# Patient Record
Sex: MALE | Race: WHITE | NOT HISPANIC OR LATINO | Employment: OTHER | URBAN - METROPOLITAN AREA
[De-identification: names, ages, dates, MRNs, and addresses within clinical notes are randomized per-mention and may not be internally consistent; named-entity substitution may affect disease eponyms.]

---

## 2021-04-13 DIAGNOSIS — Z23 ENCOUNTER FOR IMMUNIZATION: ICD-10-CM

## 2022-10-24 ENCOUNTER — APPOINTMENT (EMERGENCY)
Dept: RADIOLOGY | Facility: HOSPITAL | Age: 77
DRG: 163 | End: 2022-10-24
Attending: EMERGENCY MEDICINE
Payer: COMMERCIAL

## 2022-10-24 ENCOUNTER — HOSPITAL ENCOUNTER (INPATIENT)
Facility: HOSPITAL | Age: 77
LOS: 13 days | Discharge: HOME HEALTH | DRG: 163 | End: 2022-11-06
Attending: EMERGENCY MEDICINE | Admitting: INTERNAL MEDICINE
Payer: COMMERCIAL

## 2022-10-24 ENCOUNTER — APPOINTMENT (INPATIENT)
Dept: CARDIOLOGY | Facility: HOSPITAL | Age: 77
DRG: 163 | End: 2022-10-24
Attending: INTERNAL MEDICINE
Payer: COMMERCIAL

## 2022-10-24 DIAGNOSIS — I26.99 BILATERAL PULMONARY EMBOLISM (CMS/HCC): ICD-10-CM

## 2022-10-24 DIAGNOSIS — R79.89 ELEVATED LFTS: ICD-10-CM

## 2022-10-24 DIAGNOSIS — R55 SYNCOPE, UNSPECIFIED SYNCOPE TYPE: ICD-10-CM
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
## 2022-10-24 DIAGNOSIS — I26.02 ACUTE SADDLE PULMONARY EMBOLISM WITH ACUTE COR PULMONALE (CMS/HCC): ICD-10-CM

## 2022-10-24 DIAGNOSIS — I5A MYOCARDIAL INJURY: ICD-10-CM

## 2022-10-24 DIAGNOSIS — S01.81XA CHIN LACERATION, INITIAL ENCOUNTER: ICD-10-CM

## 2022-10-24 PROBLEM — E78.5 HYPERLIPIDEMIA: Status: ACTIVE | Noted: 2022-10-24

## 2022-10-24 PROBLEM — I24.89 DEMAND ISCHEMIA (CMS/HCC): Status: ACTIVE | Noted: 2022-10-24

## 2022-10-24 PROBLEM — I10 HYPERTENSION: Status: ACTIVE | Noted: 2022-10-24

## 2022-10-24 PROBLEM — S02.92XA FACIAL FRACTURE (CMS/HCC): Status: ACTIVE | Noted: 2022-10-24

## 2022-10-24 PROBLEM — E27.49 ADRENAL HEMORRHAGE (CMS/HCC): Status: ACTIVE | Noted: 2022-10-24

## 2022-10-24 LAB
AFP SERPL-MCNC: 1.4 NG/ML
ALBUMIN SERPL-MCNC: 4.1 G/DL (ref 3.4–5)
ALP SERPL-CCNC: 53 IU/L (ref 35–126)
ALT SERPL-CCNC: 256 IU/L (ref 16–63)
ANION GAP SERPL CALC-SCNC: 12 MEQ/L (ref 3–15)
AORTIC ROOT ANNULUS: 3.4 CM
APTT PPP: 30 SEC (ref 23–35)
APTT PPP: 88 SEC (ref 23–35)
AST SERPL-CCNC: 231 IU/L (ref 15–41)
BACTERIA URNS QL MICRO: ABNORMAL /HPF
BASOPHILS # BLD: 0.07 K/UL (ref 0.01–0.1)
BASOPHILS NFR BLD: 0.4 %
BILIRUB SERPL-MCNC: 1.5 MG/DL (ref 0.3–1.2)
BILIRUB UR QL STRIP.AUTO: NEGATIVE MG/DL
BSA FOR ECHO PROCEDURE: 2.1 M2
BUN SERPL-MCNC: 24 MG/DL (ref 8–20)
CALCIUM SERPL-MCNC: 9 MG/DL (ref 8.9–10.3)
CHLORIDE SERPL-SCNC: 99 MEQ/L (ref 98–109)
CLARITY UR REFRACT.AUTO: CLEAR
CO2 SERPL-SCNC: 25 MEQ/L (ref 22–32)
COLOR UR AUTO: YELLOW
CREAT SERPL-MCNC: 1.3 MG/DL (ref 0.8–1.3)
DIFFERENTIAL METHOD BLD: ABNORMAL
E WAVE DECELERATION TIME: 264 MS
E/A RATIO: 0.7
E/E' RATIO: 7.7
E/LAT E' RATIO: 6.4
EOSINOPHIL # BLD: 0.16 K/UL (ref 0.04–0.54)
EOSINOPHIL NFR BLD: 0.9 %
ERYTHROCYTE [DISTWIDTH] IN BLOOD BY AUTOMATED COUNT: 13.1 % (ref 11.6–14.4)
ERYTHROCYTE [DISTWIDTH] IN BLOOD BY AUTOMATED COUNT: 13.2 % (ref 11.6–14.4)
EST RIGHT VENT SYSTOLIC PRESSURE BY TRICUSPID REGURGITATION JET: 58 MMHG
FLUAV RNA SPEC QL NAA+PROBE: NEGATIVE
FLUBV RNA SPEC QL NAA+PROBE: NEGATIVE
FRACTIONAL SHORTENING: 32 %
GFR SERPL CREATININE-BSD FRML MDRD: 53.8 ML/MIN/1.73M*2
GLUCOSE SERPL-MCNC: 236 MG/DL (ref 70–99)
GLUCOSE UR STRIP.AUTO-MCNC: ABNORMAL MG/DL
HCT VFR BLDCO AUTO: 44.3 % (ref 40.1–51)
HCT VFR BLDCO AUTO: 44.7 % (ref 40.1–51)
HGB BLD-MCNC: 15 G/DL (ref 13.7–17.5)
HGB BLD-MCNC: 15.3 G/DL (ref 13.7–17.5)
HGB UR QL STRIP.AUTO: 1
HYALINE CASTS #/AREA URNS LPF: ABNORMAL /LPF
IMM GRANULOCYTES # BLD AUTO: 0.17 K/UL (ref 0–0.08)
IMM GRANULOCYTES NFR BLD AUTO: 1 %
INR PPP: 1.2
INTERVENTRICULAR SEPTUM: 0.88 CM
KETONES UR STRIP.AUTO-MCNC: 1 MG/DL
LA/AORTA RATIO: 1
LAD 2D: 3.4 CM
LEFT INTERNAL DIMENSION IN SYSTOLE: 2.17 CM (ref 3.24–4.91)
LEFT VENTRICULAR INTERNAL DIMENSION IN DIASTOLE: 3.19 CM (ref 5.54–7.69)
LEFT VENTRICULAR POSTERIOR WALL IN END DIASTOLE: 0.91 CM (ref 0.7–1.31)
LEUKOCYTE ESTERASE UR QL STRIP.AUTO: NEGATIVE
LYMPHOCYTES # BLD: 0.84 K/UL (ref 1.2–3.5)
LYMPHOCYTES NFR BLD: 4.8 %
MCH RBC QN AUTO: 29.4 PG (ref 28–33.2)
MCH RBC QN AUTO: 30.1 PG (ref 28–33.2)
MCHC RBC AUTO-ENTMCNC: 33.9 G/DL (ref 32.2–36.5)
MCHC RBC AUTO-ENTMCNC: 34.2 G/DL (ref 32.2–36.5)
MCV RBC AUTO: 86.7 FL (ref 83–98)
MCV RBC AUTO: 87.8 FL (ref 83–98)
MONOCYTES # BLD: 0.74 K/UL (ref 0.3–1)
MONOCYTES NFR BLD: 4.2 %
MUCOUS THREADS URNS QL MICRO: ABNORMAL /LPF
MV E'TISSUE VEL-LAT: 0.08 M/S
MV E'TISSUE VEL-MED: 0.07 M/S
MV PEAK A VEL: 0.72 M/S
MV PEAK E VEL: 0.51 M/S
NEUTROPHILS # BLD: 15.6 K/UL (ref 1.7–7)
NEUTS SEG NFR BLD: 88.7 %
NITRITE UR QL STRIP.AUTO: NEGATIVE
NRBC BLD-RTO: 0 %
PDW BLD AUTO: 9 FL (ref 9.4–12.4)
PDW BLD AUTO: 9 FL (ref 9.4–12.4)
PH UR STRIP.AUTO: 6.5 [PH]
PLATELET # BLD AUTO: 166 K/UL (ref 150–350)
PLATELET # BLD AUTO: 168 K/UL (ref 150–350)
POSTERIOR WALL: 0.91 CM
POTASSIUM SERPL-SCNC: 4.3 MEQ/L (ref 3.6–5.1)
PROT SERPL-MCNC: 7.1 G/DL (ref 6–8.2)
PROT UR QL STRIP.AUTO: 1
PROTHROMBIN TIME: 14.8 SEC (ref 12.2–14.5)
RBC # BLD AUTO: 5.09 M/UL (ref 4.5–5.8)
RBC # BLD AUTO: 5.11 M/UL (ref 4.5–5.8)
RBC #/AREA URNS HPF: ABNORMAL /HPF
RSV RNA SPEC QL NAA+PROBE: NEGATIVE
SARS-COV-2 RNA RESP QL NAA+PROBE: NEGATIVE
SODIUM SERPL-SCNC: 136 MEQ/L (ref 136–144)
SP GR UR REFRACT.AUTO: >1.035
SQUAMOUS URNS QL MICRO: ABNORMAL /HPF
TAPSE: 0.78 CM
TR MAX PG: 48 MMHG
TRICUSPID VALVE PEAK REGURGITATION VELOCITY: 3.47 M/S
TROPONIN I SERPL HS-MCNC: 1150.4 PG/ML
TROPONIN I SERPL HS-MCNC: 1154.2 PG/ML
UROBILINOGEN UR STRIP-ACNC: 0.2 EU/DL
WBC # BLD AUTO: 13.64 K/UL (ref 3.8–10.5)
WBC # BLD AUTO: 17.58 K/UL (ref 3.8–10.5)
WBC #/AREA URNS HPF: ABNORMAL /HPF
Z-SCORE OF LEFT VENTRICULAR DIMENSION IN END DIASTOLE: -7.16
Z-SCORE OF LEFT VENTRICULAR DIMENSION IN END SYSTOLE: -4.81
Z-SCORE OF LEFT VENTRICULAR POSTERIOR WALL IN END DIASTOLE: -0.26

## 2022-10-24 PROCEDURE — G1004 CDSM NDSC: HCPCS

## 2022-10-24 PROCEDURE — 80074 ACUTE HEPATITIS PANEL: CPT | Performed by: INTERNAL MEDICINE

## 2022-10-24 PROCEDURE — 93005 ELECTROCARDIOGRAM TRACING: CPT | Performed by: PHYSICIAN ASSISTANT

## 2022-10-24 PROCEDURE — 85610 PROTHROMBIN TIME: CPT | Performed by: PHYSICIAN ASSISTANT

## 2022-10-24 PROCEDURE — 25800000 HC PHARMACY IV SOLUTIONS: Performed by: INTERNAL MEDICINE

## 2022-10-24 PROCEDURE — 20600000 HC ROOM AND CARE INTERMEDIATE/TELEMETRY

## 2022-10-24 PROCEDURE — 81001 URINALYSIS AUTO W/SCOPE: CPT | Performed by: PHYSICIAN ASSISTANT

## 2022-10-24 PROCEDURE — 71045 X-RAY EXAM CHEST 1 VIEW: CPT

## 2022-10-24 PROCEDURE — 85730 THROMBOPLASTIN TIME PARTIAL: CPT | Performed by: INTERNAL MEDICINE

## 2022-10-24 PROCEDURE — 86803 HEPATITIS C AB TEST: CPT | Performed by: INTERNAL MEDICINE

## 2022-10-24 PROCEDURE — 82105 ALPHA-FETOPROTEIN SERUM: CPT | Performed by: INTERNAL MEDICINE

## 2022-10-24 PROCEDURE — 63600000 HC DRUGS/DETAIL CODE: Performed by: PHYSICIAN ASSISTANT

## 2022-10-24 PROCEDURE — 12011 RPR F/E/E/N/L/M 2.5 CM/<: CPT

## 2022-10-24 PROCEDURE — 82378 CARCINOEMBRYONIC ANTIGEN: CPT | Performed by: NURSE PRACTITIONER

## 2022-10-24 PROCEDURE — 36415 COLL VENOUS BLD VENIPUNCTURE: CPT | Performed by: PHYSICIAN ASSISTANT

## 2022-10-24 PROCEDURE — 85027 COMPLETE CBC AUTOMATED: CPT | Performed by: INTERNAL MEDICINE

## 2022-10-24 PROCEDURE — 63700000 HC SELF-ADMINISTRABLE DRUG: Performed by: INTERNAL MEDICINE

## 2022-10-24 PROCEDURE — 87637 SARSCOV2&INF A&B&RSV AMP PRB: CPT | Performed by: PHYSICIAN ASSISTANT

## 2022-10-24 PROCEDURE — 80053 COMPREHEN METABOLIC PANEL: CPT | Performed by: PHYSICIAN ASSISTANT

## 2022-10-24 PROCEDURE — 63600000 HC DRUGS/DETAIL CODE: Performed by: INTERNAL MEDICINE

## 2022-10-24 PROCEDURE — 85025 COMPLETE CBC W/AUTO DIFF WBC: CPT | Performed by: PHYSICIAN ASSISTANT

## 2022-10-24 PROCEDURE — 86301 IMMUNOASSAY TUMOR CA 19-9: CPT | Performed by: NURSE PRACTITIONER

## 2022-10-24 PROCEDURE — 0HQ1XZZ REPAIR FACE SKIN, EXTERNAL APPROACH: ICD-10-PCS | Performed by: EMERGENCY MEDICINE

## 2022-10-24 PROCEDURE — 84484 ASSAY OF TROPONIN QUANT: CPT | Mod: 91 | Performed by: PHYSICIAN ASSISTANT

## 2022-10-24 PROCEDURE — 93306 TTE W/DOPPLER COMPLETE: CPT

## 2022-10-24 PROCEDURE — 96374 THER/PROPH/DIAG INJ IV PUSH: CPT | Mod: 59

## 2022-10-24 PROCEDURE — 84484 ASSAY OF TROPONIN QUANT: CPT | Performed by: PHYSICIAN ASSISTANT

## 2022-10-24 PROCEDURE — 63600105 HC IODINE BASED CONTRAST: Performed by: PHYSICIAN ASSISTANT

## 2022-10-24 PROCEDURE — 85730 THROMBOPLASTIN TIME PARTIAL: CPT | Performed by: PHYSICIAN ASSISTANT

## 2022-10-24 PROCEDURE — 12051 INTMD RPR FACE/MM 2.5 CM/<: CPT | Performed by: PHYSICIAN ASSISTANT

## 2022-10-24 PROCEDURE — 99285 EMERGENCY DEPT VISIT HI MDM: CPT | Mod: 25

## 2022-10-24 PROCEDURE — 99223 1ST HOSP IP/OBS HIGH 75: CPT | Performed by: INTERNAL MEDICINE

## 2022-10-24 RX ORDER — ATORVASTATIN CALCIUM 20 MG/1
20 TABLET, FILM COATED ORAL EVERY EVENING
COMMUNITY

## 2022-10-24 RX ORDER — LATANOPROST 50 UG/ML
1 SOLUTION/ DROPS OPHTHALMIC NIGHTLY
Status: DISCONTINUED | OUTPATIENT
Start: 2022-10-24 | End: 2022-11-06 | Stop reason: HOSPADM

## 2022-10-24 RX ORDER — TIMOLOL MALEATE 5 MG/ML
1 SOLUTION/ DROPS OPHTHALMIC EVERY MORNING
COMMUNITY
Start: 2022-10-02

## 2022-10-24 RX ORDER — AMLODIPINE BESYLATE 10 MG/1
5 TABLET ORAL EVERY EVENING
COMMUNITY
Start: 2022-08-17 | End: 2022-11-06 | Stop reason: HOSPADM

## 2022-10-24 RX ORDER — AMOXICILLIN AND CLAVULANATE POTASSIUM 875; 125 MG/1; MG/1
1 TABLET, FILM COATED ORAL 2 TIMES DAILY WITH MEALS
Status: DISCONTINUED | OUTPATIENT
Start: 2022-10-24 | End: 2022-10-24

## 2022-10-24 RX ORDER — TRANDOLAPRIL 4 MG/1
4 TABLET ORAL EVERY EVENING
COMMUNITY
Start: 2022-08-16 | End: 2022-11-06 | Stop reason: HOSPADM

## 2022-10-24 RX ORDER — PANTOPRAZOLE SODIUM 40 MG/1
40 TABLET, DELAYED RELEASE ORAL DAILY
Status: DISCONTINUED | OUTPATIENT
Start: 2022-10-24 | End: 2022-11-06 | Stop reason: HOSPADM

## 2022-10-24 RX ORDER — NITROGLYCERIN 0.4 MG/1
0.4 TABLET SUBLINGUAL EVERY 5 MIN PRN
Status: DISCONTINUED | OUTPATIENT
Start: 2022-10-24 | End: 2022-11-06 | Stop reason: HOSPADM

## 2022-10-24 RX ORDER — LATANOPROST 50 UG/ML
1 SOLUTION/ DROPS OPHTHALMIC NIGHTLY
COMMUNITY

## 2022-10-24 RX ORDER — HEPARIN SODIUM 10000 [USP'U]/100ML
100-4000 INJECTION, SOLUTION INTRAVENOUS
Status: DISCONTINUED | OUTPATIENT
Start: 2022-10-24 | End: 2022-10-29

## 2022-10-24 RX ORDER — IBUPROFEN 200 MG
16-32 TABLET ORAL AS NEEDED
Status: DISCONTINUED | OUTPATIENT
Start: 2022-10-24 | End: 2022-11-06 | Stop reason: HOSPADM

## 2022-10-24 RX ORDER — ALBUTEROL SULFATE 0.83 MG/ML
5 SOLUTION RESPIRATORY (INHALATION) EVERY 6 HOURS PRN
Status: DISCONTINUED | OUTPATIENT
Start: 2022-10-24 | End: 2022-11-06 | Stop reason: HOSPADM

## 2022-10-24 RX ORDER — ESOMEPRAZOLE MAGNESIUM 40 MG/1
40 CAPSULE, DELAYED RELEASE ORAL EVERY EVENING
COMMUNITY
Start: 2022-10-13

## 2022-10-24 RX ORDER — ACETAMINOPHEN 325 MG/1
650 TABLET ORAL EVERY 4 HOURS PRN
Status: DISCONTINUED | OUTPATIENT
Start: 2022-10-24 | End: 2022-11-06 | Stop reason: HOSPADM

## 2022-10-24 RX ORDER — DEXTROSE 40 %
15-30 GEL (GRAM) ORAL AS NEEDED
Status: DISCONTINUED | OUTPATIENT
Start: 2022-10-24 | End: 2022-11-06 | Stop reason: HOSPADM

## 2022-10-24 RX ORDER — HYDROCHLOROTHIAZIDE 12.5 MG/1
12.5 TABLET ORAL EVERY MORNING
COMMUNITY
Start: 2022-09-05 | End: 2022-11-06 | Stop reason: HOSPADM

## 2022-10-24 RX ORDER — METOPROLOL SUCCINATE 25 MG/1
25 TABLET, EXTENDED RELEASE ORAL EVERY EVENING
COMMUNITY
Start: 2022-09-06

## 2022-10-24 RX ORDER — SODIUM CHLORIDE 9 MG/ML
INJECTION, SOLUTION INTRAVENOUS CONTINUOUS
Status: ACTIVE | OUTPATIENT
Start: 2022-10-24 | End: 2022-10-25

## 2022-10-24 RX ORDER — TIMOLOL MALEATE 5 MG/ML
1 SOLUTION/ DROPS OPHTHALMIC EVERY MORNING
Status: DISCONTINUED | OUTPATIENT
Start: 2022-10-25 | End: 2022-11-06 | Stop reason: HOSPADM

## 2022-10-24 RX ORDER — DEXTROSE 50 % IN WATER (D50W) INTRAVENOUS SYRINGE
25 AS NEEDED
Status: DISCONTINUED | OUTPATIENT
Start: 2022-10-24 | End: 2022-11-06 | Stop reason: HOSPADM

## 2022-10-24 RX ADMIN — HEPARIN SODIUM 1550 UNITS/HR: 10000 INJECTION, SOLUTION INTRAVENOUS at 22:57

## 2022-10-24 RX ADMIN — IOHEXOL 100 ML: 350 INJECTION, SOLUTION INTRAVENOUS at 09:31

## 2022-10-24 RX ADMIN — LATANOPROST 1 DROP: 50 SOLUTION OPHTHALMIC at 22:59

## 2022-10-24 RX ADMIN — PANTOPRAZOLE SODIUM 40 MG: 40 TABLET, DELAYED RELEASE ORAL at 15:20

## 2022-10-24 RX ADMIN — HEPARIN SODIUM 1550 UNITS/HR: 10000 INJECTION, SOLUTION INTRAVENOUS at 11:46

## 2022-10-24 RX ADMIN — SODIUM CHLORIDE: 9 INJECTION, SOLUTION INTRAVENOUS at 15:18

## 2022-10-24 ASSESSMENT — ENCOUNTER SYMPTOMS
DYSURIA: 0
CHILLS: 0
AGITATION: 0
SEIZURES: 0
PALPITATIONS: 0
ARTHRALGIAS: 0
BACK PAIN: 0
SORE THROAT: 0
SHORTNESS OF BREATH: 1
ABDOMINAL PAIN: 0
FEVER: 0
COLOR CHANGE: 0
CONFUSION: 0
COUGH: 0
HEMATURIA: 0
VOMITING: 1
EYE PAIN: 0

## 2022-10-24 NOTE — Clinical Note
Patient placed on procedure table in supine position with arms at side. Positioning devices: all pressure points padded, arm board under arms and safety strap applied.

## 2022-10-24 NOTE — ED ATTESTATION NOTE
I have personally seen and examined the patient.  I reviewed and agree with physician assistant / nurse practitioners assessment and plan of care, with the following exceptions: None  My examination, assessment, and plan of care of  Tim Humphries is as follows:       Vital Signs Review: Vital signs have been reviewed. The oxygen saturation is  SpO2: (!) 90 % which is  Hypoxic.    The patient is a 77-year-old male with past medical history of GERD, pericarditis, hypertension, heart murmur, sleep apnea, glaucoma, who presented to the emergency department for evaluation of fall with unresponsive episode.  The patient was found to have hypoxia on arrival by EMS.  Grunting respirations.  The patient's mental status improved and is currently awake and alert but does not recall the cause of him falling.  The patient did have recent COVID infection 2 months ago.  The patient recovered uneventfully and received COVID booster vaccination 1 week ago.  Patient has reported increased dyspnea with exertion over the past 4 days.  Patient denies fever, chills, cough, nausea, vomiting, diarrhea, chest pain, abdominal pain, lower extremity pain, edema, or rash.  Approximately 1 month ago the patient's wife reported left calf discomfort that has since resolved.  No recent immobilization or surgeries.    Physical exam: Vital signs reviewed.  General: Patient appears comfortable lying in bed.  HEENT: No scalp laceration.  Right chin laceration, EOMI, MMM.  PERRLA.  No facial tenderness.  Neck: Supple, nontender.  No JVD.  C-collar present.  Chest: Lungs clear to auscultation bilaterally, no rales.  No increased work of breathing.  No accessory muscle use.  Heart: Regular.  No murmur.  Abdomen: Soft, nontender, nondistended, no guarding, no rebound.  Extremities: No cyanosis, clubbing, edema, or calf tenderness.  No external signs of traumatic injury.  Skin: Warm and dry.  Neuro: GCS 15.  5/5 strength bilateral upper and lower  extremities, sensation normal, cranial nerves II to XII intact.  Affect: Normal.    Plan: Check labs, EKG, CT scan chest PE protocol rule out PE.  CT scan brain rule out traumatic injuries.    Labs Reviewed   CBC AND DIFF - Abnormal       Result Value    WBC 17.58 (*)     RBC 5.09      Hemoglobin 15.3      Hematocrit 44.7      MCV 87.8      MCH 30.1      MCHC 34.2      RDW 13.2      Platelets 168      MPV 9.0 (*)     Differential Type Auto      nRBC 0.0      Immature Granulocytes 1.0      Neutrophils 88.7      Lymphocytes 4.8      Monocytes 4.2      Eosinophils 0.9      Basophils 0.4      Immature Granulocytes, Absolute 0.17 (*)     Neutrophils, Absolute 15.60 (*)     Lymphocytes, Absolute 0.84 (*)     Monocytes, Absolute 0.74      Eosinophils, Absolute 0.16      Basophils, Absolute 0.07     PROTIME-INR - Abnormal    PT 14.8 (*)     INR 1.2     COMPREHENSIVE METABOLIC PANEL - Abnormal    Sodium 136      Potassium 4.3      Chloride 99      CO2 25      BUN 24 (*)     Creatinine 1.3      Glucose 236 (*)     Calcium 9.0      AST (SGOT) 231 (*)     ALT (SGPT) 256 (*)     Alkaline Phosphatase 53      Total Protein 7.1      Albumin 4.1      Bilirubin, Total 1.5 (*)     eGFR 53.8 (*)     Anion Gap 12     HIGH SENSITIVE TROPONIN I (BASELINE - REFLEX 2HR) - Abnormal    High Sens Troponin I 1,154.2 (*)    UA REFLEX CULTURE (MACROSCOPIC) - Abnormal    Color, Urine Yellow      Clarity, Urine Clear      Specific Gravity, Urine >1.035 (*)     pH, Urine 6.5      Leukocyte Esterase Negative      Nitrite, Urine Negative      Protein, Urine +1 (*)     Glucose, Urine Trace (*)     Ketones, Urine +1 (*)     Urobilinogen, Urine 0.2      Bilirubin, Urine Negative      Blood, Urine +1 (*)    HIGH SENSITIVE TROPONIN I (NO REFLEX) - Abnormal    High Sens Troponin I 1,150.4 (*)    UA MICROSCOPIC (UCU) - Abnormal    RBC, Urine 10 TO 19 (*)     WBC, Urine 0 TO 3      Squamous Epithelial None Seen      Hyaline Cast 3 TO 9 (*)     Bacteria, Urine  None Seen      MUCUSUA Rare (*)    SARS-COV-2 (COVID-19)/ FLU A/B, AND RSV, PCR - Normal    SARS-CoV-2 (COVID-19) Negative      Influenza A Negative      Influenza B Negative      Respiratory Syncytial Virus Negative      Narrative:     Testing performed using real-time PCR for detection of COVID-19. EUA approved validation studies performed on site.    PTT - Normal    PTT 30     SARS-COV-2 (COVID 19), PCR    Narrative:     The following orders were created for panel order SARS-CoV-2 (COVID-19), PCR Nasopharynx.  Procedure                               Abnormality         Status                     ---------                               -----------         ------                     SARS-COV-2 (COVID-19)/ F...[[id number]]  Normal              Final result                 Please view results for these tests on the individual orders.   URINALYSIS REFLEX CULTURE (ED AND OUTPATIENT ONLY)    Narrative:     The following orders were created for panel order UA with reflex culture.  Procedure                               Abnormality         Status                     ---------                               -----------         ------                     UA Reflex to Culture (Ma...[[id number]]  Abnormal            Final result               UA Microscopic[[id number]]               Abnormal            Final result                 Please view results for these tests on the individual orders.   RAINBOW DRAW PANEL    Narrative:     The following orders were created for panel order [facility] Draw Panel.  Procedure                               Abnormality         Status                     ---------                               -----------         ------                     RAINBOW RED[[id number]]                                      In process                 RAINBOW PINK[[id number]]                                     In process                 RAINBOW GOLD[[id number]]                                     In process                    Please view results for these tests on the individual orders.   PTT   CBC   CBC   HEPATITIS PANEL, ACUTE   HEPATITIS C ANTIBODY   AFP TUMOR MARKER   RAINBOW RED   RAINBOW PINK   RAINBOW GOLD     X-RAY CHEST 1 VIEW   Final Result   IMPRESSION:   No evidence of acute pulmonary disease.            CT ANGIOGRAPHY CHEST PULMONARY EMBOLISM WITH IV CONTRAST   Final Result   IMPRESSION:   1.  Extensive acute pulmonary arterial emboli involving the bilateral main and   multiple bilateral lobar, segmental and subsegmental branches, as detailed   above. Flattening of the interventricular septum with dilatation of the right   ventricle suggestive of right-sided heart strain. No evidence of acute pulmonary   infarct.   2.  Large, indeterminate infiltrative hypodense lesion within the right hepatic   lobe measuring up to 7 cm warranting further assessment with contrast-enhanced   MRI. Differential diagnosis includes metastatic disease, a primary malignancy as   well as abscess. Small adjacent similar appearing indeterminate right hepatic   lobe lesion.   3.  Indeterminate right adrenal mass with findings consistent with right adrenal   hemorrhage.   4.  A 10 mm groundglass nodule within the right middle lobe. A follow-up CT scan   of the chest in 6-12 months is recommended as per Fleischner guidelines.   5.  Additional incidental findings including ectasia of ascending thoracic   aorta, enlarged prostate gland, coronary arterial atherosclerotic vascular   disease and colonic diverticulosis without evidence of diverticulitis.         Findings and recommendations discussed with SUZANNA Muñoz by Dr. Thompson by   telephone at 9:54 AM and 10:21 AM on 10/24/2022.      Fleischner Society 2017 Guidelines for Management of Incidentally Detected   Pulmonary Nodules in Adults. (Subsolid Nodules)      Single ground glass:   <6 mm - No routine follow-up   >/= 6 mm - CT at 6-12 months to confirm persistence, then CT every 2 years until   5  years   (In certain suspicious nodules < 6 mm, consider follow-up at 2 and 4 years.  If   solid component(s) or growth develops, consider resection (Recommendations 3A   and 4A)            CT ABDOMEN PELVIS WITH IV CONTRAST   Final Result   IMPRESSION:   1.  Extensive acute pulmonary arterial emboli involving the bilateral main and   multiple bilateral lobar, segmental and subsegmental branches, as detailed   above. Flattening of the interventricular septum with dilatation of the right   ventricle suggestive of right-sided heart strain. No evidence of acute pulmonary   infarct.   2.  Large, indeterminate infiltrative hypodense lesion within the right hepatic   lobe measuring up to 7 cm warranting further assessment with contrast-enhanced   MRI. Differential diagnosis includes metastatic disease, a primary malignancy as   well as abscess. Small adjacent similar appearing indeterminate right hepatic   lobe lesion.   3.  Indeterminate right adrenal mass with findings consistent with right adrenal   hemorrhage.   4.  A 10 mm groundglass nodule within the right middle lobe. A follow-up CT scan   of the chest in 6-12 months is recommended as per Fleischner guidelines.   5.  Additional incidental findings including ectasia of ascending thoracic   aorta, enlarged prostate gland, coronary arterial atherosclerotic vascular   disease and colonic diverticulosis without evidence of diverticulitis.         Findings and recommendations discussed with SUZANNA Muñoz by Dr. Thompson by   telephone at 9:54 AM and 10:21 AM on 10/24/2022.      Fleischner Society 2017 Guidelines for Management of Incidentally Detected   Pulmonary Nodules in Adults. (Subsolid Nodules)      Single ground glass:   <6 mm - No routine follow-up   >/= 6 mm - CT at 6-12 months to confirm persistence, then CT every 2 years until   5 years   (In certain suspicious nodules < 6 mm, consider follow-up at 2 and 4 years.  If   solid component(s) or growth develops,  consider resection (Recommendations 3A   and 4A)            CT HEAD WITHOUT IV CONTRAST   Final Result   IMPRESSION:      1.  No posttraumatic intracranial abnormality.   2. Chronic changes noted under the comment.      CT CERVICAL SPINE WITHOUT IV CONTRAST   Final Result   IMPRESSION:      1.  No acute fractures. As clinically indicated, MRI of the cervical spine is   recommended for further evaluation.   2.  Other incidental findings noted under the comment.            CT FACIAL BONES WITHOUT IV CONTRAST   Final Result   IMPRESSION:   Multiple right-sided facial bone fractures as described under the comment.      ECG 12 lead         Transthoracic Echo (TTE) Complete    (Results Pending)   IR CONSULT    (Results Pending)     1456: Patient's test results were reviewed.  Vital signs reviewed.  Patient has evidence of extensive bilateral PE.  Patient did have abnormal findings on CT scan evaluation with indeterminate right adrenal mass with findings consistent of adrenal hemorrhage.  Case was discussed with on-call trauma surgeon who recommended continuation of plan for anticoagulation as patient's bilateral PE are potentially life-threatening.    Critical Care  Performed by: Henri Glover MD  Authorized by: Henri Glover MD     Critical care provider statement:     Critical care time (minutes):  120    Critical care time was exclusive of:  Separately billable procedures and treating other patients    Critical care was necessary to treat or prevent imminent or life-threatening deterioration of the following conditions:  Respiratory failure    Critical care was time spent personally by me on the following activities:  Ordering and performing treatments and interventions, ordering and review of laboratory studies, ordering and review of radiographic studies, pulse oximetry, re-evaluation of patient's condition, evaluation of patient's response to treatment, examination of patient and obtaining history from  patient or surrogate    I assumed direction of critical care for this patient from another provider in my specialty: no            Impression: Acute fall.  Acute loss of consciousness.  Acute respiratory failure with hypoxia.  Acute extensive bilateral pulmonary emboli.  Hepatic mass.  Multiple facial fractures.       I was physically present for the key/critical portions of the following procedures: None     Henri Glover MD  10/24/22 3175

## 2022-10-24 NOTE — H&P
Hospital Medicine Service -  History & Physical        CHIEF COMPLAINT   Fall, syncope     HISTORY OF PRESENT ILLNESS      Tim Humphries is a 77 y.o. male with a past medical history of hypertension, GERD, FLAVIA, hyperlipidemia who presents with syncope, fall.  Wife is at bedside who assisted with history.  For 1 is only patient had COVID about 2-3 months ago which he fully recovered without event.  After he recovered he went to get his COVID-vaccine about 2 weeks ago.  For the last 4 to 5 days patient has noted increased dyspnea with exertion and shortness of breath.  Today while coming down stair he passed out and fell down half a flight of stair.  Wife was close by it and found him down on the floor unresponsive.  Wife report patient was unconscious for about 10 minutes.  When EMS arrived patient was found to have grunting respiration with O2 sat at 86% on room air.  Was then brought to [facility] emergency room.  Patient also report he has some left leg swelling approximately 3 to 4 weeks ago which resolved by itself.    In ER he was found to have extensive PE with right heart strain.  He was found to be hypoxic and put on 4 L oxygen.  His blood pressure remained stable currently.  Mentation intact.  Patient CAT scan also revealed multiple right displaced facial fracture as well as adrenal hemorrhage.  ER PA discussed case with trauma surgery.  Due to patient extensive PE benefit of anticoagulation currently outweigh risk for bleed.  Trauma surgery recommend to start Eliquis and monitor hemoglobin closely.    PAST MEDICAL AND SURGICAL HISTORY      Past Medical History:   Diagnosis Date    Dyslipidemia     GERD (gastroesophageal reflux disease)     Glaucoma     Heart murmur     HTN (hypertension)     Implantable loop recorder present     Pericarditis     Sleep apnea        Past Surgical History:   Procedure Laterality Date    CARDIAC CATHETERIZATION      CATARACT EXTRACTION         PCP: Zachary Gutierrez  MD SATHYA    MEDICATIONS      Prior to Admission medications    Medication Sig Start Date End Date Taking? Authorizing Provider   amLODIPine (NORVASC) 10 mg tablet Take 5 mg by mouth every evening. 8/17/22  Yes Saniya Gomez MD   atorvastatin (LIPITOR) 20 mg tablet Take 20 mg by mouth every evening.   Yes Saniya Gomez MD   esomeprazole (NexIUM) 40 mg capsule Take 40 mg by mouth every evening. 10/13/22  Yes Saniya Gomez MD   hydrochlorothiazide (HYDRODIURIL) 12.5 mg tablet Take 12.5 mg by mouth every morning. 9/5/22  Yes Saniya Gomez MD   latanoprost (XALATAN) 0.005 % ophthalmic solution Administer 1 drop into both eyes nightly.   Yes Saniya Gomez MD   metoprolol succinate XL (TOPROL-XL) 25 mg 24 hr tablet Take 25 mg by mouth every evening. 9/6/22  Yes Saniya Gomez MD   timolol (TIMOPTIC) 0.5 % ophthalmic solution Administer 1 drop into the left eye every morning. 10/2/22  Yes ProviderSaniya MD   trandolapriL (MAVIK) 4 mg tablet Take 4 mg by mouth every evening. 8/16/22  Yes ProviderSaniya MD       ALLERGIES      Patient has no known allergies.    FAMILY HISTORY      History reviewed. No pertinent family history.    SOCIAL HISTORY      Social History     Socioeconomic History    Marital status:      Spouse name: None    Number of children: None    Years of education: None    Highest education level: None   Tobacco Use    Smoking status: Never    Smokeless tobacco: Never   Substance and Sexual Activity    Alcohol use: Not Currently     Comment: occasional wine    Drug use: Never     Social Determinants of Health     Food Insecurity: No Food Insecurity    Worried About Running Out of Food in the Last Year: Never true    Ran Out of Food in the Last Year: Never true       REVIEW OF SYSTEMS      All other systems reviewed and negative except as noted in HPI    PHYSICAL EXAMINATION      Temp:  [36.8 °C (98.2 °F)]  36.8 °C (98.2 °F)  Heart Rate:  [82-94] 88  Resp:  [14-24] 20  BP: (109-137)/(69-86) 118/77  Body mass index is 24.91 kg/m².    Physical Exam  Physical Exam   Constitutional:  appears well-developed and well-nourished. No distress.   HENT:   Head: Normocephalic. Swelling, ecchymosis right periorbital area, right chin suture.    Right Ear: External ear normal.   Left Ear: External ear normal.   Mouth/Throat: Oropharynx is clear and moist.   Eyes: Conjunctivae and EOM are normal. Right eye exhibits no discharge. Left eye exhibits no discharge.   Neck: Normal range of motion. Neck supple. No tracheal deviation present.   Cardiovascular: Normal rate, regular rhythm and normal heart sounds.  Exam reveals no gallop and no friction rub.    No murmur heard.  Pulmonary/Chest: mild conversational dyspnea. No stridor. No respiratory distress.  has no wheezes.  has no rales.   Abdominal: Soft. Bowel sounds are normal. exhibits no distension. There is no tenderness. There is no rebound and no guarding.   Musculoskeletal:  exhibits no edema or deformity.   Neurological:  is alert, awake, oriented x3   Skin: Skin is warm and dry, not diaphoretic.     LABS / IMAGING / EKG        Labs  Cbc  CMP Results       10/24/22     0830        K 4.3    Cl 99    CO2 25    Glucose 236    BUN 24    Creatinine 1.3    Calcium 9.0    Anion Gap 12            Albumin 4.1    EGFR 53.8         Comment for K at 0830 on 10/24/22: Results obtained on plasma. Plasma Potassium values may be up to 0.4 mEQ/L less than serum values. The differences may be greater for patients with high platelet or white cell counts.          SARS-CoV-2 (COVID-19) (no units)   Date/Time Value   10/24/2022 0839 Negative       ASSESSMENT AND PLAN           Elevated LFTs  Assessment & Plan  AST ALT elevated.  Patient and wife report he did not have any prior liver problem.  CT abdomen pelvis revealed a large hypodense liver lesion 7 cm  -Monitor LFT  -GI  consult  -check AFP, hepatitis panel  -Will need MRI evaluation once pt is more stable medically     Facial fracture (CMS/HCC)  Assessment & Plan  Multiple displaced facial fracture on CT scan  -trauma surgery and facial surgery c/s. Antibiotic per facial surgery.     Adrenal hemorrhage (CMS/HCC)  Assessment & Plan  CT abdomen pelvis with potential adrenal hemorrhage.  ER NP had discussed this with trauma surgery.  Okay to anticoagulate due to benefit greater than risk.  -Monitor CBC every 6, if hemoglobin dropping then will need to get stat CTA of the abdomen pelvis to assess for active bleed    Fall  Assessment & Plan  Fell this AM and passed out.   CT with facial bone fracture with displacement, CT a/p with potential adrenal hemorrhage?   -PT/OT when patient is more medically stable  -SW c/s    Hyperlipidemia  Assessment & Plan  Due to elevated LFT, will hold Lipitor for now until further GI eval    Demand ischemia (CMS/HCC)  Assessment & Plan  Patient has significantly elevated high-sensitivity troponin.  This is likely due to patient's PE leading to right heart strain.  Doubt ACS  -Check TTE  -Cardiology consult    Hypertension  Assessment & Plan  At home patient is on Norvasc, hydrochlorothiazide, metoprolol  -Due to extensive PE and right heart strain, will hold all blood pressure medication for now.    * Bilateral pulmonary embolism (CMS/HCC)  Assessment & Plan  CT with extensive bilateral PE with signs for right heart strain as well as elevated troponin.  Currently on 4 L oxygen, blood pressure stable.  Likely provoked with recent COVID with also concerning finding in the liver?   -Admit to PCU  -Continue heparin drip and monitor hemoglobin closely.  -Pulmonology consult-discussed with pulm who will assess patient today  -Continuous pulse ox  -Albuterol as needed  -If hemodynamically become unstable then will need to be upgraded to ICU       VTE Assessment: Padua    VTE Prophylaxis: Current  anticoagulants:  heparin (porcine) bolus from bag 3,400-6,850 Units, intravenous, q6h PRN  heparin infusion in D5W 100 units/mL, intravenous, Titrated      Code Status: Full Code      Discussed advanced care planning. Full code- discussed with pt  Estimated Discharge Date: 10/31/2022  Disposition Planning: need >2 MN     Cheryl Bradford DO  10/24/2022

## 2022-10-24 NOTE — ED PROVIDER NOTES
Emergency Medicine Note  HPI   HISTORY OF PRESENT ILLNESS     The patient is a 77-year-old male with past medical history of hypertension, hyperlipidemia, coronary artery disease, sleep apnea, and GERD who presents today for evaluation of a fall with loss of consciousness.  The patient does not recall the details of the fall.  The wife was at home during the fall and was at his side quickly after hearing him.  She reports he fell down approximately half of the steps in their home.  He was unconscious for approximately 10 minutes until EMS arrived.  EMS reported patient was having grunting respirations upon their arrival and was hypoxic at 86% on room air.  His oxygen improved quickly on nonrebreather.  Patient states he has been short of breath for the past 5 days.  He reports receiving a COVID booster vaccination last week prior to onset of his symptoms.  He also reports having right leg pain which has now resolved.  Denies any history of respiratory problems or hypoxia in the past.  Patient denies headache.  He vomited once in the ambulance.  Patient does not take any blood thinners. The patient had COVID in August 2022.       History provided by:  Patient   used: No    Trauma  Mechanism of injury: fall     Current symptoms:       Associated symptoms:             Reports vomiting.             Denies abdominal pain, back pain, chest pain and seizures.         Patient History   PAST HISTORY     Reviewed from Nursing Triage:  Tobacco  Allergies  Meds  Problems  Med Hx  Surg Hx  Fam Hx  Soc   Hx      Past Medical History:   Diagnosis Date    Dyslipidemia     GERD (gastroesophageal reflux disease)     Glaucoma     Heart murmur     HTN (hypertension)     Implantable loop recorder present     Pericarditis     Sleep apnea        Past Surgical History:   Procedure Laterality Date    CARDIAC CATHETERIZATION      CATARACT EXTRACTION         History reviewed. No pertinent family  history.    Social History     Tobacco Use    Smoking status: Never    Smokeless tobacco: Never   Substance Use Topics    Alcohol use: Not Currently     Comment: occasional wine    Drug use: Never         Review of Systems   REVIEW OF SYSTEMS     Review of Systems   Constitutional: Negative for chills and fever.   HENT: Negative for ear pain and sore throat.    Eyes: Negative for pain and visual disturbance.   Respiratory: Positive for shortness of breath. Negative for cough.    Cardiovascular: Negative for chest pain and palpitations.   Gastrointestinal: Positive for vomiting. Negative for abdominal pain.   Endocrine: Negative for polyuria.   Genitourinary: Negative for dysuria and hematuria.   Musculoskeletal: Negative for arthralgias and back pain.   Skin: Negative for color change and rash.   Neurological: Positive for syncope. Negative for seizures.   Psychiatric/Behavioral: Negative for agitation and confusion.         VITALS     ED Vitals    Date/Time Temp Pulse Resp BP SpO2 [facility]   10/24/22 1502 -- 88 28 138/95 94 % SC   10/24/22 1501 -- -- -- 138/80 -- KM   10/24/22 1432 -- 86 30 138/80 95 % SC   10/24/22 1402 -- 84 23 127/80 93 % SC   10/24/22 1344 -- 84 22 122/79 93 % SC   10/24/22 1214 -- 88 20 118/77 93 % ALB   10/24/22 1145 -- 82 23 116/75 92 % ALB   10/24/22 1032 -- 84 24 109/69 92 % SC   10/24/22 0932 -- 94 21 137/79 94 % SC   10/24/22 0900 36.8 °C (98.2 °F) 93 16 127/80 95 % SC   10/24/22 0825 -- -- -- -- 95 % SC   10/24/22 0824 -- 92 14 128/86 90 % SC        Pulse Ox %: 90 % (on 2 L NC) (10/24/22 0829)  Pulse Ox Interpretation: Low (10/24/22 0829)  Heart Rate: 96 (10/24/22 0829)  Rhythm Strip Interpretation: Normal Sinus Rhythm (10/24/22 0829)     Physical Exam   PHYSICAL EXAM     Physical Exam  Vitals and nursing note reviewed.   Constitutional:       General: He is not in acute distress.     Appearance: Normal appearance. He is well-developed and well-nourished. He is not diaphoretic.   HENT:       Head: Normocephalic. No raccoon eyes or Dyson's sign.        Mouth/Throat:      Mouth: Oropharynx is clear and moist and mucous membranes are normal.   Eyes:      Extraocular Movements: Extraocular movements intact.      Conjunctiva/sclera: Conjunctivae normal.      Pupils: Pupils are equal, round, and reactive to light.   Neck:      Trachea: Trachea normal.      Comments: C-collar in place  Cardiovascular:      Rate and Rhythm: Normal rate and regular rhythm.      Pulses: Intact distal pulses.      Heart sounds: Normal heart sounds. No murmur heard.  Pulmonary:      Effort: Pulmonary effort is normal. No respiratory distress.      Breath sounds: Normal breath sounds. No wheezing, rhonchi or rales.   Chest:      Chest wall: No tenderness.   Abdominal:      General: Bowel sounds are normal. There is no distension.      Palpations: Abdomen is soft. There is no hepatosplenomegaly or mass.      Tenderness: There is no abdominal tenderness. There is no guarding or rebound.   Musculoskeletal:      Right shoulder: Normal.      Left shoulder: Normal.      Cervical back: Normal.      Thoracic back: Normal.      Lumbar back: Normal.      Right hip: Normal.      Left hip: Normal.   Skin:     General: Skin is warm and dry.      Coloration: Skin is not pale.      Findings: No rash.   Neurological:      General: No focal deficit present.      Mental Status: He is alert and oriented to person, place, and time.      Cranial Nerves: No cranial nerve deficit.      Sensory: No sensory deficit.      Motor: Motor strength is normal. No weakness.   Psychiatric:         Mood and Affect: Mood and affect normal.         Speech: Speech normal.         Behavior: Behavior normal. Behavior is cooperative.         Thought Content: Thought content normal.         Judgment: Judgment normal.           PROCEDURES     Laceration Repair    Date/Time: 10/24/2022 11:33 AM  Performed by: Ankita Muñoz PA C  Authorized by: Henri Glover MD      Consent:     Consent obtained:  Verbal    Consent given by:  Patient    Risks discussed:  Infection, pain, poor cosmetic result, retained foreign body, poor wound healing, need for additional repair, nerve damage, tendon damage and vascular damage    Alternatives discussed:  No treatment  Universal protocol:     Patient identity confirmed:  Verbally with patient and arm band  Anesthesia:     Anesthesia method:  Local infiltration    Local anesthetic:  Lidocaine 1% WITH epi  Laceration details:     Location:  Face    Face location:  Chin    Length (cm):  2    Depth (mm):  6  Pre-procedure details:     Preparation:  Patient was prepped and draped in usual sterile fashion  Exploration:     Wound exploration: wound explored through full range of motion      Wound extent: no fascia violation noted, no foreign bodies/material noted, no muscle damage noted, no nerve damage noted, no tendon damage noted, no underlying fracture noted and no vascular damage noted      Contaminated: no    Treatment:     Area cleansed with:  Betadine    Amount of cleaning:  Standard    Irrigation solution:  Sterile saline    Irrigation volume:  500    Irrigation method:  Pressure wash and syringe    Layers/structures repaired:  Deep subcutaneous  Deep subcutaneous:     Suture size:  5-0    Suture material:  Vicryl    Suture technique:  Simple interrupted    Number of sutures:  3  Skin repair:     Repair method:  Sutures    Suture size:  6-0    Suture material:  Nylon    Suture technique:  Simple interrupted    Number of sutures:  8  Approximation:     Approximation:  Close  Repair type:     Repair type:  Intermediate  Post-procedure details:     Dressing:  Antibiotic ointment    Procedure completion:  Tolerated well, no immediate complications         DATA     Results     Procedure Component Value Units Date/Time    APTT [[id number]] Collected: 10/24/22 1806    Specimen: Blood, Venous Updated: 10/24/22 1810    [place] Draw Panel [[id number]]  Collected: 10/24/22 0830    Specimen: Blood, Venous Updated: 10/24/22 1801    Narrative:      The following orders were created for panel order [place] Draw Panel.  Procedure                               Abnormality         Status                     ---------                               -----------         ------                     RAINBOW RED[[id number]]                                      In process                 RAINBOW PINK[[id number]]                                     Final result               RAINBOW GOLD[[id number]]                                     Final result                 Please view results for these tests on the individual orders.    RAINBOW GOLD [[id number]] Collected: 10/24/22 0830    Specimen: Blood, Venous Updated: 10/24/22 1801    RAINBOW PINK [[id number]] Collected: 10/24/22 0830    Specimen: Blood, Venous Updated: 10/24/22 1700    UA with reflex culture [[id number]]  (Abnormal) Collected: 10/24/22 1233    Specimen: Urine, Clean Catch Updated: 10/24/22 1257    Narrative:      The following orders were created for panel order UA with reflex culture.  Procedure                               Abnormality         Status                     ---------                               -----------         ------                     UA Reflex to Culture (Ma...[[id number]]  Abnormal            Final result               UA Microscopic[[id number]]               Abnormal            Final result                 Please view results for these tests on the individual orders.    UA Microscopic [[id number]]  (Abnormal) Collected: 10/24/22 1233    Specimen: Urine, Clean Catch Updated: 10/24/22 1257     RBC, Urine 10 TO 19 /HPF      WBC, Urine 0 TO 3 /HPF      Squamous Epithelial None Seen /hpf      Hyaline Cast 3 TO 9 /lpf      Bacteria, Urine None Seen /HPF      MUCUSUA Rare /LPF     UA Reflex to Culture (Macroscopic) [[id number]]  (Abnormal) Collected: 10/24/22 1233    Specimen: Urine, Clean Catch Updated:  10/24/22 1253     Color, Urine Yellow     Clarity, Urine Clear     Specific Gravity, Urine >1.035     pH, Urine 6.5     Leukocyte Esterase Negative     Comment: Results can be falsely negative due to high specific gravity, some antibiotics, glucose >3 g/dl, or WBC other than neutrophils.        Nitrite, Urine Negative     Protein, Urine +1     Glucose, Urine Trace mg/dL      Ketones, Urine +1 mg/dL      Comment: Free sulfhydryl drugs such as Mesna, Capoten, and Acetylcysteine (Mucomyst) may cause false positive ketonuria.        Urobilinogen, Urine 0.2 EU/dL      Bilirubin, Urine Negative mg/dL      Blood, Urine +1     Comment: The sensitivity of the occult blood test is equivalent to approximately 4 intact RBC/HPF.       SARS-CoV-2 (COVID-19), PCR Nasopharynx [[id number]]  (Normal) Collected: 10/24/22 0839    Specimen: Nasopharyngeal Swab from Nasopharynx Updated: 10/24/22 0925    Narrative:      The following orders were created for panel order SARS-CoV-2 (COVID-19), PCR Nasopharynx.  Procedure                               Abnormality         Status                     ---------                               -----------         ------                     SARS-COV-2 (COVID-19)/ F...[[id number]]  Normal              Final result                 Please view results for these tests on the individual orders.    SARS-COV-2 (COVID-19)/ FLU A/B, AND RSV, PCR Nasopharynx [[id number]]  (Normal) Collected: 10/24/22 0839    Specimen: Nasopharyngeal Swab from Nasopharynx Updated: 10/24/22 0925     SARS-CoV-2 (COVID-19) Negative     Influenza A Negative     Influenza B Negative     Respiratory Syncytial Virus Negative    Narrative:      Testing performed using real-time PCR for detection of COVID-19. EUA approved validation studies performed on site.     HS Troponin I (with 2 hour reflex) [[id number]]  (Abnormal) Collected: 10/24/22 0830    Specimen: Blood, Venous Updated: 10/24/22 0910     High Sens Troponin I 1,154.2 pg/mL      Comprehensive metabolic panel [[id number]]  (Abnormal) Collected: 10/24/22 0830    Specimen: Blood, Venous Updated: 10/24/22 0854     Sodium 136 mEQ/L      Potassium 4.3 mEQ/L      Comment: Results obtained on plasma. Plasma Potassium values may be up to 0.4 mEQ/L less than serum values. The differences may be greater for patients with high platelet or white cell counts.        Chloride 99 mEQ/L      CO2 25 mEQ/L      BUN 24 mg/dL      Creatinine 1.3 mg/dL      Glucose 236 mg/dL      Calcium 9.0 mg/dL      AST (SGOT) 231 IU/L      ALT (SGPT) 256 IU/L      Alkaline Phosphatase 53 IU/L      Total Protein 7.1 g/dL      Comment: Test performed on plasma which typically contains approximately 0.4 g/dL more protein than serum.        Albumin 4.1 g/dL      Bilirubin, Total 1.5 mg/dL      eGFR 53.8 mL/min/1.73m*2      Anion Gap 12 mEQ/L     Protime-INR [[id number]]  (Abnormal) Collected: 10/24/22 0830    Specimen: Blood, Venous Updated: 10/24/22 0847     PT 14.8 sec      INR 1.2     Comment: Moderate Intensity Anticoagulation = 2.0 to 3.0, High Intensity = 2.5 to 3.5       APTT [[id number]]  (Normal) Collected: 10/24/22 0830    Specimen: Blood, Venous Updated: 10/24/22 0847     PTT 30 sec     CBC and differential [[id number]]  (Abnormal) Collected: 10/24/22 0830    Specimen: Blood, Venous Updated: 10/24/22 0844     WBC 17.58 K/uL      RBC 5.09 M/uL      Hemoglobin 15.3 g/dL      Hematocrit 44.7 %      MCV 87.8 fL      MCH 30.1 pg      MCHC 34.2 g/dL      RDW 13.2 %      Platelets 168 K/uL      MPV 9.0 fL      Differential Type Auto     nRBC 0.0 %      Immature Granulocytes 1.0 %      Neutrophils 88.7 %      Lymphocytes 4.8 %      Monocytes 4.2 %      Eosinophils 0.9 %      Basophils 0.4 %      Immature Granulocytes, Absolute 0.17 K/uL      Neutrophils, Absolute 15.60 K/uL      Lymphocytes, Absolute 0.84 K/uL      Monocytes, Absolute 0.74 K/uL      Eosinophils, Absolute 0.16 K/uL      Basophils, Absolute 0.07 K/uL     RAINBOW  RED [[id number]] Collected: 10/24/22 0830    Specimen: Blood, Venous Updated: 10/24/22 0836          Imaging Results          X-RAY CHEST 1 VIEW (Final result)  Result time 10/24/22 09:53:30    Final result                 Impression:    IMPRESSION:  No evidence of acute pulmonary disease.                 Narrative:    CLINICAL HISTORY:   Hypoxia    COMMENT:    Comparison: None..    2 AP views of the chest were obtained.    The lungs are clear without evidence of focal consolidation.  There is no  evidence of pleural effusion or pneumothorax. Cardiac silhouette is within  normal limits for size..                               CT ANGIOGRAPHY CHEST PULMONARY EMBOLISM WITH IV CONTRAST (Final result)  Result time 10/24/22 10:22:35    Final result                 Impression:    IMPRESSION:  1.  Extensive acute pulmonary arterial emboli involving the bilateral main and  multiple bilateral lobar, segmental and subsegmental branches, as detailed  above. Flattening of the interventricular septum with dilatation of the right  ventricle suggestive of right-sided heart strain. No evidence of acute pulmonary  infarct.  2.  Large, indeterminate infiltrative hypodense lesion within the right hepatic  lobe measuring up to 7 cm warranting further assessment with contrast-enhanced  MRI. Differential diagnosis includes metastatic disease, a primary malignancy as  well as abscess. Small adjacent similar appearing indeterminate right hepatic  lobe lesion.  3.  Indeterminate right adrenal mass with findings consistent with right adrenal  hemorrhage.  4.  A 10 mm groundglass nodule within the right middle lobe. A follow-up CT scan  of the chest in 6-12 months is recommended as per Fleischner guidelines.  5.  Additional incidental findings including ectasia of ascending thoracic  aorta, enlarged prostate gland, coronary arterial atherosclerotic vascular  disease and colonic diverticulosis without evidence of diverticulitis.      Findings  and recommendations discussed with SUZANNA Muñoz by Dr. Thompson by  telephone at 9:54 AM and 10:21 AM on 10/24/2022.    Fleischner Society 2017 Guidelines for Management of Incidentally Detected  Pulmonary Nodules in Adults. (Subsolid Nodules)    Single ground glass:  <6 mm - No routine follow-up  >/= 6 mm - CT at 6-12 months to confirm persistence, then CT every 2 years until  5 years  (In certain suspicious nodules < 6 mm, consider follow-up at 2 and 4 years.  If  solid component(s) or growth develops, consider resection (Recommendations 3A  and 4A)                 Narrative:    CLINICAL HISTORY: Trauma  Pulmonary embolism (PE) suspected, high prob    COMPARISON: None    COMMENT:    TECHNIQUE: CT of the chest, abdomen and pelvis was performed after the  uneventful administration of intravenous contrast with the patient in the supine  position. Images reconstructed/reformatted in the axial, coronal and  sagittal  planes.    CT DOSE:  One or more dose reduction techniques (e.g. automated exposure  control, adjustment of the mA and/or kV according to patient size, use of  iterative reconstruction technique) utilized for this examination.    100mL of iohexoL (OMNIPAQUE 350) 350 mg iodine/mL solution 100 mL          CHEST  CHEST WALL AND LOWER NECK: within normal limits  MEDIASTINUM AND MONICA: within normal limits  HEART: Normal in size. However, there is flattening of the interventricular  septum with dilatation of the right ventricle consistent with right-sided heart  strain. No pericardial effusion.  VESSELS: There are extensive acute pulmonary arterial emboli involving the right  main, right upper lobe are and right interlobar pulmonary arterial branches as  well as multiple right upper, middle and lower lobe segmental and subsegmental  branches. There are additional acute pulmonary arterial emboli involving the  distal left main and left lower lobar pulmonary artery branches and multiple  left upper and lower lobe  segmental and subsegmental arterial branches. There is  ectasia of the ascending thoracic aorta measuring 4.5 cm. There are extensive  coronary arterial atherosclerotic vascular calcifications.  LUNG , LARGE AIRWAYS AND PLEURA: The trachea and central bronchi are patent.  There is no focal airspace consolidation, pleural effusion or pneumothorax.  There is no evidence of an acute pulmonary infarct. There is a groundglass  nodule measuring 10 mm in the perihilar right middle lobe (series 4 image 186).    BONES: There is no acute osseous abnormality or concerning osseous lesion. There  are mild degenerative changes within the thoracolumbar spine.    ABDOMEN AND PELVIS  LIVER: The liver is normal in size and contour. There are several scattered  cysts seen within the liver, the largest measuring 4 cm within the left hepatic  lobe. There is a somewhat ill-defined hypoattenuating lesion within the right  hepatic lobe measuring up to 7 cm. There is an adjacent 2.3 cm hypoattenuating  lesion which does not have the appearance of a simple cyst within the right  hepatic lobe.  BILE DUCTS: normal caliber  GALLBLADDER: Normal  PANCREAS: within normal limits  SPLEEN: within normal limits  ADRENALS: The left adrenal gland is normal. There is an indeterminate hypodense  lesion distending the lateral limb of the right adrenal gland measuring 3.4 x  2.7 cm (66 Hounsfield units). There is adjacent ill-defined complex fluid and  stranding likely reflecting adrenal hemorrhage.  KIDNEYS/URETERS/BLADDER: There is a small simple cyst at the upper pole of the  right kidney. The kidneys, ureters and urinary bladder are otherwise  unremarkable.    REPRODUCTIVE ORGANS: The prostate gland is enlarged.    BOWEL: There is no bowel wall thickening. Normal bowel caliber. There are  colonic diverticula without associated inflammatory changes. A normal appendix  is seen in the right lower quadrant.  no ascites or free air, no fluid  collection.  ABDOMINAL LYMPH NODES: within normal limits.  ABDOMINAL WALL: within normal limits                               CT ABDOMEN PELVIS WITH IV CONTRAST (Final result)  Result time 10/24/22 10:22:35    Final result                 Impression:    IMPRESSION:  1.  Extensive acute pulmonary arterial emboli involving the bilateral main and  multiple bilateral lobar, segmental and subsegmental branches, as detailed  above. Flattening of the interventricular septum with dilatation of the right  ventricle suggestive of right-sided heart strain. No evidence of acute pulmonary  infarct.  2.  Large, indeterminate infiltrative hypodense lesion within the right hepatic  lobe measuring up to 7 cm warranting further assessment with contrast-enhanced  MRI. Differential diagnosis includes metastatic disease, a primary malignancy as  well as abscess. Small adjacent similar appearing indeterminate right hepatic  lobe lesion.  3.  Indeterminate right adrenal mass with findings consistent with right adrenal  hemorrhage.  4.  A 10 mm groundglass nodule within the right middle lobe. A follow-up CT scan  of the chest in 6-12 months is recommended as per Fleischner guidelines.  5.  Additional incidental findings including ectasia of ascending thoracic  aorta, enlarged prostate gland, coronary arterial atherosclerotic vascular  disease and colonic diverticulosis without evidence of diverticulitis.      Findings and recommendations discussed with SUZANNA Muñoz by Dr. Thompson by  telephone at 9:54 AM and 10:21 AM on 10/24/2022.    Fleischner Society 2017 Guidelines for Management of Incidentally Detected  Pulmonary Nodules in Adults. (Subsolid Nodules)    Single ground glass:  <6 mm - No routine follow-up  >/= 6 mm - CT at 6-12 months to confirm persistence, then CT every 2 years until  5 years  (In certain suspicious nodules < 6 mm, consider follow-up at 2 and 4 years.  If  solid component(s) or growth develops, consider resection  (Recommendations 3A  and 4A)                 Narrative:    CLINICAL HISTORY: Trauma  Pulmonary embolism (PE) suspected, high prob    COMPARISON: None    COMMENT:    TECHNIQUE: CT of the chest, abdomen and pelvis was performed after the  uneventful administration of intravenous contrast with the patient in the supine  position. Images reconstructed/reformatted in the axial, coronal and  sagittal  planes.    CT DOSE:  One or more dose reduction techniques (e.g. automated exposure  control, adjustment of the mA and/or kV according to patient size, use of  iterative reconstruction technique) utilized for this examination.    100mL of iohexoL (OMNIPAQUE 350) 350 mg iodine/mL solution 100 mL          CHEST  CHEST WALL AND LOWER NECK: within normal limits  MEDIASTINUM AND MONICA: within normal limits  HEART: Normal in size. However, there is flattening of the interventricular  septum with dilatation of the right ventricle consistent with right-sided heart  strain. No pericardial effusion.  VESSELS: There are extensive acute pulmonary arterial emboli involving the right  main, right upper lobe are and right interlobar pulmonary arterial branches as  well as multiple right upper, middle and lower lobe segmental and subsegmental  branches. There are additional acute pulmonary arterial emboli involving the  distal left main and left lower lobar pulmonary artery branches and multiple  left upper and lower lobe segmental and subsegmental arterial branches. There is  ectasia of the ascending thoracic aorta measuring 4.5 cm. There are extensive  coronary arterial atherosclerotic vascular calcifications.  LUNG , LARGE AIRWAYS AND PLEURA: The trachea and central bronchi are patent.  There is no focal airspace consolidation, pleural effusion or pneumothorax.  There is no evidence of an acute pulmonary infarct. There is a groundglass  nodule measuring 10 mm in the perihilar right middle lobe (series 4 image 186).    BONES: There is no  acute osseous abnormality or concerning osseous lesion. There  are mild degenerative changes within the thoracolumbar spine.    ABDOMEN AND PELVIS  LIVER: The liver is normal in size and contour. There are several scattered  cysts seen within the liver, the largest measuring 4 cm within the left hepatic  lobe. There is a somewhat ill-defined hypoattenuating lesion within the right  hepatic lobe measuring up to 7 cm. There is an adjacent 2.3 cm hypoattenuating  lesion which does not have the appearance of a simple cyst within the right  hepatic lobe.  BILE DUCTS: normal caliber  GALLBLADDER: Normal  PANCREAS: within normal limits  SPLEEN: within normal limits  ADRENALS: The left adrenal gland is normal. There is an indeterminate hypodense  lesion distending the lateral limb of the right adrenal gland measuring 3.4 x  2.7 cm (66 Hounsfield units). There is adjacent ill-defined complex fluid and  stranding likely reflecting adrenal hemorrhage.  KIDNEYS/URETERS/BLADDER: There is a small simple cyst at the upper pole of the  right kidney. The kidneys, ureters and urinary bladder are otherwise  unremarkable.    REPRODUCTIVE ORGANS: The prostate gland is enlarged.    BOWEL: There is no bowel wall thickening. Normal bowel caliber. There are  colonic diverticula without associated inflammatory changes. A normal appendix  is seen in the right lower quadrant.  no ascites or free air, no fluid collection.  ABDOMINAL LYMPH NODES: within normal limits.  ABDOMINAL WALL: within normal limits                               CT HEAD WITHOUT IV CONTRAST (Final result)  Result time 10/24/22 09:33:47    Final result                 Impression:    IMPRESSION:    1.  No posttraumatic intracranial abnormality.  2. Chronic changes noted under the comment.             Narrative:    CLINICAL HISTORY: Trauma.    COMMENT: Contiguous axial CT images of the brain is performed without  intravenous contrast with sagittal and coronal reformatted  images.    COMPARISON:  None    CT DOSE: One or more dose reduction technique (e.g.automated exposure control,  adjustment of the kV and/or mA according to the patient's size, use of iterative  reconstruction technique) utilized for this examination.    There is diffuse generalized cerebral volume loss. Decrease in density is  visualized in the periventricular white matter tracts most likely representing  chronic small vessel ischemic disease. No intraparenchymal mass, acute infarcts  or hemorrhages are seen. The ventricles and the cisterns are normal. No  extra-axial masses or focal fluid collections are seen. There is no midline  shift. Intracranial vascular calcifications are seen.  Incidental note is made  of azygous VIK.    An air-fluid level is seen in the right maxillary sinus.  Please see dedicated  CT facial bones for further evaluation.  No calvarial fracture is noted.                               CT CERVICAL SPINE WITHOUT IV CONTRAST (Final result)  Result time 10/24/22 09:37:29    Final result                 Impression:    IMPRESSION:    1.  No acute fractures. As clinically indicated, MRI of the cervical spine is  recommended for further evaluation.  2.  Other incidental findings noted under the comment.                 Narrative:    CLINICAL HISTORY: Trauma.    COMMENT: Contiguous axial CT images of the cervical spine are performed with  sagittal and coronal reformatted images.    COMPARISON:  None    CT DOSE: One or more dose reduction technique (e.g.automated exposure control,  adjustment of the kV and/or mA according to the patient's size, use of iterative  reconstruction technique) utilized for this examination.    No acute fractures or posttraumatic spondylolisthesis are identified. No soft  tissue edema is seen.  Mild degenerative spondylolisthesis is seen at C3-C4  level where C4 vertebral body is anterior to C3 vertebral body.  Also at this  level, there is focal small to moderate left  paracentral disc osteophyte  protrusion with left-sided uncovertebral and facet joint hypertrophy causing  mild mass effect on the left aspect of the thecal sac and moderate left without  right neural foraminal stenosis.    The sagittal and coronal reformatted images demonstrate normal facet joint  alignment.    Evaluation of the central canal is limited in the lower cervical spine.  Visualized lung apices are clear.                               CT FACIAL BONES WITHOUT IV CONTRAST (Final result)  Result time 10/24/22 09:30:47    Final result                 Impression:    IMPRESSION:  Multiple right-sided facial bone fractures as described under the comment.             Narrative:    CLINICAL HISTORY: Trauma.    COMMENT: Contiguous axial CT images of the facial bone are performed with  sagittal and coronal reformatted images.    COMPARISON:  None    CT DOSE: One or more dose reduction technique (e.g.automated exposure control,  adjustment of the kV and/or mA according to the patient's size, use of iterative  reconstruction technique) utilized for this examination.    Multiple right-sided facial bone fractures are noted: Inferior orbit, anterior  and lateral maxillary sinus, lateral orbit, zygomatic arch.  Posterior lateral  maxillary sinus fracture is angulated and displaced by 5 mm.  Anterior maxillary  sinus fracture is posteriorly displaced by 2 mm.  Two fracture sites are noted  in the right zygomatic arch which is also angulated.  Right lateral orbital wall  fracture is angulated and displaced by approximately 3 mm.  Inferior orbital  wall fracture is displaced by 3 mm involving the infraorbital foramen. No other  fracture is noted.  Mild fluid is noted in the extraconal space of the right  orbit inferiorly.  No definite orbital hematoma is seen.  Staphyloma of the  bilateral globes are noted.  An air-fluid level is seen in the right maxillary  sinus additionally, fat is visualized in the inferior aspect of  the right  maxillary sinus likely herniation of buccal fat.  Scattered mucosal disease is  seen in the bilateral ethmoid, left frontal and left sphenoid sinuses.  Mild  right-sided facial soft tissue edema/hematoma is seen.                                ECG 12 lead   ED Interpretation   Rhythm: [NSR]  Rate: 96  P waves: [normal interval]  QRS: [normal QRS]  Axis: [normal]  ST Segments: [no obvious ST elevation or ischemia]  Inferior q waves. No prior ekg available.           Scoring tools                                  ED Course & MDM   MDM / ED COURSE / CLINICAL IMPRESSION / DISPO     MDM    ED Course as of 10/24/22 1820   Mon Oct 24, 2022   0843 High Sens Troponin I(!!): 1,154.2 [SL]   0956 CT ANGIOGRAPHY CHEST PULMONARY EMBOLISM WITH IV CONTRAST  Call from radiology- multiple large PE with right heart strain. [SL]   0959 CT FACIAL BONES WITHOUT IV CONTRAST  Multiple right-sided facial bone fractures are noted: Inferior orbit, anterior  and lateral maxillary sinus, lateral orbit, zygomatic arch.  Posterior lateral  maxillary sinus fracture is angulated and displaced by 5 mm.  Anterior maxillary  sinus fracture is posteriorly displaced by 2 mm.  Two fracture sites are noted  in the right zygomatic arch which is also angulated.  Right lateral orbital wall  fracture is angulated and displaced by approximately 3 mm.  Inferior orbital  wall fracture is displaced by 3 mm involving the infraorbital foramen. No other  fracture is noted.  Mild fluid is noted in the extraconal space of the right  orbit inferiorly.  No definite orbital hematoma is seen.  Staphyloma of the  bilateral globes are noted.  An air-fluid level is seen in the right maxillary  sinus additionally, fat is visualized in the inferior aspect of the right  maxillary sinus likely herniation of buccal fat.  Scattered mucosal disease is  seen in the bilateral ethmoid, left frontal and left sphenoid sinuses.  Mild  right-sided facial soft tissue  edema/hematoma is seen.     --  IMPRESSION:  Multiple right-sided facial bone fractures as described under the comment. [SL]   9939 Paged trauma to discuss traumatic injuries. Suspect syncopal episode secondary to hypoxia from bilateral PE. ? Arrhythmia. Will need to be admitted and anticoagulated. Waiting on final CT abd report before able to anticoagulate.  [SL]   1035 Dw Dr. [place]. Recommends [facility] admission given multiple medical findings and need for further work up. Recommends proceed with anticoagulation given high risk of large PE with heart strain. Adrenal hemorrhage noted- relatively small. Recommends follow hgb closely, watch for unstable vitals. Would CTA abdomen if concern for acute bleeding. Recommends facial trauma consult for injuries.  [SL]   1210 Case was discussed with hospitalist.  Reviewed patient's presentation, ED course, and relevant data.  Hospitalist accepts patient on their service and will see / admit pt.  Dr. Espinoza.  [SL]   1220 Consult placed to plastics to discuss facial fracture. Was told by office they would call me back after lunch.  [SL]      ED Course User Index  [SL] Ankita Muñoz PA C     Clinical Impression      Fall, initial encounter   Syncope, unspecified syncope type   Acute saddle pulmonary embolism with acute cor pulmonale (CMS/HCC)   Chin laceration, initial encounter     _________________     ED Disposition   Admit / Observation                  Ankita Muñoz PA C  10/24/22 3689

## 2022-10-24 NOTE — PROGRESS NOTES
Image reviewed and cases discussed with primary team.  Pt requires full AC and this small finding should not inhibit that,   Benefits clearly out weigh risks here.

## 2022-10-24 NOTE — CONSULTS
Pulmonology Consult Note    Subjective     Tim Humphries is a 77 y.o. male who was admitted for Fall, initial encounter [W19.XXXA]. Patient was referred by Hospitalist for patient co-management. Patient is seen in the ED. He had a fall at home after losing consciousness. Per wife, it took about 10 minutes to regain consciousness. EMS provided oxygen therpay and brought patient in. He reports being in usual state of health. He had COVID 2-3 months ago. He had a COVID booster about 3 weeks ago. He notes leg swelling around time of booster. He notes dyspnea for the last 5 days. He had right face trauma from fall. His ED workup also revealed multiple bilateral PE's with evidence of right heart distention. There is also notable adrenal hemorrhage. He currently reports facial pain, but denies dyspnea at rest, chest discomfort, Dizziness.    Pertinent radiology results reviewed. Pertinent lab results reviewed..    Past Medical History:   Diagnosis Date    Dyslipidemia     GERD (gastroesophageal reflux disease)     Glaucoma     Heart murmur     HTN (hypertension)     Implantable loop recorder present     Pericarditis     Sleep apnea      Past Surgical History:   Procedure Laterality Date    CARDIAC CATHETERIZATION      CATARACT EXTRACTION       Social History     Socioeconomic History    Marital status:      Spouse name: None    Number of children: None    Years of education: None    Highest education level: None   Tobacco Use    Smoking status: Never    Smokeless tobacco: Never   Substance and Sexual Activity    Alcohol use: Not Currently     Comment: occasional wine    Drug use: Never     Social Determinants of Health     Food Insecurity: No Food Insecurity    Worried About Running Out of Food in the Last Year: Never true    Ran Out of Food in the Last Year: Never true     History reviewed. No pertinent family history.    Allergies: Patient has no known allergies.    Current Inpatient  Medications   Medication Dose Route Frequency Provider Last Rate Last Admin    acetaminophen (TYLENOL) tablet 650 mg  650 mg oral q4h PRN Cheryl Bradford DO        albuterol nebulizer solution 5 mg  5 mg nebulization q6h PRN Cheryl Bradford DO        glucose chewable tablet 16-32 g of dextrose  16-32 g of dextrose oral PRN Cheryl Bradford DO        Or    dextrose 40 % oral gel 15-30 g of dextrose  15-30 g of dextrose oral PRN Cheryl Bradford DO        Or    glucagon (GLUCAGEN) injection 1 mg  1 mg intramuscular PRN Cheryl Bradford DO        Or    dextrose 50 % in water (D50) injection 12.5 g  25 mL intravenous PRN Cheryl Bradford DO        heparin (porcine) bolus from bag 3,400-6,850 Units  40-80 Units/kg intravenous q6h PRN Cheryl Bradford DO        heparin infusion in D5W 100 units/mL  100-4,000 Units/hr intravenous Titrated Cheryl Bradford DO 15.5 mL/hr at 10/24/22 1146 1,550 Units/hr at 10/24/22 1146    latanoprost (XALATAN) 0.005 % ophthalmic solution 1 drop  1 drop Both Eyes Nightly hCeryl Bradford DO        nitroglycerin (NITROSTAT) SL tablet 0.4 mg  0.4 mg sublingual q5 min PRN Cheryl Bradford DO        pantoprazole (PROTONIX) tablet,delayed release (DR/EC) 40 mg  40 mg oral Daily Cheryl Bradford DO        sodium chloride 0.9 % infusion   intravenous Continuous Cheryl Bradford DO        timolol (TIMOPTIC) 0.5 % ophthalmic solution 1 drop  1 drop Left Eye q AM Cheryl Bradford DO            Medication List      ASK your doctor about these medications    amLODIPine 10 mg tablet  Commonly known as: NORVASC  Take 5 mg by mouth every evening.  Dose: 5 mg     atorvastatin 20 mg tablet  Commonly known as: LIPITOR  Take 20 mg by mouth every evening.  Dose: 20 mg     esomeprazole 40 mg capsule  Commonly known as: NexIUM  Take 40 mg by mouth every evening.  Dose: 40 mg     hydrochlorothiazide 12.5 mg tablet  Commonly known as: HYDRODIURIL  Take 12.5 mg by mouth every morning.  Dose: 12.5 mg     latanoprost 0.005 % ophthalmic solution  Commonly known as:  "XALATAN  Administer 1 drop into both eyes nightly.  Dose: 1 drop     metoprolol succinate XL 25 mg 24 hr tablet  Commonly known as: TOPROL-XL  Take 25 mg by mouth every evening.  Dose: 25 mg     timolol 0.5 % ophthalmic solution  Commonly known as: TIMOPTIC  Administer 1 drop into the left eye every morning.  Dose: 1 drop     trandolapriL 4 mg tablet  Commonly known as: MAVIK  Take 4 mg by mouth every evening.  Dose: 4 mg            Review of Systems:  All other systems reviewed and negative except as noted in the HPI.    Objective     Vital Signs for the last 24 hours:  Temp:  [36.8 °C (98.2 °F)] 36.8 °C (98.2 °F)  Heart Rate:  [82-94] 88  Resp:  [14-24] 20  BP: (109-137)/(69-86) 118/77  SpO2:  [90 %-95 %] 93 %    Oxygen:  Oxygen Therapy: Supplemental oxygen  O2 Delivery Method: Nasal cannula     O2 Flow Rate (L/min): 4 L/min     Labs:  I have reviewed the patient's labs.  Current labs are within normal limits.    Imaging:  I have reviewed the Imaging from the last 24 hrs. See HPI    Pulmonary Function Tests:  No new pulmonary function tests.    Sleep Studies:  No new sleep studies.    ECG:  sinus rhythm with S1Q3T3 pattern.    Physical Exam:  Visit Vitals  /77 (BP Location: Right upper arm, Patient Position: Lying)   Pulse 88   Temp 36.8 °C (98.2 °F) (Temporal)   Resp 20   Ht 1.854 m (6' 1\")   Wt 85.6 kg (188 lb 12.8 oz)   SpO2 93%   BMI 24.91 kg/m²     General appearance: alert, appears stated age and cooperative  Head: normocephalic, without obvious abnormality, scalp contusion, depressed right ZMC  Eyes: conjunctivae/corneas clear. PERRL, EOM's intact. Fundi benign.  Neck: no adenopathy, no carotid bruit, no JVD, supple, symmetrical, trachea midline and thyroid not enlarged, symmetric, no tenderness/mass/nodules  Back: symmetric, no curvature. ROM normal. No CVA tenderness.  Lungs: breath sounds all lung fields: clear  Chest wall: no tenderness  Heart: regular rate and rhythm, S1, S2 normal, no murmur, " click, rub or gallop  Abdomen: soft, non-tender; bowel sounds normal; no masses, no organomegaly  Extremities: extremities normal, warm and well-perfused; no cyanosis, clubbing, or edema  Pulses: 2+ and symmetric  Skin: Skin color, texture, turgor normal. No rashes or lesions  Neurologic: Alert and oriented X 3, normal strength and tone. Normal symmetric reflexes. Normal coordination and gait      Assessment   77 y.o. male being consulted for patient co-management       Plan     1) Submassive PE  -bilateral PE on CTA  -continue Hep gtt  -given enlarged RV, extensive clot burden, and adrenal hemorrhage, patient is at risk for decompensation both from clots and from treatment. I believe the patient would benefit from IR intervention, clot retrieval rather than catheter-directed tPA given adrenal hemorrhage. Discussed case with IR and Hospitalist. Discussed with patient and family.  -check echo  -check LE DVT    2) Adrenal hemorrhage  -trend Hgb.  -if dropping and/or abdominal exam changes, then reimage    3) Facial fractures  -discussed with Plastics. No plan for surgical repair given acute PE.    4) Hypertension  -holding home meds    5) Acute hypoxic respiratory failure  -continue supplemental oxygen    Dispo: PCU    I spent 45 minutes on this date of service performing the following activities: obtaining history, performing examination, entering orders, documenting, preparing for visit, obtaining / reviewing records, providing counseling and education, independently reviewing study/studies, communicating results and coordinating care.    Juliano Orr MD  10/24/2022

## 2022-10-24 NOTE — ASSESSMENT & PLAN NOTE
CT chest: Extensive b/l PE with R heart strain. 7cm hypodense lesion R hepatic lobe, small R hepatic lobe lesion. Indeterminate R adrenal mass and hemorrhage.  10 mm RML nodule  -Provoked by underlying hepatic malignancy?   -With acute hypoxic resp failure  -Associated R heart strain  -Started on Heparin drip and underwent TPA to each pulm artery  -RRT 10/26 with hypotension and retroperitoneal hemorrhage  -With DVT.  US:  Bilaterally L>R.  R nearly occlusive thrombus within the posterior tibial vein. Left: Mid femoral vein partially occlusive thrombus.  The distal popliteal vein contains occlusive thrombus.  Peroneal vein and posterior tibial veins contain partially to nearly occlusive thrombus.  -Echo 10/24:  TDS.  EF 75%   Grade I LV diastolic dysfunction. Severely dilated RV. Severely reduced RV systolic function c/w RV strain.  Mod dilated RA. Trace TR.   -Echo 10/26:  C/w 10/24, RV size has decreased and function improved   -S/p IVC filter 10/25  -AC held for now

## 2022-10-24 NOTE — ASSESSMENT & PLAN NOTE
-Abnormal transaminases  -CT:  7cm hypodense lesion R hepatic lobe, small R hepatic lobe lesion.   -CA 19-9, AFP, hepatitis panel neg.   CEA 3.9 which is theoretically elevated in a non smoker  -MRI ordered  -Will need bx

## 2022-10-24 NOTE — CONSULTS
REASON FOR CONSULT: R heart strain, PE, elevated HS troponin     CONSULT FROM: Cheryl Bradford DO    PRIMARY CARDIOLOGIST: Dr. Ravinder Carlson    ------------------------------------------------------------------------------------------------------------------------------------------  HISTORY OF PRESENTING ILLNESS  ------------------------------------------------------------------------------------------------------------------------------------------  Tim Humphries is a 77 y.o. male who is admitted s/p unwitnessed fall. Found to have multiple bilateral pulmonary emboli with evidence of right heart distention. Also notable adrenal hemorrhage, Right facial fracture and ill-defined liver lesion.  Cardiology consulted for evaluation given Right heart strain seen on CT.      # cardiac risk factors: HTN, HLD, FLAVIA [CPAP]  # CAD - Minimal CAD on cath 3/2019  # Asc thoracic aortic aneurysm measuring 4.5 on echo 9/22  # Pericarditis in 3/19  # Syncope - in a hot shower, details unknown and a short pause so LINQ placed in 2017 without significant arrhythmia or pause with it in place.   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  Echo 9/2022 : EF 55%. Ddx1, AscAo dilated (Aortic root 2.9 cm, AscAo 4.5cm) PASP 35 mmHg. No significant valvular stenosis or insufficiencies noted.   Cardiac cath 3/2019 : Luminal irregularities. EF 55% by visual estimate.     Mr. Humphries is an active 77 year old male. He typically goes for a 2.5 mile jog every other day. At baseline he has no concerning cardiac complaints.    About x4 days ago while he was out jogging he couldn't go as far as he typically does given dyspnea on exertion. He continued with dyspnea on exertion since. This morning he remembers waking up and taking his medications and the next thing he remembers is waking up to EMS. Per wife this morning while she was in the  "kitchen making coffee she heard him grunt followed shortly afterwards by hearing him falling down the stairs. She believes he fell half way down the staircase. He was initially unconscious for about x10 minutes. She called 911 and was told to place him on his back and start chest compressions. Initially when EMS arrived he was hypoxic at 86% on RA which improved with a non rebreather. He was reportedly unconscious for about x10 minutes. Currently on exam his biggest complaint is that he feels \"tired\". He denies chest pain or SOB currently on exam.     ------------------------------------------------------------------------------------------------------------------------------------------  PAST MEDICAL HISTORY  ------------------------------------------------------------------------------------------------------------------------------------------  Past Medical History:   Diagnosis Date    Dyslipidemia     GERD (gastroesophageal reflux disease)     Glaucoma     Heart murmur     HTN (hypertension)     Implantable loop recorder present     Pericarditis     Sleep apnea      Past Surgical History:   Procedure Laterality Date    CARDIAC CATHETERIZATION      CATARACT EXTRACTION         ------------------------------------------------------------------------------------------------------------------------------------------  MEDICATIONS  ------------------------------------------------------------------------------------------------------------------------------------------  Home medications    esomeprazole magnesium 40 mg capsule,delayed release(DR/EC) (esomeprazole magnesium) 1 capsule by mouth once a day   latanoprost 0.005% drops (latanoprost) once a day   hydrochlorothiazide 12.5 mg tablet (hydrochlorothiazide) 1 tablet by mouth once a day   trandolapril 4 mg tablet (trandolapril) 1 tablet by mouth once a day   atorvastatin 20 mg tablet (atorvastatin) 1 tablet by mouth once a day   Toprol XL 25 mg tablet extended " release 24 hr (metoprolol succinate) 1 tablet by mouth once a day   timolol maleate 0.5% drops (timolol maleate) once a day   amlodipine 5 mg tablet (amlodipine) 1 tablet by mouth once a day   Inpatient Medications      acetaminophen, 650 mg, oral, q4h PRN    albuterol, 5 mg, nebulization, q6h PRN    glucose, 16-32 g of dextrose, oral, PRN **OR** dextrose, 15-30 g of dextrose, oral, PRN **OR** glucagon, 1 mg, intramuscular, PRN **OR** dextrose 50 % in water (D50), 25 mL, intravenous, PRN    heparin (porcine), 40-80 Units/kg, intravenous, q6h PRN    heparin infusion - MAR calculator by PTT, 100-4,000 Units/hr, intravenous, Titrated    latanoprost, 1 drop, Both Eyes, Nightly    nitroglycerin, 0.4 mg, sublingual, q5 min PRN    pantoprazole, 40 mg, oral, Daily    sodium chloride 0.9 %, , intravenous, Continuous    timolol, 1 drop, Left Eye, q AM    amLODIPine    atorvastatin    esomeprazole    hydrochlorothiazide    latanoprost    metoprolol succinate XL    timolol    trandolapriL    ------------------------------------------------------------------------------------------------------------------------------------------  ALLERGIES  ------------------------------------------------------------------------------------------------------------------------------------------  Patient has no known allergies.    ------------------------------------------------------------------------------------------------------------------------------------------  SOCIAL HISTORY  ------------------------------------------------------------------------------------------------------------------------------------------  Never smoker  Etoh - 1 beer daily     ------------------------------------------------------------------------------------------------------------------------------------------  FAMILY  HISTORY  ------------------------------------------------------------------------------------------------------------------------------------------  Father:  FH MI < 60 yrs old  Full Brother:  FH Congestive Heart Failure    Father, fatal MI, age 59    ------------------------------------------------------------------------------------------------------------------------------------------  REVIEW OF SYSTEMS  ------------------------------------------------------------------------------------------------------------------------------------------  Constitutional: - fever, - chills, - weakness, - weight loss, + fatigue, + head injury, + facial injury   HEENT: - blurred vision, - sore throat, - hoarseness  Respiratory: - dyspnea, - cough, - hemoptysis  Cardiovascular: - chest pain, + dyspnea on exertion, - orthopnea, - PND, - edema, - palpitations, - syncope  Gastrointestinal: - nausea, - vomiting, - diarrhea, - hematemesis, - melena  Genitourinary: - dysuria, - frequency  Integument: - rash, - itching  Hematologic/lymphatic:  - bruising, - petechiae  Musculoskeletal: - arthalgias, - myalgias  Neurological: - vertigo, - tremors, - headache, - speech deficit, - focal weakness  Behavioral/Psych: - anxiety, - depression  Endocrine: - cold intolerance, - heat intolerance, - weight change    ------------------------------------------------------------------------------------------------------------------------------------------  LABS / IMAGING / EKG / TELEMETRY  ------------------------------------------------------------------------------------------------------------------------------------------  LABS:  Results from last 7 days   Lab Units 10/24/22  1047 10/24/22  0830   SODIUM mEQ/L  --  136   POTASSIUM mEQ/L  --  4.3   CHLORIDE mEQ/L  --  99   CO2 mEQ/L  --  25   BUN mg/dL  --  24*   CREATININE mg/dL  --  1.3   AST IU/L  --  231*   ALT IU/L  --  256*   HIGH SENSITIVE TROPONIN I pg/mL 1,150.4* 1,154.2*     Results from  last 7 days   Lab Units 10/24/22  0830   WBC K/uL 17.58*   HEMOGLOBIN g/dL 15.3   HEMATOCRIT % 44.7   PLATELETS K/uL 168   INR  1.2     No results found for: HGBA1C, TSH  No results found for: CHOL, LDLCALC, HDL, TRIG  No results found for: BNP    IMAGING:  CTA chest PE study / abdomen pelvis 10/24/22 : Extensive acute pulmonary arterial emboli involving the bilateral main and multiple bilateral lobar, segmental and subsegmental branches. Flattening of the interventricular septum with dilatation of the right ventricle suggestive of right-sided heart strain. No evidence of acute pulmonary infarct. Large, indeterminate infiltrative hypodense lesion within the right hepatic lobe measuring up to 7 cm warranting further assessment with contrast-enhanced MRI. Differential diagnosis includes metastatic disease, a primary malignancy as well as abscess. Small adjacent similar appearing indeterminate right hepatic lobe lesion. Indeterminate right adrenal mass with findings consistent with right adrenal hemorrhage. A 10 mm groundglass nodule within the right middle lobe. A follow-up CT scan of the chest in 6-12 months is recommended as per Fleischner guidelines. Additional incidental findings including ectasia of ascending thoracic aorta, enlarged prostate gland, coronary arterial atherosclerotic vascular disease and colonic diverticulosis without evidence of diverticulitis.     CXR 10/24/22 : No evidence of acute pulmonary disease.     CT C spine 10/24/22 : No acute fractures. As clinically indicated, MRI of the cervical spine is recommended for further evaluation.     CT Head 10/24/22 : No posttraumatic intracranial abnormality. Chronic changes noted under the comment.    CT facial bones without IV contrast 10/24/22 : Multiple right-sided facial bone fractures as described under the comment.    ECG:   10/24/22 08:27 NSR, non specific ST / T wave abnormalities (S1Q3T3)    TELEMETRY:   Sinus, Brief PAT      ATTENDING ADDENDUM  :     I personally saw and examined the patient, independently and in conjunction with the physician assistant.  I performed a face-to-face evaluation and personally performed key portions including history, physical examination, and medical decision making.  I reviewed and agree with the above and am adding the below.    Tim Humphries is a 77 y.o. man with a history of nonobstructive CAD who presents with syncope associated with pulmonary embolism.  There was trauma associated with the fall, including adrenal hemorrhage.  CT identifies multiple bilateral pulmonary emboli.  He has pronounced right heart distention and moderate pulmonary hypertension with a Vasquez sign all consistent with acute pulmonary embolism.  He has been started on a heparin drip, though it was proposed by pulmonary that he might benefit from thrombectomy or even catheter directed thrombolysis to minimize the potential complications of therapeutic anticoagulation in the setting of adrenal hemorrhage.  In addition to the adrenal hemorrhage, there is a facial fracture.  He has marked facial contusions.    The patient denies chest pain at this time.  He has mild shortness of breath.  He denies palpitations, orthopnea, claudication, or edema.    VITAL SIGNS:  Temp:  [36.5 °C (97.7 °F)-37.2 °C (98.9 °F)] 36.6 °C (97.9 °F)  Heart Rate:  [] 100  Resp:  [18-20] 18  BP: (101-139)/(55-77) 139/77    Intake/Output Summary (Last 24 hours) at 06/05/19 1615  Last data filed at 06/05/19 0900   Gross per 24 hour   Intake              240 ml   Output                0 ml   Net              240 ml     PHYSICAL EXAM:  General appearance: alert and cooperative.   Head: without obvious abnormality. Chin stiches in place, dried blood, scattered areas of ecchymosis. No active bleeding.   Eyes: PERRLA, extraocular movements intact  Neck: No JVD, carotid bruits, thyromegaly  Lungs: clear to auscultation bilaterally, no crackles or wheezing  Heart: regular  rate and rhythm, S1-S2 normal, no murmurs, clicks, rubs or gallops  Abdomen: soft, non-tender, bowel sounds normal  Extremities: no edema, peripheral pulses present  Skin: Skin color, texture, turgor normal. No rashes or lesions  Neurologic: Alert and oriented X 3, no focal deficits    ASSESSMENT AND PLAN:    1.  Traumatic syncope: Associated with pulmonary embolism.  Continue telemetry.  Determination on further work-up pending resolution of acute issues.    2.  Acute pulmonary embolism: Right heart findings consistent with diagnosis.  Defer to pulmonary as to whether it is most appropriate to continue with therapeutic anticoagulation or proceed with either catheter directed thrombolysis or catheter thrombectomy.    3.  Paroxysmal atrial tachycardia: A single sustained episode, though no symptoms reported.  Continue telemetry.  Likely secondary to acute pulmonary embolism.    4.  Abnormal troponin: Findings all consistent with acute pulmonary embolism.  There is a history of nonobstructive CAD and it may be appropriate, once acute issues resolved, to proceed with noninvasive ischemic testing.  Decision on this will be made once acute issues fully addressed.      Surya Ford MD, [facility]       SUZANNA Bray  10/24/2022    Primary Care Doctor: Zachary Gutierrez MD

## 2022-10-24 NOTE — CONSULTS
GASTROENTEROLOGY CONSULTATION   [facility]     PATIENT NAME:  Tim Humphries        YOB: 1945   AGE:  77 y.o.         MRN:  [id number]              HISTORY   CC: liver lesion, elevated LFTs    77 M with PMHx of dyslipidemia, GERD, glaucoma, heart murmur, HTN, s/p loop recorder, pericarditis, sleep apnea being seen for elevated LFTs, liver lesion.  Patient seen with wife Olena at bedside who assists with history. Patient reportedly is s/p fall this AM with loss of consciousness found to have extensive PE with right heart strain, right facial fracture, adrenal hemorrhage, and ill-defined liver lesion.  Patient reports symptoms of progressive shortness of breath x1 week and weight loss of about 5 lbs in last 1 week. He has no history of liver disease. Mother reportedly passed from cancer of unknown origin.     PAST MEDICAL HISTORY     Past Medical History:   Diagnosis Date    Dyslipidemia     GERD (gastroesophageal reflux disease)     Glaucoma     Heart murmur     HTN (hypertension)     Implantable loop recorder present     Pericarditis     Sleep apnea        PAST SURGICAL HISTORY     Past Surgical History:   Procedure Laterality Date    CARDIAC CATHETERIZATION      CATARACT EXTRACTION          FAMILY HISTORY   History reviewed. No pertinent family history.  No significant Neurological History    SOCIAL HISTORY     Social History     Tobacco Use    Smoking status: Never    Smokeless tobacco: Never   Substance Use Topics    Alcohol use: Not Currently     Comment: occasional wine       MEDICATIONS   Home Medication:  Not in a hospital admission.    MEDICATIONS:  Infusions:     heparin infusion - MAR calculator by PTT  100-4,000 Units/hr 1,550 Units/hr (10/24/22 1146)          Scheduled:     ALLERGIES   No Known Allergies    REVIEW OF SYSTEMS   13 point review of systems completed. Negative except for above mentioned history.    PHYSICAL EXAMINATION   Temperature: 98.2 Temp (24hrs),  "Av.8 °C (98.2 °F), Min:36.8 °C (98.2 °F), Max:36.8 °C (98.2 °F)      BP: 118/77  Pulse: 88 Respirations: 20  Height: 1.854 m (6' 1\") Weight: 85.6 kg (188 lb 12.8 oz) Body mass index is 24.91 kg/m².     General appearance: no distress  Head: normocephalic; chin stitches, dried blood, scattered ecchymosis  Eyes:  Anicteric  ENT:  Oral mucosa dry  Lungs: clear to auscultation anteriorly   Heart: regular rate   Abdomen: soft, non-tender; bowel sounds normal; no masses, no organomegaly  Extremities: no edema  Skin:  No rashes or lesions  Neurologic: awake and alert and oriented  Pscyh:  cooperative     Diagnostic Data:     Labs reviewed-  Lab Results   Component Value Date    WBC 17.58 (H) 10/24/2022    HGB 15.3 10/24/2022    HCT 44.7 10/24/2022     10/24/2022     (H) 10/24/2022     (H) 10/24/2022     10/24/2022    K 4.3 10/24/2022    CL 99 10/24/2022    CREATININE 1.3 10/24/2022    BUN 24 (H) 10/24/2022    CO2 25 10/24/2022    INR 1.2 10/24/2022     Imaging reviewed  CT HEAD WITHOUT IV CONTRAST    Result Date: 10/24/2022  IMPRESSION: 1.  No posttraumatic intracranial abnormality. 2. Chronic changes noted under the comment.    CT FACIAL BONES WITHOUT IV CONTRAST    Result Date: 10/24/2022  IMPRESSION: Multiple right-sided facial bone fractures as described under the comment.    CT CERVICAL SPINE WITHOUT IV CONTRAST    Result Date: 10/24/2022  IMPRESSION: 1.  No acute fractures. As clinically indicated, MRI of the cervical spine is recommended for further evaluation. 2.  Other incidental findings noted under the comment.     CT ABDOMEN PELVIS WITH IV CONTRAST    Result Date: 10/24/2022  IMPRESSION: 1.  Extensive acute pulmonary arterial emboli involving the bilateral main and multiple bilateral lobar, segmental and subsegmental branches, as detailed above. Flattening of the interventricular septum with dilatation of the right ventricle suggestive of right-sided heart strain. No evidence of " acute pulmonary infarct. 2.  Large, indeterminate infiltrative hypodense lesion within the right hepatic lobe measuring up to 7 cm warranting further assessment with contrast-enhanced MRI. Differential diagnosis includes metastatic disease, a primary malignancy as well as abscess. Small adjacent similar appearing indeterminate right hepatic lobe lesion. 3.  Indeterminate right adrenal mass with findings consistent with right adrenal hemorrhage. 4.  A 10 mm groundglass nodule within the right middle lobe. A follow-up CT scan of the chest in 6-12 months is recommended as per Fleischner guidelines. 5.  Additional incidental findings including ectasia of ascending thoracic aorta, enlarged prostate gland, coronary arterial atherosclerotic vascular disease and colonic diverticulosis without evidence of diverticulitis. Findings and recommendations discussed with SUZANNA Muoñz by Dr. Thompson by telephone at 9:54 AM and 10:21 AM on 10/24/2022. Fleischner Society 2017 Guidelines for Management of Incidentally Detected Pulmonary Nodules in Adults. (Subsolid Nodules) Single ground glass: <6 mm - No routine follow-up >/= 6 mm - CT at 6-12 months to confirm persistence, then CT every 2 years until 5 years (In certain suspicious nodules < 6 mm, consider follow-up at 2 and 4 years.  If solid component(s) or growth develops, consider resection (Recommendations 3A and 4A)     CT ANGIOGRAPHY CHEST PULMONARY EMBOLISM WITH IV CONTRAST    Result Date: 10/24/2022  IMPRESSION: 1.  Extensive acute pulmonary arterial emboli involving the bilateral main and multiple bilateral lobar, segmental and subsegmental branches, as detailed above. Flattening of the interventricular septum with dilatation of the right ventricle suggestive of right-sided heart strain. No evidence of acute pulmonary infarct. 2.  Large, indeterminate infiltrative hypodense lesion within the right hepatic lobe measuring up to 7 cm warranting further assessment with  contrast-enhanced MRI. Differential diagnosis includes metastatic disease, a primary malignancy as well as abscess. Small adjacent similar appearing indeterminate right hepatic lobe lesion. 3.  Indeterminate right adrenal mass with findings consistent with right adrenal hemorrhage. 4.  A 10 mm groundglass nodule within the right middle lobe. A follow-up CT scan of the chest in 6-12 months is recommended as per Fleischner guidelines. 5.  Additional incidental findings including ectasia of ascending thoracic aorta, enlarged prostate gland, coronary arterial atherosclerotic vascular disease and colonic diverticulosis without evidence of diverticulitis. Findings and recommendations discussed with SUZANNA Muñoz by Dr. Thompson by telephone at 9:54 AM and 10:21 AM on 10/24/2022. Fleischner Society 2017 Guidelines for Management of Incidentally Detected Pulmonary Nodules in Adults. (Subsolid Nodules) Single ground glass: <6 mm - No routine follow-up >/= 6 mm - CT at 6-12 months to confirm persistence, then CT every 2 years until 5 years (In certain suspicious nodules < 6 mm, consider follow-up at 2 and 4 years.  If solid component(s) or growth develops, consider resection (Recommendations 3A and 4A)     X-RAY CHEST 1 VIEW    Result Date: 10/24/2022  IMPRESSION: No evidence of acute pulmonary disease.         ASSESSMENT/PLAN     77 M with PMHx of dyslipidemia, GERD, glaucoma, heart murmur, HTN, s/p loop recorder, pericarditis, sleep apnea being seen for elevated LFTs, liver lesion.    1) Liver lesion -Large, indeterminate infiltrative hypodense lesion within the right hepatic  lobe measuring up to 7 cm warranting further assessment with contrast-enhanced  MRI. Differential diagnosis includes metastatic disease, a primary malignancy as  well as abscess.  2) LFTs - unclear etiology, possibly r/t shock liver given acute PE with R heart strain although, concerning liver lesion noted as above  3) Acute bilateral PE with right heart  strain - anticoagulation per HMS/pulm  4) S/p fall - adrenal hemorrhage, facial fractures  6) HTN, GERD      Plan:  -Trend labwork (LFTs)  -Acute hepatitis panel, AFP pending  -Check MRI/MRCP with and without contrast when medically stable to tolerate  -Consider further workup pending above  -D/w attending    AUTHOR:  DEVAN Wagoner  10/24/2022

## 2022-10-24 NOTE — ASSESSMENT & PLAN NOTE
-CT facial bones:Multiple right-sided facial bone fractures as described under the comment.  -Seen by Trauma and plastics  -Seen by plastics who discussed potential facial deformity.  Not a candidate for reconstructive surgery, given his need for AC  -Will d/w plastics re: need for abx

## 2022-10-24 NOTE — ASSESSMENT & PLAN NOTE
CT abdomen pelvis with potential adrenal hemorrhage.  ER NP had discussed this with trauma surgery.  Okay to anticoagulate due to benefit greater than risk.  -Monitor CBC every 6, if hemoglobin dropping then will need to get stat CTA of the abdomen pelvis to assess for active bleed

## 2022-10-25 ENCOUNTER — APPOINTMENT (INPATIENT)
Dept: RADIOLOGY | Facility: HOSPITAL | Age: 77
DRG: 163 | End: 2022-10-25
Attending: HOSPITALIST
Payer: COMMERCIAL

## 2022-10-25 ENCOUNTER — APPOINTMENT (INPATIENT)
Dept: RADIOLOGY | Facility: HOSPITAL | Age: 77
DRG: 163 | End: 2022-10-25
Attending: INTERNAL MEDICINE
Payer: COMMERCIAL

## 2022-10-25 PROBLEM — I5A MYOCARDIAL INJURY: Status: ACTIVE | Noted: 2022-10-24

## 2022-10-25 LAB
ABO + RH BLD: NORMAL
ALBUMIN SERPL-MCNC: 3.2 G/DL (ref 3.4–5)
ALBUMIN SERPL-MCNC: 3.6 G/DL (ref 3.4–5)
ALP SERPL-CCNC: 43 IU/L (ref 35–126)
ALP SERPL-CCNC: 48 IU/L (ref 35–126)
ALT SERPL-CCNC: 210 IU/L (ref 16–63)
ALT SERPL-CCNC: 229 IU/L (ref 16–63)
ANION GAP SERPL CALC-SCNC: 10 MEQ/L (ref 3–15)
ANION GAP SERPL CALC-SCNC: 11 MEQ/L (ref 3–15)
APTT PPP: 26 SEC (ref 23–35)
APTT PPP: 52 SEC (ref 23–35)
APTT PPP: 57 SEC (ref 23–35)
APTT PPP: 68 SEC (ref 23–35)
APTT PPP: 73 SEC (ref 23–35)
AST SERPL-CCNC: 118 IU/L (ref 15–41)
AST SERPL-CCNC: 118 IU/L (ref 15–41)
ATRIAL RATE: 96
BASOPHILS # BLD: 0.03 K/UL (ref 0.01–0.1)
BASOPHILS NFR BLD: 0.2 %
BILIRUB SERPL-MCNC: 1.2 MG/DL (ref 0.3–1.2)
BILIRUB SERPL-MCNC: 1.5 MG/DL (ref 0.3–1.2)
BLD GP AB SCN SERPL QL: NEGATIVE
BUN SERPL-MCNC: 27 MG/DL (ref 8–20)
BUN SERPL-MCNC: 37 MG/DL (ref 8–20)
CALCIUM SERPL-MCNC: 8.4 MG/DL (ref 8.9–10.3)
CALCIUM SERPL-MCNC: 8.7 MG/DL (ref 8.9–10.3)
CANCER AG19-9 SERPL-ACNC: 20.7 U/ML (ref 0–35)
CEA SERPL-MCNC: 3.9 NG/ML
CHLORIDE SERPL-SCNC: 100 MEQ/L (ref 98–109)
CHLORIDE SERPL-SCNC: 104 MEQ/L (ref 98–109)
CO2 SERPL-SCNC: 21 MEQ/L (ref 22–32)
CO2 SERPL-SCNC: 26 MEQ/L (ref 22–32)
CREAT SERPL-MCNC: 0.9 MG/DL (ref 0.8–1.3)
CREAT SERPL-MCNC: 1.6 MG/DL (ref 0.8–1.3)
D AG BLD QL: POSITIVE
DIFFERENTIAL METHOD BLD: ABNORMAL
EOSINOPHIL # BLD: 0 K/UL (ref 0.04–0.54)
EOSINOPHIL NFR BLD: 0 %
ERYTHROCYTE [DISTWIDTH] IN BLOOD BY AUTOMATED COUNT: 13.1 % (ref 11.6–14.4)
ERYTHROCYTE [DISTWIDTH] IN BLOOD BY AUTOMATED COUNT: 13.1 % (ref 11.6–14.4)
ERYTHROCYTE [DISTWIDTH] IN BLOOD BY AUTOMATED COUNT: 13.2 % (ref 11.6–14.4)
GFR SERPL CREATININE-BSD FRML MDRD: 42.3 ML/MIN/1.73M*2
GFR SERPL CREATININE-BSD FRML MDRD: >60 ML/MIN/1.73M*2
GLUCOSE BLD-MCNC: 221 MG/DL (ref 70–99)
GLUCOSE SERPL-MCNC: 177 MG/DL (ref 70–99)
GLUCOSE SERPL-MCNC: 226 MG/DL (ref 70–99)
HAV IGM SER QL: NONREACTIVE
HBV CORE IGM SER QL: NONREACTIVE
HBV SURFACE AG SER QL: NONREACTIVE
HCT VFR BLDCO AUTO: 30.8 % (ref 40.1–51)
HCT VFR BLDCO AUTO: 40.8 % (ref 40.1–51)
HCT VFR BLDCO AUTO: 41.3 % (ref 40.1–51)
HCT VFR BLDCO AUTO: 41.6 % (ref 40.1–51)
HCV AB SER QL: NONREACTIVE
HCV AB SER QL: NONREACTIVE
HGB BLD-MCNC: 10.6 G/DL (ref 13.7–17.5)
HGB BLD-MCNC: 13.9 G/DL (ref 13.7–17.5)
HGB BLD-MCNC: 13.9 G/DL (ref 13.7–17.5)
HGB BLD-MCNC: 14.4 G/DL (ref 13.7–17.5)
IMM GRANULOCYTES # BLD AUTO: 0.23 K/UL (ref 0–0.08)
IMM GRANULOCYTES NFR BLD AUTO: 1.2 %
INR PPP: 1.4
LABORATORY COMMENT REPORT: NORMAL
LABORATORY COMMENT REPORT: NORMAL
LACTATE SERPL-SCNC: 3 MMOL/L (ref 0.4–2)
LACTATE SERPL-SCNC: 4.4 MMOL/L (ref 0.4–2)
LYMPHOCYTES # BLD: 0.64 K/UL (ref 1.2–3.5)
LYMPHOCYTES NFR BLD: 3.4 %
MAGNESIUM SERPL-MCNC: 1.9 MG/DL (ref 1.8–2.5)
MAGNESIUM SERPL-MCNC: 2 MG/DL (ref 1.8–2.5)
MCH RBC QN AUTO: 29 PG (ref 28–33.2)
MCH RBC QN AUTO: 30.1 PG (ref 28–33.2)
MCH RBC QN AUTO: 30.3 PG (ref 28–33.2)
MCHC RBC AUTO-ENTMCNC: 33.4 G/DL (ref 32.2–36.5)
MCHC RBC AUTO-ENTMCNC: 34.4 G/DL (ref 32.2–36.5)
MCHC RBC AUTO-ENTMCNC: 35.3 G/DL (ref 32.2–36.5)
MCV RBC AUTO: 85.9 FL (ref 83–98)
MCV RBC AUTO: 86.7 FL (ref 83–98)
MCV RBC AUTO: 87.5 FL (ref 83–98)
MONOCYTES # BLD: 1.7 K/UL (ref 0.3–1)
MONOCYTES NFR BLD: 9.1 %
NEUTROPHILS # BLD: 15.99 K/UL (ref 1.7–7)
NEUTS SEG NFR BLD: 86.1 %
NRBC BLD-RTO: 0 %
P AXIS: 44
PDW BLD AUTO: 9.5 FL (ref 9.4–12.4)
PDW BLD AUTO: 9.5 FL (ref 9.4–12.4)
PDW BLD AUTO: 9.9 FL (ref 9.4–12.4)
PHOSPHATE SERPL-MCNC: 4.9 MG/DL (ref 2.4–4.7)
PLATELET # BLD AUTO: 163 K/UL (ref 150–350)
PLATELET # BLD AUTO: 172 K/UL (ref 150–350)
PLATELET # BLD AUTO: 174 K/UL (ref 150–350)
POCT TEST: ABNORMAL
POTASSIUM SERPL-SCNC: 4.3 MEQ/L (ref 3.6–5.1)
POTASSIUM SERPL-SCNC: 4.6 MEQ/L (ref 3.6–5.1)
PR INTERVAL: 176
PROT SERPL-MCNC: 5.4 G/DL (ref 6–8.2)
PROT SERPL-MCNC: 6 G/DL (ref 6–8.2)
PROTHROMBIN TIME: 16.7 SEC (ref 12.2–14.5)
QRS DURATION: 94
QT INTERVAL: 358
QTC CALCULATION(BAZETT): 452
R AXIS: 53
RBC # BLD AUTO: 3.52 M/UL (ref 4.5–5.8)
RBC # BLD AUTO: 4.75 M/UL (ref 4.5–5.8)
RBC # BLD AUTO: 4.8 M/UL (ref 4.5–5.8)
SODIUM SERPL-SCNC: 136 MEQ/L (ref 136–144)
SODIUM SERPL-SCNC: 136 MEQ/L (ref 136–144)
SPECIMEN EXP DATE BLD: NORMAL
T WAVE AXIS: -25
TROPONIN I SERPL HS-MCNC: 1349.2 PG/ML
TROPONIN I SERPL HS-MCNC: 1349.8 PG/ML
VENTRICULAR RATE: 96
WBC # BLD AUTO: 14.99 K/UL (ref 3.8–10.5)
WBC # BLD AUTO: 15.44 K/UL (ref 3.8–10.5)
WBC # BLD AUTO: 18.59 K/UL (ref 3.8–10.5)

## 2022-10-25 PROCEDURE — 25800000 HC PHARMACY IV SOLUTIONS: Performed by: INTERNAL MEDICINE

## 2022-10-25 PROCEDURE — 85730 THROMBOPLASTIN TIME PARTIAL: CPT | Performed by: PHYSICIAN ASSISTANT

## 2022-10-25 PROCEDURE — 80053 COMPREHEN METABOLIC PANEL: CPT | Performed by: HOSPITALIST

## 2022-10-25 PROCEDURE — 25800000 HC PHARMACY IV SOLUTIONS: Performed by: HOSPITALIST

## 2022-10-25 PROCEDURE — 85027 COMPLETE CBC AUTOMATED: CPT | Performed by: INTERNAL MEDICINE

## 2022-10-25 PROCEDURE — 87641 MR-STAPH DNA AMP PROBE: CPT | Performed by: HOSPITALIST

## 2022-10-25 PROCEDURE — 63600000 HC DRUGS/DETAIL CODE: Performed by: INTERNAL MEDICINE

## 2022-10-25 PROCEDURE — 85025 COMPLETE CBC W/AUTO DIFF WBC: CPT | Performed by: HOSPITALIST

## 2022-10-25 PROCEDURE — 83036 HEMOGLOBIN GLYCOSYLATED A1C: CPT | Performed by: HOSPITALIST

## 2022-10-25 PROCEDURE — C1769 GUIDE WIRE: HCPCS

## 2022-10-25 PROCEDURE — 86920 COMPATIBILITY TEST SPIN: CPT

## 2022-10-25 PROCEDURE — 36430 TRANSFUSION BLD/BLD COMPNT: CPT

## 2022-10-25 PROCEDURE — 20000000 HC ROOM AND CARE ICU

## 2022-10-25 PROCEDURE — C1894 INTRO/SHEATH, NON-LASER: HCPCS

## 2022-10-25 PROCEDURE — 85610 PROTHROMBIN TIME: CPT | Performed by: PHYSICIAN ASSISTANT

## 2022-10-25 PROCEDURE — 25000000 HC PHARMACY GENERAL: Performed by: RADIOLOGY

## 2022-10-25 PROCEDURE — 93005 ELECTROCARDIOGRAM TRACING: CPT | Performed by: HOSPITALIST

## 2022-10-25 PROCEDURE — 84484 ASSAY OF TROPONIN QUANT: CPT | Performed by: HOSPITALIST

## 2022-10-25 PROCEDURE — 36415 COLL VENOUS BLD VENIPUNCTURE: CPT | Performed by: INTERNAL MEDICINE

## 2022-10-25 PROCEDURE — 63700000 HC SELF-ADMINISTRABLE DRUG: Performed by: INTERNAL MEDICINE

## 2022-10-25 PROCEDURE — 85730 THROMBOPLASTIN TIME PARTIAL: CPT | Performed by: HOSPITALIST

## 2022-10-25 PROCEDURE — 25800000 HC PHARMACY IV SOLUTIONS: Performed by: RADIOLOGY

## 2022-10-25 PROCEDURE — 71045 X-RAY EXAM CHEST 1 VIEW: CPT

## 2022-10-25 PROCEDURE — 85014 HEMATOCRIT: CPT | Performed by: HOSPITALIST

## 2022-10-25 PROCEDURE — 63700000 HC SELF-ADMINISTRABLE DRUG: Performed by: HOSPITALIST

## 2022-10-25 PROCEDURE — 3E04317 INTRODUCTION OF OTHER THROMBOLYTIC INTO CENTRAL VEIN, PERCUTANEOUS APPROACH: ICD-10-PCS | Performed by: RADIOLOGY

## 2022-10-25 PROCEDURE — 83735 ASSAY OF MAGNESIUM: CPT | Performed by: HOSPITALIST

## 2022-10-25 PROCEDURE — 76937 US GUIDE VASCULAR ACCESS: CPT

## 2022-10-25 PROCEDURE — 83605 ASSAY OF LACTIC ACID: CPT | Performed by: HOSPITALIST

## 2022-10-25 PROCEDURE — 63600000 HC DRUGS/DETAIL CODE: Performed by: PHYSICIAN ASSISTANT

## 2022-10-25 PROCEDURE — G1004 CDSM NDSC: HCPCS

## 2022-10-25 PROCEDURE — 74176 CT ABD & PELVIS W/O CONTRAST: CPT | Mod: MG

## 2022-10-25 PROCEDURE — 85730 THROMBOPLASTIN TIME PARTIAL: CPT | Performed by: INTERNAL MEDICINE

## 2022-10-25 PROCEDURE — C1887 CATHETER, GUIDING: HCPCS

## 2022-10-25 PROCEDURE — 36100345 IR THROMBOLYSIS

## 2022-10-25 PROCEDURE — 93970 EXTREMITY STUDY: CPT

## 2022-10-25 PROCEDURE — 63600000 HC DRUGS/DETAIL CODE: Mod: JG | Performed by: RADIOLOGY

## 2022-10-25 PROCEDURE — 99233 SBSQ HOSP IP/OBS HIGH 50: CPT | Performed by: HOSPITALIST

## 2022-10-25 PROCEDURE — 84100 ASSAY OF PHOSPHORUS: CPT | Performed by: HOSPITALIST

## 2022-10-25 PROCEDURE — 99291 CRITICAL CARE FIRST HOUR: CPT | Performed by: HOSPITALIST

## 2022-10-25 PROCEDURE — 02CQ3ZZ EXTIRPATION OF MATTER FROM RIGHT PULMONARY ARTERY, PERCUTANEOUS APPROACH: ICD-10-PCS | Performed by: RADIOLOGY

## 2022-10-25 PROCEDURE — 02CR3ZZ EXTIRPATION OF MATTER FROM LEFT PULMONARY ARTERY, PERCUTANEOUS APPROACH: ICD-10-PCS | Performed by: RADIOLOGY

## 2022-10-25 PROCEDURE — 80053 COMPREHEN METABOLIC PANEL: CPT | Performed by: INTERNAL MEDICINE

## 2022-10-25 PROCEDURE — 87040 BLOOD CULTURE FOR BACTERIA: CPT | Performed by: HOSPITALIST

## 2022-10-25 PROCEDURE — 86900 BLOOD TYPING SEROLOGIC ABO: CPT

## 2022-10-25 RX ORDER — LIDOCAINE HYDROCHLORIDE 10 MG/ML
INJECTION, SOLUTION INFILTRATION; PERINEURAL
Status: COMPLETED | OUTPATIENT
Start: 2022-10-25 | End: 2022-10-25

## 2022-10-25 RX ORDER — OXYCODONE HYDROCHLORIDE 5 MG/1
5 TABLET ORAL EVERY 6 HOURS PRN
Status: DISCONTINUED | OUTPATIENT
Start: 2022-10-25 | End: 2022-11-06 | Stop reason: HOSPADM

## 2022-10-25 RX ORDER — INSULIN ASPART 100 [IU]/ML
2-10 INJECTION, SOLUTION INTRAVENOUS; SUBCUTANEOUS
Status: DISCONTINUED | OUTPATIENT
Start: 2022-10-25 | End: 2022-10-28

## 2022-10-25 RX ORDER — SODIUM CHLORIDE 9 MG/ML
5 INJECTION, SOLUTION INTRAVENOUS AS NEEDED
Status: ACTIVE | OUTPATIENT
Start: 2022-10-25 | End: 2022-10-26

## 2022-10-25 RX ORDER — HYDROMORPHONE HYDROCHLORIDE 1 MG/ML
.25-.5 INJECTION, SOLUTION INTRAMUSCULAR; INTRAVENOUS; SUBCUTANEOUS
Status: DISCONTINUED | OUTPATIENT
Start: 2022-10-25 | End: 2022-11-06 | Stop reason: HOSPADM

## 2022-10-25 RX ADMIN — HEPARIN SODIUM 1700 UNITS/HR: 10000 INJECTION, SOLUTION INTRAVENOUS at 14:51

## 2022-10-25 RX ADMIN — SODIUM CHLORIDE 1000 ML: 9 INJECTION, SOLUTION INTRAVENOUS at 18:55

## 2022-10-25 RX ADMIN — TIMOLOL MALEATE 1 DROP: 5 SOLUTION/ DROPS OPHTHALMIC at 08:54

## 2022-10-25 RX ADMIN — SODIUM CHLORIDE: 9 INJECTION, SOLUTION INTRAVENOUS at 14:52

## 2022-10-25 RX ADMIN — ALTEPLASE 10 MG: 2.2 INJECTION, POWDER, LYOPHILIZED, FOR SOLUTION INTRAVENOUS at 17:23

## 2022-10-25 RX ADMIN — LATANOPROST 1 DROP: 50 SOLUTION OPHTHALMIC at 22:53

## 2022-10-25 RX ADMIN — OXYCODONE HYDROCHLORIDE 5 MG: 5 TABLET ORAL at 14:03

## 2022-10-25 RX ADMIN — PANTOPRAZOLE SODIUM 40 MG: 40 TABLET, DELAYED RELEASE ORAL at 08:54

## 2022-10-25 RX ADMIN — ALTEPLASE 10 MG: 2.2 INJECTION, POWDER, LYOPHILIZED, FOR SOLUTION INTRAVENOUS at 17:34

## 2022-10-25 RX ADMIN — LIDOCAINE HYDROCHLORIDE 10 ML: 10 INJECTION, SOLUTION INFILTRATION; PERINEURAL at 17:11

## 2022-10-25 RX ADMIN — SODIUM CHLORIDE: 9 INJECTION, SOLUTION INTRAVENOUS at 04:49

## 2022-10-25 RX ADMIN — HYDROMORPHONE HYDROCHLORIDE 0.5 MG: 1 INJECTION, SOLUTION INTRAMUSCULAR; INTRAVENOUS; SUBCUTANEOUS at 23:25

## 2022-10-25 NOTE — SIGNIFICANT EVENT
.     Hospital Medicine  RRT or Code Blue Note       SUMMARY OF EVENT   This is a 77 y.o. year-old male who was admitted on 10/24/2022 with  Fall, initial encounter [W19.XXXA]  Chin laceration, initial encounter [S01.81XA]  Acute saddle pulmonary embolism with acute cor pulmonale (CMS/HCC) [I26.02]  Syncope, unspecified syncope type [R55] and is currently being treated for Bilateral pulmonary embolism (CMS/HCC).  I was called to the room by overhead page for RRT for complaint of AMS and hypotension.  Patient admitted w/ submassive PE w/ R heart strain. Patient received catheter directed thrombolysis this afternoon. Once he returned to PCU, patient was confused, cold/clammy, diaphoretic and became hypotensive. RRT was called. Patient was given NS bolus. BP improved to 90/50s. Will sent patient for stat CT head/chest/abdomen and pelvis to r/o bleed. Stat labs were ordered. Patient was transferred to ICU             CODE STATUS      Their Code Status at the start of the event was Full Code      CODE LEADER   Arturo Parker MD       PERTINENT PHYSICAL EXAMINATION      Pre-Event Vital Signs: Temp:  [36.5 °C (97.7 °F)-36.9 °C (98.5 °F)] 36.5 °C (97.7 °F)  Heart Rate:  [] 100  Resp:  [16-20] 20  BP: (102-158)/(59-87) 102/60    Physical Exam    GEN: well-developed and well-nourished; not in acute distress  HEENT: normocephalic; atraumatic  NECK: no JVD; no bruits  CARDIO: regular rate and rhythm; no murmurs or rubs  RESP: clear to auscultation bilaterally; no rales, rhonchi, or wheezes  ABD: soft, non-distended, non-tender, normal bowel sounds  EXT: no cyanosis, clubbing, or edema  SKIN: diarphoretic  MUSCULOSKELETAL: no injury or deformity  NEURO: alert and oriented x 1; nonfocal     SIGNIFICANT LABS / IMAGING      Labs  Labs are pending.    Imaging  I have independently reviewed the pertinent imaging from the last 24 hrs.    ECG/Telemetry  I have independently reviewed the ECG. Significant findings include NSR,  AFSHAN.          POST-EVENT      Diagnosis: PE, hypotension    Patient condition at end of event: stable    Disposition:transfer to ICU    Critical Care Time: 35 minutes     KEY COMMUNICATION           Discussed with Emergency Contact: Extended Emergency Contact Information  Primary Emergency Contact: YandelOlena  Mobile Phone: [phone]  Relation: Spouse: Yes/No: yes    Follow-Up/Handoff: signed out to Dr. Simms

## 2022-10-25 NOTE — PLAN OF CARE
Plan of Care Review  Plan of Care Reviewed With: patient  Progress: improving  Outcome Summary: Pt arrived to PCU yesterday. Q6 HH, Q4 Neuro Checks, 2 new IVs placed (#20 RFA, #18 LFA). Assessing BPs and LOC r/t syncopal episode that brought pt in to hospital. Pt has loop recorder. Hep gtt at 17; at goal; redraw at 1200. NSS @80. NPO overnight; IR today for adrenal hemorrhage removal (?). AOx4; drowsy!. 4L NC, diminished. Strong; neuro checks normal. Chin lac sutures clean, open to air. Facial edema (R side); bruises from fall. Tumor Marker labs seem to have come back unremarkable. NSR on monitor. Urinal at bedside; urine dark yellow and odorous. Continue to monitor status of patient; hopefully news on IR procedure soon. Pt is hungry and thirsty. Only minimal pain.
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
## 2022-10-25 NOTE — PROGRESS NOTES
[facility] Medicine Service -  Daily Progress Note       SUBJECTIVE   Interval History: no acute events overnight. Patient reports breathing is good. Feeling ok. No chest pain. No n/v. No abd pain.      OBJECTIVE      Vital signs in last 24 hours:  Temp:  [36.6 °C (97.8 °F)-36.8 °C (98.2 °F)] 36.6 °C (97.8 °F)  Heart Rate:  [66-91] 66  Resp:  [16-30] 16  BP: (116-158)/(75-95) 152/83    Intake/Output Summary (Last 24 hours) at 10/25/2022 1137  Last data filed at 10/25/2022 0600  Gross per 24 hour   Intake 1465.72 ml   Output 650 ml   Net 815.72 ml       PHYSICAL EXAMINATION      Physical Exam    GEN: well-developed and well-nourished; not in acute distress  HEENT: normocephalic; atraumatic  NECK: no JVD; no bruits  CARDIO: regular rate and rhythm; no murmurs or rubs  RESP: clear to auscultation bilaterally; no rales, rhonchi, or wheezes  ABD: soft, non-distended, non-tender, normal bowel sounds  EXT: no cyanosis, clubbing, or edema  SKIN: clean, dry, warm, and intact  MUSCULOSKELETAL: no injury or deformity  NEURO: alert and oriented x 3; nonfocal  BEHAVIOR/EMOTIONAL: appropriate; cooperative     LINES, CATHETERS, DRAINS, AIRWAYS, AND WOUNDS   Lines, Drains, and Airways:  Wounds (agree with documentation and present on admission):  Peripheral IV (Adult) 10/24/22 Anterior;Left Wrist (Active)   Number of days: 1       Peripheral IV (Adult) 10/24/22 Posterior;Right Forearm (Active)   Number of days: 1       Peripheral IV (Adult) 10/24/22 Left;Posterior Forearm (Active)   Number of days: 1         Comments:      LABS / IMAGING / TELE      Labs  Lab Results   Component Value Date    WBC 14.99 (H) 10/25/2022    HGB 13.9 10/25/2022    HCT 41.6 10/25/2022    MCV 86.7 10/25/2022     10/25/2022     Lab Results   Component Value Date    GLUCOSE 177 (H) 10/25/2022    CALCIUM 8.7 (L) 10/25/2022     10/25/2022    K 4.3 10/25/2022    CO2 26 10/25/2022     10/25/2022    BUN 27 (H) 10/25/2022    CREATININE 0.9  10/25/2022     Lab Results   Component Value Date    INR 1.2 10/24/2022       Micro  Microbiology Results     Procedure Component Value Units Date/Time    SARS-CoV-2 (COVID-19), PCR Nasopharynx [[id number]]  (Normal) Collected: 10/24/22 0839    Specimen: Nasopharyngeal Swab from Nasopharynx Updated: 10/24/22 0925    Narrative:      The following orders were created for panel order SARS-CoV-2 (COVID-19), PCR Nasopharynx.  Procedure                               Abnormality         Status                     ---------                               -----------         ------                     SARS-COV-2 (COVID-19)/ F...[[id number]]  Normal              Final result                 Please view results for these tests on the individual orders.    SARS-COV-2 (COVID-19)/ FLU A/B, AND RSV, PCR Nasopharynx [[id number]]  (Normal) Collected: 10/24/22 0839    Specimen: Nasopharyngeal Swab from Nasopharynx Updated: 10/24/22 0925     SARS-CoV-2 (COVID-19) Negative     Influenza A Negative     Influenza B Negative     Respiratory Syncytial Virus Negative    Narrative:      Testing performed using real-time PCR for detection of COVID-19. EUA approved validation studies performed on site.           Imaging  CT HEAD WITHOUT IV CONTRAST    Result Date: 10/24/2022  IMPRESSION: 1.  No posttraumatic intracranial abnormality. 2. Chronic changes noted under the comment.    CT FACIAL BONES WITHOUT IV CONTRAST    Result Date: 10/24/2022  IMPRESSION: Multiple right-sided facial bone fractures as described under the comment.    CT CERVICAL SPINE WITHOUT IV CONTRAST    Result Date: 10/24/2022  IMPRESSION: 1.  No acute fractures. As clinically indicated, MRI of the cervical spine is recommended for further evaluation. 2.  Other incidental findings noted under the comment.     CT ABDOMEN PELVIS WITH IV CONTRAST    Result Date: 10/24/2022  IMPRESSION: 1.  Extensive acute pulmonary arterial emboli involving the bilateral main and multiple bilateral  lobar, segmental and subsegmental branches, as detailed above. Flattening of the interventricular septum with dilatation of the right ventricle suggestive of right-sided heart strain. No evidence of acute pulmonary infarct. 2.  Large, indeterminate infiltrative hypodense lesion within the right hepatic lobe measuring up to 7 cm warranting further assessment with contrast-enhanced MRI. Differential diagnosis includes metastatic disease, a primary malignancy as well as abscess. Small adjacent similar appearing indeterminate right hepatic lobe lesion. 3.  Indeterminate right adrenal mass with findings consistent with right adrenal hemorrhage. 4.  A 10 mm groundglass nodule within the right middle lobe. A follow-up CT scan of the chest in 6-12 months is recommended as per Fleischner guidelines. 5.  Additional incidental findings including ectasia of ascending thoracic aorta, enlarged prostate gland, coronary arterial atherosclerotic vascular disease and colonic diverticulosis without evidence of diverticulitis. Findings and recommendations discussed with SUZANNA Muñoz by Dr. Thompson by telephone at 9:54 AM and 10:21 AM on 10/24/2022. Fleischner Society 2017 Guidelines for Management of Incidentally Detected Pulmonary Nodules in Adults. (Subsolid Nodules) Single ground glass: <6 mm - No routine follow-up >/= 6 mm - CT at 6-12 months to confirm persistence, then CT every 2 years until 5 years (In certain suspicious nodules < 6 mm, consider follow-up at 2 and 4 years.  If solid component(s) or growth develops, consider resection (Recommendations 3A and 4A)     CT ANGIOGRAPHY CHEST PULMONARY EMBOLISM WITH IV CONTRAST    Result Date: 10/24/2022  IMPRESSION: 1.  Extensive acute pulmonary arterial emboli involving the bilateral main and multiple bilateral lobar, segmental and subsegmental branches, as detailed above. Flattening of the interventricular septum with dilatation of the right ventricle suggestive of right-sided heart  strain. No evidence of acute pulmonary infarct. 2.  Large, indeterminate infiltrative hypodense lesion within the right hepatic lobe measuring up to 7 cm warranting further assessment with contrast-enhanced MRI. Differential diagnosis includes metastatic disease, a primary malignancy as well as abscess. Small adjacent similar appearing indeterminate right hepatic lobe lesion. 3.  Indeterminate right adrenal mass with findings consistent with right adrenal hemorrhage. 4.  A 10 mm groundglass nodule within the right middle lobe. A follow-up CT scan of the chest in 6-12 months is recommended as per Fleischner guidelines. 5.  Additional incidental findings including ectasia of ascending thoracic aorta, enlarged prostate gland, coronary arterial atherosclerotic vascular disease and colonic diverticulosis without evidence of diverticulitis. Findings and recommendations discussed with SUZANNA Muñoz by Dr. Thompson by telephone at 9:54 AM and 10:21 AM on 10/24/2022. Fleischner Society 2017 Guidelines for Management of Incidentally Detected Pulmonary Nodules in Adults. (Subsolid Nodules) Single ground glass: <6 mm - No routine follow-up >/= 6 mm - CT at 6-12 months to confirm persistence, then CT every 2 years until 5 years (In certain suspicious nodules < 6 mm, consider follow-up at 2 and 4 years.  If solid component(s) or growth develops, consider resection (Recommendations 3A and 4A)     X-RAY CHEST 1 VIEW    Result Date: 10/24/2022  IMPRESSION: No evidence of acute pulmonary disease.       ECG/Telemetry  I have independently reviewed the telemetry. No events for the last 24 hours.    ASSESSMENT AND PLAN      * Bilateral pulmonary embolism (CMS/HCC)  Assessment & Plan  CT with extensive bilateral PE with signs for right heart strain as well as elevated troponin.  Currently on 4 L oxygen, blood pressure stable.  Likely provoked with recent COVID with also concerning finding in the liver?   -Admit to PCU  -Continue heparin drip  and monitor hemoglobin closely.  -Pulmonology consulted- pulm recommending clot retrieval in the setting of adrenal hemorrhage  -Continuous pulse ox  -Albuterol as needed  -still ongoing discussion regarding IR intervention  -pt is hemodynamically stable    Elevated LFTs  Assessment & Plan  AST ALT elevated.  Patient and wife report he did not have any prior liver problem.  CT abdomen pelvis revealed a large hypodense liver lesion 7 cm  -Monitor LFT  -GI consult  -check AFP, hepatitis panel  -Will need MRI evaluation once pt is more stable medically     Facial fracture (CMS/HCC)  Assessment & Plan  Multiple displaced facial fracture on CT scan  -trauma surgery and facial surgery c/s. Antibiotic per facial surgery.     Adrenal hemorrhage (CMS/HCC)  Assessment & Plan  CT abdomen pelvis with potential adrenal hemorrhage.  ER NP had discussed this with trauma surgery.  Okay to anticoagulate due to benefit greater than risk.  -Monitor CBC every 6, if hemoglobin dropping then will need to get stat CTA of the abdomen pelvis to assess for active bleed    Fall  Assessment & Plan  Fell this AM and passed out.   CT with facial bone fracture with displacement, CT a/p with potential adrenal hemorrhage?   -PT/OT when patient is more medically stable  -SW c/s    Hyperlipidemia  Assessment & Plan  Due to elevated LFT, will hold Lipitor for now until further GI eval    Myocardial injury  Assessment & Plan  Patient has significantly elevated high-sensitivity troponin.    This is likely due to patient's PE leading to right heart strain.  Doubt ACS  cardiology following    Hypertension  Assessment & Plan  At home patient is on Norvasc, hydrochlorothiazide, metoprolol  -Due to extensive PE and right heart strain, will hold all blood pressure medication for now.        Plan of care discussed with patient, nurse, DR. Orr     VTE Assessment: Padua    VTE Prophylaxis:  Current anticoagulants:  heparin (porcine) bolus from bag 3,400-6,850  Units, intravenous, q6h PRN  heparin infusion in D5W 100 units/mL, intravenous, Titrated      Code Status: Full Code      Estimated Discharge Date: 10/31/2022     Disposition Planning: pending clinical course     Arturo Parker MD  10/25/2022

## 2022-10-25 NOTE — PROGRESS NOTES
D/w Dr. Orr (pulmonary) and Dr. Parekr ([facility] [place]) - pt with submassive PE and remains symptomatic with activity intolerance despite full dose anticoagulation. Given more peripheral location of thrombus, it would be difficult to resolve with mechanical thrombectomy. The thrombus would ordinarily respond well to EKOS catheter-directed thrombolysis - however, given his adrenal hemorrhage and liver injury, he would be at significantly increased risk of abdominal hemorrhage with TPA infusion.     Single session infusion of TPA into each pulmonary artery was offered as an alternative option for treatment. While this still carries a significant risk of bleeding, overall bleeding risk is likely lower than continued infusion therapy given the short half life of TPA. The risks were discussed with pulmonary and in detail to the patient - the patient is agreeable to proceed, understanding the risks.

## 2022-10-25 NOTE — PROGRESS NOTES
Interventional Cardiology Progress Note    Facial soreness and thoracic soreness, but shortness of breath stable if not improving and no chest pain.  No concerning arrhythmia on monitor.    REVIEW OF SYSTEMS : No other significant changes to review of systems over the past 24 hours.    Current Facility-Administered Medications   Medication Dose Route Frequency Provider Last Rate Last Admin    acetaminophen (TYLENOL) tablet 650 mg  650 mg oral q4h PRN Cheryl Bradford, DO        albuterol nebulizer solution 5 mg  5 mg nebulization q6h PRN Cheryl Bradford, DO        glucose chewable tablet 16-32 g of dextrose  16-32 g of dextrose oral PRN Cheryl Bradford, DO        Or    dextrose 40 % oral gel 15-30 g of dextrose  15-30 g of dextrose oral PRN Cheryl Bradford, DO        Or    glucagon (GLUCAGEN) injection 1 mg  1 mg intramuscular PRN Cheryl Bradford,         Or    dextrose 50 % in water (D50) injection 12.5 g  25 mL intravenous PRN Cheryl Bradford, DO        heparin (porcine) bolus from bag 3,400-6,850 Units  40-80 Units/kg intravenous q6h PRN Cheryl Bradford,         heparin infusion in D5W 100 units/mL  100-4,000 Units/hr intravenous Titrated Cheryl Bradford DO 17 mL/hr at 10/25/22 0636 1,700 Units/hr at 10/25/22 0636    latanoprost (XALATAN) 0.005 % ophthalmic solution 1 drop  1 drop Both Eyes Nightly Cheryl Bradford DO   1 drop at 10/24/22 2259    nitroglycerin (NITROSTAT) SL tablet 0.4 mg  0.4 mg sublingual q5 min PRN Cheryl Bradford DO        pantoprazole (PROTONIX) tablet,delayed release (DR/EC) 40 mg  40 mg oral Daily Cheryl Bradford DO   40 mg at 10/25/22 0854    sodium chloride 0.9 % infusion   intravenous Continuous Cheryl Bradford DO 80 mL/hr at 10/25/22 0600 Rate Verify at 10/25/22 0600    timolol (TIMOPTIC) 0.5 % ophthalmic solution 1 drop  1 drop Left Eye q AM Cheryl Bradford, DO   1 drop at 10/25/22 0854          Objective   Labs  Hemoglobin 13.9.  Peak troponin around 1500.    Telemetry  I have personally reviewed the telemetry tracings.  Sinus  rhythm.    Physical Exam  Temp:  [36.6 °C (97.8 °F)-36.8 °C (98.2 °F)] 36.6 °C (97.8 °F)  Heart Rate:  [66-91] 66  Resp:  [16-30] 16  BP: (116-158)/(75-95) 152/83    Intake/Output Summary (Last 24 hours) at 10/25/2022 1125  Last data filed at 10/25/2022 0600  Gross per 24 hour   Intake 1465.72 ml   Output 650 ml   Net 815.72 ml       General: Drowsy, NAD.  Lungs: Rhonchorous throughout.  Heart: RRR. No R/M/G.  Abdomen: Soft. NT/ND. BS positive.  Extremities: Warm, no C/C/E.  Neuro: Moves all 4 extremities. Sensation intact.  Skin: Scattered ecchymoses.    ASSESSMENT / PLAN:    1.  Traumatic syncope: Associated with pulmonary embolism.  Remains on telemetry.  No significant arrhythmia thus far.  Echocardiogram with expected right ventricular findings following pulmonary embolism, but no concerning left ventricular abnormalities.    2.  Acute pulmonary embolism: Right ventricular hypokinesis and moderate pulmonary hypertension as is typically seen.  Ongoing discussion about whether to proceed with catheter directed thrombolysis or catheter thrombectomy order continue treatment with therapeutic anticoagulation.    3.  Abnormal troponin: Findings are consistent with acute pulmonary embolism at this time.  Given history of nonobstructive CAD, it would likely be appropriate, once acute issues resolved, to consider outpatient noninvasive testing for CAD.

## 2022-10-25 NOTE — PLAN OF CARE
Problem: Adult Inpatient Plan of Care  Goal: Readiness for Transition of Care  Intervention: Mutually Develop Transition Plan  Flowsheets (Taken 10/25/2022 1053)  Anticipated Discharge Disposition:   home with home health   home without assistance or services  Equipment Needed After Discharge: none  Assistive Device/Animal Currently Used at Home: none  Anticipated Changes Related to Illness: none  Transportation Concerns: car, none  Readmission Within the Last 30 Days: no previous admission in last 30 days  Patient/Family Anticipated Services at Transition: home health care  Patient/Family Anticipates Transition to: home with family  Transportation Anticipated: family or friend will provide  Concerns to be Addressed:   care coordination/care conferences   discharge planning     Spoke with patient at bedside.  Went over RNCC role and discharge planning.  Confirmed PCP and pharmacy.  Pt lives with wife in a 2SH, FF to bedroom and bath.  States is independent and uses no DME.  Pt admitted for b/l PE with R heart strain.  Continuing with heparin drip.  Will continue to follow for discharge needs.

## 2022-10-25 NOTE — PROGRESS NOTES
"   Pulmonary Progress Note       SUBJECTIVE   Interval History:  -stable overnight, without significant change  -still dizzy with movement  -denies SOBAR, palpitations, cough, wheeze  -notes right rib/chest pain  -denies facial pain, visual changes     OBJECTIVE        Vital Signs:   Visit Vitals  /78   Pulse 96   Temp 36.5 °C (97.7 °F) (Oral)   Resp 18   Ht 1.854 m (6' 1\")   Wt 84.5 kg (186 lb 3.2 oz)   SpO2 96%   BMI 24.57 kg/m²       I/O's:    Intake/Output Summary (Last 24 hours) at 10/25/2022 1501  Last data filed at 10/25/2022 1200  Gross per 24 hour   Intake 1465.72 ml   Output 1070 ml   Net 395.72 ml       Medications:    Reviewed. Pertinent medications as below.     latanoprost  1 drop Both Eyes Nightly    pantoprazole  40 mg oral Daily    timolol  1 drop Left Eye q AM           PHYSICAL EXAMINATION        Vital Signs:   Visit Vitals  /78   Pulse 96   Temp 36.5 °C (97.7 °F) (Oral)   Resp 18   Ht 1.854 m (6' 1\")   Wt 84.5 kg (186 lb 3.2 oz)   SpO2 96%   BMI 24.57 kg/m²       I/O's:    Intake/Output Summary (Last 24 hours) at 10/25/2022 1501  Last data filed at 10/25/2022 1200  Gross per 24 hour   Intake 1465.72 ml   Output 1070 ml   Net 395.72 ml       General: The patient is in no acute distress.  Resting comfortably in bed.  HEENT: Mucous membranes are moist.  Sclera are anicteric.  Neck: Supple.  No cervical lymphadenopathy  Cardiovascular: S1 and S2 are present.  There are no murmurs. +heave  Lungs: Clear to auscultation bilaterally without wheezes or rhonchi  Abdomen: Soft, nontender and nondistended  Extremities: Cool and dry without edema  Neuro: Awake and alert.  Spontaneously moving all extremities       Diagnostic Data      Labs:    I have personally reviewed all pertinent patient laboratory results. Labs of note discussed below:    ABG Results    No lab values to display.       Results from last 7 days   Lab Units 10/25/22  1159 10/25/22  0536 10/25/22  0038 10/24/22  1806 " 10/24/22  0830   CREATININE mg/dL  --  0.9  --   --  1.3   BUN mg/dL  --  27*  --   --  24*   WBC K/uL  --  14.99* 15.44* 13.64* 17.58*   HEMOGLOBIN g/dL 13.9 13.9 14.4 15.0 15.3   PLATELETS K/uL  --  172 174 166 168       Micro:   Microbiology Results     Procedure Component Value Units Date/Time    SARS-CoV-2 (COVID-19), PCR Nasopharynx [[id number]]  (Normal) Collected: 10/24/22 0839    Specimen: Nasopharyngeal Swab from Nasopharynx Updated: 10/24/22 0925    Narrative:      The following orders were created for panel order SARS-CoV-2 (COVID-19), PCR Nasopharynx.  Procedure                               Abnormality         Status                     ---------                               -----------         ------                     SARS-COV-2 (COVID-19)/ F...[[id number]]  Normal              Final result                 Please view results for these tests on the individual orders.    SARS-COV-2 (COVID-19)/ FLU A/B, AND RSV, PCR Nasopharynx [[id number]]  (Normal) Collected: 10/24/22 0839    Specimen: Nasopharyngeal Swab from Nasopharynx Updated: 10/24/22 0925     SARS-CoV-2 (COVID-19) Negative     Influenza A Negative     Influenza B Negative     Respiratory Syncytial Virus Negative    Narrative:      Testing performed using real-time PCR for detection of COVID-19. EUA approved validation studies performed on site.           Imaging:    Personally reviewed:  Echo 10/24/2022: Interpretation Summary     Technically difficult study. Only little parasternal views are available.   Estimated EF 75%. Wall motion appears grossly normal. Normal LV wall thickness. Grade I LV diastolic dysfunction.   Severely dilated RV. Severely reduced RV systolic function. Findings are consistent with right ventricular strain in a patient with an acute saddle pulmonary embolism.   Normal-sized LA.   Moderately dilated RA.   No significant mitral valve regurgitation.   Tricuspid aortic valve. Sclerotic aortic valve leaflets. No aortic  valve stenosis. No aortic regurgitation.   Trace tricuspid valve regurgitation.   Estimated RVSP = 58 mmHg.   IVC is <2.1cm. IVC collapses >50% during inspiration.   No evidence of pericardial effusion.      ASSESSMENT AND PLAN      Assessment   77 y.o. male being consulted for patient co-management       Plan      1) Submassive PE with acute RV failure  -bilateral PE on CTA  -continue Hep gtt for now, eventual transition to DOAC  -given enlarged RV, extensive clot burden, and adrenal hemorrhage, patient is at risk for decompensation both from clots and from treatment. He is also at risk for long-term complications (RCHF, CTEPH). I believe the patient would benefit from IR intervention, clot retrieval and/or catheter-directed tPA given adrenal hemorrhage. Discussed case with IR and Hospitalist. Discussed with patient and family.  -check LE DVT     2) Adrenal hemorrhage  -Hgb stable.  -if dropping and/or abdominal exam changes, then reimage     3) Facial fractures  -discussed with Plastics. No plan for surgical repair given acute PE.     4) Hypertension  -holding home meds     5) Acute hypoxic respiratory failure  -continue supplemental oxygen     Dispo: PCU     I spent 35 minutes on this date of service performing the following activities: obtaining history, performing examination, entering orders, documenting, preparing for visit, obtaining / reviewing records, providing counseling and education, independently reviewing study/studies, communicating results and coordinating care.         Juliano Orr MD  Pulmonary & Critical Care Medicine   10/25/2022  3:01 PM

## 2022-10-25 NOTE — PROGRESS NOTES
GASTROENTEROLOGY DAILY PROGRESS NOTE  [facility]     PATIENT NAME:  Tim Humphries          YOB: 1945  AGE:  77 y.o.   MRN: [id number]      SUBJECTIVE   CC: Follow-up for mild LFT elevations, right lobe liver lesion.    REVIEW OF SYSTEMS   Systems reviewed and otherwise negative except as documented above.    VITAL SIGNS   Temp:  [36.6 °C (97.8 °F)-36.8 °C (98.2 °F)] 36.6 °C (97.8 °F)  Heart Rate:  [66-91] 66  Resp:  [16-30] 16  BP: (109-158)/(69-95) 152/83      Intake/Output Summary (Last 24 hours) at 10/25/2022 1015  Last data filed at 10/25/2022 0600  Gross per 24 hour   Intake 1465.72 ml   Output 650 ml   Net 815.72 ml     PHYSICAL EXAM   Physical Exam  General appearance: Resting comfortably  Scheduled Meds:   latanoprost  1 drop Both Eyes Nightly    pantoprazole  40 mg oral Daily    timolol  1 drop Left Eye q AM     Continuous Infusions:   heparin infusion - MAR calculator by PTT  100-4,000 Units/hr 1,700 Units/hr (10/25/22 0636)    sodium chloride 0.9 %   80 mL/hr at 10/25/22 0600     PRN Meds:.  acetaminophen    albuterol    glucose **OR** dextrose **OR** glucagon **OR** dextrose 50 % in water (D50)    heparin (porcine)    nitroglycerin    IMAGING AND LABS REVIEWED   Labs reviewed  Results from last 7 days   Lab Units 10/25/22  0536 10/25/22  0038 10/24/22  1806   WBC K/uL 14.99* 15.44* 13.64*   HEMOGLOBIN g/dL 13.9 14.4 15.0   HEMATOCRIT % 41.6 40.8 44.3   PLATELETS K/uL 172 174 166   ]    ]  Results from last 7 days   Lab Units 10/25/22  0536 10/24/22  0830   SODIUM mEQ/L 136 136   POTASSIUM mEQ/L 4.3 4.3   CHLORIDE mEQ/L 100 99   CO2 mEQ/L 26 25   BUN mg/dL 27* 24*   CREATININE mg/dL 0.9 1.3   CALCIUM mg/dL 8.7* 9.0   ALBUMIN g/dL 3.6 4.1   BILIRUBIN TOTAL mg/dL 1.5* 1.5*   ALK PHOS IU/L 48 53   ALT IU/L 210* 256*   AST IU/L 118* 231*   GLUCOSE mg/dL 177* 236*   ]    ]  Results from last 7 days   Lab Units 10/25/22  0540 10/25/22  0038 10/24/22  1806 10/24/22  0830   INR   --    --   --  1.2   PTT sec 68* 57* 88* 30   ]    Imaging reviewed  CT HEAD WITHOUT IV CONTRAST    Result Date: 10/24/2022  IMPRESSION: 1.  No posttraumatic intracranial abnormality. 2. Chronic changes noted under the comment.    CT FACIAL BONES WITHOUT IV CONTRAST    Result Date: 10/24/2022  IMPRESSION: Multiple right-sided facial bone fractures as described under the comment.    CT CERVICAL SPINE WITHOUT IV CONTRAST    Result Date: 10/24/2022  IMPRESSION: 1.  No acute fractures. As clinically indicated, MRI of the cervical spine is recommended for further evaluation. 2.  Other incidental findings noted under the comment.     CT ABDOMEN PELVIS WITH IV CONTRAST    Result Date: 10/24/2022  IMPRESSION: 1.  Extensive acute pulmonary arterial emboli involving the bilateral main and multiple bilateral lobar, segmental and subsegmental branches, as detailed above. Flattening of the interventricular septum with dilatation of the right ventricle suggestive of right-sided heart strain. No evidence of acute pulmonary infarct. 2.  Large, indeterminate infiltrative hypodense lesion within the right hepatic lobe measuring up to 7 cm warranting further assessment with contrast-enhanced MRI. Differential diagnosis includes metastatic disease, a primary malignancy as well as abscess. Small adjacent similar appearing indeterminate right hepatic lobe lesion. 3.  Indeterminate right adrenal mass with findings consistent with right adrenal hemorrhage. 4.  A 10 mm groundglass nodule within the right middle lobe. A follow-up CT scan of the chest in 6-12 months is recommended as per Fleischner guidelines. 5.  Additional incidental findings including ectasia of ascending thoracic aorta, enlarged prostate gland, coronary arterial atherosclerotic vascular disease and colonic diverticulosis without evidence of diverticulitis. Findings and recommendations discussed with SUZANNA Muñoz by Dr. Thompson by telephone at 9:54 AM and 10:21 AM on 10/24/2022.  Fleischner Society 2017 Guidelines for Management of Incidentally Detected Pulmonary Nodules in Adults. (Subsolid Nodules) Single ground glass: <6 mm - No routine follow-up >/= 6 mm - CT at 6-12 months to confirm persistence, then CT every 2 years until 5 years (In certain suspicious nodules < 6 mm, consider follow-up at 2 and 4 years.  If solid component(s) or growth develops, consider resection (Recommendations 3A and 4A)     CT ANGIOGRAPHY CHEST PULMONARY EMBOLISM WITH IV CONTRAST    Result Date: 10/24/2022  IMPRESSION: 1.  Extensive acute pulmonary arterial emboli involving the bilateral main and multiple bilateral lobar, segmental and subsegmental branches, as detailed above. Flattening of the interventricular septum with dilatation of the right ventricle suggestive of right-sided heart strain. No evidence of acute pulmonary infarct. 2.  Large, indeterminate infiltrative hypodense lesion within the right hepatic lobe measuring up to 7 cm warranting further assessment with contrast-enhanced MRI. Differential diagnosis includes metastatic disease, a primary malignancy as well as abscess. Small adjacent similar appearing indeterminate right hepatic lobe lesion. 3.  Indeterminate right adrenal mass with findings consistent with right adrenal hemorrhage. 4.  A 10 mm groundglass nodule within the right middle lobe. A follow-up CT scan of the chest in 6-12 months is recommended as per Fleischner guidelines. 5.  Additional incidental findings including ectasia of ascending thoracic aorta, enlarged prostate gland, coronary arterial atherosclerotic vascular disease and colonic diverticulosis without evidence of diverticulitis. Findings and recommendations discussed with SUZANNA Muñoz by Dr. Thompson by telephone at 9:54 AM and 10:21 AM on 10/24/2022. Fleischner Society 2017 Guidelines for Management of Incidentally Detected Pulmonary Nodules in Adults. (Subsolid Nodules) Single ground glass: <6 mm - No routine follow-up >/= 6  mm - CT at 6-12 months to confirm persistence, then CT every 2 years until 5 years (In certain suspicious nodules < 6 mm, consider follow-up at 2 and 4 years.  If solid component(s) or growth develops, consider resection (Recommendations 3A and 4A)     X-RAY CHEST 1 VIEW    Result Date: 10/24/2022  IMPRESSION: No evidence of acute pulmonary disease.       ASSESSMENT/PLAN   77 M with PMHx of dyslipidemia, GERD, glaucoma, heart murmur, HTN, s/p loop recorder, pericarditis, sleep apnea being seen for elevated LFTs, liver lesion.     1) Liver lesion -Large, indeterminate infiltrative hypodense lesion within the right hepatic  lobe measuring up to 7 cm warranting further assessment with contrast-enhanced  MRI. Differential diagnosis includes metastatic disease, a primary malignancy as  well as abscess.  2) LFTs - unclear etiology, possibly r/t shock liver given acute PE with R heart strain although, concerning liver lesion noted as above  3) Acute bilateral PE with right heart strain - anticoagulation per HMS/pulm  4) S/p fall - adrenal hemorrhage, facial fractures  6) HTN, GERD    10/25/2022 update:    Patient appears to be more comfortable.  LFTs seem to be improving.  Continued anticoagulation for thromboembolic disease.  I suppose an implanted loop recorder is a contraindication to performing an MRI of the liver.  We will need to discuss further evaluation of the liver lesion with radiology.     AUTHOR:  Jose Temple,   10/25/2022

## 2022-10-25 NOTE — PATIENT CARE CONFERENCE
Care Progression Rounds Note  Date: 10/25/2022  Time: 9:50 AM     Patient Name: Tim Humphries     Medical Record Number: [id number]   YOB: 1945  Sex: Male      Room/Bed: 3222    Admitting Diagnosis: Fall, initial encounter [W19.XXXA]  Chin laceration, initial encounter [S01.81XA]  Acute saddle pulmonary embolism with acute cor pulmonale (CMS/HCC) [I26.02]  Syncope, unspecified syncope type [R55]   Admit Date/Time: 10/24/2022  8:21 AM    Primary Diagnosis: Bilateral pulmonary embolism (CMS/HCC)  Principal Problem: Bilateral pulmonary embolism (CMS/HCC)    GMLOS: pending  Anticipated Discharge Date: 10/31/2022    AM-PAC:  Mobility Score:      Discharge Planning:  Anticipated Discharge Disposition: home with home health, skilled nursing facility    Barriers to Discharge:  Medical issues not resolved    Comments:       Participants:  advanced practice provider, , nursing

## 2022-10-25 NOTE — POST-PROCEDURE NOTE
Interventional Radiology Brief Postprocedure Note    Tim Humphries     Attending: Sarkis Gerardo MD     Assistant:      Diagnosis: Submassive PE with continued activity intolerance    Description of procedure: Bilateral pulmonary arterial catheter directed thrombolysis    Contrast: 20 cc, Omni 300     Anesthesia:  Local (Lidocaine)    Volume of Lidocaine Utilized (ml): 5     Medications:  TPA 10mg - each pulmonary artery     Complications: None      Estimated Blood Loss: Estimated Blood Loss: 0-10 ml    Anticoagulation:      Specimens:      Findings: Successful catheter-directed thrombolysis of bilateral pulmonary arteries - 10 mg TPA each. R CFV access - hemostasis achieved with manual compression    10/25/2022 5:41 PM

## 2022-10-26 ENCOUNTER — APPOINTMENT (INPATIENT)
Dept: RADIOLOGY | Facility: HOSPITAL | Age: 77
DRG: 163 | End: 2022-10-26
Payer: COMMERCIAL

## 2022-10-26 ENCOUNTER — APPOINTMENT (INPATIENT)
Dept: CARDIOLOGY | Facility: HOSPITAL | Age: 77
DRG: 163 | End: 2022-10-26
Attending: INTERNAL MEDICINE
Payer: COMMERCIAL

## 2022-10-26 LAB
ALBUMIN SERPL-MCNC: 3.1 G/DL (ref 3.4–5)
ALBUMIN SERPL-MCNC: 3.1 G/DL (ref 3.4–5)
ALBUMIN SERPL-MCNC: 3.2 G/DL (ref 3.4–5)
ALP SERPL-CCNC: 39 IU/L (ref 35–126)
ALP SERPL-CCNC: 43 IU/L (ref 35–126)
ALP SERPL-CCNC: 46 IU/L (ref 35–126)
ALT SERPL-CCNC: 224 IU/L (ref 16–63)
ALT SERPL-CCNC: 262 IU/L (ref 16–63)
ALT SERPL-CCNC: 292 IU/L (ref 16–63)
ANION GAP SERPL CALC-SCNC: 10 MEQ/L (ref 3–15)
ANION GAP SERPL CALC-SCNC: 10 MEQ/L (ref 3–15)
ANION GAP SERPL CALC-SCNC: 9 MEQ/L (ref 3–15)
AORTIC ROOT ANNULUS: 3.5 CM
APTT PPP: 26 SEC (ref 23–35)
ASCENDING AORTA: 4.2 CM
AST SERPL-CCNC: 132 IU/L (ref 15–41)
AST SERPL-CCNC: 153 IU/L (ref 15–41)
AST SERPL-CCNC: 70 IU/L (ref 15–41)
BASOPHILS # BLD: 0.02 K/UL (ref 0.01–0.1)
BASOPHILS # BLD: 0.02 K/UL (ref 0.01–0.1)
BASOPHILS # BLD: 0.03 K/UL (ref 0.01–0.1)
BASOPHILS NFR BLD: 0.1 %
BASOPHILS NFR BLD: 0.1 %
BASOPHILS NFR BLD: 0.2 %
BILIRUB SERPL-MCNC: 0.8 MG/DL (ref 0.3–1.2)
BILIRUB SERPL-MCNC: 1.4 MG/DL (ref 0.3–1.2)
BILIRUB SERPL-MCNC: 1.6 MG/DL (ref 0.3–1.2)
BSA FOR ECHO PROCEDURE: 2.08 M2
BUN SERPL-MCNC: 44 MG/DL (ref 8–20)
BUN SERPL-MCNC: 48 MG/DL (ref 8–20)
BUN SERPL-MCNC: 54 MG/DL (ref 8–20)
CALCIUM SERPL-MCNC: 7.8 MG/DL (ref 8.9–10.3)
CALCIUM SERPL-MCNC: 8.1 MG/DL (ref 8.9–10.3)
CALCIUM SERPL-MCNC: 8.1 MG/DL (ref 8.9–10.3)
CHLORIDE SERPL-SCNC: 106 MEQ/L (ref 98–109)
CHLORIDE SERPL-SCNC: 106 MEQ/L (ref 98–109)
CHLORIDE SERPL-SCNC: 107 MEQ/L (ref 98–109)
CO2 SERPL-SCNC: 20 MEQ/L (ref 22–32)
CO2 SERPL-SCNC: 21 MEQ/L (ref 22–32)
CO2 SERPL-SCNC: 22 MEQ/L (ref 22–32)
CREAT SERPL-MCNC: 1.6 MG/DL (ref 0.8–1.3)
CREAT SERPL-MCNC: 2.2 MG/DL (ref 0.8–1.3)
CREAT SERPL-MCNC: 2.3 MG/DL (ref 0.8–1.3)
DIFFERENTIAL METHOD BLD: ABNORMAL
EOSINOPHIL # BLD: 0 K/UL (ref 0.04–0.54)
EOSINOPHIL NFR BLD: 0 %
ERYTHROCYTE [DISTWIDTH] IN BLOOD BY AUTOMATED COUNT: 13.3 % (ref 11.6–14.4)
ERYTHROCYTE [DISTWIDTH] IN BLOOD BY AUTOMATED COUNT: 13.5 % (ref 11.6–14.4)
ERYTHROCYTE [DISTWIDTH] IN BLOOD BY AUTOMATED COUNT: 13.7 % (ref 11.6–14.4)
EST. AVERAGE GLUCOSE BLD GHB EST-MCNC: 123 MG/DL
GFR SERPL CREATININE-BSD FRML MDRD: 27.9 ML/MIN/1.73M*2
GFR SERPL CREATININE-BSD FRML MDRD: 29.3 ML/MIN/1.73M*2
GFR SERPL CREATININE-BSD FRML MDRD: 42.3 ML/MIN/1.73M*2
GLUCOSE BLD-MCNC: 135 MG/DL (ref 70–99)
GLUCOSE BLD-MCNC: 153 MG/DL (ref 70–99)
GLUCOSE BLD-MCNC: 156 MG/DL (ref 70–99)
GLUCOSE BLD-MCNC: 210 MG/DL (ref 70–99)
GLUCOSE BLD-MCNC: 221 MG/DL (ref 70–99)
GLUCOSE SERPL-MCNC: 146 MG/DL (ref 70–99)
GLUCOSE SERPL-MCNC: 166 MG/DL (ref 70–99)
GLUCOSE SERPL-MCNC: 184 MG/DL (ref 70–99)
HBA1C MFR BLD HPLC: 5.9 %
HCT VFR BLDCO AUTO: 30.5 % (ref 40.1–51)
HCT VFR BLDCO AUTO: 30.5 % (ref 40.1–51)
HCT VFR BLDCO AUTO: 32.4 % (ref 40.1–51)
HCT VFR BLDCO AUTO: 36.1 % (ref 40.1–51)
HGB BLD-MCNC: 10.2 G/DL (ref 13.7–17.5)
HGB BLD-MCNC: 10.2 G/DL (ref 13.7–17.5)
HGB BLD-MCNC: 10.8 G/DL (ref 13.7–17.5)
HGB BLD-MCNC: 11.8 G/DL (ref 13.7–17.5)
IMM GRANULOCYTES # BLD AUTO: 0.12 K/UL (ref 0–0.08)
IMM GRANULOCYTES # BLD AUTO: 0.14 K/UL (ref 0–0.08)
IMM GRANULOCYTES # BLD AUTO: 0.15 K/UL (ref 0–0.08)
IMM GRANULOCYTES NFR BLD AUTO: 0.7 %
IMM GRANULOCYTES NFR BLD AUTO: 0.9 %
IMM GRANULOCYTES NFR BLD AUTO: 0.9 %
INR PPP: 1.4
LACTATE SERPL-SCNC: 1.7 MMOL/L (ref 0.4–2)
LACTATE SERPL-SCNC: 1.7 MMOL/L (ref 0.4–2)
LACTATE SERPL-SCNC: 2.4 MMOL/L (ref 0.4–2)
LACTATE SERPL-SCNC: 3.1 MMOL/L (ref 0.4–2)
LVOT 2D: 1.9 CM
LVOT A: 2.84 CM2
LYMPHOCYTES # BLD: 0.8 K/UL (ref 1.2–3.5)
LYMPHOCYTES # BLD: 0.88 K/UL (ref 1.2–3.5)
LYMPHOCYTES # BLD: 0.94 K/UL (ref 1.2–3.5)
LYMPHOCYTES NFR BLD: 4.9 %
LYMPHOCYTES NFR BLD: 5.5 %
LYMPHOCYTES NFR BLD: 6 %
MCH RBC QN AUTO: 29.1 PG (ref 28–33.2)
MCH RBC QN AUTO: 29.1 PG (ref 28–33.2)
MCH RBC QN AUTO: 29.3 PG (ref 28–33.2)
MCHC RBC AUTO-ENTMCNC: 32.7 G/DL (ref 32.2–36.5)
MCHC RBC AUTO-ENTMCNC: 33.3 G/DL (ref 32.2–36.5)
MCHC RBC AUTO-ENTMCNC: 33.4 G/DL (ref 32.2–36.5)
MCV RBC AUTO: 87.1 FL (ref 83–98)
MCV RBC AUTO: 88 FL (ref 83–98)
MCV RBC AUTO: 88.9 FL (ref 83–98)
MONOCYTES # BLD: 1.51 K/UL (ref 0.3–1)
MONOCYTES # BLD: 1.79 K/UL (ref 0.3–1)
MONOCYTES # BLD: 1.89 K/UL (ref 0.3–1)
MONOCYTES NFR BLD: 11.2 %
MONOCYTES NFR BLD: 11.6 %
MONOCYTES NFR BLD: 9.7 %
MRSA DNA SPEC QL NAA+PROBE: NEGATIVE
NEUTROPHILS # BLD: 13.01 K/UL (ref 1.7–7)
NEUTROPHILS # BLD: 13.19 K/UL (ref 1.7–7)
NEUTROPHILS # BLD: 13.49 K/UL (ref 1.7–7)
NEUTS SEG NFR BLD: 82.4 %
NEUTS SEG NFR BLD: 82.5 %
NEUTS SEG NFR BLD: 83.3 %
NRBC BLD-RTO: 0 %
PDW BLD AUTO: 9.5 FL (ref 9.4–12.4)
PDW BLD AUTO: 9.8 FL (ref 9.4–12.4)
PDW BLD AUTO: 9.8 FL (ref 9.4–12.4)
PLATELET # BLD AUTO: 122 K/UL (ref 150–350)
PLATELET # BLD AUTO: 129 K/UL (ref 150–350)
PLATELET # BLD AUTO: 137 K/UL (ref 150–350)
POCT TEST: ABNORMAL
POTASSIUM SERPL-SCNC: 4.1 MEQ/L (ref 3.6–5.1)
POTASSIUM SERPL-SCNC: 4.5 MEQ/L (ref 3.6–5.1)
POTASSIUM SERPL-SCNC: 4.6 MEQ/L (ref 3.6–5.1)
PROT SERPL-MCNC: 5.1 G/DL (ref 6–8.2)
PROT SERPL-MCNC: 5.3 G/DL (ref 6–8.2)
PROT SERPL-MCNC: 5.5 G/DL (ref 6–8.2)
PROTHROMBIN TIME: 16.5 SEC (ref 12.2–14.5)
RBC # BLD AUTO: 3.5 M/UL (ref 4.5–5.8)
RBC # BLD AUTO: 3.68 M/UL (ref 4.5–5.8)
RBC # BLD AUTO: 4.06 M/UL (ref 4.5–5.8)
SODIUM SERPL-SCNC: 136 MEQ/L (ref 136–144)
SODIUM SERPL-SCNC: 136 MEQ/L (ref 136–144)
SODIUM SERPL-SCNC: 139 MEQ/L (ref 136–144)
TAPSE: 1.01 CM
TR MAX PG: 34 MMHG
TRICUSPID VALVE PEAK REGURGITATION VELOCITY: 2.9 M/S
WBC # BLD AUTO: 15.62 K/UL (ref 3.8–10.5)
WBC # BLD AUTO: 16.01 K/UL (ref 3.8–10.5)
WBC # BLD AUTO: 16.35 K/UL (ref 3.8–10.5)

## 2022-10-26 PROCEDURE — C1880 VENA CAVA FILTER: HCPCS

## 2022-10-26 PROCEDURE — 85025 COMPLETE CBC W/AUTO DIFF WBC: CPT | Performed by: HOSPITALIST

## 2022-10-26 PROCEDURE — 83605 ASSAY OF LACTIC ACID: CPT | Performed by: HOSPITALIST

## 2022-10-26 PROCEDURE — 06H03DZ INSERTION OF INTRALUMINAL DEVICE INTO INFERIOR VENA CAVA, PERCUTANEOUS APPROACH: ICD-10-PCS | Performed by: RADIOLOGY

## 2022-10-26 PROCEDURE — P9016 RBC LEUKOCYTES REDUCED: HCPCS

## 2022-10-26 PROCEDURE — B5191ZZ FLUOROSCOPY OF INFERIOR VENA CAVA USING LOW OSMOLAR CONTRAST: ICD-10-PCS | Performed by: RADIOLOGY

## 2022-10-26 PROCEDURE — 83605 ASSAY OF LACTIC ACID: CPT

## 2022-10-26 PROCEDURE — 25500000 HC DRUGS/INCIDENT RAD: Performed by: INTERNAL MEDICINE

## 2022-10-26 PROCEDURE — 36100330 IR FILTER PLACEMENT

## 2022-10-26 PROCEDURE — C1894 INTRO/SHEATH, NON-LASER: HCPCS

## 2022-10-26 PROCEDURE — 85018 HEMOGLOBIN: CPT

## 2022-10-26 PROCEDURE — 85025 COMPLETE CBC W/AUTO DIFF WBC: CPT

## 2022-10-26 PROCEDURE — 83605 ASSAY OF LACTIC ACID: CPT | Performed by: INTERNAL MEDICINE

## 2022-10-26 PROCEDURE — 25000000 HC PHARMACY GENERAL: Performed by: INTERNAL MEDICINE

## 2022-10-26 PROCEDURE — 93321 DOPPLER ECHO F-UP/LMTD STD: CPT

## 2022-10-26 PROCEDURE — 63600000 HC DRUGS/DETAIL CODE: Performed by: HOSPITALIST

## 2022-10-26 PROCEDURE — 80053 COMPREHEN METABOLIC PANEL: CPT

## 2022-10-26 PROCEDURE — 85610 PROTHROMBIN TIME: CPT | Performed by: PHYSICIAN ASSISTANT

## 2022-10-26 PROCEDURE — 93325 DOPPLER ECHO COLOR FLOW MAPG: CPT

## 2022-10-26 PROCEDURE — 63700000 HC SELF-ADMINISTRABLE DRUG: Performed by: NURSE PRACTITIONER

## 2022-10-26 PROCEDURE — 85730 THROMBOPLASTIN TIME PARTIAL: CPT | Performed by: PHYSICIAN ASSISTANT

## 2022-10-26 PROCEDURE — 20000000 HC ROOM AND CARE ICU

## 2022-10-26 PROCEDURE — 80053 COMPREHEN METABOLIC PANEL: CPT | Performed by: HOSPITALIST

## 2022-10-26 PROCEDURE — C1769 GUIDE WIRE: HCPCS

## 2022-10-26 PROCEDURE — 25800000 HC PHARMACY IV SOLUTIONS: Performed by: HOSPITALIST

## 2022-10-26 PROCEDURE — 36415 COLL VENOUS BLD VENIPUNCTURE: CPT | Performed by: HOSPITALIST

## 2022-10-26 PROCEDURE — 76937 US GUIDE VASCULAR ACCESS: CPT

## 2022-10-26 RX ORDER — SODIUM CHLORIDE 9 MG/ML
INJECTION, SOLUTION INTRAVENOUS CONTINUOUS
Status: DISCONTINUED | OUTPATIENT
Start: 2022-10-26 | End: 2022-10-27

## 2022-10-26 RX ORDER — METOPROLOL SUCCINATE 25 MG/1
25 TABLET, EXTENDED RELEASE ORAL EVERY EVENING
Status: DISCONTINUED | OUTPATIENT
Start: 2022-10-26 | End: 2022-10-27

## 2022-10-26 RX ADMIN — INSULIN ASPART 4 UNITS: 100 INJECTION, SOLUTION INTRAVENOUS; SUBCUTANEOUS at 17:38

## 2022-10-26 RX ADMIN — INSULIN ASPART 2 UNITS: 100 INJECTION, SOLUTION INTRAVENOUS; SUBCUTANEOUS at 06:41

## 2022-10-26 RX ADMIN — INSULIN ASPART 4 UNITS: 100 INJECTION, SOLUTION INTRAVENOUS; SUBCUTANEOUS at 00:24

## 2022-10-26 RX ADMIN — METOPROLOL SUCCINATE 25 MG: 25 TABLET, EXTENDED RELEASE ORAL at 18:48

## 2022-10-26 RX ADMIN — SODIUM CHLORIDE 1000 ML: 9 INJECTION, SOLUTION INTRAVENOUS at 00:12

## 2022-10-26 RX ADMIN — SODIUM CHLORIDE: 9 INJECTION INTRAMUSCULAR; INTRAVENOUS; SUBCUTANEOUS at 11:46

## 2022-10-26 RX ADMIN — INSULIN ASPART 2 UNITS: 100 INJECTION, SOLUTION INTRAVENOUS; SUBCUTANEOUS at 12:08

## 2022-10-26 RX ADMIN — TIMOLOL MALEATE 1 DROP: 5 SOLUTION/ DROPS OPHTHALMIC at 08:26

## 2022-10-26 RX ADMIN — LATANOPROST 1 DROP: 50 SOLUTION OPHTHALMIC at 21:50

## 2022-10-26 RX ADMIN — SODIUM CHLORIDE: 9 INJECTION, SOLUTION INTRAVENOUS at 03:34

## 2022-10-26 NOTE — PROGRESS NOTES
Acute saddle pulmonary embolism with acute cor pulmonale established      Retroperitoneal hematoma/bleeding was present prior to pharmacological therapy or intervention.  Heparin and thrombolysis exacerbated underlying existing bleeding which was present on arrival

## 2022-10-26 NOTE — PROGRESS NOTES
Critical Care Daily Progress Note        Subjective    Interval History: Overnight events noted    Did receive 2 units of red blood cells.    Was resting when I entered the room easily arousable complains of dry mouth no back pain or shortness of breath   Objective       Vital signs in last 24 hours:  Temp:  [36.3 °C (97.3 °F)-36.9 °C (98.5 °F)] 36.7 °C (98.1 °F)  Heart Rate:  [] 93  Resp:  [16-37] 27  BP: ()/(52-90) 143/89      Intake/Output Summary (Last 24 hours) at 10/26/2022 0946  Last data filed at 10/26/2022 0900  Gross per 24 hour   Intake 3433.83 ml   Output 773 ml   Net 2660.83 ml                    Physical Exam: Awake dry mucosa lungs clear heart rate regular abdomen soft extremities warm right femoral venous puncture site intact dry dressing     Labs:  See Labs Below:    Lab Results   Component Value Date    GLUCOSE 166 (H) 10/26/2022    CALCIUM 8.1 (L) 10/26/2022     10/26/2022    K 4.6 10/26/2022    CO2 22 10/26/2022     10/26/2022    BUN 48 (H) 10/26/2022    CREATININE 2.2 (H) 10/26/2022     Lab Results   Component Value Date    WBC 16.01 (H) 10/26/2022    HGB 11.8 (L) 10/26/2022    HCT 36.1 (L) 10/26/2022    MCV 88.9 10/26/2022     (L) 10/26/2022     Lab Results   Component Value Date    ALBUMIN 3.2 (L) 10/26/2022    BILITOT 1.4 (H) 10/26/2022    ALKPHOS 43 10/26/2022     (H) 10/26/2022     (H) 10/26/2022    PROTEIN 5.3 (L) 10/26/2022       Imaging:   CT HEAD WITHOUT IV CONTRAST    Result Date: 10/25/2022  IMPRESSION: 1.  No acute intracranial hemorrhage, large territorial infarct, mass effect or midline shift is identified. 2.  Multiple right maxillary sinus and zygomatic arch fractures with an air-fluid level within the right maxillary sinus.     CT HEAD WITHOUT IV CONTRAST    Result Date: 10/24/2022  IMPRESSION: 1.  No posttraumatic intracranial abnormality. 2. Chronic changes noted under the comment.    CT FACIAL BONES WITHOUT IV CONTRAST    Result  Date: 10/24/2022  IMPRESSION: Multiple right-sided facial bone fractures as described under the comment.    CT CERVICAL SPINE WITHOUT IV CONTRAST    Result Date: 10/24/2022  IMPRESSION: 1.  No acute fractures. As clinically indicated, MRI of the cervical spine is recommended for further evaluation. 2.  Other incidental findings noted under the comment.     ULTRASOUND GUIDED VASCULAR ACCESS    Result Date: 10/25/2022  IMPRESSION:   Successful bilateral pulmonary arteriography and catheter-directed thrombolysis. COMMENT: PROCEDURE:  1. Ultrasound guided access of the central right common femoral vein. 2. Subselective right lower lobe pulmonary arteriography. 3. Subselective catheter directed thrombolysis of the right pulmonary arterial thrombus extending into the right lower lobe branch. 4. Subselective left lower lobe pulmonary arteriography. 5. Subselective catheter directed thrombolysis of the left pulmonary arterial thrombus extending into the left lower lobe branch. RADIOLOGIST: Sarkis Gerardo MD ANESTHESIA:  Local 1% lidocaine. FLUORO TIME:  7.7 minutes REFERENCE AIR KERMA: 44 mGy CONTRAST:  20 cc of Omnipaque 300 DESCRIPTION OF PROCEDURE:    Written informed consent was obtained following explanation of risks and benefits of the procedure. Specifically, risks of cardiac arrhythmia, pulmonary arterial injury, and intracranial hemorrhage were discussed with the patient. Additionally, significantly elevated risk of abdominal hemorrhage related to known adrenal hemorrhage was clearly discussed. The patient was placed supine on the IR procedure table. Maximal sterile barrier precautions were utilized during catheter insertion including aseptic technique, the use of cap, mask, sterile gown, sterile gloves, sterile drapes, hand hygiene, and patient cutaneous antisepsis with chlorhexidene 2%. Grayscale and color Doppler ultrasound evaluation of the right common femoral vein was performed. The right common femoral vein  was patent and compressible. The right groin was prepped and draped in usual sterile fashion. Local anesthesia was administered with 1% lidocaine. Under ultrasound guidance, the right common femoral vein was accessed with a 21-gauge needle. An 018 wire was advanced centrally. Sequential exchange was made for a 5 [place] a microcatheter sheath, 035 Ness wire, and 5 [place] vascular sheath. The sheath was secured with 0-0 silk. Utilizing an H1 catheter with Glidewire, the right main pulmonary artery was selected with successful subselection of a right lower lobe pulmonary arterial branch. Subselective right lower lobe pulmonary arteriography was performed, confirming satisfactory position within the right lower lobe branch distal to the main thrombus. Over a J Phelan wire, exchange was made for the Darrion-Macnamara flush catheter. 10 mg of TPA was then successfully infused into the right main pulmonary artery, extending into the right lower lobe pulmonary arterial branch. Utilizing an H1 catheter with Glidewire, the left main pulmonary artery was selected with successful subselection of a left lower lobe pulmonary arterial branch. Subselective left lower lobe pulmonary arteriography was performed, confirming satisfactory position within the left lower lobe branch distal to the main thrombus. Over a J Phelan wire, exchange was made for the Darrion-Macnamara flush catheter. 10 mg of TPA was then successfully infused into the left main pulmonary artery, extending into the left lower lobe pulmonary arterial branch. Catheter and sheath were removed. Hemostasis of the right common femoral vein was achieved with manual compression.     CT ABDOMEN PELVIS WITH IV CONTRAST    Result Date: 10/24/2022  IMPRESSION: 1.  Extensive acute pulmonary arterial emboli involving the bilateral main and multiple bilateral lobar, segmental and subsegmental branches, as detailed above. Flattening of the interventricular septum with dilatation of the  right ventricle suggestive of right-sided heart strain. No evidence of acute pulmonary infarct. 2.  Large, indeterminate infiltrative hypodense lesion within the right hepatic lobe measuring up to 7 cm warranting further assessment with contrast-enhanced MRI. Differential diagnosis includes metastatic disease, a primary malignancy as well as abscess. Small adjacent similar appearing indeterminate right hepatic lobe lesion. 3.  Indeterminate right adrenal mass with findings consistent with right adrenal hemorrhage. 4.  A 10 mm groundglass nodule within the right middle lobe. A follow-up CT scan of the chest in 6-12 months is recommended as per Fleischner guidelines. 5.  Additional incidental findings including ectasia of ascending thoracic aorta, enlarged prostate gland, coronary arterial atherosclerotic vascular disease and colonic diverticulosis without evidence of diverticulitis. Findings and recommendations discussed with SUZANNA Muñoz by Dr. Thompson by telephone at 9:54 AM and 10:21 AM on 10/24/2022. Fleischner Society 2017 Guidelines for Management of Incidentally Detected Pulmonary Nodules in Adults. (Subsolid Nodules) Single ground glass: <6 mm - No routine follow-up >/= 6 mm - CT at 6-12 months to confirm persistence, then CT every 2 years until 5 years (In certain suspicious nodules < 6 mm, consider follow-up at 2 and 4 years.  If solid component(s) or growth develops, consider resection (Recommendations 3A and 4A)     CT ANGIOGRAPHY CHEST PULMONARY EMBOLISM WITH IV CONTRAST    Result Date: 10/24/2022  IMPRESSION: 1.  Extensive acute pulmonary arterial emboli involving the bilateral main and multiple bilateral lobar, segmental and subsegmental branches, as detailed above. Flattening of the interventricular septum with dilatation of the right ventricle suggestive of right-sided heart strain. No evidence of acute pulmonary infarct. 2.  Large, indeterminate infiltrative hypodense lesion within the right hepatic  lobe measuring up to 7 cm warranting further assessment with contrast-enhanced MRI. Differential diagnosis includes metastatic disease, a primary malignancy as well as abscess. Small adjacent similar appearing indeterminate right hepatic lobe lesion. 3.  Indeterminate right adrenal mass with findings consistent with right adrenal hemorrhage. 4.  A 10 mm groundglass nodule within the right middle lobe. A follow-up CT scan of the chest in 6-12 months is recommended as per Fleischner guidelines. 5.  Additional incidental findings including ectasia of ascending thoracic aorta, enlarged prostate gland, coronary arterial atherosclerotic vascular disease and colonic diverticulosis without evidence of diverticulitis. Findings and recommendations discussed with SUZANNA Muñoz by Dr. Thompson by telephone at 9:54 AM and 10:21 AM on 10/24/2022. Fleischner Society 2017 Guidelines for Management of Incidentally Detected Pulmonary Nodules in Adults. (Subsolid Nodules) Single ground glass: <6 mm - No routine follow-up >/= 6 mm - CT at 6-12 months to confirm persistence, then CT every 2 years until 5 years (In certain suspicious nodules < 6 mm, consider follow-up at 2 and 4 years.  If solid component(s) or growth develops, consider resection (Recommendations 3A and 4A)     IR THROMBOLYSIS    Result Date: 10/25/2022  IMPRESSION:   Successful bilateral pulmonary arteriography and catheter-directed thrombolysis. COMMENT: PROCEDURE:  1. Ultrasound guided access of the central right common femoral vein. 2. Subselective right lower lobe pulmonary arteriography. 3. Subselective catheter directed thrombolysis of the right pulmonary arterial thrombus extending into the right lower lobe branch. 4. Subselective left lower lobe pulmonary arteriography. 5. Subselective catheter directed thrombolysis of the left pulmonary arterial thrombus extending into the left lower lobe branch. RADIOLOGIST: Sarkis Gerardo MD ANESTHESIA:  Local 1% lidocaine.  FLUORO TIME:  7.7 minutes REFERENCE AIR KERMA: 44 mGy CONTRAST:  20 cc of Omnipaque 300 DESCRIPTION OF PROCEDURE:    Written informed consent was obtained following explanation of risks and benefits of the procedure. Specifically, risks of cardiac arrhythmia, pulmonary arterial injury, and intracranial hemorrhage were discussed with the patient. Additionally, significantly elevated risk of abdominal hemorrhage related to known adrenal hemorrhage was clearly discussed. The patient was placed supine on the IR procedure table. Maximal sterile barrier precautions were utilized during catheter insertion including aseptic technique, the use of cap, mask, sterile gown, sterile gloves, sterile drapes, hand hygiene, and patient cutaneous antisepsis with chlorhexidene 2%. Grayscale and color Doppler ultrasound evaluation of the right common femoral vein was performed. The right common femoral vein was patent and compressible. The right groin was prepped and draped in usual sterile fashion. Local anesthesia was administered with 1% lidocaine. Under ultrasound guidance, the right common femoral vein was accessed with a 21-gauge needle. An 018 wire was advanced centrally. Sequential exchange was made for a 5 Citizen of [place] a microcatheter sheath, 035 Ness wire, and 5 Citizen [place] vascular sheath. The sheath was secured with 0-0 silk. Utilizing an H1 catheter with Glidewire, the right main pulmonary artery was selected with successful subselection of a right lower lobe pulmonary arterial branch. Subselective right lower lobe pulmonary arteriography was performed, confirming satisfactory position within the right lower lobe branch distal to the main thrombus. Over a J Phelan wire, exchange was made for the Darrion-Macnamara flush catheter. 10 mg of TPA was then successfully infused into the right main pulmonary artery, extending into the right lower lobe pulmonary arterial branch. Utilizing an H1 catheter with Glidewire, the left main pulmonary  artery was selected with successful subselection of a left lower lobe pulmonary arterial branch. Subselective left lower lobe pulmonary arteriography was performed, confirming satisfactory position within the left lower lobe branch distal to the main thrombus. Over a J Phelan wire, exchange was made for the Darrion-Macnamara flush catheter. 10 mg of TPA was then successfully infused into the left main pulmonary artery, extending into the left lower lobe pulmonary arterial branch. Catheter and sheath were removed. Hemostasis of the right common femoral vein was achieved with manual compression.     X-RAY CHEST 1 VIEW    Result Date: 10/26/2022  IMPRESSION: No acute cardiopulmonary abnormality seen.. COMMENT:    A single AP view of the chest was performed. Comparison: AP chest x-ray from 10/24/2022. The heart size and pulmonary vasculature remain within normal limits. The lung fields appear clear, and no pleural effusions are seen. No hilar abnormality is identified. There is mild tortuosity of the descending thoracic aorta. An electronic device projects over the left lung base projecting over the left anterior left fourth rib, likely a loop recorder. When compared to the prior study, there has been no significant interval change.     X-RAY CHEST 1 VIEW    Result Date: 10/24/2022  IMPRESSION: No evidence of acute pulmonary disease.     Ultrasound venous leg bilateral    Result Date: 10/25/2022  IMPRESSION: Examination positive for deep vein thrombosis bilaterally as described, with greater than right.     CT CHEST ABDOMEN PELVIS WITHOUT IV CONTRAST    Result Date: 10/25/2022  IMPRESSION: 1.  Findings concerning for a large right-sided retroperitoneal hemorrhage including the right kidney and right adrenal gland. A small to moderate amount of pelvic ascites is also noted which appears mildly hyperdense and may reflect a component of hemoperitoneum. 2.  Additional chronic and incidental findings as described above.  Finding:    Unexpected significant hemorrhage (chest, abdomen, pelvis)   Acuity: Critical  Status:  CLOSED Critical read back was performed and results were read back by Dr. Simms, on 10/25/2022 8:10 PM           Micro:   Microbiology Results     Procedure Component Value Units Date/Time    MRSA Screen, Nares Only Nose [[id number]]  (Normal) Collected: 10/25/22 2019    Specimen: Nasal Swab from Nose Updated: 10/26/22 0216     MRSA DNA, Nares Negative    SARS-CoV-2 (COVID-19), PCR Nasopharynx [[id number]]  (Normal) Collected: 10/24/22 0839    Specimen: Nasopharyngeal Swab from Nasopharynx Updated: 10/24/22 0925    Narrative:      The following orders were created for panel order SARS-CoV-2 (COVID-19), PCR Nasopharynx.  Procedure                               Abnormality         Status                     ---------                               -----------         ------                     SARS-COV-2 (COVID-19)/ F...[[id number]]  Normal              Final result                 Please view results for these tests on the individual orders.    SARS-COV-2 (COVID-19)/ FLU A/B, AND RSV, PCR Nasopharynx [[id number]]  (Normal) Collected: 10/24/22 0839    Specimen: Nasopharyngeal Swab from Nasopharynx Updated: 10/24/22 0925     SARS-CoV-2 (COVID-19) Negative     Influenza A Negative     Influenza B Negative     Respiratory Syncytial Virus Negative    Narrative:      Testing performed using real-time PCR for detection of COVID-19. EUA approved validation studies performed on site.                  Assessment    #1.  Submassive to massive pulmonary embolism with associated DVT-see below. Initial thrombotic event. Echocardiogram with severely dilated RV. Severely reduced RV systolic function.    Status post  Subselective catheter directed thrombolysis of the right pulmonary arterial  thrombus extending into the right lower lobe branch and subselective catheter directed thrombolysis of the left pulmonary arterial thrombus  extending into the left lower lobe branch October 25    Above complicated by right retroperitoneal bleed with hemorrhagic shock    #2.  DVT ultrasonography with a nearly occlusive thrombus within the  posterior tibial vein, a left mid femoral vein contains partially occlusive thrombus, a distal left  popliteal vein and a left .peroneal vein and posterior tibial veins   partially to nearly occlusive thrombus.    3.  Retroperitoneal hemorrhage.  This is possibly related to further hemorrhage of the adrenal mass versus from venous puncture IR procedure    4. Hepatic mass, infiltrative hypodensity in the right hepatic lobe measuring up to 7 cm    5. Indeterminate right adrenal mass with findings consistent with adrenal hemorrhage, measures 34 x 27 mm on intial (pre IR intervention CT imaging)    6. Right middle lobe lung nodule, 10 mm subsolid pulmonary nodule    7.  Acute kidney injury in the setting of the above  8.  Lactic acidosis in the setting of above, downtrending    9.  Shock.  Combined obstructive/cardiogenic shock from pulmonary embolism as well as hemorrhagic.  Overall blood pressure improved   Plan    This patient is critically ill.  At this time, would like to obtain IVC filter insertion given DVT and significant right ventricular dysfunction in the setting of his pulmonary emboli described above.  I do not feel based on his echocardiogram yesterday he can tolerate pulmonary embolism and therefore IVC filter required.  Furthermore he is currently on anticoagulation contraindication given his hemorrhagic complications. Discussed with him.     Appreciate acute care surgical evaluation.    I would like to repeat an echocardiogram, limited, to reevaluate his right ventricle.  While his blood pressure is much improved from overnight he still has ongoing lactic acidosis, albeit improved, we need to follow him for further right ventricular deterioration and potential need for right ventricular support other  medical or mechanical if lactic acid does not continue to improve    Avoid nephrotoxic agents    Engage GI given liver mass    Engage hematology oncology    Will need further evaluation of his adrenal mass as well.    This patient is critically ill.    Maintain critical care level support given high risk case    Remove Sosa catheter       The case was reviewed this morning at the patient's beside multidisciplinary rounds with the patient's nurse, dietician, pharmacist, respiratory therapist, physical therapist and critical care nurse coordinator. The patient's clinical status with regards to diagnosis, treatment plans including disposition of any IV, arterial lines, Sosa and tubes were discussed, as well as dietary, respiratory therapy and mobilization needs.     This case was discussed with consultants.    Total critical time spent managing 75 minutes          Hansel Hays DO  10/26/2022  9:46 AM

## 2022-10-26 NOTE — CONSULTS
Subjective     Tim Humphries is a 77 y.o. male with a past medical history significant for recent COVID-19 infection, dyslipidemia, glaucoma, hypertension, pericarditis, sleep apnea, cataracts s/p extraction, syncope s/p implantable loop recorder placement, ventricular tachycardia, orthostatic hypotension, hypertension, and esophagitis who presented to [facility] on after sustaining a syncopal fall. Reportedly, the patient had been experiencing four to five days of increased dyspnea with exertion and shortness of breath prior to his fall. He was walking down the stairs when he lost consciousness and fell.  He had 10 minutes of LOC. EMS was appropriately called. Upon their arrival, they noted grunting respirations and hypoxia with RA SaO2 of 88%.  Upon arrival here, he was noted to be hemodynamically stable. He underwent cross-sectional imaging that revealed no obvious ICH and no cervical spine fracture. Facial imaging revealed facial fractures. Torso imaging was far more telling, revealing extensive acute bilateral pulmonary emboli with flattening of the interventricular septum with dilatation of the right ventricle, indicating heart strain. He was also noted to have a large right hepatic lobe mass, a right adrenal mass with suggesting of adrenal hemorrhage, and ground glass nodule in the right middle lobe. Given his extensive pulmonary emboli with right heart strain, he was appropriately started on anticoagulation (heparin infusion). Despite his full dose anticoagulation, the patient remained symptomatic with activity intolerance. IR was consulted. It was felt that due to his injuries, he was at increased risk of hemorrhage with EKOS-catheter directed thrombolysis. As such, it was decided that he was better served with more limited bilateral pulmonary arterial catheter directed thrombolysis.  Indeed, earlier this evening, he underwent this procedure.  Several hours later, he was noted to be confuse with  diaphoresis and hypotension.  Crystalloid bolus was given with some recovery of blood pressure. He was sent for stat cross-sectional imaging (CT head, chest, abdomen, and pelvis) without contrast. This revealed a large right-sided retroperitoneal hemorrhage including the right kidney and right adrenal gland.  There is also a small amount of hyperdense fluid in the pelvis that may be consistent with hemoperitoneum. Trauma surgery/acute care surgery was re-contacted regarding this finding.  He is currently in the ICU. His BP is in the 90s systolic range. He has not yet received blood - we have ordered 2 units. He earlier endorsed some RUQ pain but does not do so now.          Medical History:     COVID-19 infection  Dyslipidemia  Glaucoma  Hypertension  Pericarditis  Sleep apnea  Cataracts s/p extraction  Syncope s/p implantable loop recorder placement  Ventricular tachycardia  Orthostatic hypotension  Hypertension  Esophagitis    Surgical History:     Cardiac catheterizatioin  Cataract extraction  ILR placement  Bilateral pulmonary arterial thrombolysis as above    Social History:     He carries no history of tobacco use.   No history of ETOH abuse.   No current or prior illicit drug use or abuse.     He is .     Family History:  Reviewed and non contributory to case.    Allergies: Patient has no known allergies.    Current Facility-Administered Medications   Medication Dose Route Frequency Provider Last Rate Last Admin    acetaminophen (TYLENOL) tablet 650 mg  650 mg oral q4h PRN Cheryl Bradford DO        albuterol nebulizer solution 5 mg  5 mg nebulization q6h PRN Cherly Bradford,         glucose chewable tablet 16-32 g of dextrose  16-32 g of dextrose oral PRN Cheryl Bradford,         Or    dextrose 40 % oral gel 15-30 g of dextrose  15-30 g of dextrose oral PRN Cheryl Bradford,         Or    glucagon (GLUCAGEN) injection 1 mg  1 mg intramuscular PRN Cheryl Bradford,         Or    dextrose 50 % in water (D50)  injection 12.5 g  25 mL intravenous PRN Cheryl Bradford DO        heparin (porcine) bolus from bag 3,400-6,850 Units  40-80 Units/kg intravenous q6h PRN Cheryl Bradford DO        heparin infusion in D5W 100 units/mL  100-4,000 Units/hr intravenous Titrated Cheryl Bradford DO   Stopped at 10/25/22 1850    latanoprost (XALATAN) 0.005 % ophthalmic solution 1 drop  1 drop Both Eyes Nightly Cheryl Bradford DO   1 drop at 10/24/22 2259    nitroglycerin (NITROSTAT) SL tablet 0.4 mg  0.4 mg sublingual q5 min PRN Cheryl Bradford DO        oxyCODONE (ROXICODONE) immediate release tablet 5 mg  5 mg oral q6h PRN Arturo Parker MD   5 mg at 10/25/22 1403    pantoprazole (PROTONIX) tablet,delayed release (DR/EC) 40 mg  40 mg oral Daily Cheryl Bradford DO   40 mg at 10/25/22 0854    sodium chloride 0.9 % infusion  5 mL/hr intravenous PRN Mike Franco PA C        timolol (TIMOPTIC) 0.5 % ophthalmic solution 1 drop  1 drop Left Eye q AM Cheryl Bradford DO   1 drop at 10/25/22 0854        Medication List      ASK your doctor about these medications    amLODIPine 10 mg tablet  Commonly known as: NORVASC  Take 5 mg by mouth every evening.  Dose: 5 mg     atorvastatin 20 mg tablet  Commonly known as: LIPITOR  Take 20 mg by mouth every evening.  Dose: 20 mg     esomeprazole 40 mg capsule  Commonly known as: NexIUM  Take 40 mg by mouth every evening.  Dose: 40 mg     hydrochlorothiazide 12.5 mg tablet  Commonly known as: HYDRODIURIL  Take 12.5 mg by mouth every morning.  Dose: 12.5 mg     latanoprost 0.005 % ophthalmic solution  Commonly known as: XALATAN  Administer 1 drop into both eyes nightly.  Dose: 1 drop     metoprolol succinate XL 25 mg 24 hr tablet  Commonly known as: TOPROL-XL  Take 25 mg by mouth every evening.  Dose: 25 mg     timolol 0.5 % ophthalmic solution  Commonly known as: TIMOPTIC  Administer 1 drop into the left eye every morning.  Dose: 1 drop     trandolapriL 4 mg tablet  Commonly known as: MAVIK  Take 4 mg by mouth every  "evening.  Dose: 4 mg          Review of Systems  Pertinent items are noted in HPI.    Objective   Labs  Reviewed to time of note.     Imaging  Imaging and interpretations reviewed to time of note.     Physicial Exam  Visit Vitals  BP (!) 93/52   Pulse (!) 102   Temp 36.4 °C (97.5 °F) (Oral)   Resp (!) 29   Ht 1.854 m (6' 1\")   Wt 84.5 kg (186 lb 3.2 oz)   SpO2 94%   BMI 24.57 kg/m²       General Appearance:    Opens eyes to voice and answers questions. Appears ill.    Head:    Normocephalic, without obvious abnormality, atraumatic   Eyes:    PERRL, conjunctiva/corneas clear, EOM's intact, some swelling about bilateral globes - R >L. Tissue about eyes is supple. He is moving his eyes without pain.    Ears:    Normal TM's and external ear canals, both ears   Nose:   Nares normal, septum midline   Throat:   Lips, mucosa, and tongue normal; teeth and gums normal   Neck:   Supple, symmetrical, trachea midline, non-tender on palpation of posterior midline.    Back:     No obvious palpable fractures or step-offs.    Lungs:     Clear to auscultation bilaterally, respirations unlabored - no diminished breath sounds.    Chest wall:    No tenderness or deformity - no crepitus.    Heart:    Tachycardic - no muffled heart tones.    Abdomen:     Soft, non-tender for me. No R/G or evidence of peritonitis.    Genitalia:    Normal male without obvious lesion.        Extremities:  Musculoskeletal:   No obvious extremity injury.     Facial fractures as above.    Pulses:   2+ and symmetric all extremities   Skin:   Skin a bit grey appearing.        Neurologic:    Behavior/  Emotional:   CNII-XII intact. BELLO - no obvious focal deficit.     Appropriate, cooperative           Assessment   77 y.o. male with fall 2/2 syncope with right adrenal mass and hemmorhage (as well as facial fractures) found to have bilateral submassive pulmonary emboli who is now post-procedure day 0 from bilateral pulmonary artery thrombolysis.  He has developed a " large retroperitoneal hematoma with associated hypotension.   Plan     Unfortunately, this is a very difficult set of circumstances. He has significant pulmonary arterial clot burden with right heart strain that required anticoagulation even in the setting of injuries sustained during a syncopal fall (facial fractures and right adrenal mass with associated hemorrhage).  To be clear, starting heparin infusion here was the correct clinical decision as his risk-benefit ratio favored treatment of his pulmonary emboli.  Sadly, he had ongoing symptoms and required catheter-directed thrombolysis.  He has since developed hypotension and is found to have a large right retroperitoneal hematoma.  He has received a crystalloid bolus and has blood pressure now durably in the 90s systolic range.  His Hgb is 10.6 from 13.9, but this is an acute hemoglobin, and I suspect reflects lack of equilibration.  As such, we have ordered 2 units of PRBC.  His CT scan was done without IV contrast, so I cannot comment on active extravasation or not.  I have discussed angiography with IR who feels that due to his recent thrombolytic exposure, vascular access risk is prohibitive at present. They have asked that we hold his anticoagulation and attempt to support him through this.  Should he not respond to blood, become more unstable, or need continuous transfusion, we will need to obtain CTA of his abdomen and pelvis and consider angiography.  Surgery is an option, but would be extremely high risk in this patient.  Given his relative stability now, we will support him with product resuscitation.     stable

## 2022-10-26 NOTE — PROGRESS NOTES
Patient feeling better. No abdominal pain for me.   Blood pressure better after 2 units PRBC and 1 liter crystalloid overnight.   No vasopressor or inotropic requirement.   Lactate improving.     Vital signs reviewed to time of note.     PE  Non-toxic. Still appears ill overall though.   RRR  Coarse at bases.   Soft - no tenderness for me. No R/G or evidence of peritonitis.     Laboratory evaluation reviewed to time of note.     77 year old with bilateral submassive PE on AC and now s/p bilateral pulmonary arterial thrombolysis due to ongoing right heart strain and symptomatology.     Patient with post-procedure decompensation with hypotension from hemorrhagic shock. Imaging reveals large right-sided retroperitoneal hematoma about a known right adrenal mass that already had evidence of hemorrhagic change. He has received only 2 units of blood and 1 liter crystalloid. Blood pressure now far more robust. He is feeling better. His imaging was done without contrast.  Angiography considered but it was felt that recent thrombolytic exposure rendered risk of access prohibitive. As such, he was supported with product. His imaging was done without contrast, so I cannot comment on extravasation. Should he develop recurrent hypotension or need for blood beyond what is reasonable (ie, he may very well need one ore two more units eventually), we will need to obtain CTA of his A/P and consider angiography.  For now, given his improvement and his unfortunately acute kidney injury, we can hold on repeating imaging with contrast.     Lactic acidosis - likely from hemorrhagic shock - improving with time and resuscitation.     Acute kidney injury - from hemorrhagic shock - hopefully this will improve with better perfusion.     We will need to determine when we can safely restart his AC. We will follow with you.

## 2022-10-26 NOTE — PROGRESS NOTES
"Critical Care    10/26/22 / 4:34 PM    Visit Vitals  BP (!) 168/89   Pulse 94   Temp 36.7 °C (98.1 °F) (Oral)   Resp (!) 25   Ht 1.854 m (6' 1\")   Wt 84.4 kg (186 lb)   SpO2 97%   BMI 24.54 kg/m²     Progress Note:    Provided a medical update to Olena Humphries (spouse) at bedside today. Discussed our plan at length. Answered all questions.     SUZANNA Mckay C      "

## 2022-10-26 NOTE — PATIENT CARE CONFERENCE
Care Progression Rounds Note  Date: 10/26/2022  Time: 9:15 AM     Patient Name: Tim Humphries     Medical Record Number: [id number]   YOB: 1945  Sex: Male      Room/Bed: 3211    Admitting Diagnosis: Fall, initial encounter [W19.XXXA]  Chin laceration, initial encounter [S01.81XA]  Acute saddle pulmonary embolism with acute cor pulmonale (CMS/HCC) [I26.02]  Syncope, unspecified syncope type [R55]   Admit Date/Time: 10/24/2022  8:21 AM    Primary Diagnosis: Bilateral pulmonary embolism (CMS/HCC)  Principal Problem: Bilateral pulmonary embolism (CMS/HCC)    GMLOS: pending  Anticipated Discharge Date: 10/31/2022    AM-PAC:  Mobility Score:      Discharge Planning:  Concerns to be Addressed: care coordination/care conferences, discharge planning  Anticipated Discharge Disposition: acute rehab/Inpatient Rehab Facility, skilled nursing facility, home with home health    Barriers to Discharge:  Medical issues not resolved    Comments:       Participants:  advanced practice provider, nursing, social work/services

## 2022-10-26 NOTE — POST-PROCEDURE NOTE
Interventional Radiology Brief Postprocedure Note    Tim Humphries     Attending: Sarkis Gerardo MD     Assistant:      Diagnosis: PE, DVT, adrenal hemorrhage with difficulty with anticoagulation    Description of procedure: IVC Filter placement    Contrast: CO2     Anesthesia:  Local (Lidocaine)    Volume of Lidocaine Utilized (ml): 5     Medications:       Complications: None      Estimated Blood Loss: Estimated Blood Loss: 0-10 ml    Anticoagulation:      Specimens:      Findings: Successful infrarenal IVC Filter placement. R CFV access    10/26/2022 10:38 AM

## 2022-10-26 NOTE — PROGRESS NOTES
Blood pressure improving slowly with transfusion.   Lactate 4.4 - agree with continuing transfusion and crystalloid (infusing now).

## 2022-10-26 NOTE — PLAN OF CARE
Problem: Adult Inpatient Plan of Care  Goal: Patient-Specific Goal (Individualized)  Outcome: Progressing     Problem: Adult Inpatient Plan of Care  Goal: Absence of Hospital-Acquired Illness or Injury  Outcome: Progressing     Problem: Adult Inpatient Plan of Care  Goal: Optimal Comfort and Wellbeing  Outcome: Progressing     Problem: Adult Inpatient Plan of Care  Goal: Readiness for Transition of Care  Outcome: Progressing     Problem: Bleeding  Goal: Absence of Bleeding  Outcome: Progressing     Problem: Skin Injury Risk Increased  Goal: Skin Health and Integrity  Outcome: Progressing     Problem: Violence Risk or Actual  Goal: Anger and Impulse Control  Outcome: Progressing   Plan of Care Review  Plan of Care Reviewed With: patient  Progress: improving  Outcome Summary: Pt, VSS, Hgb stable, lactate improved. Pt. is AAOx3, went to IR today for IVC filter, very sleepy today, noted FLAVIA, O2 sat stable, tolerated clear liquid diet, no pain, fall frecaution, wife updated re pt condition.

## 2022-10-26 NOTE — PLAN OF CARE
Problem: Adult Inpatient Plan of Care  Goal: Plan of Care Review  Outcome: Not progressing  Flowsheets (Taken 10/26/2022 4903)  Progress: declining  Plan of Care Reviewed With: patient  Outcome Summary: Patient transferred to ICU at change of shift 2/2 RRT, confused, 2units PRBC admin for retroperitoneal bleed, prn pain admin for pain, VSS stable after PRBCs and fluid bolus   Plan of Care Review  Plan of Care Reviewed With: patient  Progress: declining  Outcome Summary: Patient transferred to ICU at change of shift 2/2 RRT, confused, 2units PRBC admin for retroperitoneal bleed, prn pain admin for pain, VSS stable after PRBCs and fluid bolus

## 2022-10-26 NOTE — SIGNIFICANT EVENT
Patient was transferred to the ICU following an RRT on the floor after which time the patient went to IR for directed thrombolysis of bilateral pulmonary emboli.     HPI: 77-year-old man admitted yesterday after syncopal episode on stairs where he fell, was found by his wife unresponsive and remained unresponsive for 10 minutes.  He was hypoxic when found by EMS brought to the emergency room where CT of the chest found bilateral submassive pulmonary emboli.  Patient has a past medical history of COVID 2 months ago, HTN, FLAVIA, HLD, GERD, glaucoma, history of heart murmur, remote pericarditis, and presumably previous syncopal episodes because he has a implantable loop recorder, who reportedly had about 5 days of increasing shortness of breath and who on the day of admission had a syncopal episode while on the stairs, fell and his wife heard him, and found him unresponsive.  He remained unresponsive for approximately 10 minutes and EMS was called.  On evaluation by EMS he was hypoxic with an O2 sat of 86% and some mild grunting respirations.     ER evaluation showed a normal head CT and no acute injury on CT of the neck, but CT of the chest abdomen pelvis had several abnormalities.  First there were extensive pulmonary artery emboli involving both the bilateral main and multiple bilateral lobar segmental and subsegmental branches.  Also flattening of the interventricular septum with dilation of the right ventricle suggesting right heart strain.  Second there was a large indeterminate infiltrative hypodense lesion within the right hepatic lobe measuring 7 cm, and warranting further work-up with MRI, concerning for primary or metastatic cancer.  There were also small adjacent indeterminate right hepatic lobe lesions.  Third was an indeterminate right adrenal mass with small adrenal hemorrhage, fourth 10 mm right middle lobe nodule.  CT of the head and neck showed no posttraumatic injuries but CT of the facial bones showed  "multiple right facial displaced fractures.  The case was discussed with plastic surgery who did not see an need for surgical intervention urgently, and was also discussed with trauma surgery and pulmonary medicine who both felt the patient was at risk for decompensation from both clots and treatment however the ultimate decision was to start anticoagulation, and send the patient for catheter directed tPA.  Patient was admitted overnight to PCU, sent to IR today and had the catheter directed tPA procedure.      This evening: RRT called upon returning to his room in PCU the patient  became acutely confused, and hypotensive.   Blood pressure responded to about small bolus of saline and the patient was sent for CT of the head chest abdomen and pelvis to rule out intracranial bleed or abdominal or retroperitoneal bleed.  He was then transferred to the ICU where I examined him, found him drowsy but alert and oriented moderately hypotensive.    Received call from IR stating patient had a large retroperitoneal hemorrhage that extended down into the pelvis localized around the right kidney.  Patient's wife was at the bedside, and I discussed the findings with her.  I also discussed these findings with Dr. Huerta of IR who recommended stopping all anticoagulation now and until the morning, supporting with blood products if needed, and reevaluating in the morning to see if the patient needs further CTA.  Blood consent was obtained from his wife and all of this was explained to her.  I also notified trauma so they could continue to follow along given the patient's ongoing right facial fractures, and the new retroperitoneal hemorrhage.  They agreed that transfusion would be the first treatment, the patient's blood pressure is still systolic of 90.  They will follow.  Visit Vitals  BP 94/60   Pulse (!) 108   Temp 36.4 °C (97.5 °F) (Oral)   Resp (!) 30   Ht 1.854 m (6' 1\")   Wt 84.5 kg (186 lb 3.2 oz)   SpO2 96%   BMI 24.57 kg/m² "     Skin: Pale, with ecchymosis primarily around the right face, and eye.  Sutures on the right chin intact  HEENT: Ecchymoses around the right forehead, eye and cheek. + conjugate gaze,, full EOMs, mild right facial flattening, tongue is midline.  Mild fullness over the right upper eyelid, but no tenderness there.+ Tenderness over the right sinuses  Neck: Supple, no JVD adenopathy or thyromegaly  Chest: Lungs are clear throughout  Cardiac: Tachycardic, regular, no appreciable murmur, rub or gallop  Abdomen: Mild distention, tenderness in the right upper quadrant without rebound, no discrete organomegaly or mass  Extremities: Deformity or effusion, intact pulses, no calf tenderness  Neurologic: Thought processes mildly slow, but appropriate, speech is clear, PERRLA, EOMI, tongue is midline, questionable left upper extremity drift, but strong , smooth rapid alternating movement, able to follow commands, lower extremities to my exam no drift, DTRs intact    Labs:  Results from last 7 days   Lab Units 10/25/22  2139 10/25/22  1855 10/24/22  1047   HIGH SENSITIVE TROPONIN I pg/mL 1,349.2* 1,349.8* 1,150.4*     Results from last 7 days   Lab Units 10/25/22  1855 10/25/22  1159 10/25/22  0536 10/25/22  0038   WBC K/uL 18.59*  --  14.99* 15.44*   HEMOGLOBIN g/dL 10.6* 13.9 13.9 14.4   HEMATOCRIT % 30.8* 41.3 41.6 40.8   PLATELETS K/uL 163  --  172 174     Results from last 7 days   Lab Units 10/25/22  1855 10/25/22  0536 10/24/22  0830   SODIUM mEQ/L 136 136 136   POTASSIUM mEQ/L 4.6 4.3 4.3   CHLORIDE mEQ/L 104 100 99   CO2 mEQ/L 21* 26 25   BUN mg/dL 37* 27* 24*   CREATININE mg/dL 1.6* 0.9 1.3   CALCIUM mg/dL 8.4* 8.7* 9.0   ALBUMIN g/dL 3.2* 3.6 4.1   BILIRUBIN TOTAL mg/dL 1.2 1.5* 1.5*   ALK PHOS IU/L 43 48 53   ALT IU/L 229* 210* 256*   AST IU/L 118* 118* 231*   GLUCOSE mg/dL 226* 177* 236*     Results from last 7 days   Lab Units 10/25/22  2139 10/25/22  1855 10/25/22  1159 10/24/22  1806 10/24/22  0830   INR    --  1.4  --   --  1.2   PTT sec 26 52* 73*   < > 30    < > = values in this interval not displayed.   EKG:  ECHO: 10/24/2022:   Technically difficult study. Only little parasternal views are available.   Estimated EF 75%. Wall motion appears grossly normal. Normal LV wall thickness. Grade I LV diastolic dysfunction.   Severely dilated RV. Severely reduced RV systolic function. Findings are consistent with right ventricular strain in a patient with an acute saddle pulmonary embolism.   Normal-sized LA.   Moderately dilated RA.   No significant mitral valve regurgitation.   Tricuspid aortic valve. Sclerotic aortic valve leaflets. No aortic valve stenosis. No aortic regurgitation.   Trace tricuspid valve regurgitation.   Estimated RVSP = 58 mmHg.   IVC is <2.1cm. IVC collapses >50% during inspiration.   No evidence of pericardial effusion  CT head without contrast 10/25/2022:  IMPRESSION:   1.  No acute intracranial hemorrhage, large territorial infarct, mass effect or   midline shift is identified.   2.  Multiple right maxillary sinus and zygomatic arch fractures with an   air-fluid level within the right maxillary sinus.   CT chest, abdomen, pelvis without contrast:  IMPRESSION:   1.  No acute intracranial hemorrhage, large territorial infarct, mass effect or   midline shift is identified.   2.  Multiple right maxillary sinus and zygomatic arch fractures with an   air-fluid level within the right maxillary sinus.     Current Medications:   latanoprost (XALATAN) 0.005 % ophthalmic solution 1 drop         Nightly    pantoprazole (PROTONIX) tablet,delayed release (DR/EC) 40 mg         Daily    sodium chloride 0.9 % bolus 1,000 mL         Once    sodium chloride 0.9 % bolus 1,000 mL         Once    timolol (TIMOPTIC) 0.5 % ophthalmic solution 1 drop         Every morning     Current IP Meds-Continuous  Expand  Hide  (24h ago, onward)    Frequency    [Provider Managed Hold]  heparin infusion in D5W 100  "units/mL  (heparin infusion High Intensity; PE and DVT)         Titrated    sodium chloride 0.9 % infusion  (Saline Fluids)         Continuous     Current IP Meds-PRN  Expand  Hide  (24h ago, onward)    Frequency    acetaminophen (TYLENOL) tablet 650 mg  (Analgesics - Acetaminophen pain or fever)         Every 4 hours PRN    albuterol nebulizer solution 5 mg         Every 6 hours PRN    dextrose 40 % oral gel 15-30 g of dextrose  (Hypoglycemia Treatment Protocol and Hyperglycemia Validation Protocol)       Note to Pharmacy:  Pharmacist: [facility] is the depot l...   \"Or\" Linked Group Details    As needed    dextrose 50 % in water (D50) injection 12.5 g  (Hypoglycemia Treatment Protocol and Hyperglycemia Validation Protocol)        \"Or\" Linked Group Details    As needed    glucagon (GLUCAGEN) injection 1 mg  (Hypoglycemia Treatment Protocol and Hyperglycemia Validation Protocol)        \"Or\" Linked Group Details    As needed    glucose chewable tablet 16-32 g of dextrose  (Hypoglycemia Treatment Protocol and Hyperglycemia Validation Protocol)        \"Or\" Linked Group Details    As needed    [Provider Managed Hold]  heparin (porcine) bolus from bag 3,400-6,850 Units  (heparin infusion High Intensity; PE and DVT)         Every 6 hours PRN    HYDROmorphone (DILAUDID) injection 0.25-0.5 mg         Every 3 hours PRN    lidocaine (XYLOCAINE) 10 mg/mL (1 %) injection         Once via One Step medication administration    nitroglycerin (NITROSTAT) SL tablet 0.4 mg         Every 5 min PRN    oxyCODONE (ROXICODONE) immediate release tablet 5 mg         Every 6 hours PRN    sodium chloride 0.9 % infusion              Pre-Admission Meds:  Patient Reported? Taking?      amLODIPine (NORVASC) 10 mg tablet 10/23/2022  Yes Yes   Take 5 mg by mouth every evening.   atorvastatin (LIPITOR) 20 mg tablet 10/23/2022  Yes Yes   Take 20 mg by mouth every evening.   esomeprazole (NexIUM) 40 mg capsule 10/23/2022  Yes Yes   Take 40 mg by mouth every " evening.   hydrochlorothiazide (HYDRODIURIL) 12.5 mg tablet 10/24/2022  Yes Yes   Take 12.5 mg by mouth every morning.   latanoprost (XALATAN) 0.005 % ophthalmic solution 10/23/2022  Yes Yes   Administer 1 drop into both eyes nightly.   metoprolol succinate XL (TOPROL-XL) 25 mg 24 hr tablet 10/23/2022  Yes Yes   Take 25 mg by mouth every evening.   timolol (TIMOPTIC) 0.5 % ophthalmic solution 10/24/2022  Yes Yes   Administer 1 drop into the left eye every morning.   trandolapriL (MAVIK) 4 mg tablet 10/23/2022  Yes Yes   Take 4 mg by mouth every evening.     Lactate: 4.0    Impression:  1.  Retroperitoneal hemorrhage, post right adrenal hemorrhage: Hold further anticoagulation overnight, trend the H&H.  Anticipating a drop, getting 2 units of packed RBCs now.  2.  Hypotension: Patient is getting 2 units of blood, and a liter of saline because of an elevated lactate.  He has previously responded to fluids, but if blood pressure continues to drop will start low-dose Levophed  3.  Bilateral pulmonary emboli-post localized tPA: Degeneration is adequate, unable to continue anticoagulant.  Continue to monitor pulse ox, consider CTA in the a.m. if he is unstable.  IR indicates no intervention tonight  4.  Multiple displaced right facial fractures: Monitor closely for any alteration in EOMs, disconjugate gaze, increased swelling or proptosis  5.  Elevated troponin secondary and new complete right bundle branch block: This likely secondary to right heart strain. Troponin still rising, EKG now shows an incomplete right bundle branch block not present previously continue to trend the troponin.  I will notify cardiology  6.  LYNNE: BUN and creatinine are rising probably because of, volume loss with his retroperitoneal bleed.  Giving blood and IV fluids.  Continue to monitor renal function carefully with Sosa catheter, REY, daily weight.  If renal function worsens consider nephrology consult  7.  Lactic acidosis: Likely secondary  to the combination of hypotension, and retroperitoneal bleeding.  Seeping blood, and IV fluids.  I am culturing but not starting antibiotics yet, as there is no clear source for infection.  However if he spikes fever or the lactate does not clear, I will cover with antibiotics that would cover sinus bacteria and intra-abdominal bacteria.   8.  Hepatic mass & Elevated LFTs: The hepatic mass is an incidental finding and needs continued follow-up while he is here.  Some of the LFT elevation may just be secondary to right heart strain.  Continue to monitor  9. Hyperglycemia: There is no record of the patient having diabetes but his glucose has been over 200 several times since admission.  He is currently not eating, I will start him on Accu-Cheks every 6 hours with low level sliding scale coverage and check an A1c in the a.m.  10.Syncope: Syncope initiated this fall, and he has a history of a loop recorder suggesting he has had that before.  Monitor for arrhythmia, cardiology is already consulted.  Total critical care time 70 minutes  Andria Simms MD

## 2022-10-27 ENCOUNTER — DOCUMENTATION (OUTPATIENT)
Dept: PLASTIC SURGERY | Facility: HOSPITAL | Age: 77
End: 2022-10-27
Payer: COMMERCIAL

## 2022-10-27 PROBLEM — D62 ACUTE BLOOD LOSS ANEMIA: Status: ACTIVE | Noted: 2022-10-27

## 2022-10-27 PROBLEM — N17.9 AKI (ACUTE KIDNEY INJURY) (CMS/HCC): Status: ACTIVE | Noted: 2022-10-27

## 2022-10-27 PROBLEM — E27.49 ADRENAL HEMORRHAGE (CMS/HCC): Status: RESOLVED | Noted: 2022-10-24 | Resolved: 2022-10-27

## 2022-10-27 PROBLEM — I26.09 PULMONARY EMBOLISM WITH ACUTE COR PULMONALE (CMS/HCC): Status: ACTIVE | Noted: 2022-10-24

## 2022-10-27 PROBLEM — R16.0 LIVER MASS: Status: ACTIVE | Noted: 2022-10-24

## 2022-10-27 PROBLEM — K68.3 RETROPERITONEAL HEMATOMA: Status: ACTIVE | Noted: 2022-10-27

## 2022-10-27 PROBLEM — R55 SYNCOPE: Status: ACTIVE | Noted: 2022-10-24

## 2022-10-27 PROBLEM — R91.1 PULMONARY NODULE: Status: ACTIVE | Noted: 2022-10-27

## 2022-10-27 LAB
ALBUMIN SERPL-MCNC: 3.1 G/DL (ref 3.4–5)
ALBUMIN SERPL-MCNC: 3.3 G/DL (ref 3.4–5)
ALP SERPL-CCNC: 47 IU/L (ref 35–126)
ALP SERPL-CCNC: 48 IU/L (ref 35–126)
ALT SERPL-CCNC: 159 IU/L (ref 16–63)
ALT SERPL-CCNC: 185 IU/L (ref 16–63)
ANION GAP SERPL CALC-SCNC: 8 MEQ/L (ref 3–15)
ANION GAP SERPL CALC-SCNC: 8 MEQ/L (ref 3–15)
ANISOCYTOSIS BLD QL SMEAR: ABNORMAL
APTT PPP: 32 SEC (ref 23–35)
AST SERPL-CCNC: 38 IU/L (ref 15–41)
AST SERPL-CCNC: 47 IU/L (ref 15–41)
ATRIAL RATE: 106
ATRIAL RATE: 99
BASOPHILS # BLD: 0.03 K/UL (ref 0.01–0.1)
BASOPHILS # BLD: 0.03 K/UL (ref 0.01–0.1)
BASOPHILS NFR BLD: 0.2 %
BASOPHILS NFR BLD: 0.2 %
BILIRUB SERPL-MCNC: 1.2 MG/DL (ref 0.3–1.2)
BILIRUB SERPL-MCNC: 1.3 MG/DL (ref 0.3–1.2)
BUN SERPL-MCNC: 44 MG/DL (ref 8–20)
BUN SERPL-MCNC: 47 MG/DL (ref 8–20)
BURR CELLS BLD QL SMEAR: ABNORMAL
CALCIUM SERPL-MCNC: 8.2 MG/DL (ref 8.9–10.3)
CALCIUM SERPL-MCNC: 8.4 MG/DL (ref 8.9–10.3)
CHLORIDE SERPL-SCNC: 107 MEQ/L (ref 98–109)
CHLORIDE SERPL-SCNC: 108 MEQ/L (ref 98–109)
CO2 SERPL-SCNC: 22 MEQ/L (ref 22–32)
CO2 SERPL-SCNC: 23 MEQ/L (ref 22–32)
CREAT SERPL-MCNC: 1.1 MG/DL (ref 0.8–1.3)
CREAT SERPL-MCNC: 1.3 MG/DL (ref 0.8–1.3)
CROSSMATCH: NORMAL
CROSSMATCH: NORMAL
D DIMER PPP IA.FEU-MCNC: ABNORMAL
DIFFERENTIAL METHOD BLD: ABNORMAL
DIFFERENTIAL METHOD BLD: ABNORMAL
EOSINOPHIL # BLD: 0 K/UL (ref 0.04–0.54)
EOSINOPHIL # BLD: 0.01 K/UL (ref 0.04–0.54)
EOSINOPHIL NFR BLD: 0 %
EOSINOPHIL NFR BLD: 0.1 %
ERYTHROCYTE [DISTWIDTH] IN BLOOD BY AUTOMATED COUNT: 13.5 % (ref 11.6–14.4)
ERYTHROCYTE [DISTWIDTH] IN BLOOD BY AUTOMATED COUNT: 13.7 % (ref 11.6–14.4)
FERRITIN SERPL-MCNC: 612 NG/ML (ref 24–250)
FIBRINOGEN PPP-MCNC: 473 MG/DL (ref 220–480)
GFR SERPL CREATININE-BSD FRML MDRD: 53.8 ML/MIN/1.73M*2
GFR SERPL CREATININE-BSD FRML MDRD: >60 ML/MIN/1.73M*2
GLUCOSE BLD-MCNC: 130 MG/DL (ref 70–99)
GLUCOSE BLD-MCNC: 139 MG/DL (ref 70–99)
GLUCOSE BLD-MCNC: 141 MG/DL (ref 70–99)
GLUCOSE SERPL-MCNC: 134 MG/DL (ref 70–99)
GLUCOSE SERPL-MCNC: 142 MG/DL (ref 70–99)
HCT VFR BLDCO AUTO: 30.8 % (ref 40.1–51)
HCT VFR BLDCO AUTO: 31 % (ref 40.1–51)
HGB BLD-MCNC: 10.3 G/DL (ref 13.7–17.5)
HGB BLD-MCNC: 10.6 G/DL (ref 13.7–17.5)
HYPOCHROMIA BLD QL SMEAR: ABNORMAL
IMM GRANULOCYTES # BLD AUTO: 0.1 K/UL (ref 0–0.08)
IMM GRANULOCYTES # BLD AUTO: 0.2 K/UL (ref 0–0.08)
IMM GRANULOCYTES NFR BLD AUTO: 0.7 %
IMM GRANULOCYTES NFR BLD AUTO: 1.3 %
INR PPP: 1.2
IRON SATN MFR SERPL: 10 % (ref 15–45)
IRON SERPL-MCNC: 22 UG/DL (ref 35–150)
ISBT CODE: 7300
ISBT CODE: 7300
LDH SERPL L TO P-CCNC: 263 IU/L (ref 98–192)
LYMPHOCYTES # BLD: 0.7 K/UL (ref 1.2–3.5)
LYMPHOCYTES # BLD: 0.82 K/UL (ref 1.2–3.5)
LYMPHOCYTES NFR BLD: 4.6 %
LYMPHOCYTES NFR BLD: 5.3 %
MCH RBC QN AUTO: 29.2 PG (ref 28–33.2)
MCH RBC QN AUTO: 29.9 PG (ref 28–33.2)
MCHC RBC AUTO-ENTMCNC: 33.2 G/DL (ref 32.2–36.5)
MCHC RBC AUTO-ENTMCNC: 34.4 G/DL (ref 32.2–36.5)
MCV RBC AUTO: 87 FL (ref 83–98)
MCV RBC AUTO: 87.8 FL (ref 83–98)
MONOCYTES # BLD: 1.37 K/UL (ref 0.3–1)
MONOCYTES # BLD: 1.43 K/UL (ref 0.3–1)
MONOCYTES NFR BLD: 8.9 %
MONOCYTES NFR BLD: 9.4 %
NEUTROPHILS # BLD: 12.89 K/UL (ref 1.7–7)
NEUTROPHILS # BLD: 13.04 K/UL (ref 1.7–7)
NEUTS SEG NFR BLD: 84.5 %
NEUTS SEG NFR BLD: 84.8 %
NRBC BLD-RTO: 0 %
NRBC BLD-RTO: 0 %
P AXIS: 25
P AXIS: 35
PDW BLD AUTO: 9.5 FL (ref 9.4–12.4)
PDW BLD AUTO: 9.9 FL (ref 9.4–12.4)
PLAT MORPH BLD: NORMAL
PLATELET # BLD AUTO: 105 K/UL (ref 150–350)
PLATELET # BLD AUTO: 95 K/UL (ref 150–350)
PLATELET # BLD EST: ABNORMAL 10*3/UL
POCT TEST: ABNORMAL
POLYCHROMASIA BLD QL SMEAR: ABNORMAL
POTASSIUM SERPL-SCNC: 4 MEQ/L (ref 3.6–5.1)
POTASSIUM SERPL-SCNC: 4.1 MEQ/L (ref 3.6–5.1)
PR INTERVAL: 150
PR INTERVAL: 150
PRODUCT CODE: NORMAL
PRODUCT CODE: NORMAL
PRODUCT STATUS: NORMAL
PRODUCT STATUS: NORMAL
PROT SERPL-MCNC: 5.4 G/DL (ref 6–8.2)
PROT SERPL-MCNC: 5.7 G/DL (ref 6–8.2)
PROTHROMBIN TIME: 14.7 SEC (ref 12.2–14.5)
QRS DURATION: 112
QRS DURATION: 114
QT INTERVAL: 388
QT INTERVAL: 402
QTC CALCULATION(BAZETT): 515
QTC CALCULATION(BAZETT): 515
R AXIS: 40
R AXIS: 62
RBC # BLD AUTO: 3.53 M/UL (ref 4.5–5.8)
RBC # BLD AUTO: 3.54 M/UL (ref 4.5–5.8)
SODIUM SERPL-SCNC: 138 MEQ/L (ref 136–144)
SODIUM SERPL-SCNC: 138 MEQ/L (ref 136–144)
SPECIMEN EXP DATE BLD: NORMAL
SPECIMEN EXP DATE BLD: NORMAL
T WAVE AXIS: 5
T WAVE AXIS: 7
TIBC SERPL-MCNC: 220 UG/DL (ref 270–460)
UIBC SERPL-MCNC: 198 UG/DL (ref 180–360)
UNIT ABO: NORMAL
UNIT ABO: NORMAL
UNIT ID: NORMAL
UNIT ID: NORMAL
UNIT RH: POSITIVE
UNIT RH: POSITIVE
VENTRICULAR RATE: 106
VENTRICULAR RATE: 99
WBC # BLD AUTO: 15.25 K/UL (ref 3.8–10.5)
WBC # BLD AUTO: 15.37 K/UL (ref 3.8–10.5)

## 2022-10-27 PROCEDURE — 85025 COMPLETE CBC W/AUTO DIFF WBC: CPT

## 2022-10-27 PROCEDURE — 85384 FIBRINOGEN ACTIVITY: CPT | Performed by: INTERNAL MEDICINE

## 2022-10-27 PROCEDURE — 20600000 HC ROOM AND CARE INTERMEDIATE/TELEMETRY

## 2022-10-27 PROCEDURE — 63600000 HC DRUGS/DETAIL CODE: Performed by: HOSPITALIST

## 2022-10-27 PROCEDURE — 63700000 HC SELF-ADMINISTRABLE DRUG: Performed by: INTERNAL MEDICINE

## 2022-10-27 PROCEDURE — 80053 COMPREHEN METABOLIC PANEL: CPT

## 2022-10-27 PROCEDURE — 83550 IRON BINDING TEST: CPT | Performed by: INTERNAL MEDICINE

## 2022-10-27 PROCEDURE — 63700000 HC SELF-ADMINISTRABLE DRUG

## 2022-10-27 PROCEDURE — 83615 LACTATE (LD) (LDH) ENZYME: CPT | Performed by: INTERNAL MEDICINE

## 2022-10-27 PROCEDURE — 82728 ASSAY OF FERRITIN: CPT | Performed by: INTERNAL MEDICINE

## 2022-10-27 PROCEDURE — 200200 PR NO CHARGE: Performed by: INTERNAL MEDICINE

## 2022-10-27 PROCEDURE — 25800000 HC PHARMACY IV SOLUTIONS

## 2022-10-27 PROCEDURE — 36415 COLL VENOUS BLD VENIPUNCTURE: CPT

## 2022-10-27 PROCEDURE — 63700000 HC SELF-ADMINISTRABLE DRUG: Performed by: NURSE PRACTITIONER

## 2022-10-27 RX ORDER — AMLODIPINE BESYLATE 5 MG/1
5 TABLET ORAL ONCE
Status: COMPLETED | OUTPATIENT
Start: 2022-10-27 | End: 2022-10-27

## 2022-10-27 RX ORDER — METOPROLOL SUCCINATE 50 MG/1
50 TABLET, EXTENDED RELEASE ORAL EVERY EVENING
Status: DISCONTINUED | OUTPATIENT
Start: 2022-10-27 | End: 2022-11-06 | Stop reason: HOSPADM

## 2022-10-27 RX ORDER — HYDRALAZINE HYDROCHLORIDE 20 MG/ML
10 INJECTION INTRAMUSCULAR; INTRAVENOUS EVERY 6 HOURS PRN
Status: DISCONTINUED | OUTPATIENT
Start: 2022-10-27 | End: 2022-11-06 | Stop reason: HOSPADM

## 2022-10-27 RX ORDER — AMLODIPINE BESYLATE 5 MG/1
5 TABLET ORAL EVERY EVENING
Status: DISCONTINUED | OUTPATIENT
Start: 2022-10-27 | End: 2022-11-02

## 2022-10-27 RX ADMIN — HYDRALAZINE HYDROCHLORIDE 10 MG: 20 INJECTION INTRAMUSCULAR; INTRAVENOUS at 14:01

## 2022-10-27 RX ADMIN — AMLODIPINE BESYLATE 5 MG: 5 TABLET ORAL at 18:05

## 2022-10-27 RX ADMIN — SODIUM CHLORIDE: 9 INJECTION, SOLUTION INTRAVENOUS at 02:31

## 2022-10-27 RX ADMIN — PANTOPRAZOLE SODIUM 40 MG: 40 TABLET, DELAYED RELEASE ORAL at 08:14

## 2022-10-27 RX ADMIN — METOPROLOL SUCCINATE 50 MG: 50 TABLET, FILM COATED, EXTENDED RELEASE ORAL at 18:06

## 2022-10-27 RX ADMIN — TIMOLOL MALEATE 1 DROP: 5 SOLUTION/ DROPS OPHTHALMIC at 08:14

## 2022-10-27 RX ADMIN — AMLODIPINE BESYLATE 5 MG: 5 TABLET ORAL at 15:57

## 2022-10-27 RX ADMIN — HYDRALAZINE HYDROCHLORIDE 10 MG: 20 INJECTION INTRAMUSCULAR; INTRAVENOUS at 03:03

## 2022-10-27 RX ADMIN — HYDRALAZINE HYDROCHLORIDE 10 MG: 20 INJECTION INTRAMUSCULAR; INTRAVENOUS at 08:16

## 2022-10-27 NOTE — PROGRESS NOTES
TRAUMA SURGERY DAILY PROGRESS NOTE     PATIENT NAME:  Tim Humphries        YOB: 1945  AGE:  77 y.o.     GENDER: male  MRN:  [id number]          PATIENT #: [id number]    CHIEF COMPLAINT     Chief Complaint   Patient presents with    Fall       PRIMARY CARE PHYSICIAN   Zachary Gutierrez MD    SUBJECTIVE   No pain, nausea or vomiting    REVIEW OF SYSTEMS   Review of Systems   All other systems reviewed and are negative.        VITAL SIGNS   Temperature: Temp (24hrs), Av.6 °C (97.9 °F), Min:36.4 °C (97.6 °F), Max:36.8 °C (98.2 °F)     BP Max:  Systolic (24hrs), Av , Min:129 , Max:175      BP [place]:  Diastolic (24hrs), Av, Min:70, Max:108    Recent:    Patient Vitals for the past 4 hrs:   BP Temp Temp src Pulse Resp SpO2   10/27/22 0600 (!) 159/87 -- -- 93 (!) 24 96 %   10/27/22 0500 (!) 167/88 -- -- 98 (!) 28 95 %   10/27/22 0400 (!) 160/83 36.8 °C (98.2 °F) Oral 93 (!) 25 92 %   10/27/22 0300 (!) 142/87 -- -- 90 (!) 28 94 %        I/Os:  I/O last 3 completed shifts:  In: 4744.5 [P.O.:420; I.V.:1652.2; Blood:672.3; IV Piggyback:]  Out: 1088 [Urine:1088]  I/O this shift:  In: 960 [I.V.:960]  Out: 375 [Urine:375]     MEDICATIONS     Current Facility-Administered Medications:     acetaminophen (TYLENOL) tablet 650 mg, 650 mg, oral, q4h PRN, Cheryl Bradford, DO    albuterol nebulizer solution 5 mg, 5 mg, nebulization, q6h PRN, Cheryl Bradford, DO    glucose chewable tablet 16-32 g of dextrose, 16-32 g of dextrose, oral, PRN **OR** dextrose 40 % oral gel 15-30 g of dextrose, 15-30 g of dextrose, oral, PRN **OR** glucagon (GLUCAGEN) injection 1 mg, 1 mg, intramuscular, PRN **OR** dextrose 50 % in water (D50) injection 12.5 g, 25 mL, intravenous, PRN, Cheryl Bradford DO    [Provider Managed Hold] heparin (porcine) bolus from bag 3,400-6,850 Units, 40-80 Units/kg, intravenous, q6h PRN, Cheryl Bradford DO    [Provider Managed Hold] heparin infusion in D5W 100 units/mL, 100-4,000 Units/hr, intravenous,  Titrated, Cheryl Bradford DO, Stopped at 10/25/22 1850    hydrALAZINE (APRESOLINE) injection 10 mg, 10 mg, intravenous, q6h PRN, Andria Simms MD, 10 mg at 10/27/22 0303    HYDROmorphone (DILAUDID) injection 0.25-0.5 mg, 0.25-0.5 mg, intravenous, q3h PRN, Mike Franco PA C, 0.5 mg at 10/25/22 2325    insulin aspart U-100 (NovoLOG) pen 2-10 Units, 2-10 Units, subcutaneous, q6h INT, Andria Simms MD, 4 Units at 10/26/22 1738    latanoprost (XALATAN) 0.005 % ophthalmic solution 1 drop, 1 drop, Both Eyes, Nightly, Cheryl Bradford DO, 1 drop at 10/26/22 2150    metoprolol succinate XL (TOPROL-XL) 24 hr ER tablet 25 mg, 25 mg, oral, q PM, Tameka Werner CRNP, 25 mg at 10/26/22 1848    nitroglycerin (NITROSTAT) SL tablet 0.4 mg, 0.4 mg, sublingual, q5 min PRN, Cheryl Bradford DO    oxyCODONE (ROXICODONE) immediate release tablet 5 mg, 5 mg, oral, q6h PRN, Arturo Parker MD, 5 mg at 10/25/22 1403    pantoprazole (PROTONIX) tablet,delayed release (DR/EC) 40 mg, 40 mg, oral, Daily, Cheryl Bradford DO, 40 mg at 10/25/22 0854    sodium chloride 0.9 % infusion, , intravenous, Continuous, Piedad Romeo PA C, Last Rate: 80 mL/hr at 10/27/22 0600, Rate Verify at 10/27/22 0600    timolol (TIMOPTIC) 0.5 % ophthalmic solution 1 drop, 1 drop, Left Eye, q AM, Cheryl Bradford DO, 1 drop at 10/26/22 0826    MEDICATIONS:  Infusions:     [Provider Managed Hold] heparin infusion - MAR calculator by PTT  100-4,000 Units/hr Stopped (10/25/22 1850)    sodium chloride 0.9 %   80 mL/hr at 10/27/22 0600          Scheduled:    insulin aspart U-100  2-10 Units subcutaneous q6h INT    latanoprost  1 drop Both Eyes Nightly    metoprolol succinate XL  25 mg oral q PM    pantoprazole  40 mg oral Daily    timolol  1 drop Left Eye q AM     PRN:   acetaminophen, 650 mg, q4h PRN  albuterol, 5 mg, q6h PRN  glucose, 16-32 g of dextrose, PRN   Or  dextrose, 15-30 g of dextrose, PRN   Or  glucagon, 1 mg, PRN   Or  dextrose 50 % in water  (D50), 25 mL, PRN  [Provider Managed Hold] heparin (porcine), 40-80 Units/kg, q6h PRN  hydrALAZINE, 10 mg, q6h PRN  HYDROmorphone, 0.25-0.5 mg, q3h PRN  nitroglycerin, 0.4 mg, q5 min PRN  oxyCODONE, 5 mg, q6h PRN         PHYSICAL EXAM    Physical Exam  Cardiovascular:      Rate and Rhythm: Normal rate.   Pulmonary:      Effort: Pulmonary effort is normal.   Abdominal:      Palpations: Abdomen is soft.      Tenderness: There is no abdominal tenderness.   Neurological:      Mental Status: He is alert.           IMPRESSION/PLAN   77 y.o. y/o male s/p RP bleed  1. Pt is hemodynamically stable with stable hemoglobin.  His lactate cleared and he has no abdominal pain or tenderness.  No further signs of bleeding.  Trauma team will sign off.  Please call if any questions.      AUTHOR:  Rolando Canas MD  Trauma Service pager #[phone], [phone]  10/27/2022  6:39 AM

## 2022-10-27 NOTE — PROGRESS NOTES
Interventional Cardiology Progress Note    Appears to be resting comfortably.  No concerning arrhythmia on telemetry.    REVIEW OF SYSTEMS : No other significant changes to review of systems over the past 24 hours.    Current Facility-Administered Medications   Medication Dose Route Frequency Provider Last Rate Last Admin    acetaminophen (TYLENOL) tablet 650 mg  650 mg oral q4h PRN Cheryl Bradford DO        albuterol nebulizer solution 5 mg  5 mg nebulization q6h PRN Cheryl Bradford DO        glucose chewable tablet 16-32 g of dextrose  16-32 g of dextrose oral PRN Cheryl Bradford DO        Or    dextrose 40 % oral gel 15-30 g of dextrose  15-30 g of dextrose oral PRN Cheryl Bradford DO        Or    glucagon (GLUCAGEN) injection 1 mg  1 mg intramuscular PRN Cheryl Bradford DO        Or    dextrose 50 % in water (D50) injection 12.5 g  25 mL intravenous PRN Cheryl Bradford DO        [Provider Managed Hold] heparin (porcine) bolus from bag 3,400-6,850 Units  40-80 Units/kg intravenous q6h PRN Cheryl Bradford DO        [Provider Managed Hold] heparin infusion in D5W 100 units/mL  100-4,000 Units/hr intravenous Titrated Cheryl Bradford DO   Stopped at 10/25/22 1850    hydrALAZINE (APRESOLINE) injection 10 mg  10 mg intravenous q6h PRN Andria Smims MD   10 mg at 10/27/22 0303    HYDROmorphone (DILAUDID) injection 0.25-0.5 mg  0.25-0.5 mg intravenous q3h PRN Mike Franco PA C   0.5 mg at 10/25/22 2325    insulin aspart U-100 (NovoLOG) pen 2-10 Units  2-10 Units subcutaneous q6h INT Andria Simms MD   4 Units at 10/26/22 1738    latanoprost (XALATAN) 0.005 % ophthalmic solution 1 drop  1 drop Both Eyes Nightly Cheryl Bradford DO   1 drop at 10/26/22 2150    metoprolol succinate XL (TOPROL-XL) 24 hr ER tablet 25 mg  25 mg oral q PM Tameka Werner CRNP   25 mg at 10/26/22 1848    nitroglycerin (NITROSTAT) SL tablet 0.4 mg  0.4 mg sublingual q5 min PRN Cheryl Bradford DO        oxyCODONE (ROXICODONE) immediate release  tablet 5 mg  5 mg oral q6h PRN Arturo Parker MD   5 mg at 10/25/22 1403    pantoprazole (PROTONIX) tablet,delayed release (DR/EC) 40 mg  40 mg oral Daily Cheryl Bradford DO   40 mg at 10/25/22 0854    sodium chloride 0.9 % infusion   intravenous Continuous Piedad Romeo PA C 80 mL/hr at 10/27/22 0600 Rate Verify at 10/27/22 0600    timolol (TIMOPTIC) 0.5 % ophthalmic solution 1 drop  1 drop Left Eye q AM Cheryl Bradford DO   1 drop at 10/26/22 0826          Objective   Labs  Labs pending    Telemetry  I have personally reviewed the telemetry tracings.  Sinus rhythm.  No arrhythmia.    Physical Exam  Temp:  [36.4 °C (97.6 °F)-36.8 °C (98.2 °F)] 36.8 °C (98.2 °F)  Heart Rate:  [] 93  Resp:  [16-29] 24  BP: (129-175)/() 159/87    Intake/Output Summary (Last 24 hours) at 10/27/2022 0658  Last data filed at 10/27/2022 0600  Gross per 24 hour   Intake 2570.67 ml   Output 997 ml   Net 1573.67 ml       General: Drowsy, NAD.  Lungs: Rhonchorous throughout.  Heart: RRR. No R/M/G.  Abdomen: Soft. NT/ND. BS positive.  Extremities: Warm, no C/C/E.  Neuro: Moves all 4 extremities. Sensation intact.  Skin: Scattered ecchymoses.    ASSESSMENT / PLAN:    1.  Traumatic syncope/acute PE: Associated with pulmonary embolism.  Defer to pulmonary with expectant management of PE.  IVC filter placed.  Off of anticoagulation due to hemorrhagic complications of syncope.    2.  Abnormal right ventricular function: Secondary to pulmonary embolism.  Appears stable.  Does not appear to have significant hemodynamic instability at this point, nor is there significant arrhythmia, though abnormal lactate findings noted by Dr. Hays.  Continue telemetry.    3.  Abnormal troponin: Secondary to acute pulmonary embolism.  Can defer any further discussion about ischemic coronary disease until acute issues have resolved.

## 2022-10-27 NOTE — PROGRESS NOTES
77 year old male who presents for evaluation of facial fracture    PMH/PSH  PE  HTN    Meds See list    pe   EOMI  No diplopia  Mild right sided depressioin of malar area  No trismus  No hematoma  va grossly itnact  A/p 77 year old male who presents with right ZMC. I discussed at length with the patient and his wife regarding this fracture. We discussed potential deformity of the face as well as loss of facial height. Given his current medical situation, he will need anti-coagulation and they do not want to proceed with intervention at this time, given risks. All questions were answered

## 2022-10-27 NOTE — PLAN OF CARE
Problem: Adult Inpatient Plan of Care  Goal: Plan of Care Review  Outcome: Progressing  Flowsheets (Taken 10/27/2022 0649)  Progress: improving  Plan of Care Reviewed With: patient  Outcome Summary: Patient lethargic throughout shift, Oriented to self, waxes and wanes with place, time and situation, PRN med admin for elevated BP, denies pain   Plan of Care Review  Plan of Care Reviewed With: patient  Progress: improving  Outcome Summary: Patient lethargic throughout shift, Oriented to self, waxes and wanes with place, time and situation, PRN med admin for elevated BP, denies pain

## 2022-10-27 NOTE — PROGRESS NOTES
Critical Care/Pulmonary  Daily Progress Note        Subjective    Interval History:    No acute events overnight.  S/p IVC filter placement 10/26  Tolerating 2L NC, SpO2 96%  Denies chest pain, SOB, abdominal pain. No tenderness or hematoma at groin site.  Tmax 98.2F    Net: +1.5L 24 Hr, +4.8L since admission  UO: 997 mL 24 Hr     Objective       Allergies: Patient has no known allergies.    CURRENT MEDS    acetaminophen, 650 mg, oral, q4h PRN    albuterol, 5 mg, nebulization, q6h PRN    amLODIPine, 5 mg, oral, q PM    glucose, 16-32 g of dextrose, oral, PRN **OR** dextrose, 15-30 g of dextrose, oral, PRN **OR** glucagon, 1 mg, intramuscular, PRN **OR** dextrose 50 % in water (D50), 25 mL, intravenous, PRN    [Provider Managed Hold] heparin (porcine), 40-80 Units/kg, intravenous, q6h PRN    [Provider Managed Hold] heparin infusion - MAR calculator by PTT, 100-4,000 Units/hr, intravenous, Titrated    hydrALAZINE, 10 mg, intravenous, q6h PRN    HYDROmorphone, 0.25-0.5 mg, intravenous, q3h PRN    insulin aspart U-100, 2-10 Units, subcutaneous, q6h INT    latanoprost, 1 drop, Both Eyes, Nightly    metoprolol succinate XL, 25 mg, oral, q PM    nitroglycerin, 0.4 mg, sublingual, q5 min PRN    oxyCODONE, 5 mg, oral, q6h PRN    pantoprazole, 40 mg, oral, Daily    timolol, 1 drop, Left Eye, q AM    VITAL SIGNS  Vitals:    10/27/22 0600 10/27/22 0700 10/27/22 0800 10/27/22 0816   BP: (!) 159/87 (!) 172/82 (!) 166/83 (!) 166/83   Pulse: 93 94 90    Resp: (!) 24 (!) 26 (!) 24    Temp:   36.6 °C (97.8 °F)    TempSrc:   Oral    SpO2: 96% 95% 96%    Weight:       Height:         Oxygen:  Oxygen Therapy: Supplemental oxygen  O2 Delivery Method: Nasal cannula     O2 Flow Rate (L/min): 2 L/min     INTAKE/OUTPUT    Intake/Output Summary (Last 24 hours) at 10/27/2022 0935  Last data filed at 10/27/2022 0800  Gross per 24 hour   Intake 2430.67 ml   Output 1140 ml   Net 1290.67 ml     Lines, Drains, Airways,  Wounds:  Peripheral IV (Adult) 10/24/22 Anterior;Left Wrist (Active)   Number of days: 3       Peripheral IV (Adult) 10/24/22 Posterior;Right Forearm (Active)   Number of days: 3       Peripheral IV (Adult) 10/24/22 Left;Posterior Forearm (Active)   Number of days: 3       External Urinary Catheter Small (Active)   Number of days: 1       Wound Puncture Anterior;Proximal;Right Thigh (Active)   Number of days: 1       Wound Right Chin (Active)   Number of days: 2       Catheterization Site - Venous Right Femoral 5 Fr.;Removed in lab (Active)   Number of days: 2         Physical Exam:  Physical Exam  Vitals and nursing note reviewed.   Constitutional:       General: He is not in acute distress.  HENT:      Head: Normocephalic.      Comments: Scattered facial ecchymosis     Mouth/Throat:      Mouth: Mucous membranes are moist.   Eyes:      General: No scleral icterus.     Conjunctiva/sclera: Conjunctivae normal.      Pupils: Pupils are equal, round, and reactive to light.   Cardiovascular:      Rate and Rhythm: Normal rate and regular rhythm.      Pulses: Normal pulses.      Heart sounds: Normal heart sounds, S1 normal and S2 normal. No murmur heard.    No friction rub. No gallop.   Pulmonary:      Effort: Pulmonary effort is normal.      Breath sounds: Normal breath sounds. No wheezing, rhonchi or rales.   Abdominal:      General: Bowel sounds are normal. There is no distension.      Palpations: Abdomen is soft.      Tenderness: There is no abdominal tenderness.   Genitourinary:     Comments: [place] catheter  Musculoskeletal:      Cervical back: Neck supple.      Right lower leg: No edema.      Left lower leg: No edema.   Skin:     General: Skin is warm and dry.      Capillary Refill: Capillary refill takes less than 2 seconds.      Findings: Ecchymosis present.      Comments: Scattered ecchymosis   Neurological:      General: No focal deficit present.      Mental Status: He is alert and oriented to person, place, and  time.   Psychiatric:         Behavior: Behavior is cooperative.          Labs:  See Labs Below:  CBC  Results from last 7 days   Lab Units 10/27/22  0633 10/26/22  2026 10/26/22  0444   WBC K/uL 15.25* 15.62* 16.01*   RBC M/uL 3.53* 3.50* 4.06*   HEMOGLOBIN g/dL 10.3* 10.2*  10.2* 11.8*   HEMATOCRIT % 31.0* 30.5*  30.5* 36.1*   MCV fL 87.8 87.1 88.9   MCH pg 29.2 29.1 29.1   MCHC g/dL 33.2 33.4 32.7   PLATELETS K/uL 95* 122* 129*   RDW % 13.7 13.7 13.5   MPV fL 9.5 9.5 9.8     BMP  Results from last 7 days   Lab Units 10/27/22  0633 10/26/22  2026 10/26/22  0444   SODIUM mEQ/L 138 136 139   POTASSIUM mEQ/L 4.0 4.1 4.6   CHLORIDE mEQ/L 107 106 107   CO2 mEQ/L 23 21* 22   BUN mg/dL 47* 54* 48*   CREATININE mg/dL 1.3 1.6* 2.2*   CALCIUM mg/dL 8.2* 8.1* 8.1*   ALBUMIN g/dL 3.1* 3.1* 3.2*   BILIRUBIN TOTAL mg/dL 1.2 0.8 1.4*   ALK PHOS IU/L 47 46 43   ALT IU/L 185* 224* 292*   AST IU/L 47* 70* 153*   GLUCOSE mg/dL 142* 146* 166*     Coag  Results from last 7 days   Lab Units 10/26/22  0444 10/25/22  2139 10/25/22  1855 10/24/22  1806 10/24/22  0830   PROTIME sec 16.5*  --  16.7*  --  14.8*   INR  1.4  --  1.4  --  1.2   PTT sec 26 26 52*   < > 30    < > = values in this interval not displayed.     Imaging:   CT HEAD WITHOUT IV CONTRAST    Result Date: 10/25/2022  IMPRESSION: 1.  No acute intracranial hemorrhage, large territorial infarct, mass effect or midline shift is identified. 2.  Multiple right maxillary sinus and zygomatic arch fractures with an air-fluid level within the right maxillary sinus.     CT HEAD WITHOUT IV CONTRAST    Result Date: 10/24/2022  IMPRESSION: 1.  No posttraumatic intracranial abnormality. 2. Chronic changes noted under the comment.    CT FACIAL BONES WITHOUT IV CONTRAST    Result Date: 10/24/2022  IMPRESSION: Multiple right-sided facial bone fractures as described under the comment.    CT CERVICAL SPINE WITHOUT IV CONTRAST    Result Date: 10/24/2022  IMPRESSION: 1.  No acute fractures. As  clinically indicated, MRI of the cervical spine is recommended for further evaluation. 2.  Other incidental findings noted under the comment.     ULTRASOUND GUIDED VASCULAR ACCESS    Result Date: 10/26/2022  IMPRESSION:   Successful placement of a retrievable infrarenal IVC Filter. Device: Argon Option Elite IVC Filter COMMENT: PROCEDURE: 1. Ultrasound-guided access of the right common femoral vein. 2. IVC cavagram 3. IVC filter placement RADIOLOGIST:  Sarkis Gerardo MD ANESTHESIA:  Lidocaine 1% CONTRAST:  CO2 FLUORO TIME:  0.8 min REFERENCE AIR KERMA: 40 mGy MEDICATIONS:  none DESCRIPTION OF PROCEDURE:  Consent was obtained following explanation of risks and benefits of the procedure. The right groin was prepped and draped in the usual sterile fashion. The right common femoral vein was noted to be compressible and patent on gray scale ultrasound. Local anesthesia was provided. The vein was then accessed with a  21-gauge needle under ultrasound guidance, and an .018 wire was advanced centrally. An ultrasound-guided access image was saved to PACS. Exchange was made for a microsheath, .035 wire, and multi-sidehole sheath, which was positioned at the iliocaval junction. IVC cavagram was performed with CO2. Renal vein insertion sites were localized. An Argon Option Elite IVC Filter was deployed infrarenally without complication. Wires and catheters were removed and hemostasis was achieved. The patient remained stable throughout the procedure. The number of guidewires used, and their integrity, was confirmed at the end of the procedure.     ULTRASOUND GUIDED VASCULAR ACCESS    Result Date: 10/25/2022  IMPRESSION:   Successful bilateral pulmonary arteriography and catheter-directed thrombolysis. COMMENT: PROCEDURE:  1. Ultrasound guided access of the central right common femoral vein. 2. Subselective right lower lobe pulmonary arteriography. 3. Subselective catheter directed thrombolysis of the right pulmonary arterial  thrombus extending into the right lower lobe branch. 4. Subselective left lower lobe pulmonary arteriography. 5. Subselective catheter directed thrombolysis of the left pulmonary arterial thrombus extending into the left lower lobe branch. RADIOLOGIST: Sarkis Gerardo MD ANESTHESIA:  Local 1% lidocaine. FLUORO TIME:  7.7 minutes REFERENCE AIR KERMA: 44 mGy CONTRAST:  20 cc of Omnipaque 300 DESCRIPTION OF PROCEDURE:    Written informed consent was obtained following explanation of risks and benefits of the procedure. Specifically, risks of cardiac arrhythmia, pulmonary arterial injury, and intracranial hemorrhage were discussed with the patient. Additionally, significantly elevated risk of abdominal hemorrhage related to known adrenal hemorrhage was clearly discussed. The patient was placed supine on the IR procedure table. Maximal sterile barrier precautions were utilized during catheter insertion including aseptic technique, the use of cap, mask, sterile gown, sterile gloves, sterile drapes, hand hygiene, and patient cutaneous antisepsis with chlorhexidene 2%. Grayscale and color Doppler ultrasound evaluation of the right common femoral vein was performed. The right common femoral vein was patent and compressible. The right groin was prepped and draped in usual sterile fashion. Local anesthesia was administered with 1% lidocaine. Under ultrasound guidance, the right common femoral vein was accessed with a 21-gauge needle. An 018 wire was advanced centrally. Sequential exchange was made for a 5 Welsh a microcatheter sheath, 035 Ness wire, and 5 Welsh vascular sheath. The sheath was secured with 0-0 silk. Utilizing an H1 catheter with Glidewire, the right main pulmonary artery was selected with successful subselection of a right lower lobe pulmonary arterial branch. Subselective right lower lobe pulmonary arteriography was performed, confirming satisfactory position within the right lower lobe branch distal to the  main thrombus. Over a J Phelan wire, exchange was made for the Darrion-Macnamara flush catheter. 10 mg of TPA was then successfully infused into the right main pulmonary artery, extending into the right lower lobe pulmonary arterial branch. Utilizing an H1 catheter with Glidewire, the left main pulmonary artery was selected with successful subselection of a left lower lobe pulmonary arterial branch. Subselective left lower lobe pulmonary arteriography was performed, confirming satisfactory position within the left lower lobe branch distal to the main thrombus. Over a J Phelan wire, exchange was made for the Darrion-Macnamara flush catheter. 10 mg of TPA was then successfully infused into the left main pulmonary artery, extending into the left lower lobe pulmonary arterial branch. Catheter and sheath were removed. Hemostasis of the right common femoral vein was achieved with manual compression.     CT ABDOMEN PELVIS WITH IV CONTRAST    Result Date: 10/24/2022  IMPRESSION: 1.  Extensive acute pulmonary arterial emboli involving the bilateral main and multiple bilateral lobar, segmental and subsegmental branches, as detailed above. Flattening of the interventricular septum with dilatation of the right ventricle suggestive of right-sided heart strain. No evidence of acute pulmonary infarct. 2.  Large, indeterminate infiltrative hypodense lesion within the right hepatic lobe measuring up to 7 cm warranting further assessment with contrast-enhanced MRI. Differential diagnosis includes metastatic disease, a primary malignancy as well as abscess. Small adjacent similar appearing indeterminate right hepatic lobe lesion. 3.  Indeterminate right adrenal mass with findings consistent with right adrenal hemorrhage. 4.  A 10 mm groundglass nodule within the right middle lobe. A follow-up CT scan of the chest in 6-12 months is recommended as per Fleischner guidelines. 5.  Additional incidental findings including ectasia of ascending  thoracic aorta, enlarged prostate gland, coronary arterial atherosclerotic vascular disease and colonic diverticulosis without evidence of diverticulitis. Findings and recommendations discussed with SUZANNA Muñoz by Dr. Thompson by telephone at 9:54 AM and 10:21 AM on 10/24/2022. Fleischner Society 2017 Guidelines for Management of Incidentally Detected Pulmonary Nodules in Adults. (Subsolid Nodules) Single ground glass: <6 mm - No routine follow-up >/= 6 mm - CT at 6-12 months to confirm persistence, then CT every 2 years until 5 years (In certain suspicious nodules < 6 mm, consider follow-up at 2 and 4 years.  If solid component(s) or growth develops, consider resection (Recommendations 3A and 4A)     IR FILTER PLACEMENT    Result Date: 10/26/2022  IMPRESSION:   Successful placement of a retrievable infrarenal IVC Filter. Device: Argon Option Elite IVC Filter COMMENT: PROCEDURE: 1. Ultrasound-guided access of the right common femoral vein. 2. IVC cavagram 3. IVC filter placement RADIOLOGIST:  Sarkis Gerardo MD ANESTHESIA:  Lidocaine 1% CONTRAST:  CO2 FLUORO TIME:  0.8 min REFERENCE AIR KERMA: 40 mGy MEDICATIONS:  none DESCRIPTION OF PROCEDURE:  Consent was obtained following explanation of risks and benefits of the procedure. The right groin was prepped and draped in the usual sterile fashion. The right common femoral vein was noted to be compressible and patent on gray scale ultrasound. Local anesthesia was provided. The vein was then accessed with a  21-gauge needle under ultrasound guidance, and an .018 wire was advanced centrally. An ultrasound-guided access image was saved to PACS. Exchange was made for a microsheath, .035 wire, and multi-sidehole sheath, which was positioned at the iliocaval junction. IVC cavagram was performed with CO2. Renal vein insertion sites were localized. An Argon Option Elite IVC Filter was deployed infrarenally without complication. Wires and catheters were removed and hemostasis was  achieved. The patient remained stable throughout the procedure. The number of guidewires used, and their integrity, was confirmed at the end of the procedure.     CT ANGIOGRAPHY CHEST PULMONARY EMBOLISM WITH IV CONTRAST    Result Date: 10/24/2022  IMPRESSION: 1.  Extensive acute pulmonary arterial emboli involving the bilateral main and multiple bilateral lobar, segmental and subsegmental branches, as detailed above. Flattening of the interventricular septum with dilatation of the right ventricle suggestive of right-sided heart strain. No evidence of acute pulmonary infarct. 2.  Large, indeterminate infiltrative hypodense lesion within the right hepatic lobe measuring up to 7 cm warranting further assessment with contrast-enhanced MRI. Differential diagnosis includes metastatic disease, a primary malignancy as well as abscess. Small adjacent similar appearing indeterminate right hepatic lobe lesion. 3.  Indeterminate right adrenal mass with findings consistent with right adrenal hemorrhage. 4.  A 10 mm groundglass nodule within the right middle lobe. A follow-up CT scan of the chest in 6-12 months is recommended as per Fleischner guidelines. 5.  Additional incidental findings including ectasia of ascending thoracic aorta, enlarged prostate gland, coronary arterial atherosclerotic vascular disease and colonic diverticulosis without evidence of diverticulitis. Findings and recommendations discussed with SUZANNA Muñoz by Dr. Thompson by telephone at 9:54 AM and 10:21 AM on 10/24/2022. Fleischner Society 2017 Guidelines for Management of Incidentally Detected Pulmonary Nodules in Adults. (Subsolid Nodules) Single ground glass: <6 mm - No routine follow-up >/= 6 mm - CT at 6-12 months to confirm persistence, then CT every 2 years until 5 years (In certain suspicious nodules < 6 mm, consider follow-up at 2 and 4 years.  If solid component(s) or growth develops, consider resection (Recommendations 3A and 4A)     IR  THROMBOLYSIS    Result Date: 10/25/2022  IMPRESSION:   Successful bilateral pulmonary arteriography and catheter-directed thrombolysis. COMMENT: PROCEDURE:  1. Ultrasound guided access of the central right common femoral vein. 2. Subselective right lower lobe pulmonary arteriography. 3. Subselective catheter directed thrombolysis of the right pulmonary arterial thrombus extending into the right lower lobe branch. 4. Subselective left lower lobe pulmonary arteriography. 5. Subselective catheter directed thrombolysis of the left pulmonary arterial thrombus extending into the left lower lobe branch. RADIOLOGIST: Sarkis Gerarod MD ANESTHESIA:  Local 1% lidocaine. FLUORO TIME:  7.7 minutes REFERENCE AIR KERMA: 44 mGy CONTRAST:  20 cc of Omnipaque 300 DESCRIPTION OF PROCEDURE:    Written informed consent was obtained following explanation of risks and benefits of the procedure. Specifically, risks of cardiac arrhythmia, pulmonary arterial injury, and intracranial hemorrhage were discussed with the patient. Additionally, significantly elevated risk of abdominal hemorrhage related to known adrenal hemorrhage was clearly discussed. The patient was placed supine on the IR procedure table. Maximal sterile barrier precautions were utilized during catheter insertion including aseptic technique, the use of cap, mask, sterile gown, sterile gloves, sterile drapes, hand hygiene, and patient cutaneous antisepsis with chlorhexidene 2%. Grayscale and color Doppler ultrasound evaluation of the right common femoral vein was performed. The right common femoral vein was patent and compressible. The right groin was prepped and draped in usual sterile fashion. Local anesthesia was administered with 1% lidocaine. Under ultrasound guidance, the right common femoral vein was accessed with a 21-gauge needle. An 018 wire was advanced centrally. Sequential exchange was made for a 5 Canadian a microcatheter sheath, 035 Ness wire, and 5 [place]  vascular sheath. The sheath was secured with 0-0 silk. Utilizing an H1 catheter with Glidewire, the right main pulmonary artery was selected with successful subselection of a right lower lobe pulmonary arterial branch. Subselective right lower lobe pulmonary arteriography was performed, confirming satisfactory position within the right lower lobe branch distal to the main thrombus. Over a J Phelan wire, exchange was made for the Darrion-Macnamara flush catheter. 10 mg of TPA was then successfully infused into the right main pulmonary artery, extending into the right lower lobe pulmonary arterial branch. Utilizing an H1 catheter with Glidewire, the left main pulmonary artery was selected with successful subselection of a left lower lobe pulmonary arterial branch. Subselective left lower lobe pulmonary arteriography was performed, confirming satisfactory position within the left lower lobe branch distal to the main thrombus. Over a J Phelan wire, exchange was made for the Darrion-Macnamara flush catheter. 10 mg of TPA was then successfully infused into the left main pulmonary artery, extending into the left lower lobe pulmonary arterial branch. Catheter and sheath were removed. Hemostasis of the right common femoral vein was achieved with manual compression.     X-RAY CHEST 1 VIEW    Result Date: 10/26/2022  IMPRESSION: No acute cardiopulmonary abnormality seen.. COMMENT:    A single AP view of the chest was performed. Comparison: AP chest x-ray from 10/24/2022. The heart size and pulmonary vasculature remain within normal limits. The lung fields appear clear, and no pleural effusions are seen. No hilar abnormality is identified. There is mild tortuosity of the descending thoracic aorta. An electronic device projects over the left lung base projecting over the left anterior left fourth rib, likely a loop recorder. When compared to the prior study, there has been no significant interval change.     X-RAY CHEST 1  VIEW    Result Date: 10/24/2022  IMPRESSION: No evidence of acute pulmonary disease.     Ultrasound venous leg bilateral    Result Date: 10/25/2022  IMPRESSION: Examination positive for deep vein thrombosis bilaterally as described, with greater than right.     CT CHEST ABDOMEN PELVIS WITHOUT IV CONTRAST    Result Date: 10/25/2022  IMPRESSION: 1.  Findings concerning for a large right-sided retroperitoneal hemorrhage including the right kidney and right adrenal gland. A small to moderate amount of pelvic ascites is also noted which appears mildly hyperdense and may reflect a component of hemoperitoneum. 2.  Additional chronic and incidental findings as described above. Finding:    Unexpected significant hemorrhage (chest, abdomen, pelvis)   Acuity: Critical  Status:  CLOSED Critical read back was performed and results were read back by Dr. Simms, on 10/25/2022 8:10 PM     Micro:   Microbiology Results     Procedure Component Value Units Date/Time    Blood Culture Blood, Venous [[id number]]  (Normal) Collected: 10/25/22 2248    Specimen: Blood, Venous Updated: 10/27/22 0600     Culture No growth at 18-24 hours    Blood Culture Blood, Venous [[id number]]  (Normal) Collected: 10/25/22 2222    Specimen: Blood, Venous Updated: 10/27/22 0600     Culture No growth at 18-24 hours    MRSA Screen, Nares Only Nose [[id number]]  (Normal) Collected: 10/25/22 2019    Specimen: Nasal Swab from Nose Updated: 10/26/22 0216     MRSA DNA, Nares Negative    SARS-CoV-2 (COVID-19), PCR Nasopharynx [[id number]]  (Normal) Collected: 10/24/22 0839    Specimen: Nasopharyngeal Swab from Nasopharynx Updated: 10/24/22 0925    Narrative:      The following orders were created for panel order SARS-CoV-2 (COVID-19), PCR Nasopharynx.  Procedure                               Abnormality         Status                     ---------                               -----------         ------                     SARS-COV-2 (COVID-19)/ F...[[id number]]   Normal              Final result                 Please view results for these tests on the individual orders.    SARS-COV-2 (COVID-19)/ FLU A/B, AND RSV, PCR Nasopharynx [[id number]]  (Normal) Collected: 10/24/22 0839    Specimen: Nasopharyngeal Swab from Nasopharynx Updated: 10/24/22 0925     SARS-CoV-2 (COVID-19) Negative     Influenza A Negative     Influenza B Negative     Respiratory Syncytial Virus Negative    Narrative:      Testing performed using real-time PCR for detection of COVID-19. EUA approved validation studies performed on site.         ECG/Telemetry  I have independently reviewed the telemetry. No events for the last 24 hours.         ASSESSMENT    78 y/o M presented 10/24 after syncopal episode with fall down stairs found to have submassive bilateral PE with RHS, s/p tPA complicated by right retroperitoneal hemorrhage.     PLAN   1. Submassive to massive pulmonary embolism with associated DVT-see below. Initial thrombotic event.  - 10/24 TTE: Severely dilated RV, severely reduced RV systolic function  - 10/26 repeat TTE: RV appears dilated, RV demonstrated low normals, at most mildly reduced systolic function  - 10/25: subselective catheter directed tPA of the right pulmonary arterial  thrombus extending into the right lower lobe branch and subselective catheter directed tPA of the left pulmonary arterial thrombus extending into the left lower lobe branch  - Complicated by right retroperitoneal bleed with hemorrhagic shock  - 10/26 IVC filter placement given contraindication to ac  -follow up echo 10/26 with improved RV dysfunction , now right ventricle appears dilated. The right ventricle demonstrates low normal, at most mildly reduced, systolic function.  -will discuss with team on multidisciplinary level re timing of restart anticoagulation if possible, ie, IV UFH without bolus with close follow  -Await MRI in case biopsy is required.  Hopefully however maybe we can safely restart  anticoagulation in the next 24 to 48 hours       2. DVT  - 10/25 US: nearly occlusive thrombus within the posterior tibial vein, a left mid femoral vein contains partially occlusive thrombus, a distal left popliteal vein and a left peroneal vein and posterior tibial veins partially to nearly occlusive thrombus.  - 10/26 IVC filter placement     3. Retroperitoneal hemorrhage  - Possibly secondary to further hemorrhage of adrenal mass versus venous puncture IR procedure  - Hemoglobin stable, 10.3     4. Hepatic mass, infiltrative hypodensity in the right hepatic lobe measuring up to 7 cm  - Will obtain MRI with and without  - LFTs stable  - GI following    5. Indeterminate right adrenal mass with findings consistent with adrenal hemorrhage, measures 34 x 27 mm on intial (pre IR intervention CT imaging)     6. Right middle lobe lung nodule, 10 mm subsolid pulmonary nodule     7. Acute kidney injury in the setting of the above  - Now stable  - Avoid nephrotoxic agents    8. Lactic acidosis  - In the setting of above events  - Peaked 4.4, now cleared     9. Shock. Combined obstructive/cardiogenic shock from pulmonary embolism as well as hemorrhagic. Overall blood pressure improved  - Now hemodynamically stable      VTE Prophylaxis Plan: Heparin infusion on hold secondary to hemorrhagic complications  GI Prophylaxis Plan: Protonix  Lines/Drains/Airway: 3 PIV, [place] catheter  Fluids/Electrolytes/Nutrition: Clear liquids, advance as tolerated     Disposition Planning: Stable for PCU downgrade    CODE STATUS  Full Code     More than 35 minutes were spent obtaining a detailed interval history, detailed exam, and evaluating this high complexity case with significant medical decision making. More than 50% of time was spent counseling the patient and coordinating care with the primary team and consultants.         DEVAN Carrillo  10/27/2022  9:35 AM

## 2022-10-27 NOTE — PATIENT CARE CONFERENCE
Care Progression Rounds Note  Date: 10/27/2022  Time: 9:04 AM     Patient Name: Tim Humphries     Medical Record Number: [id number]   YOB: 1945  Sex: Male      Room/Bed: 3211    Admitting Diagnosis: Fall, initial encounter [W19.XXXA]  Chin laceration, initial encounter [S01.81XA]  Acute saddle pulmonary embolism with acute cor pulmonale (CMS/HCC) [I26.02]  Syncope, unspecified syncope type [R55]   Admit Date/Time: 10/24/2022  8:21 AM    Primary Diagnosis: Bilateral pulmonary embolism (CMS/HCC)  Principal Problem: Bilateral pulmonary embolism (CMS/HCC)    GMLOS: 4.1  Anticipated Discharge Date: 10/29/2022    AM-PAC:  Mobility Score:      Discharge Planning:  Concerns to be Addressed: care coordination/care conferences, discharge planning  Anticipated Discharge Disposition: acute rehab/Inpatient Rehab Facility, home with home health, skilled nursing facility    Barriers to Discharge:  Medical issues not resolved    Comments:       Participants:  advanced practice provider, , nursing

## 2022-10-27 NOTE — CONSULTS
Hematology/Oncology Consult Note       Reason for Consult:  Pulmonary emboli, liver mass    History of Present Illness: Tim Humphries is a 77 y.o. male with a past medical history of hypertension, GERD, FLAVIA, hyperlipidemia who presented with syncope, fall.  Patient had COVID about 2-3 months ago which he fully recovered without event.  After he recovered he went to get his COVID-vaccine about 2 weeks ago.  For the last 4 to 5 days patient has noted increased dyspnea with exertion and shortness of breath.  While coming down stairs 10/24 he passed out and fell down half a flight of stair.  Wife was close by it and found him down on the floor unresponsive.  Wife report patient was unconscious for about 10 minutes.  When EMS arrived patient was found to have grunting respiration with O2 sat at 86% on room air.  Was then brought to [place] emergency room.  Patient also report he has some left leg swelling approximately 3 to 4 weeks ago which resolved by itself.      In ER he was found to have extensive PE with right heart strain.  He was found to be hypoxic and put on 4 L oxygen.  His blood pressure remained stable currently.  Mentation intact.  Patient CAT scan also revealed multiple right displaced facial fracture as well as adrenal hemorrhage and liver lesion.  ER PA discussed case with trauma surgery.  Due to patient extensive PE benefit of anticoagulation currently outweigh risk for bleed. Patient started on heparin.  Transaminases elevated on admission and have since trended down.  Has had paroxysmal atrial tachycardia and elevated troponin.     Catheter directed thrombolysis to pulmonary arteries performed 10/25.  This was followed by hypotension and imaging showing large right sided retroperitoneal hemorrhage.  Anticoagulation discontinued, IVC filter placed.      Patient is somnolent but arousable, reports feeling better than on admission.      Medical History  Past Medical History:   Diagnosis Date     Dyslipidemia     GERD (gastroesophageal reflux disease)     Glaucoma     Heart murmur     HTN (hypertension)     Implantable loop recorder present     Pericarditis     Sleep apnea        Surgical History   Past Surgical History:   Procedure Laterality Date    CARDIAC CATHETERIZATION      CATARACT EXTRACTION         Allergies  Patient has no known allergies.    Current Meds  Current Facility-Administered Medications   Medication Dose Route Frequency    acetaminophen  650 mg oral q4h PRN    albuterol  5 mg nebulization q6h PRN    amLODIPine  5 mg oral q PM    glucose  16-32 g of dextrose oral PRN    Or    dextrose  15-30 g of dextrose oral PRN    Or    glucagon  1 mg intramuscular PRN    Or    dextrose 50 % in water (D50)  25 mL intravenous PRN    [Provider Managed Hold] heparin (porcine)  40-80 Units/kg intravenous q6h PRN    [Provider Managed Hold] heparin infusion - MAR calculator by PTT  100-4,000 Units/hr intravenous Titrated    hydrALAZINE  10 mg intravenous q6h PRN    HYDROmorphone  0.25-0.5 mg intravenous q3h PRN    insulin aspart U-100  2-10 Units subcutaneous q6h INT    latanoprost  1 drop Both Eyes Nightly    metoprolol succinate XL  25 mg oral q PM    nitroglycerin  0.4 mg sublingual q5 min PRN    oxyCODONE  5 mg oral q6h PRN    pantoprazole  40 mg oral Daily    timolol  1 drop Left Eye q AM       Home Meds    amLODIPine, Take 5 mg by mouth every evening.    atorvastatin, Take 20 mg by mouth every evening.    esomeprazole, Take 40 mg by mouth every evening.    hydrochlorothiazide, Take 12.5 mg by mouth every morning.    latanoprost, Administer 1 drop into both eyes nightly.    metoprolol succinate XL, Take 25 mg by mouth every evening.    timolol, Administer 1 drop into the left eye every morning.    trandolapriL, Take 4 mg by mouth every evening.    Social History  Social History     Socioeconomic History    Marital status:      Spouse name: None    Number of  children: None    Years of education: None    Highest education level: None   Tobacco Use    Smoking status: Never    Smokeless tobacco: Never   Substance and Sexual Activity    Alcohol use: Not Currently     Comment: occasional wine    Drug use: Never     Social Determinants of Health     Food Insecurity: No Food Insecurity    Worried About Running Out of Food in the Last Year: Never true    Ran Out of Food in the Last Year: Never true       Family History  History reviewed. No pertinent family history.    REVIEW OF SYSTEMS  16 point review of systems is as above and otherwise negative in detail.    VITAL SIGNS  Temp:  [36.4 °C (97.6 °F)-36.8 °C (98.2 °F)] 36.6 °C (97.8 °F)  Heart Rate:  [] 90  Resp:  [16-29] 24  BP: (142-175)/() 166/83      Intake/Output Summary (Last 24 hours) at 10/27/2022 0951  Last data filed at 10/27/2022 0800  Gross per 24 hour   Intake 2430.67 ml   Output 1140 ml   Net 1290.67 ml       PHYSICAL EXAM  Constitutional: facial bruising, comfortable, arouseable but sleepy  Neck: Supple.   Cardiovascular: Normal and regular S1 and S2, no murmurs.  Chest: Clear to auscultation bilaterally, no wheezing.  Abdomen: Soft and nontender, bowel sounds are present.  No hepatosplenomegaly.  Musculoskeletal: Normal range of motion.  No spinal or CVAT.  Extremities: No edema.  Skin: Skin is warm and dry.  No rash.  Hematologic: No petechiae  Lymph Nodes: No cervical lymphadenopathy.    LAB RESULTS  CBC  Results from last 7 days   Lab Units 10/27/22  0633 10/26/22  2026 10/26/22  0444 10/26/22  0126 10/25/22  1855   WBC K/uL 15.25* 15.62* 16.01* 16.35* 18.59*   RBC M/uL 3.53* 3.50* 4.06* 3.68* 3.52*   HEMOGLOBIN g/dL 10.3* 10.2*  10.2* 11.8* 10.8* 10.6*   HEMATOCRIT % 31.0* 30.5*  30.5* 36.1* 32.4* 30.8*   MCV fL 87.8 87.1 88.9 88.0 87.5   PLATELETS K/uL 95* 122* 129* 137* 163     Diff  Results from last 7 days   Lab Units 10/27/22  0633 10/26/22  2026 10/26/22  0444 10/26/22  0126  10/25/22  1855   NRBC % 0.0 0.0 0.0 0.0 0.0   IMM GRANULOCYTES % 1.3 0.9 0.7 0.9 1.2   NEUTROPHILS % 84.5 83.3 82.4 82.5 86.1   LYMPHOCYTES % 4.6 6.0 5.5 4.9 3.4   MONOCYTES % 9.4 9.7 11.2 11.6 9.1   EOSINOPHILS % 0.0 0.0 0.0 0.0 0.0   BASOPHILS % 0.2 0.1 0.2 0.1 0.2   IMM GRANUCOCYTES ABS K/uL 0.20* 0.14* 0.12* 0.15* 0.23*   NEUTRO ABS K/uL 12.89* 13.01* 13.19* 13.49* 15.99*     Chemistry   Results from last 7 days   Lab Units 10/27/22  0633 10/26/22  2026 10/26/22  0444   SODIUM mEQ/L 138 136 139   POTASSIUM mEQ/L 4.0 4.1 4.6   CHLORIDE mEQ/L 107 106 107   CO2 mEQ/L 23 21* 22   BUN mg/dL 47* 54* 48*   CREATININE mg/dL 1.3 1.6* 2.2*   CALCIUM mg/dL 8.2* 8.1* 8.1*   ALBUMIN g/dL 3.1* 3.1* 3.2*   BILIRUBIN TOTAL mg/dL 1.2 0.8 1.4*   ALK PHOS IU/L 47 46 43   ALT IU/L 185* 224* 292*   AST IU/L 47* 70* 153*   GLUCOSE mg/dL 142* 146* 166*     Coag  Results from last 7 days   Lab Units 10/26/22  0444 10/25/22  2139 10/25/22  1855 10/24/22  1806 10/24/22  0830   PROTIME sec 16.5*  --  16.7*  --  14.8*   INR  1.4  --  1.4  --  1.2   PTT sec 26 26 52*   < > 30    < > = values in this interval not displayed.     Micro  Microbiology Results     Procedure Component Value Units Date/Time    Blood Culture Blood, Venous [[id number]]  (Normal) Collected: 10/25/22 2248    Specimen: Blood, Venous Updated: 10/27/22 0600     Culture No growth at 18-24 hours    Blood Culture Blood, Venous [[id number]]  (Normal) Collected: 10/25/22 2222    Specimen: Blood, Venous Updated: 10/27/22 0600     Culture No growth at 18-24 hours    MRSA Screen, Nares Only Nose [[id number]]  (Normal) Collected: 10/25/22 2019    Specimen: Nasal Swab from Nose Updated: 10/26/22 0216     MRSA DNA, Nares Negative    SARS-CoV-2 (COVID-19), PCR Nasopharynx [[id number]]  (Normal) Collected: 10/24/22 0839    Specimen: Nasopharyngeal Swab from Nasopharynx Updated: 10/24/22 0925    Narrative:      The following orders were created for panel order SARS-CoV-2 (COVID-19), PCR  Nasopharynx.  Procedure                               Abnormality         Status                     ---------                               -----------         ------                     SARS-COV-2 (COVID-19)/ F...[[id number]]  Normal              Final result                 Please view results for these tests on the individual orders.    SARS-COV-2 (COVID-19)/ FLU A/B, AND RSV, PCR Nasopharynx [[id number]]  (Normal) Collected: 10/24/22 0839    Specimen: Nasopharyngeal Swab from Nasopharynx Updated: 10/24/22 0925     SARS-CoV-2 (COVID-19) Negative     Influenza A Negative     Influenza B Negative     Respiratory Syncytial Virus Negative    Narrative:      Testing performed using real-time PCR for detection of COVID-19. EUA approved validation studies performed on site.           IMAGING  CT HEAD WITHOUT IV CONTRAST    Result Date: 10/25/2022  IMPRESSION: 1.  No acute intracranial hemorrhage, large territorial infarct, mass effect or midline shift is identified. 2.  Multiple right maxillary sinus and zygomatic arch fractures with an air-fluid level within the right maxillary sinus.     CT HEAD WITHOUT IV CONTRAST    Result Date: 10/24/2022  IMPRESSION: 1.  No posttraumatic intracranial abnormality. 2. Chronic changes noted under the comment.    CT FACIAL BONES WITHOUT IV CONTRAST    Result Date: 10/24/2022  IMPRESSION: Multiple right-sided facial bone fractures as described under the comment.    CT CERVICAL SPINE WITHOUT IV CONTRAST    Result Date: 10/24/2022  IMPRESSION: 1.  No acute fractures. As clinically indicated, MRI of the cervical spine is recommended for further evaluation. 2.  Other incidental findings noted under the comment.     ULTRASOUND GUIDED VASCULAR ACCESS    Result Date: 10/26/2022  IMPRESSION:   Successful placement of a retrievable infrarenal IVC Filter. Device: Argon Option Elite IVC Filter COMMENT: PROCEDURE: 1. Ultrasound-guided access of the right common femoral vein. 2. IVC cavagram 3. IVC  filter placement RADIOLOGIST:  Sarkis Gerardo MD ANESTHESIA:  Lidocaine 1% CONTRAST:  CO2 FLUORO TIME:  0.8 min REFERENCE AIR KERMA: 40 mGy MEDICATIONS:  none DESCRIPTION OF PROCEDURE:  Consent was obtained following explanation of risks and benefits of the procedure. The right groin was prepped and draped in the usual sterile fashion. The right common femoral vein was noted to be compressible and patent on gray scale ultrasound. Local anesthesia was provided. The vein was then accessed with a  21-gauge needle under ultrasound guidance, and an .018 wire was advanced centrally. An ultrasound-guided access image was saved to PACS. Exchange was made for a microsheath, .035 wire, and multi-sidehole sheath, which was positioned at the iliocaval junction. IVC cavagram was performed with CO2. Renal vein insertion sites were localized. An Argon Option Elite IVC Filter was deployed infrarenally without complication. Wires and catheters were removed and hemostasis was achieved. The patient remained stable throughout the procedure. The number of guidewires used, and their integrity, was confirmed at the end of the procedure.     ULTRASOUND GUIDED VASCULAR ACCESS    Result Date: 10/25/2022  IMPRESSION:   Successful bilateral pulmonary arteriography and catheter-directed thrombolysis. COMMENT: PROCEDURE:  1. Ultrasound guided access of the central right common femoral vein. 2. Subselective right lower lobe pulmonary arteriography. 3. Subselective catheter directed thrombolysis of the right pulmonary arterial thrombus extending into the right lower lobe branch. 4. Subselective left lower lobe pulmonary arteriography. 5. Subselective catheter directed thrombolysis of the left pulmonary arterial thrombus extending into the left lower lobe branch. RADIOLOGIST: Sarkis Gerardo MD ANESTHESIA:  Local 1% lidocaine. FLUORO TIME:  7.7 minutes REFERENCE AIR KERMA: 44 mGy CONTRAST:  20 cc of Omnipaque 300 DESCRIPTION OF PROCEDURE:     Written informed consent was obtained following explanation of risks and benefits of the procedure. Specifically, risks of cardiac arrhythmia, pulmonary arterial injury, and intracranial hemorrhage were discussed with the patient. Additionally, significantly elevated risk of abdominal hemorrhage related to known adrenal hemorrhage was clearly discussed. The patient was placed supine on the IR procedure table. Maximal sterile barrier precautions were utilized during catheter insertion including aseptic technique, the use of cap, mask, sterile gown, sterile gloves, sterile drapes, hand hygiene, and patient cutaneous antisepsis with chlorhexidene 2%. Grayscale and color Doppler ultrasound evaluation of the right common femoral vein was performed. The right common femoral vein was patent and compressible. The right groin was prepped and draped in usual sterile fashion. Local anesthesia was administered with 1% lidocaine. Under ultrasound guidance, the right common femoral vein was accessed with a 21-gauge needle. An 018 wire was advanced centrally. Sequential exchange was made for a 5 Citizen of [place] a microcatheter sheath, 035 Ness wire, and 5 Citizen of [place] vascular sheath. The sheath was secured with 0-0 silk. Utilizing an H1 catheter with Glidewire, the right main pulmonary artery was selected with successful subselection of a right lower lobe pulmonary arterial branch. Subselective right lower lobe pulmonary arteriography was performed, confirming satisfactory position within the right lower lobe branch distal to the main thrombus. Over a J Phelan wire, exchange was made for the Darrion-Macnimesh flush catheter. 10 mg of TPA was then successfully infused into the right main pulmonary artery, extending into the right lower lobe pulmonary arterial branch. Utilizing an H1 catheter with Glidewire, the left main pulmonary artery was selected with successful subselection of a left lower lobe pulmonary arterial branch. Subselective left  lower lobe pulmonary arteriography was performed, confirming satisfactory position within the left lower lobe branch distal to the main thrombus. Over a J Phelan wire, exchange was made for the Darrion-Macnamara flush catheter. 10 mg of TPA was then successfully infused into the left main pulmonary artery, extending into the left lower lobe pulmonary arterial branch. Catheter and sheath were removed. Hemostasis of the right common femoral vein was achieved with manual compression.     CT ABDOMEN PELVIS WITH IV CONTRAST    Result Date: 10/24/2022  IMPRESSION: 1.  Extensive acute pulmonary arterial emboli involving the bilateral main and multiple bilateral lobar, segmental and subsegmental branches, as detailed above. Flattening of the interventricular septum with dilatation of the right ventricle suggestive of right-sided heart strain. No evidence of acute pulmonary infarct. 2.  Large, indeterminate infiltrative hypodense lesion within the right hepatic lobe measuring up to 7 cm warranting further assessment with contrast-enhanced MRI. Differential diagnosis includes metastatic disease, a primary malignancy as well as abscess. Small adjacent similar appearing indeterminate right hepatic lobe lesion. 3.  Indeterminate right adrenal mass with findings consistent with right adrenal hemorrhage. 4.  A 10 mm groundglass nodule within the right middle lobe. A follow-up CT scan of the chest in 6-12 months is recommended as per Fleischner guidelines. 5.  Additional incidental findings including ectasia of ascending thoracic aorta, enlarged prostate gland, coronary arterial atherosclerotic vascular disease and colonic diverticulosis without evidence of diverticulitis. Findings and recommendations discussed with SUZANNA Muñoz by Dr. Thmopson by telephone at 9:54 AM and 10:21 AM on 10/24/2022. Fleischner Society 2017 Guidelines for Management of Incidentally Detected Pulmonary Nodules in Adults. (Subsolid Nodules) Single ground glass:  <6 mm - No routine follow-up >/= 6 mm - CT at 6-12 months to confirm persistence, then CT every 2 years until 5 years (In certain suspicious nodules < 6 mm, consider follow-up at 2 and 4 years.  If solid component(s) or growth develops, consider resection (Recommendations 3A and 4A)     IR FILTER PLACEMENT    Result Date: 10/26/2022  IMPRESSION:   Successful placement of a retrievable infrarenal IVC Filter. Device: Argon Option Elite IVC Filter COMMENT: PROCEDURE: 1. Ultrasound-guided access of the right common femoral vein. 2. IVC cavagram 3. IVC filter placement RADIOLOGIST:  Sarkis Gerardo MD ANESTHESIA:  Lidocaine 1% CONTRAST:  CO2 FLUORO TIME:  0.8 min REFERENCE AIR KERMA: 40 mGy MEDICATIONS:  none DESCRIPTION OF PROCEDURE:  Consent was obtained following explanation of risks and benefits of the procedure. The right groin was prepped and draped in the usual sterile fashion. The right common femoral vein was noted to be compressible and patent on gray scale ultrasound. Local anesthesia was provided. The vein was then accessed with a  21-gauge needle under ultrasound guidance, and an .018 wire was advanced centrally. An ultrasound-guided access image was saved to PACS. Exchange was made for a microsheath, .035 wire, and multi-sidehole sheath, which was positioned at the iliocaval junction. IVC cavagram was performed with CO2. Renal vein insertion sites were localized. An Argon Option Elite IVC Filter was deployed infrarenally without complication. Wires and catheters were removed and hemostasis was achieved. The patient remained stable throughout the procedure. The number of guidewires used, and their integrity, was confirmed at the end of the procedure.     CT ANGIOGRAPHY CHEST PULMONARY EMBOLISM WITH IV CONTRAST    Result Date: 10/24/2022  IMPRESSION: 1.  Extensive acute pulmonary arterial emboli involving the bilateral main and multiple bilateral lobar, segmental and subsegmental branches, as detailed above.  Flattening of the interventricular septum with dilatation of the right ventricle suggestive of right-sided heart strain. No evidence of acute pulmonary infarct. 2.  Large, indeterminate infiltrative hypodense lesion within the right hepatic lobe measuring up to 7 cm warranting further assessment with contrast-enhanced MRI. Differential diagnosis includes metastatic disease, a primary malignancy as well as abscess. Small adjacent similar appearing indeterminate right hepatic lobe lesion. 3.  Indeterminate right adrenal mass with findings consistent with right adrenal hemorrhage. 4.  A 10 mm groundglass nodule within the right middle lobe. A follow-up CT scan of the chest in 6-12 months is recommended as per Fleischner guidelines. 5.  Additional incidental findings including ectasia of ascending thoracic aorta, enlarged prostate gland, coronary arterial atherosclerotic vascular disease and colonic diverticulosis without evidence of diverticulitis. Findings and recommendations discussed with SUZANNA Muñoz by Dr. Thompson by telephone at 9:54 AM and 10:21 AM on 10/24/2022. Fleischner Society 2017 Guidelines for Management of Incidentally Detected Pulmonary Nodules in Adults. (Subsolid Nodules) Single ground glass: <6 mm - No routine follow-up >/= 6 mm - CT at 6-12 months to confirm persistence, then CT every 2 years until 5 years (In certain suspicious nodules < 6 mm, consider follow-up at 2 and 4 years.  If solid component(s) or growth develops, consider resection (Recommendations 3A and 4A)     IR THROMBOLYSIS    Result Date: 10/25/2022  IMPRESSION:   Successful bilateral pulmonary arteriography and catheter-directed thrombolysis. COMMENT: PROCEDURE:  1. Ultrasound guided access of the central right common femoral vein. 2. Subselective right lower lobe pulmonary arteriography. 3. Subselective catheter directed thrombolysis of the right pulmonary arterial thrombus extending into the right lower lobe branch. 4. Subselective  left lower lobe pulmonary arteriography. 5. Subselective catheter directed thrombolysis of the left pulmonary arterial thrombus extending into the left lower lobe branch. RADIOLOGIST: Sarkis Gerardo MD ANESTHESIA:  Local 1% lidocaine. FLUORO TIME:  7.7 minutes REFERENCE AIR KERMA: 44 mGy CONTRAST:  20 cc of Omnipaque 300 DESCRIPTION OF PROCEDURE:    Written informed consent was obtained following explanation of risks and benefits of the procedure. Specifically, risks of cardiac arrhythmia, pulmonary arterial injury, and intracranial hemorrhage were discussed with the patient. Additionally, significantly elevated risk of abdominal hemorrhage related to known adrenal hemorrhage was clearly discussed. The patient was placed supine on the IR procedure table. Maximal sterile barrier precautions were utilized during catheter insertion including aseptic technique, the use of cap, mask, sterile gown, sterile gloves, sterile drapes, hand hygiene, and patient cutaneous antisepsis with chlorhexidene 2%. Grayscale and color Doppler ultrasound evaluation of the right common femoral vein was performed. The right common femoral vein was patent and compressible. The right groin was prepped and draped in usual sterile fashion. Local anesthesia was administered with 1% lidocaine. Under ultrasound guidance, the right common femoral vein was accessed with a 21-gauge needle. An 018 wire was advanced centrally. Sequential exchange was made for a 5 Hebrew a microcatheter sheath, 035 Ness wire, and 5 Hebrew vascular sheath. The sheath was secured with 0-0 silk. Utilizing an H1 catheter with Glidewire, the right main pulmonary artery was selected with successful subselection of a right lower lobe pulmonary arterial branch. Subselective right lower lobe pulmonary arteriography was performed, confirming satisfactory position within the right lower lobe branch distal to the main thrombus. Over a J Phelan wire, exchange was made for the  Darrion-Macnamara flush catheter. 10 mg of TPA was then successfully infused into the right main pulmonary artery, extending into the right lower lobe pulmonary arterial branch. Utilizing an H1 catheter with Glidewire, the left main pulmonary artery was selected with successful subselection of a left lower lobe pulmonary arterial branch. Subselective left lower lobe pulmonary arteriography was performed, confirming satisfactory position within the left lower lobe branch distal to the main thrombus. Over a J Phelan wire, exchange was made for the Darrion-Macnamara flush catheter. 10 mg of TPA was then successfully infused into the left main pulmonary artery, extending into the left lower lobe pulmonary arterial branch. Catheter and sheath were removed. Hemostasis of the right common femoral vein was achieved with manual compression.     X-RAY CHEST 1 VIEW    Result Date: 10/26/2022  IMPRESSION: No acute cardiopulmonary abnormality seen.. COMMENT:    A single AP view of the chest was performed. Comparison: AP chest x-ray from 10/24/2022. The heart size and pulmonary vasculature remain within normal limits. The lung fields appear clear, and no pleural effusions are seen. No hilar abnormality is identified. There is mild tortuosity of the descending thoracic aorta. An electronic device projects over the left lung base projecting over the left anterior left fourth rib, likely a loop recorder. When compared to the prior study, there has been no significant interval change.     X-RAY CHEST 1 VIEW    Result Date: 10/24/2022  IMPRESSION: No evidence of acute pulmonary disease.     Ultrasound venous leg bilateral    Result Date: 10/25/2022  IMPRESSION: Examination positive for deep vein thrombosis bilaterally as described, with greater than right.     CT CHEST ABDOMEN PELVIS WITHOUT IV CONTRAST    Result Date: 10/25/2022  IMPRESSION: 1.  Findings concerning for a large right-sided retroperitoneal hemorrhage including the right  kidney and right adrenal gland. A small to moderate amount of pelvic ascites is also noted which appears mildly hyperdense and may reflect a component of hemoperitoneum. 2.  Additional chronic and incidental findings as described above. Finding:    Unexpected significant hemorrhage (chest, abdomen, pelvis)   Acuity: Critical  Status:  CLOSED Critical read back was performed and results were read back by Dr. Simms, on 10/25/2022 8:10 PM       PATHOLOGY  Pathology Results     ** No results found for the last 2160 hours. **           ASSESSMENT & PLAN  78 yo male presenting with syncope found to have submassive pulmonary embolism and bilateral DVT, liver mass, adrenal hemorrhage developing retroperitoneal hemorrhage following catheter directed thrombolysis post IVC filter placement 10/25.  10mm ground glass lung nodule on imaging as well.     Pulmonary embolism/DVT: ideally would anticoagulate but agree with holding in setting of retroperitoneal bleed, now post IVC filter placement. Given liver mass, concerning for hypercoagulability of malignancy.  PTT normal on presentation and now off heparin, PT mildly elevated now at 16.5.      Liver mass: tumor markers with normal AFP, , CEA.  Hepatitis panels negative.  Scheduled for MRI of abdomen, GI following, ideally would biopsy.    Anemia: hgb 15.3 on presentation but down to 10.6 10/25 in setting of bleed, normocytic, normal RDW.  Did get 2 units PRBC 10/25.  Requested iron studies/LDH.    Thrombocytopenia: plts dropping since admit, down to 95K today, could be due to bleed.  Ordered DIC panel with next blood draw. Currently off heparin.      All of the above has been reviewed with the patient, who is in agreement with the plan of action.  I appreciate the opportunity to participate in the care of this patient, who I will follow with you.        Octavia Soto MD

## 2022-10-27 NOTE — ASSESSMENT & PLAN NOTE
-From retroperitoneal hematoma, needing PRBC transfusion  -Thrombocytopenia as well, improving.  DIC labs sent

## 2022-10-27 NOTE — PROGRESS NOTES
"  GASTROENTEROLOGY DAILY PROGRESS NOTE  [facility]     PATIENT NAME:  Tim Humphries          YOB: 1945  AGE:  77 y.o.   MRN: [id number]      SUBJECTIVE   Patient continues to snore and sleep comfortably, denies abdominal pains  Explained to patient can pursue MRI-we will order today further assess liver lesion    REVIEW OF SYSTEMS   13 point ROS unchanged    VITAL SIGNS   Height: 1.854 m (6' 1\") Weight: 84.4 kg (186 lb) Body mass index is 24.54 kg/m².  Vitals over the last 24 hours:   Temp:  [36.4 °C (97.6 °F)-36.8 °C (98.2 °F)] 36.6 °C (97.8 °F)  Heart Rate:  [] 90  Resp:  [16-29] 24  BP: (142-175)/() 166/83  PHYSICAL EXAM   Physical Exam  General appearance: alert, appears stated age and cooperative  Head: normocephalic  Lungs: clear to auscultation anteriorly   Heart: regular rate   Abdomen: soft, non-tender; bowel sounds normal; no masses, no organomegaly  Rectal:   Extremities: no edema  Skin: No jaundice  Neurologic: awake and alert      IMAGING AND LABS REVIEWED     Lab Results   Component Value Date    WBC 15.25 (H) 10/27/2022    HGB 10.3 (L) 10/27/2022    HCT 31.0 (L) 10/27/2022    PLT 95 (L) 10/27/2022     (H) 10/27/2022    AST 47 (H) 10/27/2022     10/27/2022    K 4.0 10/27/2022     10/27/2022    CREATININE 1.3 10/27/2022    BUN 47 (H) 10/27/2022    CO2 23 10/27/2022    INR 1.4 10/26/2022    HGBA1C 5.9 (H) 10/25/2022     ULTRASOUND GUIDED VASCULAR ACCESS    Result Date: 10/26/2022  CLINICAL HISTORY:    Pulmonary embolus, DVT. Adrenal hemorrhage with inability to anticoagulate.     IMPRESSION:   Successful placement of a retrievable infrarenal IVC Filter. Device: Argon Option Elite IVC Filter COMMENT: PROCEDURE: 1. Ultrasound-guided access of the right common femoral vein. 2. IVC cavagram 3. IVC filter placement RADIOLOGIST:  Sarkis Gerardo MD ANESTHESIA:  Lidocaine 1% CONTRAST:  CO2 FLUORO TIME:  0.8 min REFERENCE AIR KERMA: 40 mGy MEDICATIONS:  none " DESCRIPTION OF PROCEDURE:  Consent was obtained following explanation of risks and benefits of the procedure. The right groin was prepped and draped in the usual sterile fashion. The right common femoral vein was noted to be compressible and patent on gray scale ultrasound. Local anesthesia was provided. The vein was then accessed with a  21-gauge needle under ultrasound guidance, and an .018 wire was advanced centrally. An ultrasound-guided access image was saved to PACS. Exchange was made for a microsheath, .035 wire, and multi-sidehole sheath, which was positioned at the iliocaval junction. IVC cavagram was performed with CO2. Renal vein insertion sites were localized. An Argon Option Elite IVC Filter was deployed infrarenally without complication. Wires and catheters were removed and hemostasis was achieved. The patient remained stable throughout the procedure. The number of guidewires used, and their integrity, was confirmed at the end of the procedure.     IR FILTER PLACEMENT    Result Date: 10/26/2022  CLINICAL HISTORY:    Pulmonary embolus, DVT. Adrenal hemorrhage with inability to anticoagulate.     IMPRESSION:   Successful placement of a retrievable infrarenal IVC Filter. Device: Argon Option Elite IVC Filter COMMENT: PROCEDURE: 1. Ultrasound-guided access of the right common femoral vein. 2. IVC cavagram 3. IVC filter placement RADIOLOGIST:  Sarkis Gerardo MD ANESTHESIA:  Lidocaine 1% CONTRAST:  CO2 FLUORO TIME:  0.8 min REFERENCE AIR KERMA: 40 mGy MEDICATIONS:  none DESCRIPTION OF PROCEDURE:  Consent was obtained following explanation of risks and benefits of the procedure. The right groin was prepped and draped in the usual sterile fashion. The right common femoral vein was noted to be compressible and patent on gray scale ultrasound. Local anesthesia was provided. The vein was then accessed with a  21-gauge needle under ultrasound guidance, and an .018 wire was advanced centrally. An ultrasound-guided  access image was saved to PACS. Exchange was made for a microsheath, .035 wire, and multi-sidehole sheath, which was positioned at the iliocaval junction. IVC cavagram was performed with CO2. Renal vein insertion sites were localized. An Argon Option Elite IVC Filter was deployed infrarenally without complication. Wires and catheters were removed and hemostasis was achieved. The patient remained stable throughout the procedure. The number of guidewires used, and their integrity, was confirmed at the end of the procedure.     Transthoracic echo (TTE) limited    Result Date: 10/26/2022  · Normal-sized LV. D-shaped LV cavity suggests right-sided pressure/volume overload. Preserved LV systolic function. Estimated EF 65%. Wall motion appears grossly normal. · The right ventricle appears dilated. The right ventricle demonstrates low normal, at most mildly reduced, systolic function. · Tricuspid aortic valve. Sclerotic aortic valve leaflets. Trace aortic valve regurgitation. · Grossly normal leaflet structure of the mitral valve. Trace mitral valve regurgitation. · Tricuspid valve structure is grossly normal. Mild tricuspid valve regurgitation. · Pulmonic valve not well visualized. · PA systolic pressure is at least mildly elevated to 43 mmHg plus right atrial pressure. · IVC is <2.1cm. IVC collapses >50% during inspiration. · No evidence of pericardial effusion. · Mild dilatation of the ascending aorta, 4.2 cm. · Compared to the prior study performed 10/24/2022 right ventricular size has decreased and function improved.         ASSESSMENT/PLAN   Impressions/plan   1. Liver lesion 7 cm-indeterminate, possible malignancy, possible hemangioma possible hemorrhage; cleared by MRI to proceed with MRI even with a loop recorder-we will proceed with and without contrast. -   2.  Liver function test stable  3.  Bilateral PEs now status post IVC filter  Service: Gastroenterology    AUTHOR:  Kasandra Moe,   10/27/2022

## 2022-10-27 NOTE — ASSESSMENT & PLAN NOTE
-CT a/p 10/25: Lg R sided retroperitoneal hemorrhage including the right kidney and right adrenal gland. Component of hemoperitoneum  -With associated acute blood loss anemia requiring prbcs  -Underlying adrenal hemorrhage noted on initial CT, but risk of not anticoagulating outweighed risk of bleed

## 2022-10-27 NOTE — PROGRESS NOTES
Chart reviewed, pt transferring out of ICU    No new subjective & objective note has been filed under this hospital service since the last note was generated.    Assessment & Plan  * Pulmonary embolism with acute cor pulmonale (CMS/HCC)  Assessment & Plan  CT chest: Extensive b/l PE with R heart strain. 7cm hypodense lesion R hepatic lobe, small R hepatic lobe lesion. Indeterminate R adrenal mass and hemorrhage.  10 mm RML nodule  -Provoked by underlying hepatic malignancy?   -With acute hypoxic resp failure  -Associated R heart strain  -Started on Heparin drip and underwent TPA to each pulm artery  -RRT 10/26 with hypotension and retroperitoneal hemorrhage  -With DVT.  US:  Bilaterally L>R.  R nearly occlusive thrombus within the posterior tibial vein. Left: Mid femoral vein partially occlusive thrombus.  The distal popliteal vein contains occlusive thrombus.  Peroneal vein and posterior tibial veins contain partially to nearly occlusive thrombus.  -Echo 10/24:  TDS.  EF 75%   Grade I LV diastolic dysfunction. Severely dilated RV. Severely reduced RV systolic function c/w RV strain.  Mod dilated RA. Trace TR.   -Echo 10/26:  C/w 10/24, RV size has decreased and function improved   -S/p IVC filter 10/25  -AC held for now    Retroperitoneal hematoma  Assessment & Plan  -CT a/p 10/25: Lg R sided retroperitoneal hemorrhage including the right kidney and right adrenal gland. Component of hemoperitoneum  -With associated acute blood loss anemia requiring prbcs  -Underlying adrenal hemorrhage noted on initial CT, but risk of not anticoagulating outweighed risk of bleed    Liver mass  Assessment & Plan  -Abnormal transaminases  -CT:  7cm hypodense lesion R hepatic lobe, small R hepatic lobe lesion.   -CA 19-9, AFP, hepatitis panel neg.   CEA 3.9 which is theoretically elevated in a non smoker  -MRI ordered  -Will need bx    LYNNE (acute kidney injury) (CMS/HCC)  Assessment & Plan  -Prerenal, improving  -Cr peaked at 2.3, now  resolved    Acute blood loss anemia  Assessment & Plan  -From retroperitoneal hematoma, needing PRBC transfusion  -Thrombocytopenia as well, improving.  DIC labs sent    Pulmonary nodule  Assessment & Plan  -Noted on CT chest  -Will need repeat CT chest 6-12 months    Facial fracture (CMS/HCC)  Assessment & Plan  -CT facial bones:Multiple right-sided facial bone fractures as described under the comment.  -Seen by Trauma and plastics  -Seen by plastics who discussed potential facial deformity.  Not a candidate for reconstructive surgery, given his need for AC  -Will d/w plastics re: need for abx    Syncope  Assessment & Plan  -From submassive PE/R heart strain  -PT, OT    Hyperlipidemia  Assessment & Plan  Due to elevated LFT, will hold Lipitor for now until further GI eval    Myocardial injury  Assessment & Plan  -Trop peaked at 1349, R heart strain from PE    Hypertension  Assessment & Plan  -BP uncontrolled, resuming Norvasc and Metoprolol  -Holding HCTZ

## 2022-10-28 ENCOUNTER — APPOINTMENT (INPATIENT)
Dept: RADIOLOGY | Facility: HOSPITAL | Age: 77
DRG: 163 | End: 2022-10-28
Attending: INTERNAL MEDICINE
Payer: COMMERCIAL

## 2022-10-28 PROBLEM — I50.9 CHF (CONGESTIVE HEART FAILURE) (CMS/HCC): Status: ACTIVE | Noted: 2022-10-28

## 2022-10-28 LAB
ALBUMIN SERPL-MCNC: 3.1 G/DL (ref 3.4–5)
ALP SERPL-CCNC: 57 IU/L (ref 35–126)
ALT SERPL-CCNC: 124 IU/L (ref 16–63)
ANION GAP SERPL CALC-SCNC: 9 MEQ/L (ref 3–15)
AST SERPL-CCNC: 30 IU/L (ref 15–41)
BILIRUB SERPL-MCNC: 1.4 MG/DL (ref 0.3–1.2)
BUN SERPL-MCNC: 41 MG/DL (ref 8–20)
CALCIUM SERPL-MCNC: 8.3 MG/DL (ref 8.9–10.3)
CHLORIDE SERPL-SCNC: 106 MEQ/L (ref 98–109)
CO2 SERPL-SCNC: 25 MEQ/L (ref 22–32)
CREAT SERPL-MCNC: 1.1 MG/DL (ref 0.8–1.3)
ERYTHROCYTE [DISTWIDTH] IN BLOOD BY AUTOMATED COUNT: 13.6 % (ref 11.6–14.4)
GFR SERPL CREATININE-BSD FRML MDRD: >60 ML/MIN/1.73M*2
GLUCOSE BLD-MCNC: 117 MG/DL (ref 70–99)
GLUCOSE BLD-MCNC: 138 MG/DL (ref 70–99)
GLUCOSE BLD-MCNC: 149 MG/DL (ref 70–99)
GLUCOSE BLD-MCNC: 165 MG/DL (ref 70–99)
GLUCOSE BLD-MCNC: 212 MG/DL (ref 70–99)
GLUCOSE SERPL-MCNC: 150 MG/DL (ref 70–99)
HCT VFR BLDCO AUTO: 29.7 % (ref 40.1–51)
HGB BLD-MCNC: 10 G/DL (ref 13.7–17.5)
MAGNESIUM SERPL-MCNC: 2.5 MG/DL (ref 1.8–2.5)
MCH RBC QN AUTO: 29.6 PG (ref 28–33.2)
MCHC RBC AUTO-ENTMCNC: 33.7 G/DL (ref 32.2–36.5)
MCV RBC AUTO: 87.9 FL (ref 83–98)
PDW BLD AUTO: 9.8 FL (ref 9.4–12.4)
PLATELET # BLD AUTO: 116 K/UL (ref 150–350)
POCT TEST: ABNORMAL
POTASSIUM SERPL-SCNC: 4 MEQ/L (ref 3.6–5.1)
PROT SERPL-MCNC: 5.6 G/DL (ref 6–8.2)
RBC # BLD AUTO: 3.38 M/UL (ref 4.5–5.8)
RETICS #: 0.09 M/UL (ref 0–0.12)
RETICS/RBC NFR: 2.62 % (ref 0.6–2.8)
SODIUM SERPL-SCNC: 140 MEQ/L (ref 136–144)
WBC # BLD AUTO: 14.55 K/UL (ref 3.8–10.5)

## 2022-10-28 PROCEDURE — G1004 CDSM NDSC: HCPCS

## 2022-10-28 PROCEDURE — 83735 ASSAY OF MAGNESIUM: CPT | Performed by: INTERNAL MEDICINE

## 2022-10-28 PROCEDURE — 63700000 HC SELF-ADMINISTRABLE DRUG: Performed by: INTERNAL MEDICINE

## 2022-10-28 PROCEDURE — 63700000 HC SELF-ADMINISTRABLE DRUG: Performed by: NURSE PRACTITIONER

## 2022-10-28 PROCEDURE — 85027 COMPLETE CBC AUTOMATED: CPT | Performed by: INTERNAL MEDICINE

## 2022-10-28 PROCEDURE — 99233 SBSQ HOSP IP/OBS HIGH 50: CPT | Performed by: INTERNAL MEDICINE

## 2022-10-28 PROCEDURE — 97166 OT EVAL MOD COMPLEX 45 MIN: CPT | Mod: GO

## 2022-10-28 PROCEDURE — 83010 ASSAY OF HAPTOGLOBIN QUANT: CPT | Performed by: INTERNAL MEDICINE

## 2022-10-28 PROCEDURE — 63700000 HC SELF-ADMINISTRABLE DRUG

## 2022-10-28 PROCEDURE — 85045 AUTOMATED RETICULOCYTE COUNT: CPT | Performed by: INTERNAL MEDICINE

## 2022-10-28 PROCEDURE — 20600000 HC ROOM AND CARE INTERMEDIATE/TELEMETRY

## 2022-10-28 PROCEDURE — 97162 PT EVAL MOD COMPLEX 30 MIN: CPT | Mod: GP

## 2022-10-28 PROCEDURE — 36415 COLL VENOUS BLD VENIPUNCTURE: CPT | Performed by: INTERNAL MEDICINE

## 2022-10-28 PROCEDURE — 80053 COMPREHEN METABOLIC PANEL: CPT | Performed by: INTERNAL MEDICINE

## 2022-10-28 PROCEDURE — A9585 GADOBUTROL INJECTION: HCPCS | Mod: JW | Performed by: INTERNAL MEDICINE

## 2022-10-28 RX ORDER — INSULIN ASPART 100 [IU]/ML
2-10 INJECTION, SOLUTION INTRAVENOUS; SUBCUTANEOUS
Status: DISCONTINUED | OUTPATIENT
Start: 2022-10-28 | End: 2022-11-06 | Stop reason: HOSPADM

## 2022-10-28 RX ORDER — GADOBUTROL 604.72 MG/ML
0.1 INJECTION INTRAVENOUS ONCE
Status: COMPLETED | OUTPATIENT
Start: 2022-10-28 | End: 2022-10-28

## 2022-10-28 RX ADMIN — METOPROLOL SUCCINATE 50 MG: 50 TABLET, FILM COATED, EXTENDED RELEASE ORAL at 18:57

## 2022-10-28 RX ADMIN — INSULIN ASPART 2 UNITS: 100 INJECTION, SOLUTION INTRAVENOUS; SUBCUTANEOUS at 00:32

## 2022-10-28 RX ADMIN — GADOBUTROL 8.5 MMOL: 604.72 INJECTION INTRAVENOUS at 17:53

## 2022-10-28 RX ADMIN — AMLODIPINE BESYLATE 5 MG: 5 TABLET ORAL at 18:57

## 2022-10-28 RX ADMIN — LATANOPROST 1 DROP: 50 SOLUTION OPHTHALMIC at 00:34

## 2022-10-28 RX ADMIN — INSULIN ASPART 4 UNITS: 100 INJECTION, SOLUTION INTRAVENOUS; SUBCUTANEOUS at 12:30

## 2022-10-28 RX ADMIN — TIMOLOL MALEATE 1 DROP: 5 SOLUTION/ DROPS OPHTHALMIC at 09:30

## 2022-10-28 RX ADMIN — PANTOPRAZOLE SODIUM 40 MG: 40 TABLET, DELAYED RELEASE ORAL at 09:27

## 2022-10-28 RX ADMIN — LATANOPROST 1 DROP: 50 SOLUTION OPHTHALMIC at 21:50

## 2022-10-28 ASSESSMENT — COGNITIVE AND FUNCTIONAL STATUS - GENERAL
DRESSING REGULAR UPPER BODY CLOTHING: 3 - A LITTLE
DRESSING REGULAR LOWER BODY CLOTHING: 2 - A LOT
WALKING IN HOSPITAL ROOM: 2 - A LOT
MOVING TO AND FROM BED TO CHAIR: 2 - A LOT
EATING MEALS: 3 - A LITTLE
HELP NEEDED FOR BATHING: 2 - A LOT
AFFECT: LOW AROUSAL/LETHARGIC
TOILETING: 2 - A LOT
HELP NEEDED FOR PERSONAL GROOMING: 3 - A LITTLE
STANDING UP FROM CHAIR USING ARMS: 2 - A LOT
CLIMB 3 TO 5 STEPS WITH RAILING: 1 - TOTAL
AFFECT: LOW AROUSAL/LETHARGIC

## 2022-10-28 NOTE — ASSESSMENT & PLAN NOTE
CT chest: Extensive b/l PE with R heart strain. 7cm hypodense lesion R hepatic lobe, small R hepatic lobe lesion. Indeterminate R adrenal mass and hemorrhage.  10 mm RML nodule  -Provoked by underlying hepatic malignancy?   -With acute hypoxic resp failure  -Associated R heart strain  -Started on Heparin drip and underwent TPA to each pulm artery  -RRT 10/26 with hypotension and retroperitoneal hemorrhage  -With DVT.  US:  Bilaterally L>R.  R nearly occlusive thrombus within the posterior tibial vein. Left: Mid femoral vein partially occlusive thrombus.  The distal popliteal vein contains occlusive thrombus.  Peroneal vein and posterior tibial veins contain partially to nearly occlusive thrombus.  -Echo 10/24:  TDS.  EF 75%   Grade I LV diastolic dysfunction. Severely dilated RV. Severely reduced RV systolic function c/w RV strain.  Mod dilated RA. Trace TR.   -Echo 10/26:  C/w 10/24, RV size has decreased and function improved   -S/p IVC filter 10/25  -AC held for now, eunice plan to resume Heparin without a bolus after MRI resulted

## 2022-10-28 NOTE — PATIENT CARE CONFERENCE
Care Progression Rounds Note  Date: 10/28/2022  Time: 9:38 AM     Patient Name: Tim Humphries     Medical Record Number: [id number]   YOB: 1945  Sex: Male      Room/Bed: 3220    Admitting Diagnosis: Fall, initial encounter [W19.XXXA]  Chin laceration, initial encounter [S01.81XA]  Acute saddle pulmonary embolism with acute cor pulmonale (CMS/HCC) [I26.02]  Syncope, unspecified syncope type [R55]   Admit Date/Time: 10/24/2022  8:21 AM    Primary Diagnosis: Pulmonary embolism with acute cor pulmonale (CMS/HCC)  Principal Problem: Pulmonary embolism with acute cor pulmonale (CMS/HCC)    GMLOS: 4.1  Anticipated Discharge Date: 10/31/2022    AM-PAC:  Mobility Score:      Discharge Planning:  Concerns to be Addressed: care coordination/care conferences, discharge planning  Anticipated Discharge Disposition: home with home health, skilled nursing facility    Barriers to Discharge:  Medical issues not resolved    Comments:       Participants:  advanced practice provider, , nursing

## 2022-10-28 NOTE — ASSESSMENT & PLAN NOTE
-From retroperitoneal hematoma, needing PRBC transfusion  -Thrombocytopenia as well, improving.  DIC labs noted

## 2022-10-28 NOTE — PROGRESS NOTES
Hospital Medicine Service -  Daily Progress Note       SUBJECTIVE   Interval History: Seen this am.  No complaints, with occ abd pain.  No fevers or chills.  3L NC.  Understands his hospital course thus far     OBJECTIVE      Vital signs in last 24 hours:  Temp:  [36.7 °C (98.1 °F)-37.1 °C (98.8 °F)] 36.8 °C (98.2 °F)  Heart Rate:  [] 82  Resp:  [18-24] 18  BP: (126-176)/(63-82) 126/63    Intake/Output Summary (Last 24 hours) at 10/28/2022 1759  Last data filed at 10/28/2022 0400  Gross per 24 hour   Intake --   Output 850 ml   Net -850 ml       PHYSICAL EXAMINATION      Physical Exam  Vitals and nursing note reviewed.   Constitutional:       Appearance: Normal appearance.   HENT:      Head: Normocephalic and atraumatic.   Cardiovascular:      Rate and Rhythm: Normal rate and regular rhythm.      Pulses: Normal pulses.      Heart sounds: Normal heart sounds.   Pulmonary:      Effort: Pulmonary effort is normal.      Breath sounds: Normal breath sounds.   Abdominal:      General: Abdomen is flat. Bowel sounds are normal. There is no distension.      Palpations: Abdomen is soft. There is no mass.      Tenderness: There is no abdominal tenderness.      Hernia: No hernia is present.   Musculoskeletal:      Right lower leg: No edema.      Left lower leg: No edema.   Skin:     General: Skin is warm and dry.   Neurological:      Mental Status: He is alert.      Comments: Generally weak.  Slow speech but completely appropriate   Psychiatric:         Mood and Affect: Mood normal.         Behavior: Behavior normal.         Thought Content: Thought content normal.         Judgment: Judgment normal.           LABS / IMAGING / TELE      Labs  Lab Results   Component Value Date    WBC 14.55 (H) 10/28/2022    HGB 10.0 (L) 10/28/2022    HCT 29.7 (L) 10/28/2022    MCV 87.9 10/28/2022     (L) 10/28/2022     Lab Results   Component Value Date    GLUCOSE 150 (H) 10/28/2022    CALCIUM 8.3 (L) 10/28/2022      10/28/2022    K 4.0 10/28/2022    CO2 25 10/28/2022     10/28/2022    BUN 41 (H) 10/28/2022    CREATININE 1.1 10/28/2022     Lab Results   Component Value Date     (H) 10/28/2022    AST 30 10/28/2022    ALKPHOS 57 10/28/2022    BILITOT 1.4 (H) 10/28/2022     Lab Results   Component Value Date    INR 1.2 10/27/2022    INR 1.4 10/26/2022    INR 1.4 10/25/2022       Imaging  CT HEAD WITHOUT IV CONTRAST    Result Date: 10/25/2022  IMPRESSION: 1.  No acute intracranial hemorrhage, large territorial infarct, mass effect or midline shift is identified. 2.  Multiple right maxillary sinus and zygomatic arch fractures with an air-fluid level within the right maxillary sinus.     CT HEAD WITHOUT IV CONTRAST    Result Date: 10/24/2022  IMPRESSION: 1.  No posttraumatic intracranial abnormality. 2. Chronic changes noted under the comment.    CT FACIAL BONES WITHOUT IV CONTRAST    Result Date: 10/24/2022  IMPRESSION: Multiple right-sided facial bone fractures as described under the comment.    CT CERVICAL SPINE WITHOUT IV CONTRAST    Result Date: 10/24/2022  IMPRESSION: 1.  No acute fractures. As clinically indicated, MRI of the cervical spine is recommended for further evaluation. 2.  Other incidental findings noted under the comment.     ULTRASOUND GUIDED VASCULAR ACCESS    Result Date: 10/26/2022  IMPRESSION:   Successful placement of a retrievable infrarenal IVC Filter. Device: Argon Option Elite IVC Filter COMMENT: PROCEDURE: 1. Ultrasound-guided access of the right common femoral vein. 2. IVC cavagram 3. IVC filter placement RADIOLOGIST:  Sarkis Gerardo MD ANESTHESIA:  Lidocaine 1% CONTRAST:  CO2 FLUORO TIME:  0.8 min REFERENCE AIR KERMA: 40 mGy MEDICATIONS:  none DESCRIPTION OF PROCEDURE:  Consent was obtained following explanation of risks and benefits of the procedure. The right groin was prepped and draped in the usual sterile fashion. The right common femoral vein was noted to be compressible and patent on  gray scale ultrasound. Local anesthesia was provided. The vein was then accessed with a  21-gauge needle under ultrasound guidance, and an .018 wire was advanced centrally. An ultrasound-guided access image was saved to PACS. Exchange was made for a microsheath, .035 wire, and multi-sidehole sheath, which was positioned at the iliocaval junction. IVC cavagram was performed with CO2. Renal vein insertion sites were localized. An Argon Option Elite IVC Filter was deployed infrarenally without complication. Wires and catheters were removed and hemostasis was achieved. The patient remained stable throughout the procedure. The number of guidewires used, and their integrity, was confirmed at the end of the procedure.     ULTRASOUND GUIDED VASCULAR ACCESS    Result Date: 10/25/2022  IMPRESSION:   Successful bilateral pulmonary arteriography and catheter-directed thrombolysis. COMMENT: PROCEDURE:  1. Ultrasound guided access of the central right common femoral vein. 2. Subselective right lower lobe pulmonary arteriography. 3. Subselective catheter directed thrombolysis of the right pulmonary arterial thrombus extending into the right lower lobe branch. 4. Subselective left lower lobe pulmonary arteriography. 5. Subselective catheter directed thrombolysis of the left pulmonary arterial thrombus extending into the left lower lobe branch. RADIOLOGIST: Sarkis Gerardo MD ANESTHESIA:  Local 1% lidocaine. FLUORO TIME:  7.7 minutes REFERENCE AIR KERMA: 44 mGy CONTRAST:  20 cc of Omnipaque 300 DESCRIPTION OF PROCEDURE:    Written informed consent was obtained following explanation of risks and benefits of the procedure. Specifically, risks of cardiac arrhythmia, pulmonary arterial injury, and intracranial hemorrhage were discussed with the patient. Additionally, significantly elevated risk of abdominal hemorrhage related to known adrenal hemorrhage was clearly discussed. The patient was placed supine on the IR procedure table.  Maximal sterile barrier precautions were utilized during catheter insertion including aseptic technique, the use of cap, mask, sterile gown, sterile gloves, sterile drapes, hand hygiene, and patient cutaneous antisepsis with chlorhexidene 2%. Grayscale and color Doppler ultrasound evaluation of the right common femoral vein was performed. The right common femoral vein was patent and compressible. The right groin was prepped and draped in usual sterile fashion. Local anesthesia was administered with 1% lidocaine. Under ultrasound guidance, the right common femoral vein was accessed with a 21-gauge needle. An 018 wire was advanced centrally. Sequential exchange was made for a 5 Dominican a microcatheter sheath, 035 Ness wire, and 5 Dominican vascular sheath. The sheath was secured with 0-0 silk. Utilizing an H1 catheter with Glidewire, the right main pulmonary artery was selected with successful subselection of a right lower lobe pulmonary arterial branch. Subselective right lower lobe pulmonary arteriography was performed, confirming satisfactory position within the right lower lobe branch distal to the main thrombus. Over a J Phelan wire, exchange was made for the Darrion-Macnamara flush catheter. 10 mg of TPA was then successfully infused into the right main pulmonary artery, extending into the right lower lobe pulmonary arterial branch. Utilizing an H1 catheter with Glidewire, the left main pulmonary artery was selected with successful subselection of a left lower lobe pulmonary arterial branch. Subselective left lower lobe pulmonary arteriography was performed, confirming satisfactory position within the left lower lobe branch distal to the main thrombus. Over a J Phelan wire, exchange was made for the Darrion-Macnamara flush catheter. 10 mg of TPA was then successfully infused into the left main pulmonary artery, extending into the left lower lobe pulmonary arterial branch. Catheter and sheath were removed. Hemostasis of  the right common femoral vein was achieved with manual compression.     CT ABDOMEN PELVIS WITH IV CONTRAST    Result Date: 10/24/2022  IMPRESSION: 1.  Extensive acute pulmonary arterial emboli involving the bilateral main and multiple bilateral lobar, segmental and subsegmental branches, as detailed above. Flattening of the interventricular septum with dilatation of the right ventricle suggestive of right-sided heart strain. No evidence of acute pulmonary infarct. 2.  Large, indeterminate infiltrative hypodense lesion within the right hepatic lobe measuring up to 7 cm warranting further assessment with contrast-enhanced MRI. Differential diagnosis includes metastatic disease, a primary malignancy as well as abscess. Small adjacent similar appearing indeterminate right hepatic lobe lesion. 3.  Indeterminate right adrenal mass with findings consistent with right adrenal hemorrhage. 4.  A 10 mm groundglass nodule within the right middle lobe. A follow-up CT scan of the chest in 6-12 months is recommended as per Fleischner guidelines. 5.  Additional incidental findings including ectasia of ascending thoracic aorta, enlarged prostate gland, coronary arterial atherosclerotic vascular disease and colonic diverticulosis without evidence of diverticulitis. Findings and recommendations discussed with SUZANNA Muñoz by Dr. Thompson by telephone at 9:54 AM and 10:21 AM on 10/24/2022. Fleischner Society 2017 Guidelines for Management of Incidentally Detected Pulmonary Nodules in Adults. (Subsolid Nodules) Single ground glass: <6 mm - No routine follow-up >/= 6 mm - CT at 6-12 months to confirm persistence, then CT every 2 years until 5 years (In certain suspicious nodules < 6 mm, consider follow-up at 2 and 4 years.  If solid component(s) or growth develops, consider resection (Recommendations 3A and 4A)     IR FILTER PLACEMENT    Result Date: 10/26/2022  IMPRESSION:   Successful placement of a retrievable infrarenal IVC Filter.  Device: Argon Option Elite IVC Filter COMMENT: PROCEDURE: 1. Ultrasound-guided access of the right common femoral vein. 2. IVC cavagram 3. IVC filter placement RADIOLOGIST:  Sarkis Gerardo MD ANESTHESIA:  Lidocaine 1% CONTRAST:  CO2 FLUORO TIME:  0.8 min REFERENCE AIR KERMA: 40 mGy MEDICATIONS:  none DESCRIPTION OF PROCEDURE:  Consent was obtained following explanation of risks and benefits of the procedure. The right groin was prepped and draped in the usual sterile fashion. The right common femoral vein was noted to be compressible and patent on gray scale ultrasound. Local anesthesia was provided. The vein was then accessed with a  21-gauge needle under ultrasound guidance, and an .018 wire was advanced centrally. An ultrasound-guided access image was saved to PACS. Exchange was made for a microsheath, .035 wire, and multi-sidehole sheath, which was positioned at the iliocaval junction. IVC cavagram was performed with CO2. Renal vein insertion sites were localized. An Argon Option Elite IVC Filter was deployed infrarenally without complication. Wires and catheters were removed and hemostasis was achieved. The patient remained stable throughout the procedure. The number of guidewires used, and their integrity, was confirmed at the end of the procedure.     CT ANGIOGRAPHY CHEST PULMONARY EMBOLISM WITH IV CONTRAST    Result Date: 10/24/2022  IMPRESSION: 1.  Extensive acute pulmonary arterial emboli involving the bilateral main and multiple bilateral lobar, segmental and subsegmental branches, as detailed above. Flattening of the interventricular septum with dilatation of the right ventricle suggestive of right-sided heart strain. No evidence of acute pulmonary infarct. 2.  Large, indeterminate infiltrative hypodense lesion within the right hepatic lobe measuring up to 7 cm warranting further assessment with contrast-enhanced MRI. Differential diagnosis includes metastatic disease, a primary malignancy as well as  abscess. Small adjacent similar appearing indeterminate right hepatic lobe lesion. 3.  Indeterminate right adrenal mass with findings consistent with right adrenal hemorrhage. 4.  A 10 mm groundglass nodule within the right middle lobe. A follow-up CT scan of the chest in 6-12 months is recommended as per Fleischner guidelines. 5.  Additional incidental findings including ectasia of ascending thoracic aorta, enlarged prostate gland, coronary arterial atherosclerotic vascular disease and colonic diverticulosis without evidence of diverticulitis. Findings and recommendations discussed with SUZANNA Muñoz by Dr. Thompson by telephone at 9:54 AM and 10:21 AM on 10/24/2022. Fleischner Society 2017 Guidelines for Management of Incidentally Detected Pulmonary Nodules in Adults. (Subsolid Nodules) Single ground glass: <6 mm - No routine follow-up >/= 6 mm - CT at 6-12 months to confirm persistence, then CT every 2 years until 5 years (In certain suspicious nodules < 6 mm, consider follow-up at 2 and 4 years.  If solid component(s) or growth develops, consider resection (Recommendations 3A and 4A)     IR THROMBOLYSIS    Result Date: 10/25/2022  IMPRESSION:   Successful bilateral pulmonary arteriography and catheter-directed thrombolysis. COMMENT: PROCEDURE:  1. Ultrasound guided access of the central right common femoral vein. 2. Subselective right lower lobe pulmonary arteriography. 3. Subselective catheter directed thrombolysis of the right pulmonary arterial thrombus extending into the right lower lobe branch. 4. Subselective left lower lobe pulmonary arteriography. 5. Subselective catheter directed thrombolysis of the left pulmonary arterial thrombus extending into the left lower lobe branch. RADIOLOGIST: Sarkis Gerardo MD ANESTHESIA:  Local 1% lidocaine. FLUORO TIME:  7.7 minutes REFERENCE AIR KERMA: 44 mGy CONTRAST:  20 cc of Omnipaque 300 DESCRIPTION OF PROCEDURE:    Written informed consent was obtained following  explanation of risks and benefits of the procedure. Specifically, risks of cardiac arrhythmia, pulmonary arterial injury, and intracranial hemorrhage were discussed with the patient. Additionally, significantly elevated risk of abdominal hemorrhage related to known adrenal hemorrhage was clearly discussed. The patient was placed supine on the IR procedure table. Maximal sterile barrier precautions were utilized during catheter insertion including aseptic technique, the use of cap, mask, sterile gown, sterile gloves, sterile drapes, hand hygiene, and patient cutaneous antisepsis with chlorhexidene 2%. Grayscale and color Doppler ultrasound evaluation of the right common femoral vein was performed. The right common femoral vein was patent and compressible. The right groin was prepped and draped in usual sterile fashion. Local anesthesia was administered with 1% lidocaine. Under ultrasound guidance, the right common femoral vein was accessed with a 21-gauge needle. An 018 wire was advanced centrally. Sequential exchange was made for a 5 Afghan a microcatheter sheath, 035 Ness wire, and 5 Afghan vascular sheath. The sheath was secured with 0-0 silk. Utilizing an H1 catheter with Glidewire, the right main pulmonary artery was selected with successful subselection of a right lower lobe pulmonary arterial branch. Subselective right lower lobe pulmonary arteriography was performed, confirming satisfactory position within the right lower lobe branch distal to the main thrombus. Over a J Phelan wire, exchange was made for the Darrion-Macnamara flush catheter. 10 mg of TPA was then successfully infused into the right main pulmonary artery, extending into the right lower lobe pulmonary arterial branch. Utilizing an H1 catheter with Glidewire, the left main pulmonary artery was selected with successful subselection of a left lower lobe pulmonary arterial branch. Subselective left lower lobe pulmonary arteriography was performed,  confirming satisfactory position within the left lower lobe branch distal to the main thrombus. Over a J Phelan wire, exchange was made for the Darrion-Macnamara flush catheter. 10 mg of TPA was then successfully infused into the left main pulmonary artery, extending into the left lower lobe pulmonary arterial branch. Catheter and sheath were removed. Hemostasis of the right common femoral vein was achieved with manual compression.     X-RAY CHEST 1 VIEW    Result Date: 10/26/2022  IMPRESSION: No acute cardiopulmonary abnormality seen.. COMMENT:    A single AP view of the chest was performed. Comparison: AP chest x-ray from 10/24/2022. The heart size and pulmonary vasculature remain within normal limits. The lung fields appear clear, and no pleural effusions are seen. No hilar abnormality is identified. There is mild tortuosity of the descending thoracic aorta. An electronic device projects over the left lung base projecting over the left anterior left fourth rib, likely a loop recorder. When compared to the prior study, there has been no significant interval change.     X-RAY CHEST 1 VIEW    Result Date: 10/24/2022  IMPRESSION: No evidence of acute pulmonary disease.     Ultrasound venous leg bilateral    Result Date: 10/25/2022  IMPRESSION: Examination positive for deep vein thrombosis bilaterally as described, with greater than right.     CT CHEST ABDOMEN PELVIS WITHOUT IV CONTRAST    Result Date: 10/25/2022  IMPRESSION: 1.  Findings concerning for a large right-sided retroperitoneal hemorrhage including the right kidney and right adrenal gland. A small to moderate amount of pelvic ascites is also noted which appears mildly hyperdense and may reflect a component of hemoperitoneum. 2.  Additional chronic and incidental findings as described above. Finding:    Unexpected significant hemorrhage (chest, abdomen, pelvis)   Acuity: Critical  Status:  CLOSED Critical read back was performed and results were read back by  Dr. Simms, on 10/25/2022 8:10 PM        ECG/Telemetry     ASSESSMENT AND PLAN      * Pulmonary embolism with acute cor pulmonale (CMS/HCC)  Assessment & Plan  CT chest: Extensive b/l PE with R heart strain. 7cm hypodense lesion R hepatic lobe, small R hepatic lobe lesion. Indeterminate R adrenal mass and hemorrhage.  10 mm RML nodule  -Provoked by underlying hepatic malignancy?   -With acute hypoxic resp failure  -Associated R heart strain  -Started on Heparin drip and underwent TPA to each pulm artery  -RRT 10/26 with hypotension and retroperitoneal hemorrhage  -With DVT.  US:  Bilaterally L>R.  R nearly occlusive thrombus within the posterior tibial vein. Left: Mid femoral vein partially occlusive thrombus.  The distal popliteal vein contains occlusive thrombus.  Peroneal vein and posterior tibial veins contain partially to nearly occlusive thrombus.  -Echo 10/24:  TDS.  EF 75%   Grade I LV diastolic dysfunction. Severely dilated RV. Severely reduced RV systolic function c/w RV strain.  Mod dilated RA. Trace TR.   -Echo 10/26:  C/w 10/24, RV size has decreased and function improved   -S/p IVC filter 10/25  -AC held for now, eunice plan to resume Heparin without a bolus after MRI resulted    Retroperitoneal hematoma  Assessment & Plan  -CT a/p 10/25: Lg R sided retroperitoneal hemorrhage including the right kidney and right adrenal gland. Component of hemoperitoneum  -With associated acute blood loss anemia requiring prbcs  -Underlying adrenal hemorrhage noted on initial CT, but risk of not anticoagulating outweighed risk of bleed    Liver mass  Assessment & Plan  -Abnormal transaminases  -CT:  7cm hypodense lesion R hepatic lobe, small R hepatic lobe lesion.   -CA 19-9, AFP, hepatitis panel neg.   CEA 3.9 which is theoretically elevated in a non smoker  -MRI completed, results pending  -Will need bx    LYNNE (acute kidney injury) (CMS/HCC)  Assessment & Plan  -Prerenal, improving  -Cr peaked at 2.3, now  resolved    Acute blood loss anemia  Assessment & Plan  -From retroperitoneal hematoma, needing PRBC transfusion  -Thrombocytopenia as well, improving.  DIC labs noted    Pulmonary nodule  Assessment & Plan  -Noted on CT chest  -Will need repeat CT chest 6-12 months    Facial fracture (CMS/HCC)  Assessment & Plan  -CT facial bones:Multiple right-sided facial bone fractures as described under the comment.  -Seen by Trauma and plastics  -Seen by plastics who discussed potential facial deformity.  Not a candidate for reconstructive surgery, given his need for AC  -I d/w trauma, no need for abx    Syncope  Assessment & Plan  -From submassive PE/R heart strain  -PT, OT    Hyperlipidemia  Assessment & Plan  Due to elevated LFT, will hold Lipitor for now until further GI eval    Myocardial injury  Assessment & Plan  -Trop peaked at 1349, R heart strain from PE    Hypertension  Assessment & Plan  -BP better since resuming Norvasc and Metoprolol  -Holding HCTZ         VTE Assessment: Padua    VTE Prophylaxis Plan: Current anticoagulants:  [Provider Managed Hold] heparin (porcine) bolus from bag 3,400-6,850 Units, intravenous, q6h PRN  [Provider Managed Hold] heparin infusion in D5W 100 units/mL, intravenous, Titrated      Code Status: Full Code  Estimated Discharge Date:  10/31/2022  Disposition: [facility]     Nael Rodriguez MD  10/28/2022  5:59 PM

## 2022-10-28 NOTE — PLAN OF CARE
Problem: Adult Inpatient Plan of Care  Goal: Plan of Care Review  Outcome: Progressing  Flowsheets (Taken 10/28/2022 1700)  Progress: no change  Plan of Care Reviewed With: patient  Outcome Summary: PT evaluation complete. Patient requires David for bed mobility due to posterior trunk lean, min-modA x2 for sit<>stand and stand-pivot transfers to chair w/ bilateral hand-hold assist. Limited by generalized weakness, balance deficits and LE instability. Further assessment deferred due to lethargy, patient repeatedly falling asleep during session. Patient will benefit from SNF for continued skilled PT services to progress functional safety and indepnedence w/ mobility prior to returning home. PT will continue to follow. AM-PAC 13/24.

## 2022-10-28 NOTE — ASSESSMENT & PLAN NOTE
-CT facial bones:Multiple right-sided facial bone fractures as described under the comment.  -Seen by Trauma and plastics  -Seen by plastics who discussed potential facial deformity.  Not a candidate for reconstructive surgery, given his need for AC  -I d/w trauma, no need for abx

## 2022-10-28 NOTE — PROGRESS NOTES
Critical Care/Pulmonary  Daily Progress Note        Subjective    Interval History:    No acute events overnight.  S/p IVC filter placement 10/26  Tolerating 2L-4LNC  Denies chest pain, SOB, abdominal pain. No tenderness or hematoma at groin site.       Objective       Allergies: Patient has no known allergies.    CURRENT MEDS    acetaminophen, 650 mg, oral, q4h PRN    albuterol, 5 mg, nebulization, q6h PRN    amLODIPine, 5 mg, oral, q PM    glucose, 16-32 g of dextrose, oral, PRN **OR** dextrose, 15-30 g of dextrose, oral, PRN **OR** glucagon, 1 mg, intramuscular, PRN **OR** dextrose 50 % in water (D50), 25 mL, intravenous, PRN    [Provider Managed Hold] heparin (porcine), 40-80 Units/kg, intravenous, q6h PRN    [Provider Managed Hold] heparin infusion - MAR calculator by PTT, 100-4,000 Units/hr, intravenous, Titrated    hydrALAZINE, 10 mg, intravenous, q6h PRN    HYDROmorphone, 0.25-0.5 mg, intravenous, q3h PRN    insulin aspart U-100, 2-10 Units, subcutaneous, q6h INT    latanoprost, 1 drop, Both Eyes, Nightly    metoprolol succinate XL, 50 mg, oral, q PM    nitroglycerin, 0.4 mg, sublingual, q5 min PRN    oxyCODONE, 5 mg, oral, q6h PRN    pantoprazole, 40 mg, oral, Daily    timolol, 1 drop, Left Eye, q AM    VITAL SIGNS  Vitals:    10/28/22 1000 10/28/22 1137 10/28/22 1147 10/28/22 1200   BP: (!) 146/70 (!) 142/67 (!) 155/71 126/63   BP Location: Left upper arm   Left upper arm   Patient Position: Lying   Sitting   Pulse: 91 88 86 82   Resp: 20   18   Temp:    36.8 °C (98.2 °F)   TempSrc:    Oral   SpO2: 96% 93% 95% 94%   Weight:       Height:         Oxygen:  Oxygen Therapy: Supplemental oxygen  O2 Delivery Method: Nasal cannula     O2 Flow Rate (L/min): 3 L/min     INTAKE/OUTPUT    Intake/Output Summary (Last 24 hours) at 10/28/2022 6709  Last data filed at 10/28/2022 0400  Gross per 24 hour   Intake --   Output 850 ml   Net -850 ml     Lines, Drains, Airways, Wounds:  Peripheral IV (Adult)  10/24/22 Anterior;Left Wrist (Active)   Number of days: 3       Peripheral IV (Adult) 10/24/22 Posterior;Right Forearm (Active)   Number of days: 3       Peripheral IV (Adult) 10/24/22 Left;Posterior Forearm (Active)   Number of days: 3       External Urinary Catheter Small (Active)   Number of days: 1       Wound Puncture Anterior;Proximal;Right Thigh (Active)   Number of days: 1       Wound Right Chin (Active)   Number of days: 2       Catheterization Site - Venous Right Femoral 5 Fr.;Removed in lab (Active)   Number of days: 2         Physical Exam:  Physical Exam  Vitals and nursing note reviewed.   Constitutional:       General: He is not in acute distress.  HENT:      Head: Normocephalic.      Comments: Scattered facial ecchymosis     Mouth/Throat:      Mouth: Mucous membranes are moist.   Cardiovascular:      Rate and Rhythm: Normal rate and regular rhythm.      Pulses: Normal pulses.      Heart sounds: Normal heart sounds, S1 normal and S2 normal. No murmur heard.    No friction rub. No gallop.   Pulmonary:      Effort: Pulmonary effort is normal.      Breath sounds: Normal breath sounds. No wheezing, rhonchi or rales.   Abdominal:      General: Bowel sounds are normal. There is no distension.      Palpations: Abdomen is soft.      Tenderness: There is no abdominal tenderness.   Musculoskeletal:      Cervical back: Neck supple.      Right lower leg: No edema.      Left lower leg: No edema.   Skin:     General: Skin is warm and dry.      Capillary Refill: Capillary refill takes less than 2 seconds.      Findings: Ecchymosis present.      Comments: Scattered ecchymosis   Neurological:      General: No focal deficit present.      Mental Status: He is alert and oriented to person, place, and time.   Psychiatric:         Behavior: Behavior is cooperative.          Labs:  See Labs Below:  CBC  Results from last 7 days   Lab Units 10/28/22  0540 10/27/22  1605 10/27/22  0633   WBC K/uL 14.55* 15.37* 15.25*   RBC  M/uL 3.38* 3.54* 3.53*   HEMOGLOBIN g/dL 10.0* 10.6* 10.3*   HEMATOCRIT % 29.7* 30.8* 31.0*   MCV fL 87.9 87.0 87.8   MCH pg 29.6 29.9 29.2   MCHC g/dL 33.7 34.4 33.2   PLATELETS K/uL 116* 105* 95*   RDW % 13.6 13.5 13.7   MPV fL 9.8 9.9 9.5     BMP  Results from last 7 days   Lab Units 10/28/22  0540 10/27/22  1605 10/27/22  0633   SODIUM mEQ/L 140 138 138   POTASSIUM mEQ/L 4.0 4.1 4.0   CHLORIDE mEQ/L 106 108 107   CO2 mEQ/L 25 22 23   BUN mg/dL 41* 44* 47*   CREATININE mg/dL 1.1 1.1 1.3   CALCIUM mg/dL 8.3* 8.4* 8.2*   ALBUMIN g/dL 3.1* 3.3* 3.1*   BILIRUBIN TOTAL mg/dL 1.4* 1.3* 1.2   ALK PHOS IU/L 57 48 47   ALT IU/L 124* 159* 185*   AST IU/L 30 38 47*   GLUCOSE mg/dL 150* 134* 142*     Coag  Results from last 7 days   Lab Units 10/27/22  1605 10/26/22  0444 10/25/22  2139 10/25/22  1855   PROTIME sec 14.7* 16.5*  --  16.7*   INR  1.2 1.4  --  1.4   PTT sec 32 26 26 52*     Imaging:   CT HEAD WITHOUT IV CONTRAST    Result Date: 10/25/2022  IMPRESSION: 1.  No acute intracranial hemorrhage, large territorial infarct, mass effect or midline shift is identified. 2.  Multiple right maxillary sinus and zygomatic arch fractures with an air-fluid level within the right maxillary sinus.     CT HEAD WITHOUT IV CONTRAST    Result Date: 10/24/2022  IMPRESSION: 1.  No posttraumatic intracranial abnormality. 2. Chronic changes noted under the comment.    CT FACIAL BONES WITHOUT IV CONTRAST    Result Date: 10/24/2022  IMPRESSION: Multiple right-sided facial bone fractures as described under the comment.    CT CERVICAL SPINE WITHOUT IV CONTRAST    Result Date: 10/24/2022  IMPRESSION: 1.  No acute fractures. As clinically indicated, MRI of the cervical spine is recommended for further evaluation. 2.  Other incidental findings noted under the comment.     ULTRASOUND GUIDED VASCULAR ACCESS    Result Date: 10/26/2022  IMPRESSION:   Successful placement of a retrievable infrarenal IVC Filter. Device: Argon Option Elite IVC Filter  COMMENT: PROCEDURE: 1. Ultrasound-guided access of the right common femoral vein. 2. IVC cavagram 3. IVC filter placement RADIOLOGIST:  Sarkis Gerardo MD ANESTHESIA:  Lidocaine 1% CONTRAST:  CO2 FLUORO TIME:  0.8 min REFERENCE AIR KERMA: 40 mGy MEDICATIONS:  none DESCRIPTION OF PROCEDURE:  Consent was obtained following explanation of risks and benefits of the procedure. The right groin was prepped and draped in the usual sterile fashion. The right common femoral vein was noted to be compressible and patent on gray scale ultrasound. Local anesthesia was provided. The vein was then accessed with a  21-gauge needle under ultrasound guidance, and an .018 wire was advanced centrally. An ultrasound-guided access image was saved to PACS. Exchange was made for a microsheath, .035 wire, and multi-sidehole sheath, which was positioned at the iliocaval junction. IVC cavagram was performed with CO2. Renal vein insertion sites were localized. An Argon Option Elite IVC Filter was deployed infrarenally without complication. Wires and catheters were removed and hemostasis was achieved. The patient remained stable throughout the procedure. The number of guidewires used, and their integrity, was confirmed at the end of the procedure.     ULTRASOUND GUIDED VASCULAR ACCESS    Result Date: 10/25/2022  IMPRESSION:   Successful bilateral pulmonary arteriography and catheter-directed thrombolysis. COMMENT: PROCEDURE:  1. Ultrasound guided access of the central right common femoral vein. 2. Subselective right lower lobe pulmonary arteriography. 3. Subselective catheter directed thrombolysis of the right pulmonary arterial thrombus extending into the right lower lobe branch. 4. Subselective left lower lobe pulmonary arteriography. 5. Subselective catheter directed thrombolysis of the left pulmonary arterial thrombus extending into the left lower lobe branch. RADIOLOGIST: Sarkis Gerardo MD ANESTHESIA:  Local 1% lidocaine. FLUORO TIME:  7.7  minutes REFERENCE AIR KERMA: 44 mGy CONTRAST:  20 cc of Omnipaque 300 DESCRIPTION OF PROCEDURE:    Written informed consent was obtained following explanation of risks and benefits of the procedure. Specifically, risks of cardiac arrhythmia, pulmonary arterial injury, and intracranial hemorrhage were discussed with the patient. Additionally, significantly elevated risk of abdominal hemorrhage related to known adrenal hemorrhage was clearly discussed. The patient was placed supine on the IR procedure table. Maximal sterile barrier precautions were utilized during catheter insertion including aseptic technique, the use of cap, mask, sterile gown, sterile gloves, sterile drapes, hand hygiene, and patient cutaneous antisepsis with chlorhexidene 2%. Grayscale and color Doppler ultrasound evaluation of the right common femoral vein was performed. The right common femoral vein was patent and compressible. The right groin was prepped and draped in usual sterile fashion. Local anesthesia was administered with 1% lidocaine. Under ultrasound guidance, the right common femoral vein was accessed with a 21-gauge needle. An 018 wire was advanced centrally. Sequential exchange was made for a 5 Salvadorean a microcatheter sheath, 035 Ness wire, and 5 Salvadorean vascular sheath. The sheath was secured with 0-0 silk. Utilizing an H1 catheter with Glidewire, the right main pulmonary artery was selected with successful subselection of a right lower lobe pulmonary arterial branch. Subselective right lower lobe pulmonary arteriography was performed, confirming satisfactory position within the right lower lobe branch distal to the main thrombus. Over a J Phelan wire, exchange was made for the Darrion-Macnamara flush catheter. 10 mg of TPA was then successfully infused into the right main pulmonary artery, extending into the right lower lobe pulmonary arterial branch. Utilizing an H1 catheter with Glidewire, the left main pulmonary artery was selected  with successful subselection of a left lower lobe pulmonary arterial branch. Subselective left lower lobe pulmonary arteriography was performed, confirming satisfactory position within the left lower lobe branch distal to the main thrombus. Over a J Phelan wire, exchange was made for the Darrion-Macnamara flush catheter. 10 mg of TPA was then successfully infused into the left main pulmonary artery, extending into the left lower lobe pulmonary arterial branch. Catheter and sheath were removed. Hemostasis of the right common femoral vein was achieved with manual compression.     CT ABDOMEN PELVIS WITH IV CONTRAST    Result Date: 10/24/2022  IMPRESSION: 1.  Extensive acute pulmonary arterial emboli involving the bilateral main and multiple bilateral lobar, segmental and subsegmental branches, as detailed above. Flattening of the interventricular septum with dilatation of the right ventricle suggestive of right-sided heart strain. No evidence of acute pulmonary infarct. 2.  Large, indeterminate infiltrative hypodense lesion within the right hepatic lobe measuring up to 7 cm warranting further assessment with contrast-enhanced MRI. Differential diagnosis includes metastatic disease, a primary malignancy as well as abscess. Small adjacent similar appearing indeterminate right hepatic lobe lesion. 3.  Indeterminate right adrenal mass with findings consistent with right adrenal hemorrhage. 4.  A 10 mm groundglass nodule within the right middle lobe. A follow-up CT scan of the chest in 6-12 months is recommended as per Fleischner guidelines. 5.  Additional incidental findings including ectasia of ascending thoracic aorta, enlarged prostate gland, coronary arterial atherosclerotic vascular disease and colonic diverticulosis without evidence of diverticulitis. Findings and recommendations discussed with SUZANNA Muñoz by Dr. Thompson by telephone at 9:54 AM and 10:21 AM on 10/24/2022. Fleischner Society 2017 Guidelines for Management  of Incidentally Detected Pulmonary Nodules in Adults. (Subsolid Nodules) Single ground glass: <6 mm - No routine follow-up >/= 6 mm - CT at 6-12 months to confirm persistence, then CT every 2 years until 5 years (In certain suspicious nodules < 6 mm, consider follow-up at 2 and 4 years.  If solid component(s) or growth develops, consider resection (Recommendations 3A and 4A)     IR FILTER PLACEMENT    Result Date: 10/26/2022  IMPRESSION:   Successful placement of a retrievable infrarenal IVC Filter. Device: Argon Option Elite IVC Filter COMMENT: PROCEDURE: 1. Ultrasound-guided access of the right common femoral vein. 2. IVC cavagram 3. IVC filter placement RADIOLOGIST:  Sarkis Gerardo MD ANESTHESIA:  Lidocaine 1% CONTRAST:  CO2 FLUORO TIME:  0.8 min REFERENCE AIR KERMA: 40 mGy MEDICATIONS:  none DESCRIPTION OF PROCEDURE:  Consent was obtained following explanation of risks and benefits of the procedure. The right groin was prepped and draped in the usual sterile fashion. The right common femoral vein was noted to be compressible and patent on gray scale ultrasound. Local anesthesia was provided. The vein was then accessed with a  21-gauge needle under ultrasound guidance, and an .018 wire was advanced centrally. An ultrasound-guided access image was saved to PACS. Exchange was made for a microsheath, .035 wire, and multi-sidehole sheath, which was positioned at the iliocaval junction. IVC cavagram was performed with CO2. Renal vein insertion sites were localized. An Argon Option Elite IVC Filter was deployed infrarenally without complication. Wires and catheters were removed and hemostasis was achieved. The patient remained stable throughout the procedure. The number of guidewires used, and their integrity, was confirmed at the end of the procedure.     CT ANGIOGRAPHY CHEST PULMONARY EMBOLISM WITH IV CONTRAST    Result Date: 10/24/2022  IMPRESSION: 1.  Extensive acute pulmonary arterial emboli involving the  bilateral main and multiple bilateral lobar, segmental and subsegmental branches, as detailed above. Flattening of the interventricular septum with dilatation of the right ventricle suggestive of right-sided heart strain. No evidence of acute pulmonary infarct. 2.  Large, indeterminate infiltrative hypodense lesion within the right hepatic lobe measuring up to 7 cm warranting further assessment with contrast-enhanced MRI. Differential diagnosis includes metastatic disease, a primary malignancy as well as abscess. Small adjacent similar appearing indeterminate right hepatic lobe lesion. 3.  Indeterminate right adrenal mass with findings consistent with right adrenal hemorrhage. 4.  A 10 mm groundglass nodule within the right middle lobe. A follow-up CT scan of the chest in 6-12 months is recommended as per Fleischner guidelines. 5.  Additional incidental findings including ectasia of ascending thoracic aorta, enlarged prostate gland, coronary arterial atherosclerotic vascular disease and colonic diverticulosis without evidence of diverticulitis. Findings and recommendations discussed with SUZANNA Muñoz by Dr. Thompson by telephone at 9:54 AM and 10:21 AM on 10/24/2022. Fleischner Society 2017 Guidelines for Management of Incidentally Detected Pulmonary Nodules in Adults. (Subsolid Nodules) Single ground glass: <6 mm - No routine follow-up >/= 6 mm - CT at 6-12 months to confirm persistence, then CT every 2 years until 5 years (In certain suspicious nodules < 6 mm, consider follow-up at 2 and 4 years.  If solid component(s) or growth develops, consider resection (Recommendations 3A and 4A)     IR THROMBOLYSIS    Result Date: 10/25/2022  IMPRESSION:   Successful bilateral pulmonary arteriography and catheter-directed thrombolysis. COMMENT: PROCEDURE:  1. Ultrasound guided access of the central right common femoral vein. 2. Subselective right lower lobe pulmonary arteriography. 3. Subselective catheter directed thrombolysis  of the right pulmonary arterial thrombus extending into the right lower lobe branch. 4. Subselective left lower lobe pulmonary arteriography. 5. Subselective catheter directed thrombolysis of the left pulmonary arterial thrombus extending into the left lower lobe branch. RADIOLOGIST: Sarkis Gerardo MD ANESTHESIA:  Local 1% lidocaine. FLUORO TIME:  7.7 minutes REFERENCE AIR KERMA: 44 mGy CONTRAST:  20 cc of Omnipaque 300 DESCRIPTION OF PROCEDURE:    Written informed consent was obtained following explanation of risks and benefits of the procedure. Specifically, risks of cardiac arrhythmia, pulmonary arterial injury, and intracranial hemorrhage were discussed with the patient. Additionally, significantly elevated risk of abdominal hemorrhage related to known adrenal hemorrhage was clearly discussed. The patient was placed supine on the IR procedure table. Maximal sterile barrier precautions were utilized during catheter insertion including aseptic technique, the use of cap, mask, sterile gown, sterile gloves, sterile drapes, hand hygiene, and patient cutaneous antisepsis with chlorhexidene 2%. Grayscale and color Doppler ultrasound evaluation of the right common femoral vein was performed. The right common femoral vein was patent and compressible. The right groin was prepped and draped in usual sterile fashion. Local anesthesia was administered with 1% lidocaine. Under ultrasound guidance, the right common femoral vein was accessed with a 21-gauge needle. An 018 wire was advanced centrally. Sequential exchange was made for a 5 Beninese a microcatheter sheath, 035 Ness wire, and 5 Beninese vascular sheath. The sheath was secured with 0-0 silk. Utilizing an H1 catheter with Glidewire, the right main pulmonary artery was selected with successful subselection of a right lower lobe pulmonary arterial branch. Subselective right lower lobe pulmonary arteriography was performed, confirming satisfactory position within the right  lower lobe branch distal to the main thrombus. Over a J Phelan wire, exchange was made for the Darrion-Macnamara flush catheter. 10 mg of TPA was then successfully infused into the right main pulmonary artery, extending into the right lower lobe pulmonary arterial branch. Utilizing an H1 catheter with Glidewire, the left main pulmonary artery was selected with successful subselection of a left lower lobe pulmonary arterial branch. Subselective left lower lobe pulmonary arteriography was performed, confirming satisfactory position within the left lower lobe branch distal to the main thrombus. Over a J Phelan wire, exchange was made for the Darrion-Macnamara flush catheter. 10 mg of TPA was then successfully infused into the left main pulmonary artery, extending into the left lower lobe pulmonary arterial branch. Catheter and sheath were removed. Hemostasis of the right common femoral vein was achieved with manual compression.     X-RAY CHEST 1 VIEW    Result Date: 10/26/2022  IMPRESSION: No acute cardiopulmonary abnormality seen.. COMMENT:    A single AP view of the chest was performed. Comparison: AP chest x-ray from 10/24/2022. The heart size and pulmonary vasculature remain within normal limits. The lung fields appear clear, and no pleural effusions are seen. No hilar abnormality is identified. There is mild tortuosity of the descending thoracic aorta. An electronic device projects over the left lung base projecting over the left anterior left fourth rib, likely a loop recorder. When compared to the prior study, there has been no significant interval change.     X-RAY CHEST 1 VIEW    Result Date: 10/24/2022  IMPRESSION: No evidence of acute pulmonary disease.     Ultrasound venous leg bilateral    Result Date: 10/25/2022  IMPRESSION: Examination positive for deep vein thrombosis bilaterally as described, with greater than right.     CT CHEST ABDOMEN PELVIS WITHOUT IV CONTRAST    Result Date: 10/25/2022  IMPRESSION: 1.   Findings concerning for a large right-sided retroperitoneal hemorrhage including the right kidney and right adrenal gland. A small to moderate amount of pelvic ascites is also noted which appears mildly hyperdense and may reflect a component of hemoperitoneum. 2.  Additional chronic and incidental findings as described above. Finding:    Unexpected significant hemorrhage (chest, abdomen, pelvis)   Acuity: Critical  Status:  CLOSED Critical read back was performed and results were read back by Dr. Simms, on 10/25/2022 8:10 PM     Micro:   Microbiology Results     Procedure Component Value Units Date/Time    Blood Culture Blood, Venous [[id number]]  (Normal) Collected: 10/25/22 2248    Specimen: Blood, Venous Updated: 10/27/22 0600     Culture No growth at 18-24 hours    Blood Culture Blood, Venous [[id number]]  (Normal) Collected: 10/25/22 2222    Specimen: Blood, Venous Updated: 10/27/22 0600     Culture No growth at 18-24 hours    MRSA Screen, Nares Only Nose [[id number]]  (Normal) Collected: 10/25/22 2019    Specimen: Nasal Swab from Nose Updated: 10/26/22 0216     MRSA DNA, Nares Negative    SARS-CoV-2 (COVID-19), PCR Nasopharynx [[id number]]  (Normal) Collected: 10/24/22 0839    Specimen: Nasopharyngeal Swab from Nasopharynx Updated: 10/24/22 0925    Narrative:      The following orders were created for panel order SARS-CoV-2 (COVID-19), PCR Nasopharynx.  Procedure                               Abnormality         Status                     ---------                               -----------         ------                     SARS-COV-2 (COVID-19)/ F...[[id number]]  Normal              Final result                 Please view results for these tests on the individual orders.    SARS-COV-2 (COVID-19)/ FLU A/B, AND RSV, PCR Nasopharynx [[id number]]  (Normal) Collected: 10/24/22 0839    Specimen: Nasopharyngeal Swab from Nasopharynx Updated: 10/24/22 0925     SARS-CoV-2 (COVID-19) Negative     Influenza A Negative      Influenza B Negative     Respiratory Syncytial Virus Negative    Narrative:      Testing performed using real-time PCR for detection of COVID-19. EUA approved validation studies performed on site.         ECG/Telemetry  I have independently reviewed the telemetry. No events for the last 24 hours.         ASSESSMENT    78 y/o M presented 10/24 after syncopal episode with fall down stairs found to have submassive bilateral PE with RHS, s/p tPA complicated by right retroperitoneal hemorrhage.     PLAN   1. Submassive to massive pulmonary embolism with associated DVT-see below. Initial thrombotic event.  - 10/24 TTE: Severely dilated RV, severely reduced RV systolic function  - 10/26 repeat TTE: RV appears dilated, RV demonstrated low normals, at most mildly reduced systolic function  - 10/25: subselective catheter directed tPA of the right pulmonary arterial  thrombus extending into the right lower lobe branch and subselective catheter directed tPA of the left pulmonary arterial thrombus extending into the left lower lobe branch  - Complicated by right retroperitoneal bleed with hemorrhagic shock  - 10/26 IVC filter placement given contraindication to ac  -follow up echo 10/26 with improved RV dysfunction , now right ventricle appears dilated. The right ventricle demonstrates low normal, at most mildly reduced, systolic function.  -will discuss with team on multidisciplinary level re timing of restart anticoagulation if possible, ie, IV UFH without bolus with close follow  -Await MRI in case biopsy is required.  Hopefully however maybe we can safely restart anticoagulation in the next 24 to 48 hours (heparin, no bolus)       2. DVT  - 10/25 US: nearly occlusive thrombus within the posterior tibial vein, a left mid femoral vein contains partially occlusive thrombus, a distal left popliteal vein and a left peroneal vein and posterior tibial veins partially to nearly occlusive thrombus.  - 10/26 IVC filter  placement     3. Retroperitoneal hemorrhage  - Possibly secondary to further hemorrhage of adrenal mass versus venous puncture IR procedure  - Hemoglobin stable, 10     4. Hepatic mass, infiltrative hypodensity in the right hepatic lobe measuring up to 7 cm  - Will obtain MRI with and without  - LFTs stable  - GI following    5. Indeterminate right adrenal mass with findings consistent with adrenal hemorrhage, measures 34 x 27 mm on intial (pre IR intervention CT imaging)     6. Right middle lobe lung nodule, 10 mm subsolid pulmonary nodule     7. Acute kidney injury in the setting of the above  - Now stable  - Avoid nephrotoxic agents     35 minutes were spent obtaining a detailed interval history, detailed exam, and evaluating this high complexity case with significant medical decision making. More than 50% of time was spent counseling the patient and coordinating care with the primary team and consultants.         Juliano Orr MD  10/28/2022  3:59 PM

## 2022-10-28 NOTE — PLAN OF CARE
Problem: Adult Inpatient Plan of Care  Goal: Plan of Care Review  Flowsheets (Taken 10/28/2022 3981)  Plan of Care Reviewed With:   patient   spouse     Pt currently working with PT/OT.  Discussed discharge planning with wife at bedside and possibility of needed SNF.  Patient and/or patient guardian/advocate was made aware of their right to choose a provider. A list of eligible providers was presented and reviewed with the patient and/or patient guardian/advocate in written and/or verbal form. The list delineates providers in the patients desired geographic area who are participating in the Medicare program and/or providers contracted with the patients primary insurance. The Medicare list and quality ratings were obtained from the Medicare.gov [medicare.gov] website.  Wife states will look over list and give preferences shortly.  Will continue to follow for discharge needs.

## 2022-10-28 NOTE — PROGRESS NOTES
Hematology/Oncology Progress Note       SUBJECTIVE  Patient reports feeling good.  Out of ICU.    CURRENT MEDS  Current Facility-Administered Medications   Medication Dose Route Frequency    acetaminophen  650 mg oral q4h PRN    albuterol  5 mg nebulization q6h PRN    amLODIPine  5 mg oral q PM    glucose  16-32 g of dextrose oral PRN    Or    dextrose  15-30 g of dextrose oral PRN    Or    glucagon  1 mg intramuscular PRN    Or    dextrose 50 % in water (D50)  25 mL intravenous PRN    [Provider Managed Hold] heparin (porcine)  40-80 Units/kg intravenous q6h PRN    [Provider Managed Hold] heparin infusion - MAR calculator by PTT  100-4,000 Units/hr intravenous Titrated    hydrALAZINE  10 mg intravenous q6h PRN    HYDROmorphone  0.25-0.5 mg intravenous q3h PRN    insulin aspart U-100  2-10 Units subcutaneous q6h INT    latanoprost  1 drop Both Eyes Nightly    metoprolol succinate XL  50 mg oral q PM    nitroglycerin  0.4 mg sublingual q5 min PRN    oxyCODONE  5 mg oral q6h PRN    pantoprazole  40 mg oral Daily    timolol  1 drop Left Eye q AM       VITAL SIGNS  Temp:  [36.7 °C (98.1 °F)-37.1 °C (98.8 °F)] 36.8 °C (98.2 °F)  Heart Rate:  [] 82  Resp:  [18-25] 18  BP: (126-179)/(63-89) 126/63    Intake/Output Summary (Last 24 hours) at 10/28/2022 1319  Last data filed at 10/28/2022 0400  Gross per 24 hour   Intake --   Output 850 ml   Net -850 ml       PHYSICAL EXAM  Constitutional:  Comfortable appearing, in no acute distress.  Facial bruising.   HEENT: Oropharynx clear.  Moist mucous membranes.  Neck: Supple.   Cardiovascular: Normal and regular S1 and S2, no murmurs.  Chest: Clear to auscultation bilaterally, no wheezing.  Abdomen: Soft, nontender, nondistended, bowel sounds are present.  No hepatosplenomegaly.  Musculoskeletal: No spinal or CVAT.  Extremities: No edema.  Neurological: Grossly nonfocal.  Oriented to person, place, and time.  Skin: Skin is warm and dry.  No  rash.  Hematologic: No petechiae, purpura, or echymoses.  Lymph Nodes: No cervical lymphadenopathy.    LAB RESULTS  CBC  Results from last 7 days   Lab Units 10/28/22  0540 10/27/22  1605 10/27/22  0633 10/26/22  2026 10/26/22  0444   WBC K/uL 14.55* 15.37* 15.25* 15.62* 16.01*   RBC M/uL 3.38* 3.54* 3.53* 3.50* 4.06*   HEMOGLOBIN g/dL 10.0* 10.6* 10.3* 10.2*  10.2* 11.8*   HEMATOCRIT % 29.7* 30.8* 31.0* 30.5*  30.5* 36.1*   MCV fL 87.9 87.0 87.8 87.1 88.9   PLATELETS K/uL 116* 105* 95* 122* 129*     Diff  Results from last 7 days   Lab Units 10/27/22  1605 10/27/22  0633 10/26/22  2026 10/26/22  0444 10/26/22  0126   NRBC % 0.0 0.0 0.0 0.0 0.0   IMM GRANULOCYTES % 0.7 1.3 0.9 0.7 0.9   NEUTROPHILS % 84.8 84.5 83.3 82.4 82.5   LYMPHOCYTES % 5.3 4.6 6.0 5.5 4.9   MONOCYTES % 8.9 9.4 9.7 11.2 11.6   EOSINOPHILS % 0.1 0.0 0.0 0.0 0.0   BASOPHILS % 0.2 0.2 0.1 0.2 0.1   IMM GRANUCOCYTES ABS K/uL 0.10* 0.20* 0.14* 0.12* 0.15*   NEUTRO ABS K/uL 13.04* 12.89* 13.01* 13.19* 13.49*   PLT MORPHOLOGY   --  Normal  --   --   --      Chemistry   Results from last 7 days   Lab Units 10/28/22  0540 10/27/22  1605 10/27/22  0633   SODIUM mEQ/L 140 138 138   POTASSIUM mEQ/L 4.0 4.1 4.0   CHLORIDE mEQ/L 106 108 107   CO2 mEQ/L 25 22 23   BUN mg/dL 41* 44* 47*   CREATININE mg/dL 1.1 1.1 1.3   CALCIUM mg/dL 8.3* 8.4* 8.2*   ALBUMIN g/dL 3.1* 3.3* 3.1*   BILIRUBIN TOTAL mg/dL 1.4* 1.3* 1.2   ALK PHOS IU/L 57 48 47   ALT IU/L 124* 159* 185*   AST IU/L 30 38 47*   GLUCOSE mg/dL 150* 134* 142*     Coag  Results from last 7 days   Lab Units 10/27/22  1605 10/26/22  0444 10/25/22  2139 10/25/22  1855   PROTIME sec 14.7* 16.5*  --  16.7*   INR  1.2 1.4  --  1.4   PTT sec 32 26 26 52*     Micro  Microbiology Results     Procedure Component Value Units Date/Time    Blood Culture Blood, Venous [[id number]]  (Normal) Collected: 10/25/22 2248    Specimen: Blood, Venous Updated: 10/28/22 0601     Culture No growth at 48 hours    Blood Culture  Blood, Venous [[id number]]  (Normal) Collected: 10/25/22 2222    Specimen: Blood, Venous Updated: 10/28/22 0601     Culture No growth at 48 hours    MRSA Screen, Nares Only Nose [[id number]]  (Normal) Collected: 10/25/22 2019    Specimen: Nasal Swab from Nose Updated: 10/26/22 0216     MRSA DNA, Nares Negative    SARS-CoV-2 (COVID-19), PCR Nasopharynx [[id number]]  (Normal) Collected: 10/24/22 0839    Specimen: Nasopharyngeal Swab from Nasopharynx Updated: 10/24/22 0925    Narrative:      The following orders were created for panel order SARS-CoV-2 (COVID-19), PCR Nasopharynx.  Procedure                               Abnormality         Status                     ---------                               -----------         ------                     SARS-COV-2 (COVID-19)/ F...[[id number]]  Normal              Final result                 Please view results for these tests on the individual orders.    SARS-COV-2 (COVID-19)/ FLU A/B, AND RSV, PCR Nasopharynx [[id number]]  (Normal) Collected: 10/24/22 0839    Specimen: Nasopharyngeal Swab from Nasopharynx Updated: 10/24/22 0925     SARS-CoV-2 (COVID-19) Negative     Influenza A Negative     Influenza B Negative     Respiratory Syncytial Virus Negative    Narrative:      Testing performed using real-time PCR for detection of COVID-19. EUA approved validation studies performed on site.         IMAGING  CT HEAD WITHOUT IV CONTRAST    Result Date: 10/25/2022  IMPRESSION: 1.  No acute intracranial hemorrhage, large territorial infarct, mass effect or midline shift is identified. 2.  Multiple right maxillary sinus and zygomatic arch fractures with an air-fluid level within the right maxillary sinus.     CT HEAD WITHOUT IV CONTRAST    Result Date: 10/24/2022  IMPRESSION: 1.  No posttraumatic intracranial abnormality. 2. Chronic changes noted under the comment.    CT FACIAL BONES WITHOUT IV CONTRAST    Result Date: 10/24/2022  IMPRESSION: Multiple right-sided facial bone fractures  as described under the comment.    CT CERVICAL SPINE WITHOUT IV CONTRAST    Result Date: 10/24/2022  IMPRESSION: 1.  No acute fractures. As clinically indicated, MRI of the cervical spine is recommended for further evaluation. 2.  Other incidental findings noted under the comment.     ULTRASOUND GUIDED VASCULAR ACCESS    Result Date: 10/26/2022  IMPRESSION:   Successful placement of a retrievable infrarenal IVC Filter. Device: Argon Option Elite IVC Filter COMMENT: PROCEDURE: 1. Ultrasound-guided access of the right common femoral vein. 2. IVC cavagram 3. IVC filter placement RADIOLOGIST:  Sarkis Gerardo MD ANESTHESIA:  Lidocaine 1% CONTRAST:  CO2 FLUORO TIME:  0.8 min REFERENCE AIR KERMA: 40 mGy MEDICATIONS:  none DESCRIPTION OF PROCEDURE:  Consent was obtained following explanation of risks and benefits of the procedure. The right groin was prepped and draped in the usual sterile fashion. The right common femoral vein was noted to be compressible and patent on gray scale ultrasound. Local anesthesia was provided. The vein was then accessed with a  21-gauge needle under ultrasound guidance, and an .018 wire was advanced centrally. An ultrasound-guided access image was saved to PACS. Exchange was made for a microsheath, .035 wire, and multi-sidehole sheath, which was positioned at the iliocaval junction. IVC cavagram was performed with CO2. Renal vein insertion sites were localized. An Argon Option Elite IVC Filter was deployed infrarenally without complication. Wires and catheters were removed and hemostasis was achieved. The patient remained stable throughout the procedure. The number of guidewires used, and their integrity, was confirmed at the end of the procedure.     ULTRASOUND GUIDED VASCULAR ACCESS    Result Date: 10/25/2022  IMPRESSION:   Successful bilateral pulmonary arteriography and catheter-directed thrombolysis. COMMENT: PROCEDURE:  1. Ultrasound guided access of the central right common femoral  vein. 2. Subselective right lower lobe pulmonary arteriography. 3. Subselective catheter directed thrombolysis of the right pulmonary arterial thrombus extending into the right lower lobe branch. 4. Subselective left lower lobe pulmonary arteriography. 5. Subselective catheter directed thrombolysis of the left pulmonary arterial thrombus extending into the left lower lobe branch. RADIOLOGIST: Sarkis Gerardo MD ANESTHESIA:  Local 1% lidocaine. FLUORO TIME:  7.7 minutes REFERENCE AIR KERMA: 44 mGy CONTRAST:  20 cc of Omnipaque 300 DESCRIPTION OF PROCEDURE:    Written informed consent was obtained following explanation of risks and benefits of the procedure. Specifically, risks of cardiac arrhythmia, pulmonary arterial injury, and intracranial hemorrhage were discussed with the patient. Additionally, significantly elevated risk of abdominal hemorrhage related to known adrenal hemorrhage was clearly discussed. The patient was placed supine on the IR procedure table. Maximal sterile barrier precautions were utilized during catheter insertion including aseptic technique, the use of cap, mask, sterile gown, sterile gloves, sterile drapes, hand hygiene, and patient cutaneous antisepsis with chlorhexidene 2%. Grayscale and color Doppler ultrasound evaluation of the right common femoral vein was performed. The right common femoral vein was patent and compressible. The right groin was prepped and draped in usual sterile fashion. Local anesthesia was administered with 1% lidocaine. Under ultrasound guidance, the right common femoral vein was accessed with a 21-gauge needle. An 018 wire was advanced centrally. Sequential exchange was made for a 5 Citizen of [place] a microcatheter sheath, 035 Ness wire, and 5 [place] vascular sheath. The sheath was secured with 0-0 silk. Utilizing an H1 catheter with Glidewire, the right main pulmonary artery was selected with successful subselection of a right lower lobe pulmonary arterial branch.  Subselective right lower lobe pulmonary arteriography was performed, confirming satisfactory position within the right lower lobe branch distal to the main thrombus. Over a J Phelan wire, exchange was made for the Darrion-Macnamara flush catheter. 10 mg of TPA was then successfully infused into the right main pulmonary artery, extending into the right lower lobe pulmonary arterial branch. Utilizing an H1 catheter with Glidewire, the left main pulmonary artery was selected with successful subselection of a left lower lobe pulmonary arterial branch. Subselective left lower lobe pulmonary arteriography was performed, confirming satisfactory position within the left lower lobe branch distal to the main thrombus. Over a J Phelan wire, exchange was made for the Darrion-Macnamara flush catheter. 10 mg of TPA was then successfully infused into the left main pulmonary artery, extending into the left lower lobe pulmonary arterial branch. Catheter and sheath were removed. Hemostasis of the right common femoral vein was achieved with manual compression.     CT ABDOMEN PELVIS WITH IV CONTRAST    Result Date: 10/24/2022  IMPRESSION: 1.  Extensive acute pulmonary arterial emboli involving the bilateral main and multiple bilateral lobar, segmental and subsegmental branches, as detailed above. Flattening of the interventricular septum with dilatation of the right ventricle suggestive of right-sided heart strain. No evidence of acute pulmonary infarct. 2.  Large, indeterminate infiltrative hypodense lesion within the right hepatic lobe measuring up to 7 cm warranting further assessment with contrast-enhanced MRI. Differential diagnosis includes metastatic disease, a primary malignancy as well as abscess. Small adjacent similar appearing indeterminate right hepatic lobe lesion. 3.  Indeterminate right adrenal mass with findings consistent with right adrenal hemorrhage. 4.  A 10 mm groundglass nodule within the right middle lobe. A follow-up  CT scan of the chest in 6-12 months is recommended as per Fleischner guidelines. 5.  Additional incidental findings including ectasia of ascending thoracic aorta, enlarged prostate gland, coronary arterial atherosclerotic vascular disease and colonic diverticulosis without evidence of diverticulitis. Findings and recommendations discussed with SUZANNA Muñoz by Dr. Thompson by telephone at 9:54 AM and 10:21 AM on 10/24/2022. Fleischner Society 2017 Guidelines for Management of Incidentally Detected Pulmonary Nodules in Adults. (Subsolid Nodules) Single ground glass: <6 mm - No routine follow-up >/= 6 mm - CT at 6-12 months to confirm persistence, then CT every 2 years until 5 years (In certain suspicious nodules < 6 mm, consider follow-up at 2 and 4 years.  If solid component(s) or growth develops, consider resection (Recommendations 3A and 4A)     IR FILTER PLACEMENT    Result Date: 10/26/2022  IMPRESSION:   Successful placement of a retrievable infrarenal IVC Filter. Device: Argon Option Elite IVC Filter COMMENT: PROCEDURE: 1. Ultrasound-guided access of the right common femoral vein. 2. IVC cavagram 3. IVC filter placement RADIOLOGIST:  Sarkis Gerardo MD ANESTHESIA:  Lidocaine 1% CONTRAST:  CO2 FLUORO TIME:  0.8 min REFERENCE AIR KERMA: 40 mGy MEDICATIONS:  none DESCRIPTION OF PROCEDURE:  Consent was obtained following explanation of risks and benefits of the procedure. The right groin was prepped and draped in the usual sterile fashion. The right common femoral vein was noted to be compressible and patent on gray scale ultrasound. Local anesthesia was provided. The vein was then accessed with a  21-gauge needle under ultrasound guidance, and an .018 wire was advanced centrally. An ultrasound-guided access image was saved to PACS. Exchange was made for a microsheath, .035 wire, and multi-sidehole sheath, which was positioned at the iliocaval junction. IVC cavagram was performed with CO2. Renal vein insertion sites  were localized. An Argon Option Elite IVC Filter was deployed infrarenally without complication. Wires and catheters were removed and hemostasis was achieved. The patient remained stable throughout the procedure. The number of guidewires used, and their integrity, was confirmed at the end of the procedure.     CT ANGIOGRAPHY CHEST PULMONARY EMBOLISM WITH IV CONTRAST    Result Date: 10/24/2022  IMPRESSION: 1.  Extensive acute pulmonary arterial emboli involving the bilateral main and multiple bilateral lobar, segmental and subsegmental branches, as detailed above. Flattening of the interventricular septum with dilatation of the right ventricle suggestive of right-sided heart strain. No evidence of acute pulmonary infarct. 2.  Large, indeterminate infiltrative hypodense lesion within the right hepatic lobe measuring up to 7 cm warranting further assessment with contrast-enhanced MRI. Differential diagnosis includes metastatic disease, a primary malignancy as well as abscess. Small adjacent similar appearing indeterminate right hepatic lobe lesion. 3.  Indeterminate right adrenal mass with findings consistent with right adrenal hemorrhage. 4.  A 10 mm groundglass nodule within the right middle lobe. A follow-up CT scan of the chest in 6-12 months is recommended as per Fleischner guidelines. 5.  Additional incidental findings including ectasia of ascending thoracic aorta, enlarged prostate gland, coronary arterial atherosclerotic vascular disease and colonic diverticulosis without evidence of diverticulitis. Findings and recommendations discussed with SUZANNA Muñoz by Dr. Thompson by telephone at 9:54 AM and 10:21 AM on 10/24/2022. Fleischner Society 2017 Guidelines for Management of Incidentally Detected Pulmonary Nodules in Adults. (Subsolid Nodules) Single ground glass: <6 mm - No routine follow-up >/= 6 mm - CT at 6-12 months to confirm persistence, then CT every 2 years until 5 years (In certain suspicious nodules < 6  mm, consider follow-up at 2 and 4 years.  If solid component(s) or growth develops, consider resection (Recommendations 3A and 4A)     IR THROMBOLYSIS    Result Date: 10/25/2022  IMPRESSION:   Successful bilateral pulmonary arteriography and catheter-directed thrombolysis. COMMENT: PROCEDURE:  1. Ultrasound guided access of the central right common femoral vein. 2. Subselective right lower lobe pulmonary arteriography. 3. Subselective catheter directed thrombolysis of the right pulmonary arterial thrombus extending into the right lower lobe branch. 4. Subselective left lower lobe pulmonary arteriography. 5. Subselective catheter directed thrombolysis of the left pulmonary arterial thrombus extending into the left lower lobe branch. RADIOLOGIST: Sarkis Gerardo MD ANESTHESIA:  Local 1% lidocaine. FLUORO TIME:  7.7 minutes REFERENCE AIR KERMA: 44 mGy CONTRAST:  20 cc of Omnipaque 300 DESCRIPTION OF PROCEDURE:    Written informed consent was obtained following explanation of risks and benefits of the procedure. Specifically, risks of cardiac arrhythmia, pulmonary arterial injury, and intracranial hemorrhage were discussed with the patient. Additionally, significantly elevated risk of abdominal hemorrhage related to known adrenal hemorrhage was clearly discussed. The patient was placed supine on the IR procedure table. Maximal sterile barrier precautions were utilized during catheter insertion including aseptic technique, the use of cap, mask, sterile gown, sterile gloves, sterile drapes, hand hygiene, and patient cutaneous antisepsis with chlorhexidene 2%. Grayscale and color Doppler ultrasound evaluation of the right common femoral vein was performed. The right common femoral vein was patent and compressible. The right groin was prepped and draped in usual sterile fashion. Local anesthesia was administered with 1% lidocaine. Under ultrasound guidance, the right common femoral vein was accessed with a 21-gauge needle.  An 018 wire was advanced centrally. Sequential exchange was made for a 5 [place] a microcatheter sheath, 035 Ness wire, and 5 [place] vascular sheath. The sheath was secured with 0-0 silk. Utilizing an H1 catheter with Glidewire, the right main pulmonary artery was selected with successful subselection of a right lower lobe pulmonary arterial branch. Subselective right lower lobe pulmonary arteriography was performed, confirming satisfactory position within the right lower lobe branch distal to the main thrombus. Over a J Phelan wire, exchange was made for the Darrion-Macnamara flush catheter. 10 mg of TPA was then successfully infused into the right main pulmonary artery, extending into the right lower lobe pulmonary arterial branch. Utilizing an H1 catheter with Glidewire, the left main pulmonary artery was selected with successful subselection of a left lower lobe pulmonary arterial branch. Subselective left lower lobe pulmonary arteriography was performed, confirming satisfactory position within the left lower lobe branch distal to the main thrombus. Over a J Phelan wire, exchange was made for the Darrion-Macnamara flush catheter. 10 mg of TPA was then successfully infused into the left main pulmonary artery, extending into the left lower lobe pulmonary arterial branch. Catheter and sheath were removed. Hemostasis of the right common femoral vein was achieved with manual compression.     X-RAY CHEST 1 VIEW    Result Date: 10/26/2022  IMPRESSION: No acute cardiopulmonary abnormality seen.. COMMENT:    A single AP view of the chest was performed. Comparison: AP chest x-ray from 10/24/2022. The heart size and pulmonary vasculature remain within normal limits. The lung fields appear clear, and no pleural effusions are seen. No hilar abnormality is identified. There is mild tortuosity of the descending thoracic aorta. An electronic device projects over the left lung base projecting over the left anterior left fourth rib,  likely a loop recorder. When compared to the prior study, there has been no significant interval change.     X-RAY CHEST 1 VIEW    Result Date: 10/24/2022  IMPRESSION: No evidence of acute pulmonary disease.     Ultrasound venous leg bilateral    Result Date: 10/25/2022  IMPRESSION: Examination positive for deep vein thrombosis bilaterally as described, with greater than right.     CT CHEST ABDOMEN PELVIS WITHOUT IV CONTRAST    Result Date: 10/25/2022  IMPRESSION: 1.  Findings concerning for a large right-sided retroperitoneal hemorrhage including the right kidney and right adrenal gland. A small to moderate amount of pelvic ascites is also noted which appears mildly hyperdense and may reflect a component of hemoperitoneum. 2.  Additional chronic and incidental findings as described above. Finding:    Unexpected significant hemorrhage (chest, abdomen, pelvis)   Acuity: Critical  Status:  CLOSED Critical read back was performed and results were read back by Dr. Simms, on 10/25/2022 8:10 PM       ASSESSMENT & PLAN    76 yo male presenting with syncope found to have submassive pulmonary embolism and bilateral DVT, liver mass, adrenal hemorrhage developing retroperitoneal hemorrhage following catheter directed thrombolysis post IVC filter placement 10/25.  10mm ground glass lung nodule on imaging as well.      Pulmonary embolism/DVT: ideally would anticoagulate but agree with holding in setting of retroperitoneal bleed, now post IVC filter placement. Given liver mass, concerning for hypercoagulability of malignancy.  PTT normal on presentation and now off heparin, PT mildly elevated now at 16.5.  Discussed with Dr. Hays reasonable to make sure no surprises on MRI abdomen and assuming no obvious contraindications, resume anticoagulation with heparin after, no bolus, and follow.       Liver mass: tumor markers with normal AFP, , CEA.  Hepatitis panels negative.  Scheduled for MRI of abdomen, GI following,  ideally would biopsy. Ordered biopsy, discussed with IR, preference is to wait until MRI abdomen done.       Anemia: hgb 15.3 on presentation but down to 10.6 10/25 in setting of bleed, normocytic, normal RDW.  Did get 2 units PRBC 10/25.  Requested iron studies/LDH.  Ferritin high at 612, iron sat low at 10% c/w inflammation.  LDH high at 263, likely also non specific marker of inflammation but given bili also up a bit at 1.4, could be hemolysis, ordered retic/haptoglobin.  Hgb more or less stable at 10, continue to follow.       Thrombocytopenia: plts dropping since admit, down to 95K, could be due to bleed.  Ordered DIC panel, fibrinogen ok at 473.  Plts improved to 116K.  Remains off heparin.       All of the above has been reviewed with the patient, who is in agreement with the plan of action.  I appreciate the opportunity to participate in the care of this patient, who I will follow with you.      Dr. Holt covering over weekend if needed, I will return Monday.         Octavia Soto MD

## 2022-10-28 NOTE — PROGRESS NOTES
Interventional Cardiology Progress Note    No cardiac complaints.  Sitting in chair and eating breakfast when I saw him.    REVIEW OF SYSTEMS : No other significant changes to review of systems over the past 24 hours.    Current Facility-Administered Medications   Medication Dose Route Frequency Provider Last Rate Last Admin    acetaminophen (TYLENOL) tablet 650 mg  650 mg oral q4h PRN Cheryl Bradford DO        albuterol nebulizer solution 5 mg  5 mg nebulization q6h PRN Cheryl Bradford DO        amLODIPine (NORVASC) tablet 5 mg  5 mg oral q PM Piedad Romeo PA C   5 mg at 10/27/22 1805    glucose chewable tablet 16-32 g of dextrose  16-32 g of dextrose oral PRN Cheryl Bradford DO        Or    dextrose 40 % oral gel 15-30 g of dextrose  15-30 g of dextrose oral PRN Cheryl Bradford DO        Or    glucagon (GLUCAGEN) injection 1 mg  1 mg intramuscular PRN Cheryl Bradford DO        Or    dextrose 50 % in water (D50) injection 12.5 g  25 mL intravenous PRN Cheryl Bradford DO        [Provider Managed Hold] heparin (porcine) bolus from bag 3,400-6,850 Units  40-80 Units/kg intravenous q6h PRN Cheryl Bradford DO        [Provider Managed Hold] heparin infusion in D5W 100 units/mL  100-4,000 Units/hr intravenous Titrated Cheryl Bradford DO   Stopped at 10/25/22 1850    hydrALAZINE (APRESOLINE) injection 10 mg  10 mg intravenous q6h PRN Andria Simms MD   10 mg at 10/27/22 1401    HYDROmorphone (DILAUDID) injection 0.25-0.5 mg  0.25-0.5 mg intravenous q3h PRN Mike Franco PA C   0.5 mg at 10/25/22 2325    insulin aspart U-100 (NovoLOG) pen 2-10 Units  2-10 Units subcutaneous q6h INT Andria Simms MD   4 Units at 10/28/22 1230    latanoprost (XALATAN) 0.005 % ophthalmic solution 1 drop  1 drop Both Eyes Nightly Cheryl Bradford DO   1 drop at 10/28/22 0034    metoprolol succinate XL (TOPROL-XL) 24 hr ER tablet 50 mg  50 mg oral q PM Tameka Werner CRNP   50 mg at 10/27/22 1003    nitroglycerin (NITROSTAT) SL tablet 0.4 mg   0.4 mg sublingual q5 min PRN Cheryl Bradford,         oxyCODONE (ROXICODONE) immediate release tablet 5 mg  5 mg oral q6h PRN Arturo Parker MD   5 mg at 10/25/22 1403    pantoprazole (PROTONIX) tablet,delayed release (DR/EC) 40 mg  40 mg oral Daily Cheryl Bradford, DO   40 mg at 10/28/22 0927    timolol (TIMOPTIC) 0.5 % ophthalmic solution 1 drop  1 drop Left Eye q AM Cheryl Bradford DO   1 drop at 10/28/22 0930          Objective   Labs  Creatinine 1.1    Telemetry  I have personally reviewed the telemetry tracings.  Sinus rhythm with a single episode of SVT rate 130 bpm earlier today.  This was a regular rhythm and did not appear to be atrial fibrillation or atrial flutter.    Physical Exam  Temp:  [36.7 °C (98.1 °F)-37.1 °C (98.8 °F)] 36.8 °C (98.2 °F)  Heart Rate:  [] 82  Resp:  [18-27] 18  BP: (126-179)/(63-95) 126/63    Intake/Output Summary (Last 24 hours) at 10/28/2022 1259  Last data filed at 10/28/2022 0400  Gross per 24 hour   Intake --   Output 850 ml   Net -850 ml       General: AAOx3, NAD.  Lungs: Rhonchorous throughout.  Heart: RRR. No R/M/G.  Abdomen: Soft. NT/ND. BS positive.  Extremities: Venous stasis changes.  Neuro: Moves all 4 extremities. Sensation intact.  Skin: Scattered ecchymoses.    ASSESSMENT / PLAN:    1.  Submassive pulmonary emboli: Hemodynamically improved.  Underwent catheter directed thrombolysis bilaterally with postprocedural retroperitoneal bleed.  Not currently on therapeutic anticoagulation and will defer to pulmonology regarding timing of restart.    2.  Abnormal troponin: Consistent with acute pulmonary embolism.  Will defer any further ischemic testing until acute issues have resolved, likely on an outpatient basis.    3.  Right heart strain: Seen on echo due to pulmonary embolism.  Stable over 2 echoes.  Not hemodynamically unstable at this time.  Expect this will probably improve over the next few weeks.  Can have a follow-up echocardiogram on an outpatient basis.    We will  sign off at this time.  Please do not hesitate reconsult with any cardiac questions or concerns while he is here.  Can continue telemetry while he is here.  Can follow-up with his primary cardiologist, Dr. Abe Carlson, at the [facility] division.

## 2022-10-28 NOTE — PLAN OF CARE
Problem: Adult Inpatient Plan of Care  Goal: Plan of Care Review  Outcome: Progressing  Flowsheets (Taken 10/28/2022 1321)  Progress: improving  Plan of Care Reviewed With: patient  Outcome Summary: OT eval complete. [facility] 15

## 2022-10-28 NOTE — HOSPITAL COURSE
Tim is a 77 y.o. male admitted on 10/24/2022 with Fall, initial encounter [W19.XXXA]  Chin laceration, initial encounter [S01.81XA]  Acute saddle pulmonary embolism with acute cor pulmonale (CMS/HCC) [I26.02]  Syncope, unspecified syncope type [R55]. Principal problem is Pulmonary embolism with acute cor pulmonale (CMS/HCC).    Past Medical History  Tim has a past medical history of Dyslipidemia, GERD (gastroesophageal reflux disease), Glaucoma, Heart murmur, HTN (hypertension), Implantable loop recorder present, Pericarditis, and Sleep apnea.    History of Present Illness   Tim Humphries is a 77 y.o. male with a past medical history of hypertension, GERD, FLAVIA, hyperlipidemia who presents with syncope, fall.  Wife is at bedside who assisted with history.  For 1 is only patient had COVID about 2-3 months ago which he fully recovered without event.  After he recovered he went to get his COVID-vaccine about 2 weeks ago.  For the last 4 to 5 days patient has noted increased dyspnea with exertion and shortness of breath.  Today while coming down stair he passed out and fell down half a flight of stair.  Wife was close by it and found him down on the floor unresponsive.  Wife report patient was unconscious for about 10 minutes.  When EMS arrived patient was found to have grunting respiration with O2 sat at 86% on room air.  Was then brought to [place] emergency room.  Patient also report he has some left leg swelling approximately 3 to 4 weeks ago which resolved by itself. In ER he was found to have extensive PE with right heart strain.  He was found to be hypoxic and put on 4 L oxygen.  His blood pressure remained stable currently.  Mentation intact.  Patient CAT scan also revealed multiple right displaced facial fracture as well as adrenal hemorrhage.    10/26 IVC Filter placement

## 2022-10-28 NOTE — PROGRESS NOTES
Still awaiting MRI to further delineate liver lesion    Dr. Frankel takes over GI service 10/29/2022

## 2022-10-28 NOTE — PROGRESS NOTES
Patient:  Tim Humphries  Location:  [facility]  MRN:  [id number]  Today's date:  10/28/2022    Pt resting in chair at end of session with chair alarm on, incontinence pad in place and call bell in reach. RN notified      Tim is a 77 y.o. male admitted on 10/24/2022 with Fall, initial encounter [W19.XXXA]  Chin laceration, initial encounter [S01.81XA]  Acute saddle pulmonary embolism with acute cor pulmonale (CMS/HCC) [I26.02]  Syncope, unspecified syncope type [R55]. Principal problem is Pulmonary embolism with acute cor pulmonale (CMS/HCC).    Past Medical History  Tim has a past medical history of Dyslipidemia, GERD (gastroesophageal reflux disease), Glaucoma, Heart murmur, HTN (hypertension), Implantable loop recorder present, Pericarditis, and Sleep apnea.    History of Present Illness   Tim Humphries is a 77 y.o. male with a past medical history of hypertension, GERD, FLAVIA, hyperlipidemia who presents with syncope, fall.  Wife is at bedside who assisted with history.  For 1 is only patient had COVID about 2-3 months ago which he fully recovered without event.  After he recovered he went to get his COVID-vaccine about 2 weeks ago.  For the last 4 to 5 days patient has noted increased dyspnea with exertion and shortness of breath.  Today while coming down stair he passed out and fell down half a flight of stair.  Wife was close by it and found him down on the floor unresponsive.  Wife report patient was unconscious for about 10 minutes.  When EMS arrived patient was found to have grunting respiration with O2 sat at 86% on room air.  Was then brought to [place] emergency room.  Patient also report he has some left leg swelling approximately 3 to 4 weeks ago which resolved by itself. In ER he was found to have extensive PE with right heart strain.  He was found to be hypoxic and put on 4 L oxygen.  His blood pressure remained stable currently.  Mentation intact.   "Patient CAT scan also revealed multiple right displaced facial fracture as well as adrenal hemorrhage.    10/26 IVC Filter placement      OT Vitals    Date/Time Pulse SpO2 BP MAP Who   10/28/22 1137 88 93 % 142/67 97 mmHg ECM   10/28/22 1147 86 95 % 155/71 102 mmHg ECM          Prior Living Environment    Flowsheet Row Most Recent Value   Current Living Arrangements home   Living Environment Comment 2 story house with second bedroom/bathroom walk in shower - reports recently added first floor bed/bath but does not have furniture yet        Prior Level of Function    Flowsheet Row Most Recent Value   Ambulation independent   Transferring independent   Toileting independent   Bathing independent   Dressing independent   Prior Level of Function Comment indep with no devices for ADL, avid \"walker jogger\"   Assistive Device Currently Used at Home none        Occupational Profile    Flowsheet Row Most Recent Value   Reason for Services/Referral d/c needs   Occupational History/Life Experiences 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 \"walker jogger\" ,  doctorate in engineering   Performance Patterns indep w/ ADL no devices   Patient Goals dc home           OT Evaluation and Treatment - 10/28/22 1127        OT Time Calculation    Start Time 1129     Stop Time 1148     Time Calculation (min) 19 min        Session Details    Document Type initial evaluation     Mode of Treatment occupational therapy        General Information    Patient Profile Reviewed yes     Onset of Illness/Injury or Date of Surgery 10/24/22     Referring Physician [facility]     Patient/Family/Caregiver Comments/Observations RN cleared for OT     General Observations of Patient Pt resting in bed, on 4LNC, wife present     Existing Precautions/Restrictions fall     Limitations/Impairments safety/cognitive        Cognition/Psychosocial    Affect/Mental Status (Cognition) low arousal/lethargic     Orientation Status (Cognition) oriented to;person;verbal cues/prompts needed for " orientation;time;place     Follows Commands (Cognition) follows one-step commands;75-90% accuracy;delayed response/completion;increased processing time needed;repetition of directions required     Cognitive Function attention deficit;executive function deficit;safety deficit     Attention Deficit (Cognition) moderate deficit;focused/sustained attention;alternating attention     Comment, Attention lethargic and when not stimulated returns to sleep however able to maintain arousal for transfer     Executive Function Deficit (Cognition) moderate deficit;initiation;information processing;insight/awareness of deficits;self-monitoring/self-correction     Safety Deficit (Cognition) minimal deficit;awareness of need for assistance;insight into deficits/self-awareness     Comment, Cognition AOx3 however low arousal and lethargic with intermittent arousal t/o session, once in chair returns quickly back to sleeping     Cognitive Interventions/Strategies occupation/activity based interventions        Hearing Assessment    Hearing Status WFL        Vision Assessment/Intervention    Visual Impairment/Limitations WFL;corrective lenses for reading        Sensory Assessment    Sensory Assessment (Somatosensory) UE sensation intact        Range of Motion (ROM)    Range of Motion bilateral upper extremities;ROM is WFL        Strength (Manual Muscle Testing)    Strength (Manual Muscle Testing) strength is WFL;bilateral upper extremities        Bed Mobility    Post, Supine to Sit minimum assist (75% or more patient effort);1 person assist     Assistive Device bed rails;head of bed elevated        Bed to Chair Transfer    Post, Bed to Chair moderate assist (50-74% patient effort);2 person assist;verbal cues     Verbal Cues hand placement     Assistive Device --   b/l HHA    Comment SPT bed>chair once in chair returns to lethargic and low arousal hindering further mob        Sit to Stand Transfer    Post, Sit to  Stand Transfer minimum assist (75% or more patient effort);2 person assist     Verbal Cues hand placement;proper use of assistive device     Assistive Device --   b/l HHA    Comment from bed x2, first trial with posterior LOB returns to bed abruptly        Stand to Sit Transfer    [place], Stand to Sit Transfer minimum assist (75% or more patient effort);2 person assist;verbal cues     Verbal Cues hand placement;technique;proper use of assistive device     Assistive Device --   b/l HHA    Comment to chair x1 bed x1        Safety Issues, Functional Mobility    Safety Issues Affecting Function (Mobility) sequencing abilities;insight into deficits/self-awareness   level of arousal    Impairments Affecting Function (Mobility) balance;endurance/activity tolerance;pain;range of motion (ROM);strength     Comment, Safety Issues/Impairments (Mobility) noted to return to sleeping if not stimulated, falls asleep inbetween questions ; improved JOSSELINE at EOB however once in chair returns to lethargic        Balance    Balance Assessment sit to stand dynamic balance     Static Sitting Balance WFL;sitting, edge of bed     Dynamic Sitting Balance mild impairment;sitting, edge of bed     Sit to Stand Dynamic Balance moderate impairment;supported     Static Standing Balance moderate impairment;supported     Dynamic Standing Balance moderate impairment;supported     Comment, Balance MOD A x2 w/ HHA        Motor Skills    Functional Endurance fair (-) lethargic        Upper Body Dressing    Tasks hospital gown     Self-Performance threads left arm, shirt;threads right arm, shirt     [place] Assistance pulls shirt over head/around back     [place] minimum assist (75% or more patient effort)     Position sitting up in bed     Adaptive Equipment none        Lower Body Dressing    Tasks socks     Self-Performance dons/doffs right sock;dons/doffs left sock     [place] Assistance dons/doffs left sock;dons/doffs right sock      [place] maximum assist (25-49% patient effort)     Position sitting up in bed     Comment limited distal reach reqs assist to thread sock onto foot and then hike to waist        Grooming    Self-Performance washes, rinses and dries face     [place] set up;supervision     Position supported sitting     Setup Assistance obtain supplies     Adaptive Equipment none        BADL Safety/Performance    Impairments, BADL Safety/Performance balance;endurance/activity tolerance;cognition;pain;range of motion;strength     Cognitive Impairments, BADL Safety/Performance awareness, need for assistance;insight into deficits/self-awareness;safety precaution awareness     Skilled BADL Treatment/Intervention BADL process/adaptation training     Progress in BADL Status level of supervision required        ADL Interventions    Self-Care (BADL) Promotion set-up provided;equipment or device utilized;cues for sequencing provided        Coping    Observed Emotional State cooperative;calm     Verbalized Emotional State acceptance     Trust Relationship/Rapport care explained        AM-PAC (TM) - ADL (Current Function)    Putting on and taking off regular lower body clothing? 2 - A Lot     Bathing? 2 - A Lot     Toileting? 2 - A Lot     Putting on/taking off regular upper body clothing? 3 - A Little     How much help for taking care of personal grooming? 3 - A Little     Eating meals? 3 - A Little     AM-PAC (TM) ADL Score 15        Assessment/Plan (OT)    Daily Outcome Statement OT eval complete. AMPAC 15. Pt at this time requires MOD A x2 for SPT to chair. Pt requires MIN A for bed mobility. Pt is MIN A x2 for STS with posterior LOB with inital stance. Pt requires MAX A x2 for LBD. Pt is MIN A for UBD. Pt would benefit from cont OT to increase indep w/ ADL and transfer     Rehab Potential fair, will monitor progress closely     Therapy Frequency 3-5 times/wk     Planned Therapy Interventions occupation/activity based  interventions;ROM/therapeutic exercise;transfer/mobility retraining;adaptive equipment training;BADL retraining               OT Assessment/Plan    Flowsheet Row Most Recent Value   OT Recommended Discharge Disposition skilled nursing facility at 10/28/2022 1127   Anticipated Equipment Needs At Discharge (OT) none  [TBD rehab] at 10/28/2022 1127                    Education Documentation  Treatment Plan, taught by Malu Akers OT at 10/28/2022  1:29 PM.  Learner: Significant Other, Patient  Readiness: Acceptance  Method: Explanation  Response: Verbalizes Understanding  Comment: OT POC, ADL safety, SPT, role of rehab          OT Goals    Flowsheet Row Most Recent Value   Bed Mobility Goal 1    Activity/Assistive Device bed mobility activities, all at 10/28/2022 1127   [place] supervision required at 10/28/2022 1127   Time Frame by discharge at 10/28/2022 1127   Progress/Outcome goal ongoing at 10/28/2022 1127   Transfer Goal 1    Activity/Assistive Device sit-to-stand/stand-to-sit, bed-to-chair/chair-to-bed, toilet at 10/28/2022 1127   [place] supervision required at 10/28/2022 1127   Time Frame by discharge at 10/28/2022 1127   Progress/Outcome goal ongoing at 10/28/2022 1127   Dressing Goal 1    Activity/Adaptive Equipment dressing skills, all at 10/28/2022 1127   [place] supervision required at 10/28/2022 1127   Time Frame by discharge at 10/28/2022 1127   Progress/Outcome goal ongoing at 10/28/2022 1127   Toileting Goal 1    Activity/Assistive Device toileting skills, all at 10/28/2022 1127   [place] supervision required at 10/28/2022 1127   Time Frame by discharge at 10/28/2022 1127   Progress/Outcome goal ongoing at 10/28/2022 1127

## 2022-10-29 LAB
ALBUMIN SERPL-MCNC: 3 G/DL (ref 3.4–5)
ALP SERPL-CCNC: 58 IU/L (ref 35–126)
ALT SERPL-CCNC: 92 IU/L (ref 16–63)
ANION GAP SERPL CALC-SCNC: 7 MEQ/L (ref 3–15)
APTT PPP: 31 SEC (ref 23–35)
APTT PPP: 49 SEC (ref 23–35)
AST SERPL-CCNC: 23 IU/L (ref 15–41)
BASOPHILS # BLD: 0.03 K/UL (ref 0.01–0.1)
BASOPHILS NFR BLD: 0.2 %
BILIRUB DIRECT SERPL-MCNC: 0.4 MG/DL
BILIRUB SERPL-MCNC: 1.5 MG/DL (ref 0.3–1.2)
BUN SERPL-MCNC: 40 MG/DL (ref 8–20)
CALCIUM SERPL-MCNC: 8 MG/DL (ref 8.9–10.3)
CHLORIDE SERPL-SCNC: 108 MEQ/L (ref 98–109)
CO2 SERPL-SCNC: 24 MEQ/L (ref 22–32)
CREAT SERPL-MCNC: 1 MG/DL (ref 0.8–1.3)
DIFFERENTIAL METHOD BLD: ABNORMAL
EOSINOPHIL # BLD: 0.13 K/UL (ref 0.04–0.54)
EOSINOPHIL NFR BLD: 1 %
ERYTHROCYTE [DISTWIDTH] IN BLOOD BY AUTOMATED COUNT: 13.9 % (ref 11.6–14.4)
ERYTHROCYTE [DISTWIDTH] IN BLOOD BY AUTOMATED COUNT: 13.9 % (ref 11.6–14.4)
GFR SERPL CREATININE-BSD FRML MDRD: >60 ML/MIN/1.73M*2
GLUCOSE BLD-MCNC: 127 MG/DL (ref 70–99)
GLUCOSE BLD-MCNC: 147 MG/DL (ref 70–99)
GLUCOSE BLD-MCNC: 147 MG/DL (ref 70–99)
GLUCOSE BLD-MCNC: 178 MG/DL (ref 70–99)
GLUCOSE BLD-MCNC: 192 MG/DL (ref 70–99)
GLUCOSE SERPL-MCNC: 135 MG/DL (ref 70–99)
HCT VFR BLDCO AUTO: 29.3 % (ref 40.1–51)
HCT VFR BLDCO AUTO: 29.4 % (ref 40.1–51)
HGB BLD-MCNC: 9.6 G/DL (ref 13.7–17.5)
HGB BLD-MCNC: 9.7 G/DL (ref 13.7–17.5)
IMM GRANULOCYTES # BLD AUTO: 0.09 K/UL (ref 0–0.08)
IMM GRANULOCYTES NFR BLD AUTO: 0.7 %
LYMPHOCYTES # BLD: 0.74 K/UL (ref 1.2–3.5)
LYMPHOCYTES NFR BLD: 5.8 %
MCH RBC QN AUTO: 29.4 PG (ref 28–33.2)
MCH RBC QN AUTO: 29.5 PG (ref 28–33.2)
MCHC RBC AUTO-ENTMCNC: 32.7 G/DL (ref 32.2–36.5)
MCHC RBC AUTO-ENTMCNC: 33.1 G/DL (ref 32.2–36.5)
MCV RBC AUTO: 89.1 FL (ref 83–98)
MCV RBC AUTO: 90.2 FL (ref 83–98)
MONOCYTES # BLD: 1.2 K/UL (ref 0.3–1)
MONOCYTES NFR BLD: 9.4 %
NEUTROPHILS # BLD: 10.56 K/UL (ref 1.7–7)
NEUTS SEG NFR BLD: 82.9 %
NRBC BLD-RTO: 0 %
PDW BLD AUTO: 9.2 FL (ref 9.4–12.4)
PDW BLD AUTO: 9.7 FL (ref 9.4–12.4)
PLATELET # BLD AUTO: 136 K/UL (ref 150–350)
PLATELET # BLD AUTO: 156 K/UL (ref 150–350)
POCT TEST: ABNORMAL
POTASSIUM SERPL-SCNC: 4.2 MEQ/L (ref 3.6–5.1)
PROT SERPL-MCNC: 5.5 G/DL (ref 6–8.2)
RBC # BLD AUTO: 3.26 M/UL (ref 4.5–5.8)
RBC # BLD AUTO: 3.29 M/UL (ref 4.5–5.8)
SODIUM SERPL-SCNC: 139 MEQ/L (ref 136–144)
WBC # BLD AUTO: 11.85 K/UL (ref 3.8–10.5)
WBC # BLD AUTO: 12.75 K/UL (ref 3.8–10.5)

## 2022-10-29 PROCEDURE — 85730 THROMBOPLASTIN TIME PARTIAL: CPT | Performed by: INTERNAL MEDICINE

## 2022-10-29 PROCEDURE — 63700000 HC SELF-ADMINISTRABLE DRUG

## 2022-10-29 PROCEDURE — 85027 COMPLETE CBC AUTOMATED: CPT | Performed by: INTERNAL MEDICINE

## 2022-10-29 PROCEDURE — 63700000 HC SELF-ADMINISTRABLE DRUG: Performed by: INTERNAL MEDICINE

## 2022-10-29 PROCEDURE — 20600000 HC ROOM AND CARE INTERMEDIATE/TELEMETRY

## 2022-10-29 PROCEDURE — 82040 ASSAY OF SERUM ALBUMIN: CPT | Performed by: INTERNAL MEDICINE

## 2022-10-29 PROCEDURE — 80048 BASIC METABOLIC PNL TOTAL CA: CPT | Performed by: HOSPITALIST

## 2022-10-29 PROCEDURE — 85730 THROMBOPLASTIN TIME PARTIAL: CPT | Performed by: HOSPITALIST

## 2022-10-29 PROCEDURE — 99233 SBSQ HOSP IP/OBS HIGH 50: CPT | Performed by: INTERNAL MEDICINE

## 2022-10-29 PROCEDURE — 63600000 HC DRUGS/DETAIL CODE: Performed by: INTERNAL MEDICINE

## 2022-10-29 PROCEDURE — 36415 COLL VENOUS BLD VENIPUNCTURE: CPT | Performed by: HOSPITALIST

## 2022-10-29 PROCEDURE — 85025 COMPLETE CBC W/AUTO DIFF WBC: CPT | Performed by: HOSPITALIST

## 2022-10-29 PROCEDURE — 63700000 HC SELF-ADMINISTRABLE DRUG: Performed by: HOSPITALIST

## 2022-10-29 RX ORDER — HEPARIN SODIUM 10000 [USP'U]/100ML
100-4000 INJECTION, SOLUTION INTRAVENOUS
Status: DISCONTINUED | OUTPATIENT
Start: 2022-10-29 | End: 2022-11-03

## 2022-10-29 RX ORDER — AMOXICILLIN 250 MG
2 CAPSULE ORAL DAILY
Status: DISCONTINUED | OUTPATIENT
Start: 2022-10-29 | End: 2022-11-06 | Stop reason: HOSPADM

## 2022-10-29 RX ORDER — POLYETHYLENE GLYCOL 3350 17 G/17G
17 POWDER, FOR SOLUTION ORAL DAILY
Status: DISCONTINUED | OUTPATIENT
Start: 2022-10-29 | End: 2022-11-06 | Stop reason: HOSPADM

## 2022-10-29 RX ADMIN — PANTOPRAZOLE SODIUM 40 MG: 40 TABLET, DELAYED RELEASE ORAL at 09:48

## 2022-10-29 RX ADMIN — SENNOSIDES AND DOCUSATE SODIUM 2 TABLET: 50; 8.6 TABLET ORAL at 09:48

## 2022-10-29 RX ADMIN — LATANOPROST 1 DROP: 50 SOLUTION OPHTHALMIC at 22:07

## 2022-10-29 RX ADMIN — TIMOLOL MALEATE 1 DROP: 5 SOLUTION/ DROPS OPHTHALMIC at 09:48

## 2022-10-29 RX ADMIN — HEPARIN SODIUM AND DEXTROSE 1500 UNITS/HR: 10000; 5 INJECTION INTRAVENOUS at 11:16

## 2022-10-29 RX ADMIN — HEPARIN SODIUM AND DEXTROSE 1650 UNITS/HR: 10000; 5 INJECTION INTRAVENOUS at 22:10

## 2022-10-29 RX ADMIN — POLYETHYLENE GLYCOL 3350 17 G: 17 POWDER, FOR SOLUTION ORAL at 09:48

## 2022-10-29 RX ADMIN — METOPROLOL SUCCINATE 50 MG: 50 TABLET, FILM COATED, EXTENDED RELEASE ORAL at 18:13

## 2022-10-29 RX ADMIN — HEPARIN SODIUM AND DEXTROSE 1650 UNITS/HR: 10000; 5 INJECTION INTRAVENOUS at 18:15

## 2022-10-29 RX ADMIN — AMLODIPINE BESYLATE 5 MG: 5 TABLET ORAL at 18:13

## 2022-10-29 RX ADMIN — INSULIN ASPART 2 UNITS: 100 INJECTION, SOLUTION INTRAVENOUS; SUBCUTANEOUS at 22:06

## 2022-10-29 NOTE — PROGRESS NOTES
Hospital Medicine Service -  Daily Progress Note       SUBJECTIVE   Interval History: Seen this am.  Denies SOB sitting in bed. No c/o.  Notes ongoing L leg swelling prior to admit.  Discussed risks of resumption of Heparin and potentially worsening his hemorrhages - he is agreeable to trial.     OBJECTIVE      Vital signs in last 24 hours:  Temp:  [36.6 °C (97.9 °F)-36.9 °C (98.4 °F)] 36.9 °C (98.4 °F)  Heart Rate:  [] 88  Resp:  [18-20] 18  BP: (131-156)/(62-89) 139/78    Intake/Output Summary (Last 24 hours) at 10/29/2022 1415  Last data filed at 10/29/2022 0400  Gross per 24 hour   Intake 240 ml   Output 750 ml   Net -510 ml       PHYSICAL EXAMINATION      Physical Exam  Vitals and nursing note reviewed.   Constitutional:       Appearance: Normal appearance.   HENT:      Head:      Comments: R facial asymmetry, hematoma  Cardiovascular:      Rate and Rhythm: Normal rate and regular rhythm.      Pulses: Normal pulses.      Heart sounds: Normal heart sounds. No murmur heard.    No friction rub. No gallop.   Pulmonary:      Effort: Pulmonary effort is normal. No respiratory distress.      Breath sounds: Normal breath sounds. No stridor. No wheezing or rhonchi.   Abdominal:      General: Abdomen is flat. Bowel sounds are normal.      Palpations: Abdomen is soft.   Musculoskeletal:      Right lower leg: No edema.      Left lower leg: No edema.   Neurological:      General: No focal deficit present.      Mental Status: He is alert and oriented to person, place, and time.   Psychiatric:         Mood and Affect: Mood normal.         Behavior: Behavior normal.         Thought Content: Thought content normal.         Judgment: Judgment normal.           LABS / IMAGING / TELE      Labs  Lab Results   Component Value Date    WBC 12.75 (H) 10/29/2022    HGB 9.7 (L) 10/29/2022    HCT 29.3 (L) 10/29/2022    MCV 89.1 10/29/2022     (L) 10/29/2022     Lab Results   Component Value Date    GLUCOSE 135 (H)  10/29/2022    CALCIUM 8.0 (L) 10/29/2022     10/29/2022    K 4.2 10/29/2022    CO2 24 10/29/2022     10/29/2022    BUN 40 (H) 10/29/2022    CREATININE 1.0 10/29/2022     Lab Results   Component Value Date    ALT 92 (H) 10/29/2022    AST 23 10/29/2022    ALKPHOS 58 10/29/2022    BILITOT 1.5 (H) 10/29/2022     Lab Results   Component Value Date    INR 1.2 10/27/2022    INR 1.4 10/26/2022    INR 1.4 10/25/2022       Imaging  CT HEAD WITHOUT IV CONTRAST    Result Date: 10/25/2022  IMPRESSION: 1.  No acute intracranial hemorrhage, large territorial infarct, mass effect or midline shift is identified. 2.  Multiple right maxillary sinus and zygomatic arch fractures with an air-fluid level within the right maxillary sinus.     CT HEAD WITHOUT IV CONTRAST    Result Date: 10/24/2022  IMPRESSION: 1.  No posttraumatic intracranial abnormality. 2. Chronic changes noted under the comment.    CT FACIAL BONES WITHOUT IV CONTRAST    Result Date: 10/24/2022  IMPRESSION: Multiple right-sided facial bone fractures as described under the comment.    CT CERVICAL SPINE WITHOUT IV CONTRAST    Result Date: 10/24/2022  IMPRESSION: 1.  No acute fractures. As clinically indicated, MRI of the cervical spine is recommended for further evaluation. 2.  Other incidental findings noted under the comment.     MRI ABDOMEN WITH AND WITHOUT CONTRAST    Result Date: 10/29/2022  IMPRESSION: 1. Right hepatic lobe mass on recent CT is most compatible with evolving hemorrhage/infarct centered in segment 7. Additional hemorrhagic/infarcted focus in central segment 5/8, stable in retrospect. Additional benign liver cysts, largest in the left lobe measuring up to 3.9 cm and containing blood products. 2. Similar appearance of right perinephric and right retroperitoneal hematomas with hemorrhagic right renal cyst. 3. Similar right adrenal hemorrhage with developing hemorrhage into the left adrenal gland. 4. Small right pleural effusion with findings  suspicious for a component of hemothorax. This could be confirmed with thoracentesis, if necessary. 5. No findings on subtraction imaging to suggest papo active bleeding. Overall, there are no findings suspicious for malignancy; specifically, no suspicious hepatic observations. Given motion degradation on extensive findings, follow-up contrast-enhanced MRI is recommended to assess for resolution of findings and exclude underlying hemorrhagic neoplasm.    ULTRASOUND GUIDED VASCULAR ACCESS    Result Date: 10/26/2022  IMPRESSION:   Successful placement of a retrievable infrarenal IVC Filter. Device: Argon Option Elite IVC Filter COMMENT: PROCEDURE: 1. Ultrasound-guided access of the right common femoral vein. 2. IVC cavagram 3. IVC filter placement RADIOLOGIST:  Sarkis Gerardo MD ANESTHESIA:  Lidocaine 1% CONTRAST:  CO2 FLUORO TIME:  0.8 min REFERENCE AIR KERMA: 40 mGy MEDICATIONS:  none DESCRIPTION OF PROCEDURE:  Consent was obtained following explanation of risks and benefits of the procedure. The right groin was prepped and draped in the usual sterile fashion. The right common femoral vein was noted to be compressible and patent on gray scale ultrasound. Local anesthesia was provided. The vein was then accessed with a  21-gauge needle under ultrasound guidance, and an .018 wire was advanced centrally. An ultrasound-guided access image was saved to PACS. Exchange was made for a microsheath, .035 wire, and multi-sidehole sheath, which was positioned at the iliocaval junction. IVC cavagram was performed with CO2. Renal vein insertion sites were localized. An Argon Option Elite IVC Filter was deployed infrarenally without complication. Wires and catheters were removed and hemostasis was achieved. The patient remained stable throughout the procedure. The number of guidewires used, and their integrity, was confirmed at the end of the procedure.     ULTRASOUND GUIDED VASCULAR ACCESS    Result Date:  10/25/2022  IMPRESSION:   Successful bilateral pulmonary arteriography and catheter-directed thrombolysis. COMMENT: PROCEDURE:  1. Ultrasound guided access of the central right common femoral vein. 2. Subselective right lower lobe pulmonary arteriography. 3. Subselective catheter directed thrombolysis of the right pulmonary arterial thrombus extending into the right lower lobe branch. 4. Subselective left lower lobe pulmonary arteriography. 5. Subselective catheter directed thrombolysis of the left pulmonary arterial thrombus extending into the left lower lobe branch. RADIOLOGIST: Sarkis Gerardo MD ANESTHESIA:  Local 1% lidocaine. FLUORO TIME:  7.7 minutes REFERENCE AIR KERMA: 44 mGy CONTRAST:  20 cc of Omnipaque 300 DESCRIPTION OF PROCEDURE:    Written informed consent was obtained following explanation of risks and benefits of the procedure. Specifically, risks of cardiac arrhythmia, pulmonary arterial injury, and intracranial hemorrhage were discussed with the patient. Additionally, significantly elevated risk of abdominal hemorrhage related to known adrenal hemorrhage was clearly discussed. The patient was placed supine on the IR procedure table. Maximal sterile barrier precautions were utilized during catheter insertion including aseptic technique, the use of cap, mask, sterile gown, sterile gloves, sterile drapes, hand hygiene, and patient cutaneous antisepsis with chlorhexidene 2%. Grayscale and color Doppler ultrasound evaluation of the right common femoral vein was performed. The right common femoral vein was patent and compressible. The right groin was prepped and draped in usual sterile fashion. Local anesthesia was administered with 1% lidocaine. Under ultrasound guidance, the right common femoral vein was accessed with a 21-gauge needle. An 018 wire was advanced centrally. Sequential exchange was made for a 5 [place]ish a microcatheter sheath, 035 Ness wire, and 5 [place]ish vascular sheath. The sheath was  secured with 0-0 silk. Utilizing an H1 catheter with Glidewire, the right main pulmonary artery was selected with successful subselection of a right lower lobe pulmonary arterial branch. Subselective right lower lobe pulmonary arteriography was performed, confirming satisfactory position within the right lower lobe branch distal to the main thrombus. Over a J Phelan wire, exchange was made for the Darrion-Macnamara flush catheter. 10 mg of TPA was then successfully infused into the right main pulmonary artery, extending into the right lower lobe pulmonary arterial branch. Utilizing an H1 catheter with Glidewire, the left main pulmonary artery was selected with successful subselection of a left lower lobe pulmonary arterial branch. Subselective left lower lobe pulmonary arteriography was performed, confirming satisfactory position within the left lower lobe branch distal to the main thrombus. Over a J Phelan wire, exchange was made for the Darrion-Macnamara flush catheter. 10 mg of TPA was then successfully infused into the left main pulmonary artery, extending into the left lower lobe pulmonary arterial branch. Catheter and sheath were removed. Hemostasis of the right common femoral vein was achieved with manual compression.     CT ABDOMEN PELVIS WITH IV CONTRAST    Result Date: 10/24/2022  IMPRESSION: 1.  Extensive acute pulmonary arterial emboli involving the bilateral main and multiple bilateral lobar, segmental and subsegmental branches, as detailed above. Flattening of the interventricular septum with dilatation of the right ventricle suggestive of right-sided heart strain. No evidence of acute pulmonary infarct. 2.  Large, indeterminate infiltrative hypodense lesion within the right hepatic lobe measuring up to 7 cm warranting further assessment with contrast-enhanced MRI. Differential diagnosis includes metastatic disease, a primary malignancy as well as abscess. Small adjacent similar appearing indeterminate right  hepatic lobe lesion. 3.  Indeterminate right adrenal mass with findings consistent with right adrenal hemorrhage. 4.  A 10 mm groundglass nodule within the right middle lobe. A follow-up CT scan of the chest in 6-12 months is recommended as per Fleischner guidelines. 5.  Additional incidental findings including ectasia of ascending thoracic aorta, enlarged prostate gland, coronary arterial atherosclerotic vascular disease and colonic diverticulosis without evidence of diverticulitis. Findings and recommendations discussed with SUZANNA Muñoz by Dr. Thompson by telephone at 9:54 AM and 10:21 AM on 10/24/2022. Fleischner Society 2017 Guidelines for Management of Incidentally Detected Pulmonary Nodules in Adults. (Subsolid Nodules) Single ground glass: <6 mm - No routine follow-up >/= 6 mm - CT at 6-12 months to confirm persistence, then CT every 2 years until 5 years (In certain suspicious nodules < 6 mm, consider follow-up at 2 and 4 years.  If solid component(s) or growth develops, consider resection (Recommendations 3A and 4A)     IR FILTER PLACEMENT    Result Date: 10/26/2022  IMPRESSION:   Successful placement of a retrievable infrarenal IVC Filter. Device: Argon Option Elite IVC Filter COMMENT: PROCEDURE: 1. Ultrasound-guided access of the right common femoral vein. 2. IVC cavagram 3. IVC filter placement RADIOLOGIST:  Sarkis Gerardo MD ANESTHESIA:  Lidocaine 1% CONTRAST:  CO2 FLUORO TIME:  0.8 min REFERENCE AIR KERMA: 40 mGy MEDICATIONS:  none DESCRIPTION OF PROCEDURE:  Consent was obtained following explanation of risks and benefits of the procedure. The right groin was prepped and draped in the usual sterile fashion. The right common femoral vein was noted to be compressible and patent on gray scale ultrasound. Local anesthesia was provided. The vein was then accessed with a  21-gauge needle under ultrasound guidance, and an .018 wire was advanced centrally. An ultrasound-guided access image was saved to PACS.  Exchange was made for a microsheath, .035 wire, and multi-sidehole sheath, which was positioned at the iliocaval junction. IVC cavagram was performed with CO2. Renal vein insertion sites were localized. An Argon Option Elite IVC Filter was deployed infrarenally without complication. Wires and catheters were removed and hemostasis was achieved. The patient remained stable throughout the procedure. The number of guidewires used, and their integrity, was confirmed at the end of the procedure.     CT ANGIOGRAPHY CHEST PULMONARY EMBOLISM WITH IV CONTRAST    Result Date: 10/24/2022  IMPRESSION: 1.  Extensive acute pulmonary arterial emboli involving the bilateral main and multiple bilateral lobar, segmental and subsegmental branches, as detailed above. Flattening of the interventricular septum with dilatation of the right ventricle suggestive of right-sided heart strain. No evidence of acute pulmonary infarct. 2.  Large, indeterminate infiltrative hypodense lesion within the right hepatic lobe measuring up to 7 cm warranting further assessment with contrast-enhanced MRI. Differential diagnosis includes metastatic disease, a primary malignancy as well as abscess. Small adjacent similar appearing indeterminate right hepatic lobe lesion. 3.  Indeterminate right adrenal mass with findings consistent with right adrenal hemorrhage. 4.  A 10 mm groundglass nodule within the right middle lobe. A follow-up CT scan of the chest in 6-12 months is recommended as per Fleischner guidelines. 5.  Additional incidental findings including ectasia of ascending thoracic aorta, enlarged prostate gland, coronary arterial atherosclerotic vascular disease and colonic diverticulosis without evidence of diverticulitis. Findings and recommendations discussed with SUZANNA Muñoz by Dr. Thompson by telephone at 9:54 AM and 10:21 AM on 10/24/2022. Fleischner Society 2017 Guidelines for Management of Incidentally Detected Pulmonary Nodules in Adults.  (Subsolid Nodules) Single ground glass: <6 mm - No routine follow-up >/= 6 mm - CT at 6-12 months to confirm persistence, then CT every 2 years until 5 years (In certain suspicious nodules < 6 mm, consider follow-up at 2 and 4 years.  If solid component(s) or growth develops, consider resection (Recommendations 3A and 4A)     IR THROMBOLYSIS    Result Date: 10/25/2022  IMPRESSION:   Successful bilateral pulmonary arteriography and catheter-directed thrombolysis. COMMENT: PROCEDURE:  1. Ultrasound guided access of the central right common femoral vein. 2. Subselective right lower lobe pulmonary arteriography. 3. Subselective catheter directed thrombolysis of the right pulmonary arterial thrombus extending into the right lower lobe branch. 4. Subselective left lower lobe pulmonary arteriography. 5. Subselective catheter directed thrombolysis of the left pulmonary arterial thrombus extending into the left lower lobe branch. RADIOLOGIST: Sarkis Gerardo MD ANESTHESIA:  Local 1% lidocaine. FLUORO TIME:  7.7 minutes REFERENCE AIR KERMA: 44 mGy CONTRAST:  20 cc of Omnipaque 300 DESCRIPTION OF PROCEDURE:    Written informed consent was obtained following explanation of risks and benefits of the procedure. Specifically, risks of cardiac arrhythmia, pulmonary arterial injury, and intracranial hemorrhage were discussed with the patient. Additionally, significantly elevated risk of abdominal hemorrhage related to known adrenal hemorrhage was clearly discussed. The patient was placed supine on the IR procedure table. Maximal sterile barrier precautions were utilized during catheter insertion including aseptic technique, the use of cap, mask, sterile gown, sterile gloves, sterile drapes, hand hygiene, and patient cutaneous antisepsis with chlorhexidene 2%. Grayscale and color Doppler ultrasound evaluation of the right common femoral vein was performed. The right common femoral vein was patent and compressible. The right groin was  prepped and draped in usual sterile fashion. Local anesthesia was administered with 1% lidocaine. Under ultrasound guidance, the right common femoral vein was accessed with a 21-gauge needle. An 018 wire was advanced centrally. Sequential exchange was made for a 5 [place] a microcatheter sheath, 035 Ness wire, and 5 [place] vascular sheath. The sheath was secured with 0-0 silk. Utilizing an H1 catheter with Glidewire, the right main pulmonary artery was selected with successful subselection of a right lower lobe pulmonary arterial branch. Subselective right lower lobe pulmonary arteriography was performed, confirming satisfactory position within the right lower lobe branch distal to the main thrombus. Over a J Phelan wire, exchange was made for the Darrion-Macnamara flush catheter. 10 mg of TPA was then successfully infused into the right main pulmonary artery, extending into the right lower lobe pulmonary arterial branch. Utilizing an H1 catheter with Glidewire, the left main pulmonary artery was selected with successful subselection of a left lower lobe pulmonary arterial branch. Subselective left lower lobe pulmonary arteriography was performed, confirming satisfactory position within the left lower lobe branch distal to the main thrombus. Over a J Phelan wire, exchange was made for the Darrion-Macnamara flush catheter. 10 mg of TPA was then successfully infused into the left main pulmonary artery, extending into the left lower lobe pulmonary arterial branch. Catheter and sheath were removed. Hemostasis of the right common femoral vein was achieved with manual compression.     X-RAY CHEST 1 VIEW    Result Date: 10/26/2022  IMPRESSION: No acute cardiopulmonary abnormality seen.. COMMENT:    A single AP view of the chest was performed. Comparison: AP chest x-ray from 10/24/2022. The heart size and pulmonary vasculature remain within normal limits. The lung fields appear clear, and no pleural effusions are seen. No hilar  abnormality is identified. There is mild tortuosity of the descending thoracic aorta. An electronic device projects over the left lung base projecting over the left anterior left fourth rib, likely a loop recorder. When compared to the prior study, there has been no significant interval change.     X-RAY CHEST 1 VIEW    Result Date: 10/24/2022  IMPRESSION: No evidence of acute pulmonary disease.     Ultrasound venous leg bilateral    Result Date: 10/25/2022  IMPRESSION: Examination positive for deep vein thrombosis bilaterally as described, with greater than right.     CT CHEST ABDOMEN PELVIS WITHOUT IV CONTRAST    Result Date: 10/25/2022  IMPRESSION: 1.  Findings concerning for a large right-sided retroperitoneal hemorrhage including the right kidney and right adrenal gland. A small to moderate amount of pelvic ascites is also noted which appears mildly hyperdense and may reflect a component of hemoperitoneum. 2.  Additional chronic and incidental findings as described above. Finding:    Unexpected significant hemorrhage (chest, abdomen, pelvis)   Acuity: Critical  Status:  CLOSED Critical read back was performed and results were read back by Dr. Simms, on 10/25/2022 8:10 PM        ECG/Telemetry     ASSESSMENT AND PLAN      * Pulmonary embolism with acute cor pulmonale (CMS/HCC)  Assessment & Plan  CT chest: Extensive b/l PE with R heart strain. 7cm hypodense lesion R hepatic lobe, small R hepatic lobe lesion. Indeterminate R adrenal mass and hemorrhage.  10 mm RML nodule  -Provoked by underlying hepatic malignancy?   -With acute hypoxic resp failure  -Associated R heart strain  -Started on Heparin drip and underwent TPA to each pulm artery  -RRT 10/26 with hypotension and retroperitoneal hemorrhage  -With DVT.  US:  Bilaterally L>R.  R nearly occlusive thrombus within the posterior tibial vein. Left: Mid femoral vein partially occlusive thrombus.  The distal popliteal vein contains occlusive thrombus.   Peroneal vein and posterior tibial veins contain partially to nearly occlusive thrombus.  -Echo 10/24:  TDS.  EF 75%   Grade I LV diastolic dysfunction. Severely dilated RV. Severely reduced RV systolic function c/w RV strain.  Mod dilated RA. Trace TR.   -Echo 10/26:  C/w 10/24, RV size has decreased and function improved   -S/p IVC filter 10/25  -Heparin resumed today without a bolus.  CBC q12      Retroperitoneal hematoma  Assessment & Plan  -CT a/p 10/25: Lg R sided retroperitoneal hemorrhage including the right kidney and right adrenal gland. Component of hemoperitoneum  -With associated acute blood loss anemia requiring prbcs  -Underlying adrenal hemorrhage noted on initial CT, but risk of not anticoagulating outweighed risk of bleed    Liver mass  Assessment & Plan  -Abnormal transaminases  -CT:  7cm hypodense lesion R hepatic lobe, small R hepatic lobe lesion.   -CA 19-9, AFP, hepatitis panel neg.   CEA 3.9 which is theoretically elevated in a non smoker  -MRI: R hepatic lobe looks more like evolving hemorrhage/infarct.  Benign liver cysts. R perinephric and R retroperitoneal hematomas, hemorrhagic R renal cyst. R adrenal hemorrhage with developing hemorrhage L adrenal.  Small R pleural effusion, possible hemothorax  -May not need bx - not malignant appearing.  Will d/w heme    LYNNE (acute kidney injury) (CMS/HCC)  Assessment & Plan  -Prerenal, improving  -Cr peaked at 2.3, now resolved    Acute blood loss anemia  Assessment & Plan  -From retroperitoneal hematoma, needing PRBC transfusion  -Thrombocytopenia as well, improving.  DIC labs noted  -Trend BID with resumption of Heparin    Pulmonary nodule  Assessment & Plan  -Noted on CT chest 1 cm R perihilar  -Will need repeat CT chest 6-12 months  -Possibly need to bx this per heme    Facial fracture (CMS/HCC)  Assessment & Plan  -CT facial bones:Multiple right-sided facial bone fractures as described under the comment.  -Seen by Trauma and plastics  -Seen by  plastics who discussed potential facial deformity.  Not a candidate for reconstructive surgery, given his need for AC  -I d/w trauma, no need for abx    Syncope  Assessment & Plan  -From submassive PE/R heart strain  -PT, OT    Hyperlipidemia  Assessment & Plan  -Lipitor held with abn LFTs    Myocardial injury  Assessment & Plan  -Trop peaked at 1349, R heart strain from PE    Hypertension  Assessment & Plan  -BP better since resuming Norvasc and Metoprolol  -Holding HCTZ       VTE Assessment: [place]    VTE Prophylaxis Plan: Current anticoagulants:  heparin (porcine) bolus from bag 3,400-6,750 Units, intravenous, q6h PRN  heparin infusion in D5W 100 units/mL, intravenous, Titrated      Code Status: Full Code  Estimated Discharge Date:  11/2/2022  Disposition: [facility]     Nael Rodriguez MD  10/29/2022  2:15 PM

## 2022-10-29 NOTE — PLAN OF CARE
Plan of Care Review  Plan of Care Reviewed With: patient  Outcome Summary: Pt lethargic at times, but arousable. AOx1-3, confused at times to place, time, and situation. Neuro checks otherwise unchanged. VSS, remains on 4L nasal cannula. MRI of the abd completed yesterday. Plan to possibly restart AC today.

## 2022-10-29 NOTE — ASSESSMENT & PLAN NOTE
-Noted on CT chest 1 cm R perihilar  -Will need repeat CT chest 6-12 months  -Possibly need to bx this per heme

## 2022-10-29 NOTE — ASSESSMENT & PLAN NOTE
-Abnormal transaminases  -CT:  7cm hypodense lesion R hepatic lobe, small R hepatic lobe lesion.   -CA 19-9, AFP, hepatitis panel neg.   CEA 3.9 which is theoretically elevated in a non smoker  -MRI: R hepatic lobe looks more like evolving hemorrhage/infarct.  Benign liver cysts. R perinephric and R retroperitoneal hematomas, hemorrhagic R renal cyst. R adrenal hemorrhage with developing hemorrhage L adrenal.  Small R pleural effusion, possible hemothorax  -May not need bx - not malignant appearing.  Will d/w heme

## 2022-10-29 NOTE — ASSESSMENT & PLAN NOTE
-From retroperitoneal hematoma, needing PRBC transfusion  -Thrombocytopenia as well, improving.  DIC labs noted  -Trend BID with resumption of Heparin

## 2022-10-29 NOTE — PROGRESS NOTES
GI note:    MRI reviewed. No mass lesion identified in the liver; concern for hemorrhage.    Please call if any additional GI workup is required.    Robert Frankel, MD  [facility]

## 2022-10-29 NOTE — ASSESSMENT & PLAN NOTE
CT chest: Extensive b/l PE with R heart strain. 7cm hypodense lesion R hepatic lobe, small R hepatic lobe lesion. Indeterminate R adrenal mass and hemorrhage.  10 mm RML nodule  -Provoked by underlying hepatic malignancy?   -With acute hypoxic resp failure  -Associated R heart strain  -Started on Heparin drip and underwent TPA to each pulm artery  -RRT 10/26 with hypotension and retroperitoneal hemorrhage  -With DVT.  US:  Bilaterally L>R.  R nearly occlusive thrombus within the posterior tibial vein. Left: Mid femoral vein partially occlusive thrombus.  The distal popliteal vein contains occlusive thrombus.  Peroneal vein and posterior tibial veins contain partially to nearly occlusive thrombus.  -Echo 10/24:  TDS.  EF 75%   Grade I LV diastolic dysfunction. Severely dilated RV. Severely reduced RV systolic function c/w RV strain.  Mod dilated RA. Trace TR.   -Echo 10/26:  C/w 10/24, RV size has decreased and function improved   -S/p IVC filter 10/25  -Heparin resumed today without a bolus.  CBC q12

## 2022-10-30 LAB
ANION GAP SERPL CALC-SCNC: 8 MEQ/L (ref 3–15)
APTT PPP: 61 SEC (ref 23–35)
APTT PPP: 83 SEC (ref 23–35)
APTT PPP: 93 SEC (ref 23–35)
BUN SERPL-MCNC: 38 MG/DL (ref 8–20)
CALCIUM SERPL-MCNC: 8 MG/DL (ref 8.9–10.3)
CHLORIDE SERPL-SCNC: 106 MEQ/L (ref 98–109)
CO2 SERPL-SCNC: 24 MEQ/L (ref 22–32)
CREAT SERPL-MCNC: 1 MG/DL (ref 0.8–1.3)
ERYTHROCYTE [DISTWIDTH] IN BLOOD BY AUTOMATED COUNT: 13.7 % (ref 11.6–14.4)
ERYTHROCYTE [DISTWIDTH] IN BLOOD BY AUTOMATED COUNT: 13.8 % (ref 11.6–14.4)
GFR SERPL CREATININE-BSD FRML MDRD: >60 ML/MIN/1.73M*2
GLUCOSE BLD-MCNC: 132 MG/DL (ref 70–99)
GLUCOSE BLD-MCNC: 161 MG/DL (ref 70–99)
GLUCOSE BLD-MCNC: 162 MG/DL (ref 70–99)
GLUCOSE SERPL-MCNC: 143 MG/DL (ref 70–99)
HCT VFR BLDCO AUTO: 28.6 % (ref 40.1–51)
HCT VFR BLDCO AUTO: 29.9 % (ref 40.1–51)
HGB BLD-MCNC: 9.2 G/DL (ref 13.7–17.5)
HGB BLD-MCNC: 9.5 G/DL (ref 13.7–17.5)
MAGNESIUM SERPL-MCNC: 2.1 MG/DL (ref 1.8–2.5)
MCH RBC QN AUTO: 28.7 PG (ref 28–33.2)
MCH RBC QN AUTO: 29 PG (ref 28–33.2)
MCHC RBC AUTO-ENTMCNC: 31.8 G/DL (ref 32.2–36.5)
MCHC RBC AUTO-ENTMCNC: 32.2 G/DL (ref 32.2–36.5)
MCV RBC AUTO: 90.2 FL (ref 83–98)
MCV RBC AUTO: 90.3 FL (ref 83–98)
PDW BLD AUTO: 9 FL (ref 9.4–12.4)
PDW BLD AUTO: 9.4 FL (ref 9.4–12.4)
PLATELET # BLD AUTO: 172 K/UL (ref 150–350)
PLATELET # BLD AUTO: 229 K/UL (ref 150–350)
POCT TEST: ABNORMAL
POTASSIUM SERPL-SCNC: 4.1 MEQ/L (ref 3.6–5.1)
RBC # BLD AUTO: 3.17 M/UL (ref 4.5–5.8)
RBC # BLD AUTO: 3.31 M/UL (ref 4.5–5.8)
SODIUM SERPL-SCNC: 138 MEQ/L (ref 136–144)
WBC # BLD AUTO: 12.47 K/UL (ref 3.8–10.5)
WBC # BLD AUTO: 12.49 K/UL (ref 3.8–10.5)

## 2022-10-30 PROCEDURE — 36415 COLL VENOUS BLD VENIPUNCTURE: CPT | Performed by: NURSE PRACTITIONER

## 2022-10-30 PROCEDURE — 99233 SBSQ HOSP IP/OBS HIGH 50: CPT | Performed by: INTERNAL MEDICINE

## 2022-10-30 PROCEDURE — 63600000 HC DRUGS/DETAIL CODE: Performed by: INTERNAL MEDICINE

## 2022-10-30 PROCEDURE — 83735 ASSAY OF MAGNESIUM: CPT | Performed by: NURSE PRACTITIONER

## 2022-10-30 PROCEDURE — 63700000 HC SELF-ADMINISTRABLE DRUG

## 2022-10-30 PROCEDURE — 85730 THROMBOPLASTIN TIME PARTIAL: CPT | Performed by: INTERNAL MEDICINE

## 2022-10-30 PROCEDURE — 63700000 HC SELF-ADMINISTRABLE DRUG: Performed by: HOSPITALIST

## 2022-10-30 PROCEDURE — 85027 COMPLETE CBC AUTOMATED: CPT | Performed by: INTERNAL MEDICINE

## 2022-10-30 PROCEDURE — 21400000 HC ROOM AND CARE CCU/INTERMEDIATE

## 2022-10-30 PROCEDURE — 80048 BASIC METABOLIC PNL TOTAL CA: CPT | Performed by: NURSE PRACTITIONER

## 2022-10-30 RX ADMIN — INSULIN ASPART 2 UNITS: 100 INJECTION, SOLUTION INTRAVENOUS; SUBCUTANEOUS at 22:08

## 2022-10-30 RX ADMIN — HEPARIN SODIUM AND DEXTROSE 1800 UNITS/HR: 10000; 5 INJECTION INTRAVENOUS at 08:36

## 2022-10-30 RX ADMIN — INSULIN ASPART 2 UNITS: 100 INJECTION, SOLUTION INTRAVENOUS; SUBCUTANEOUS at 13:01

## 2022-10-30 RX ADMIN — PANTOPRAZOLE SODIUM 40 MG: 40 TABLET, DELAYED RELEASE ORAL at 08:29

## 2022-10-30 RX ADMIN — LATANOPROST 1 DROP: 50 SOLUTION OPHTHALMIC at 20:29

## 2022-10-30 RX ADMIN — AMLODIPINE BESYLATE 5 MG: 5 TABLET ORAL at 17:54

## 2022-10-30 RX ADMIN — TIMOLOL MALEATE 1 DROP: 5 SOLUTION/ DROPS OPHTHALMIC at 08:29

## 2022-10-30 RX ADMIN — HEPARIN SODIUM AND DEXTROSE 1800 UNITS/HR: 10000; 5 INJECTION INTRAVENOUS at 23:19

## 2022-10-30 RX ADMIN — SENNOSIDES AND DOCUSATE SODIUM 2 TABLET: 50; 8.6 TABLET ORAL at 08:29

## 2022-10-30 RX ADMIN — METOPROLOL SUCCINATE 50 MG: 50 TABLET, FILM COATED, EXTENDED RELEASE ORAL at 17:54

## 2022-10-30 RX ADMIN — POLYETHYLENE GLYCOL 3350 17 G: 17 POWDER, FOR SOLUTION ORAL at 08:29

## 2022-10-30 NOTE — PROGRESS NOTES
Hospital Medicine Service -  Daily Progress Note       SUBJECTIVE   Interval History: Seen this am, feels well, denies SOB.  Only complaint is that he hasn't had a BM, but feels as if he'll go soon.  Hgb has been relatively stable on heparin.  No worsening pain.  Hemodynamically stable.     OBJECTIVE      Vital signs in last 24 hours:  Temp:  [36.6 °C (97.9 °F)-36.8 °C (98.3 °F)] 36.6 °C (97.9 °F)  Heart Rate:  [79-92] 86  Resp:  [18-22] 18  BP: (124-159)/(62-73) 126/67    Intake/Output Summary (Last 24 hours) at 10/30/2022 1424  Last data filed at 10/30/2022 0740  Gross per 24 hour   Intake 1051.78 ml   Output 925 ml   Net 126.78 ml       PHYSICAL EXAMINATION      Physical Exam  Vitals and nursing note reviewed.   Constitutional:       Appearance: Normal appearance.   HENT:      Head:      Comments: Facial asymmetry from fractures  Cardiovascular:      Rate and Rhythm: Normal rate and regular rhythm.      Pulses: Normal pulses.      Heart sounds: Normal heart sounds. No murmur heard.    No gallop.   Pulmonary:      Effort: Pulmonary effort is normal. No respiratory distress.      Breath sounds: Normal breath sounds. No stridor. No wheezing or rhonchi.   Abdominal:      General: Abdomen is flat. Bowel sounds are normal. There is no distension.      Palpations: Abdomen is soft. There is no mass.      Tenderness: There is no abdominal tenderness.      Hernia: No hernia is present.   Musculoskeletal:      Right lower leg: No edema.      Left lower leg: No edema.   Skin:     General: Skin is warm and dry.   Neurological:      General: No focal deficit present.      Mental Status: He is alert and oriented to person, place, and time.   Psychiatric:         Mood and Affect: Mood normal.         Behavior: Behavior normal.         Thought Content: Thought content normal.         Judgment: Judgment normal.           LABS / IMAGING / TELE      Labs  Lab Results   Component Value Date    WBC 12.47 (H) 10/30/2022    HGB 9.2  (L) 10/30/2022    HCT 28.6 (L) 10/30/2022    MCV 90.2 10/30/2022     10/30/2022     Lab Results   Component Value Date    GLUCOSE 143 (H) 10/30/2022    CALCIUM 8.0 (L) 10/30/2022     10/30/2022    K 4.1 10/30/2022    CO2 24 10/30/2022     10/30/2022    BUN 38 (H) 10/30/2022    CREATININE 1.0 10/30/2022     Lab Results   Component Value Date    ALT 92 (H) 10/29/2022    AST 23 10/29/2022    ALKPHOS 58 10/29/2022    BILITOT 1.5 (H) 10/29/2022     Lab Results   Component Value Date    INR 1.2 10/27/2022    INR 1.4 10/26/2022    INR 1.4 10/25/2022       Imaging  CT HEAD WITHOUT IV CONTRAST    Result Date: 10/25/2022  IMPRESSION: 1.  No acute intracranial hemorrhage, large territorial infarct, mass effect or midline shift is identified. 2.  Multiple right maxillary sinus and zygomatic arch fractures with an air-fluid level within the right maxillary sinus.     CT HEAD WITHOUT IV CONTRAST    Result Date: 10/24/2022  IMPRESSION: 1.  No posttraumatic intracranial abnormality. 2. Chronic changes noted under the comment.    CT FACIAL BONES WITHOUT IV CONTRAST    Result Date: 10/24/2022  IMPRESSION: Multiple right-sided facial bone fractures as described under the comment.    CT CERVICAL SPINE WITHOUT IV CONTRAST    Result Date: 10/24/2022  IMPRESSION: 1.  No acute fractures. As clinically indicated, MRI of the cervical spine is recommended for further evaluation. 2.  Other incidental findings noted under the comment.     MRI ABDOMEN WITH AND WITHOUT CONTRAST    Result Date: 10/29/2022  IMPRESSION: 1. Right hepatic lobe mass on recent CT is most compatible with evolving hemorrhage/infarct centered in segment 7. Additional hemorrhagic/infarcted focus in central segment 5/8, stable in retrospect. Additional benign liver cysts, largest in the left lobe measuring up to 3.9 cm and containing blood products. 2. Similar appearance of right perinephric and right retroperitoneal hematomas with hemorrhagic right renal  cyst. 3. Similar right adrenal hemorrhage with developing hemorrhage into the left adrenal gland. 4. Small right pleural effusion with findings suspicious for a component of hemothorax. This could be confirmed with thoracentesis, if necessary. 5. No findings on subtraction imaging to suggest papo active bleeding. Overall, there are no findings suspicious for malignancy; specifically, no suspicious hepatic observations. Given motion degradation on extensive findings, follow-up contrast-enhanced MRI is recommended to assess for resolution of findings and exclude underlying hemorrhagic neoplasm.    ULTRASOUND GUIDED VASCULAR ACCESS    Result Date: 10/26/2022  IMPRESSION:   Successful placement of a retrievable infrarenal IVC Filter. Device: Argon Option Elite IVC Filter COMMENT: PROCEDURE: 1. Ultrasound-guided access of the right common femoral vein. 2. IVC cavagram 3. IVC filter placement RADIOLOGIST:  Sarkis Gerardo MD ANESTHESIA:  Lidocaine 1% CONTRAST:  CO2 FLUORO TIME:  0.8 min REFERENCE AIR KERMA: 40 mGy MEDICATIONS:  none DESCRIPTION OF PROCEDURE:  Consent was obtained following explanation of risks and benefits of the procedure. The right groin was prepped and draped in the usual sterile fashion. The right common femoral vein was noted to be compressible and patent on gray scale ultrasound. Local anesthesia was provided. The vein was then accessed with a  21-gauge needle under ultrasound guidance, and an .018 wire was advanced centrally. An ultrasound-guided access image was saved to PACS. Exchange was made for a microsheath, .035 wire, and multi-sidehole sheath, which was positioned at the iliocaval junction. IVC cavagram was performed with CO2. Renal vein insertion sites were localized. An Argon Option Elite IVC Filter was deployed infrarenally without complication. Wires and catheters were removed and hemostasis was achieved. The patient remained stable throughout the procedure. The number of guidewires  used, and their integrity, was confirmed at the end of the procedure.     ULTRASOUND GUIDED VASCULAR ACCESS    Result Date: 10/25/2022  IMPRESSION:   Successful bilateral pulmonary arteriography and catheter-directed thrombolysis. COMMENT: PROCEDURE:  1. Ultrasound guided access of the central right common femoral vein. 2. Subselective right lower lobe pulmonary arteriography. 3. Subselective catheter directed thrombolysis of the right pulmonary arterial thrombus extending into the right lower lobe branch. 4. Subselective left lower lobe pulmonary arteriography. 5. Subselective catheter directed thrombolysis of the left pulmonary arterial thrombus extending into the left lower lobe branch. RADIOLOGIST: Sarkis Gerardo MD ANESTHESIA:  Local 1% lidocaine. FLUORO TIME:  7.7 minutes REFERENCE AIR KERMA: 44 mGy CONTRAST:  20 cc of Omnipaque 300 DESCRIPTION OF PROCEDURE:    Written informed consent was obtained following explanation of risks and benefits of the procedure. Specifically, risks of cardiac arrhythmia, pulmonary arterial injury, and intracranial hemorrhage were discussed with the patient. Additionally, significantly elevated risk of abdominal hemorrhage related to known adrenal hemorrhage was clearly discussed. The patient was placed supine on the IR procedure table. Maximal sterile barrier precautions were utilized during catheter insertion including aseptic technique, the use of cap, mask, sterile gown, sterile gloves, sterile drapes, hand hygiene, and patient cutaneous antisepsis with chlorhexidene 2%. Grayscale and color Doppler ultrasound evaluation of the right common femoral vein was performed. The right common femoral vein was patent and compressible. The right groin was prepped and draped in usual sterile fashion. Local anesthesia was administered with 1% lidocaine. Under ultrasound guidance, the right common femoral vein was accessed with a 21-gauge needle. An 018 wire was advanced centrally.  Sequential exchange was made for a 5 Dominican a microcatheter sheath, 035 Ness wire, and 5 Dominican vascular sheath. The sheath was secured with 0-0 silk. Utilizing an H1 catheter with Glidewire, the right main pulmonary artery was selected with successful subselection of a right lower lobe pulmonary arterial branch. Subselective right lower lobe pulmonary arteriography was performed, confirming satisfactory position within the right lower lobe branch distal to the main thrombus. Over a J Phelan wire, exchange was made for the Darrion-Macnamara flush catheter. 10 mg of TPA was then successfully infused into the right main pulmonary artery, extending into the right lower lobe pulmonary arterial branch. Utilizing an H1 catheter with Glidewire, the left main pulmonary artery was selected with successful subselection of a left lower lobe pulmonary arterial branch. Subselective left lower lobe pulmonary arteriography was performed, confirming satisfactory position within the left lower lobe branch distal to the main thrombus. Over a J Phelan wire, exchange was made for the Darrion-Macnamara flush catheter. 10 mg of TPA was then successfully infused into the left main pulmonary artery, extending into the left lower lobe pulmonary arterial branch. Catheter and sheath were removed. Hemostasis of the right common femoral vein was achieved with manual compression.     CT ABDOMEN PELVIS WITH IV CONTRAST    Result Date: 10/24/2022  IMPRESSION: 1.  Extensive acute pulmonary arterial emboli involving the bilateral main and multiple bilateral lobar, segmental and subsegmental branches, as detailed above. Flattening of the interventricular septum with dilatation of the right ventricle suggestive of right-sided heart strain. No evidence of acute pulmonary infarct. 2.  Large, indeterminate infiltrative hypodense lesion within the right hepatic lobe measuring up to 7 cm warranting further assessment with contrast-enhanced MRI. Differential  diagnosis includes metastatic disease, a primary malignancy as well as abscess. Small adjacent similar appearing indeterminate right hepatic lobe lesion. 3.  Indeterminate right adrenal mass with findings consistent with right adrenal hemorrhage. 4.  A 10 mm groundglass nodule within the right middle lobe. A follow-up CT scan of the chest in 6-12 months is recommended as per Fleischner guidelines. 5.  Additional incidental findings including ectasia of ascending thoracic aorta, enlarged prostate gland, coronary arterial atherosclerotic vascular disease and colonic diverticulosis without evidence of diverticulitis. Findings and recommendations discussed with SUZANNA Muñoz by Dr. Thomspon by telephone at 9:54 AM and 10:21 AM on 10/24/2022. Fleischner Society 2017 Guidelines for Management of Incidentally Detected Pulmonary Nodules in Adults. (Subsolid Nodules) Single ground glass: <6 mm - No routine follow-up >/= 6 mm - CT at 6-12 months to confirm persistence, then CT every 2 years until 5 years (In certain suspicious nodules < 6 mm, consider follow-up at 2 and 4 years.  If solid component(s) or growth develops, consider resection (Recommendations 3A and 4A)     IR FILTER PLACEMENT    Result Date: 10/26/2022  IMPRESSION:   Successful placement of a retrievable infrarenal IVC Filter. Device: Argon Option Elite IVC Filter COMMENT: PROCEDURE: 1. Ultrasound-guided access of the right common femoral vein. 2. IVC cavagram 3. IVC filter placement RADIOLOGIST:  Sarkis Gerardo MD ANESTHESIA:  Lidocaine 1% CONTRAST:  CO2 FLUORO TIME:  0.8 min REFERENCE AIR KERMA: 40 mGy MEDICATIONS:  none DESCRIPTION OF PROCEDURE:  Consent was obtained following explanation of risks and benefits of the procedure. The right groin was prepped and draped in the usual sterile fashion. The right common femoral vein was noted to be compressible and patent on gray scale ultrasound. Local anesthesia was provided. The vein was then accessed with a   21-gauge needle under ultrasound guidance, and an .018 wire was advanced centrally. An ultrasound-guided access image was saved to PACS. Exchange was made for a microsheath, .035 wire, and multi-sidehole sheath, which was positioned at the iliocaval junction. IVC cavagram was performed with CO2. Renal vein insertion sites were localized. An Argon Option Elite IVC Filter was deployed infrarenally without complication. Wires and catheters were removed and hemostasis was achieved. The patient remained stable throughout the procedure. The number of guidewires used, and their integrity, was confirmed at the end of the procedure.     CT ANGIOGRAPHY CHEST PULMONARY EMBOLISM WITH IV CONTRAST    Result Date: 10/24/2022  IMPRESSION: 1.  Extensive acute pulmonary arterial emboli involving the bilateral main and multiple bilateral lobar, segmental and subsegmental branches, as detailed above. Flattening of the interventricular septum with dilatation of the right ventricle suggestive of right-sided heart strain. No evidence of acute pulmonary infarct. 2.  Large, indeterminate infiltrative hypodense lesion within the right hepatic lobe measuring up to 7 cm warranting further assessment with contrast-enhanced MRI. Differential diagnosis includes metastatic disease, a primary malignancy as well as abscess. Small adjacent similar appearing indeterminate right hepatic lobe lesion. 3.  Indeterminate right adrenal mass with findings consistent with right adrenal hemorrhage. 4.  A 10 mm groundglass nodule within the right middle lobe. A follow-up CT scan of the chest in 6-12 months is recommended as per Fleischner guidelines. 5.  Additional incidental findings including ectasia of ascending thoracic aorta, enlarged prostate gland, coronary arterial atherosclerotic vascular disease and colonic diverticulosis without evidence of diverticulitis. Findings and recommendations discussed with SUZANNA Muñoz by Dr. Thompson by telephone at 9:54 AM  and 10:21 AM on 10/24/2022. Fleischner Society 2017 Guidelines for Management of Incidentally Detected Pulmonary Nodules in Adults. (Subsolid Nodules) Single ground glass: <6 mm - No routine follow-up >/= 6 mm - CT at 6-12 months to confirm persistence, then CT every 2 years until 5 years (In certain suspicious nodules < 6 mm, consider follow-up at 2 and 4 years.  If solid component(s) or growth develops, consider resection (Recommendations 3A and 4A)     IR THROMBOLYSIS    Result Date: 10/25/2022  IMPRESSION:   Successful bilateral pulmonary arteriography and catheter-directed thrombolysis. COMMENT: PROCEDURE:  1. Ultrasound guided access of the central right common femoral vein. 2. Subselective right lower lobe pulmonary arteriography. 3. Subselective catheter directed thrombolysis of the right pulmonary arterial thrombus extending into the right lower lobe branch. 4. Subselective left lower lobe pulmonary arteriography. 5. Subselective catheter directed thrombolysis of the left pulmonary arterial thrombus extending into the left lower lobe branch. RADIOLOGIST: Sarkis Gerardo MD ANESTHESIA:  Local 1% lidocaine. FLUORO TIME:  7.7 minutes REFERENCE AIR KERMA: 44 mGy CONTRAST:  20 cc of Omnipaque 300 DESCRIPTION OF PROCEDURE:    Written informed consent was obtained following explanation of risks and benefits of the procedure. Specifically, risks of cardiac arrhythmia, pulmonary arterial injury, and intracranial hemorrhage were discussed with the patient. Additionally, significantly elevated risk of abdominal hemorrhage related to known adrenal hemorrhage was clearly discussed. The patient was placed supine on the IR procedure table. Maximal sterile barrier precautions were utilized during catheter insertion including aseptic technique, the use of cap, mask, sterile gown, sterile gloves, sterile drapes, hand hygiene, and patient cutaneous antisepsis with chlorhexidene 2%. Grayscale and color Doppler ultrasound  evaluation of the right common femoral vein was performed. The right common femoral vein was patent and compressible. The right groin was prepped and draped in usual sterile fashion. Local anesthesia was administered with 1% lidocaine. Under ultrasound guidance, the right common femoral vein was accessed with a 21-gauge needle. An 018 wire was advanced centrally. Sequential exchange was made for a 5 Emirati a microcatheter sheath, 035 Ness wire, and 5 Emirati vascular sheath. The sheath was secured with 0-0 silk. Utilizing an H1 catheter with Glidewire, the right main pulmonary artery was selected with successful subselection of a right lower lobe pulmonary arterial branch. Subselective right lower lobe pulmonary arteriography was performed, confirming satisfactory position within the right lower lobe branch distal to the main thrombus. Over a J Phelan wire, exchange was made for the Darrion-Macnamara flush catheter. 10 mg of TPA was then successfully infused into the right main pulmonary artery, extending into the right lower lobe pulmonary arterial branch. Utilizing an H1 catheter with Glidewire, the left main pulmonary artery was selected with successful subselection of a left lower lobe pulmonary arterial branch. Subselective left lower lobe pulmonary arteriography was performed, confirming satisfactory position within the left lower lobe branch distal to the main thrombus. Over a J Phelan wire, exchange was made for the Darrion-Macnamara flush catheter. 10 mg of TPA was then successfully infused into the left main pulmonary artery, extending into the left lower lobe pulmonary arterial branch. Catheter and sheath were removed. Hemostasis of the right common femoral vein was achieved with manual compression.     X-RAY CHEST 1 VIEW    Result Date: 10/26/2022  IMPRESSION: No acute cardiopulmonary abnormality seen.. COMMENT:    A single AP view of the chest was performed. Comparison: AP chest x-ray from 10/24/2022. The  heart size and pulmonary vasculature remain within normal limits. The lung fields appear clear, and no pleural effusions are seen. No hilar abnormality is identified. There is mild tortuosity of the descending thoracic aorta. An electronic device projects over the left lung base projecting over the left anterior left fourth rib, likely a loop recorder. When compared to the prior study, there has been no significant interval change.     X-RAY CHEST 1 VIEW    Result Date: 10/24/2022  IMPRESSION: No evidence of acute pulmonary disease.     Ultrasound venous leg bilateral    Result Date: 10/25/2022  IMPRESSION: Examination positive for deep vein thrombosis bilaterally as described, with greater than right.     CT CHEST ABDOMEN PELVIS WITHOUT IV CONTRAST    Result Date: 10/25/2022  IMPRESSION: 1.  Findings concerning for a large right-sided retroperitoneal hemorrhage including the right kidney and right adrenal gland. A small to moderate amount of pelvic ascites is also noted which appears mildly hyperdense and may reflect a component of hemoperitoneum. 2.  Additional chronic and incidental findings as described above. Finding:    Unexpected significant hemorrhage (chest, abdomen, pelvis)   Acuity: Critical  Status:  CLOSED Critical read back was performed and results were read back by Dr. Simms, on 10/25/2022 8:10 PM        ECG/Telemetry     ASSESSMENT AND PLAN      * Pulmonary embolism with acute cor pulmonale (CMS/HCC)  Assessment & Plan  CT chest: Extensive b/l PE with R heart strain. 7cm hypodense lesion R hepatic lobe, small R hepatic lobe lesion. Indeterminate R adrenal mass and hemorrhage.  10 mm RML nodule  -Provoked by underlying hepatic malignancy?   -With acute hypoxic resp failure  -Associated R heart strain  -Started on Heparin drip and underwent TPA to each pulm artery  -RRT 10/26 with hypotension and retroperitoneal hemorrhage  -DVT,  US: B/ L>R.  R nearly occlusive thrombus post tibial vein. Left:  Mid femoral vein partially occlusive thrombus. Distal popliteal vein occlusive thrombus.  Peroneal and posterior tibial vein partially/nearly occlusive thrombus.  -Echo 10/24:  TDS.  EF 75%   Grade I LV diastolic dysfunction. Severely dilated RV. Severely reduced RV systolic function c/w RV strain.  Mod dilated RA. Trace TR.   -Echo 10/26:C/w 10/24, RV size dec and fcn improved   -S/p IVC filter 10/25  -Heparin resumed without a bolus, hgb stable    Retroperitoneal hematoma  Assessment & Plan  -CT a/p 10/25: Lg R sided retroperitoneal hemorrhage including R kidney and R adrenal. Component of hemoperitoneum  -With associated acute blood loss anemia requiring prbcs  -Underlying adrenal hemorrhage noted on initial CT, but risk of not anticoagulating outweighed risk of bleed    Liver mass  Assessment & Plan  -Abnormal transaminases  -CT:  7cm hypodense lesion R hepatic lobe, small R hepatic lobe lesion.   -CA 19-9, AFP, hepatitis panel neg.   CEA 3.9 which is theoretically elevated in a non smoker  -MRI: R hepatic lobe looks more like evolving hemorrhage/infarct.  Benign liver cysts. R perinephric and R retroperitoneal hematomas, hemorrhagic R renal cyst. R adrenal hemorrhage with developing hemorrhage L adrenal.  Small R pleural effusion, possible hemothorax  -May not need bx - not malignant appearing.  Deferring to heme re: need.  I will discuss today vs tomorrow    LYNNE (acute kidney injury) (CMS/HCC)  Assessment & Plan  -Prerenal, improving  -Cr peaked at 2.3, now resolved    Acute blood loss anemia  Assessment & Plan  -From retroperitoneal hematoma, needing PRBC transfusion  -Thrombocytopenia as well, improving.  DIC labs noted  -Stable    Pulmonary nodule  Assessment & Plan  -Noted on CT chest 1 cm R perihilar  -Will need repeat CT chest 6-12 months  -Possibly need to bx this per heme    Facial fracture (CMS/HCC)  Assessment & Plan  -CT facial bones:Multiple right-sided facial bone fractures as described under the  comment.  -Seen by Trauma and plastics  -Seen by plastics who discussed potential facial deformity.  Not a candidate for reconstructive surgery, given his need for AC  -I d/w trauma, no need for abx    Syncope  Assessment & Plan  -From submassive PE/R heart strain  -PT, OT    Hyperlipidemia  Assessment & Plan  -Lipitor held with abn LFTs    Myocardial injury  Assessment & Plan  -Trop peaked at 1349, R heart strain from PE    Hypertension  Assessment & Plan  -BP better since resuming Norvasc and Metoprolol  -Holding HCTZ       VTE Assessment: Pad[place]    VTE Prophylaxis Plan: Current anticoagulants:  heparin (porcine) bolus from bag 3,400-6,750 Units, intravenous, q6h PRN  heparin infusion in D5W 100 units/mL, intravenous, Titrated      Code Status: Full Code  Estimated Discharge Date: 11/2/2022  Disposition: [facility]     Nael Rodriguez MD  10/30/2022  2:24 PM

## 2022-10-30 NOTE — ASSESSMENT & PLAN NOTE
-From retroperitoneal hematoma, needing PRBC transfusion  -Thrombocytopenia as well, improving.  DIC labs noted  -Stable

## 2022-10-30 NOTE — ASSESSMENT & PLAN NOTE
-CT a/p 10/25: Lg R sided retroperitoneal hemorrhage including R kidney and R adrenal. Component of hemoperitoneum  -With associated acute blood loss anemia requiring prbcs  -Underlying adrenal hemorrhage noted on initial CT, but risk of not anticoagulating outweighed risk of bleed

## 2022-10-30 NOTE — ASSESSMENT & PLAN NOTE
CT chest: Extensive b/l PE with R heart strain. 7cm hypodense lesion R hepatic lobe, small R hepatic lobe lesion. Indeterminate R adrenal mass and hemorrhage.  10 mm RML nodule  -Provoked by underlying hepatic malignancy?   -With acute hypoxic resp failure  -Associated R heart strain  -Started on Heparin drip and underwent TPA to each pulm artery  -RRT 10/26 with hypotension and retroperitoneal hemorrhage  -DVT,  US: B/ L>R.  R nearly occlusive thrombus post tibial vein. Left: Mid femoral vein partially occlusive thrombus. Distal popliteal vein occlusive thrombus.  Peroneal and posterior tibial vein partially/nearly occlusive thrombus.  -Echo 10/24:  TDS.  EF 75%   Grade I LV diastolic dysfunction. Severely dilated RV. Severely reduced RV systolic function c/w RV strain.  Mod dilated RA. Trace TR.   -Echo 10/26:C/w 10/24, RV size dec and fcn improved   -S/p IVC filter 10/25  -Heparin resumed without a bolus, hgb stable

## 2022-10-30 NOTE — ASSESSMENT & PLAN NOTE
-Abnormal transaminases  -CT:  7cm hypodense lesion R hepatic lobe, small R hepatic lobe lesion.   -CA 19-9, AFP, hepatitis panel neg.   CEA 3.9 which is theoretically elevated in a non smoker  -MRI: R hepatic lobe looks more like evolving hemorrhage/infarct.  Benign liver cysts. R perinephric and R retroperitoneal hematomas, hemorrhagic R renal cyst. R adrenal hemorrhage with developing hemorrhage L adrenal.  Small R pleural effusion, possible hemothorax  -May not need bx - not malignant appearing.  Deferring to heme re: need.  I will discuss today vs tomorrow

## 2022-10-30 NOTE — PROGRESS NOTES
Hematology/Oncology Progress Note       SUBJECTIVE  Patient reports feeling comfortable.  Watching football on the TV in the room.  Denies any chest pain, progr dyspnea, abdominal pain, bleeding, headache, rash, edema, or pain otherwise.  Wife at bedside.    CURRENT MEDS  Current Facility-Administered Medications   Medication Dose Route Frequency    acetaminophen  650 mg oral q4h PRN    albuterol  5 mg nebulization q6h PRN    amLODIPine  5 mg oral q PM    glucose  16-32 g of dextrose oral PRN    Or    dextrose  15-30 g of dextrose oral PRN    Or    glucagon  1 mg intramuscular PRN    Or    dextrose 50 % in water (D50)  25 mL intravenous PRN    heparin (porcine)  40-80 Units/kg intravenous q6h PRN    heparin infusion - MAR calculator by PTT  100-4,000 Units/hr intravenous Titrated    hydrALAZINE  10 mg intravenous q6h PRN    HYDROmorphone  0.25-0.5 mg intravenous q3h PRN    insulin aspart U-100  2-10 Units subcutaneous With meals & nightly    latanoprost  1 drop Both Eyes Nightly    metoprolol succinate XL  50 mg oral q PM    nitroglycerin  0.4 mg sublingual q5 min PRN    oxyCODONE  5 mg oral q6h PRN    pantoprazole  40 mg oral Daily    polyethylene glycol  17 g oral Daily    sennosides-docusate sodium  2 tablet oral Daily    timolol  1 drop Left Eye q AM       VITAL SIGNS  Temp:  [36.6 °C (97.9 °F)-36.8 °C (98.3 °F)] 36.6 °C (97.9 °F)  Heart Rate:  [79-92] 86  Resp:  [18-22] 18  BP: (124-159)/(62-73) 126/67    Intake/Output Summary (Last 24 hours) at 10/30/2022 1506  Last data filed at 10/30/2022 0740  Gross per 24 hour   Intake 1051.78 ml   Output 925 ml   Net 126.78 ml       PHYSICAL EXAM  Constitutional:  Comfortable appearing, watching football on TV from bed, in no acute distress.   HEENT: Oropharynx clear.  Moist mucous membranes.  Scl anict.  Mult fac'l contusions.  Neck: Supple.   Cardiovascular: Normal and regular S1 and S2, no murmurs.  Chest: Sl coarse BS.  Abdomen: Soft, nontender,  nondistended, bowel sounds are present.  No hepatosplenomegaly.  Musculoskeletal: No spinal or CVAT.  Extremities: BLE 1+ edema.  No palp cords. Flora's sign absent.  Neurological: Tired, blunted affect, deferring to wife for most details, but awake, not agitated, and able to answer q's.  Skin: Skin is warm and dry.  No rash.  Hematologic: No petechiae or purpura.  Difuse echymoses.  Lymph Nodes: No cervical lymphadenopathy.    LAB RESULTS  CBC  Results from last 7 days   Lab Units 10/30/22  0640 10/29/22  1633 10/29/22  0436 10/28/22  0540 10/27/22  1605   WBC K/uL 12.47* 11.85* 12.75* 14.55* 15.37*   RBC M/uL 3.17* 3.26* 3.29* 3.38* 3.54*   HEMOGLOBIN g/dL 9.2* 9.6* 9.7* 10.0* 10.6*   HEMATOCRIT % 28.6* 29.4* 29.3* 29.7* 30.8*   MCV fL 90.2 90.2 89.1 87.9 87.0   PLATELETS K/uL 172 156 136* 116* 105*     Diff  Results from last 7 days   Lab Units 10/29/22  0436 10/27/22  1605 10/27/22  0633 10/26/22  2026 10/26/22  0444   NRBC % 0.0 0.0 0.0 0.0 0.0   IMM GRANULOCYTES % 0.7 0.7 1.3 0.9 0.7   NEUTROPHILS % 82.9 84.8 84.5 83.3 82.4   LYMPHOCYTES % 5.8 5.3 4.6 6.0 5.5   MONOCYTES % 9.4 8.9 9.4 9.7 11.2   EOSINOPHILS % 1.0 0.1 0.0 0.0 0.0   BASOPHILS % 0.2 0.2 0.2 0.1 0.2   IMM GRANUCOCYTES ABS K/uL 0.09* 0.10* 0.20* 0.14* 0.12*   NEUTRO ABS K/uL 10.56* 13.04* 12.89* 13.01* 13.19*   PLT MORPHOLOGY   --   --  Normal  --   --      Chemistry   Results from last 7 days   Lab Units 10/30/22  0640 10/29/22  0436 10/28/22  0540 10/27/22  1605   SODIUM mEQ/L 138 139 140 138   POTASSIUM mEQ/L 4.1 4.2 4.0 4.1   CHLORIDE mEQ/L 106 108 106 108   CO2 mEQ/L 24 24 25 22   BUN mg/dL 38* 40* 41* 44*   CREATININE mg/dL 1.0 1.0 1.1 1.1   CALCIUM mg/dL 8.0* 8.0* 8.3* 8.4*   ALBUMIN g/dL  --  3.0* 3.1* 3.3*   BILIRUBIN TOTAL mg/dL  --  1.5* 1.4* 1.3*   ALK PHOS IU/L  --  58 57 48   ALT IU/L  --  92* 124* 159*   AST IU/L  --  23 30 38   GLUCOSE mg/dL 143* 135* 150* 134*     Coag  Results from last 7 days   Lab Units 10/30/22  1406  10/30/22  0640 10/29/22  2356 10/29/22  0436 10/27/22  1605 10/26/22  0444 10/25/22  2139 10/25/22  1855   PROTIME sec  --   --   --   --  14.7* 16.5*  --  16.7*   INR   --   --   --   --  1.2 1.4  --  1.4   PTT sec 93* 83* 61*   < > 32 26   < > 52*    < > = values in this interval not displayed.     Micro  Microbiology Results     Procedure Component Value Units Date/Time    Blood Culture Blood, Venous [[id number]]  (Normal) Collected: 10/25/22 2248    Specimen: Blood, Venous Updated: 10/29/22 0600     Culture No growth at 72 hours    Blood Culture Blood, Venous [[id number]]  (Normal) Collected: 10/25/22 2222    Specimen: Blood, Venous Updated: 10/29/22 0600     Culture No growth at 72 hours    MRSA Screen, Nares Only Nose [[id number]]  (Normal) Collected: 10/25/22 2019    Specimen: Nasal Swab from Nose Updated: 10/26/22 0216     MRSA DNA, Nares Negative    SARS-CoV-2 (COVID-19), PCR Nasopharynx [[id number]]  (Normal) Collected: 10/24/22 0839    Specimen: Nasopharyngeal Swab from Nasopharynx Updated: 10/24/22 0925    Narrative:      The following orders were created for panel order SARS-CoV-2 (COVID-19), PCR Nasopharynx.  Procedure                               Abnormality         Status                     ---------                               -----------         ------                     SARS-COV-2 (COVID-19)/ F...[[id number]]  Normal              Final result                 Please view results for these tests on the individual orders.    SARS-COV-2 (COVID-19)/ FLU A/B, AND RSV, PCR Nasopharynx [[id number]]  (Normal) Collected: 10/24/22 0839    Specimen: Nasopharyngeal Swab from Nasopharynx Updated: 10/24/22 0925     SARS-CoV-2 (COVID-19) Negative     Influenza A Negative     Influenza B Negative     Respiratory Syncytial Virus Negative    Narrative:      Testing performed using real-time PCR for detection of COVID-19. EUA approved validation studies performed on site.         IMAGING  CT HEAD WITHOUT IV  CONTRAST    Result Date: 10/25/2022  IMPRESSION: 1.  No acute intracranial hemorrhage, large territorial infarct, mass effect or midline shift is identified. 2.  Multiple right maxillary sinus and zygomatic arch fractures with an air-fluid level within the right maxillary sinus.     CT HEAD WITHOUT IV CONTRAST    Result Date: 10/24/2022  IMPRESSION: 1.  No posttraumatic intracranial abnormality. 2. Chronic changes noted under the comment.    CT FACIAL BONES WITHOUT IV CONTRAST    Result Date: 10/24/2022  IMPRESSION: Multiple right-sided facial bone fractures as described under the comment.    CT CERVICAL SPINE WITHOUT IV CONTRAST    Result Date: 10/24/2022  IMPRESSION: 1.  No acute fractures. As clinically indicated, MRI of the cervical spine is recommended for further evaluation. 2.  Other incidental findings noted under the comment.     MRI ABDOMEN WITH AND WITHOUT CONTRAST    Result Date: 10/29/2022  IMPRESSION: 1. Right hepatic lobe mass on recent CT is most compatible with evolving hemorrhage/infarct centered in segment 7. Additional hemorrhagic/infarcted focus in central segment 5/8, stable in retrospect. Additional benign liver cysts, largest in the left lobe measuring up to 3.9 cm and containing blood products. 2. Similar appearance of right perinephric and right retroperitoneal hematomas with hemorrhagic right renal cyst. 3. Similar right adrenal hemorrhage with developing hemorrhage into the left adrenal gland. 4. Small right pleural effusion with findings suspicious for a component of hemothorax. This could be confirmed with thoracentesis, if necessary. 5. No findings on subtraction imaging to suggest papo active bleeding. Overall, there are no findings suspicious for malignancy; specifically, no suspicious hepatic observations. Given motion degradation on extensive findings, follow-up contrast-enhanced MRI is recommended to assess for resolution of findings and exclude underlying hemorrhagic  neoplasm.    ULTRASOUND GUIDED VASCULAR ACCESS    Result Date: 10/26/2022  IMPRESSION:   Successful placement of a retrievable infrarenal IVC Filter. Device: Argon Option Elite IVC Filter COMMENT: PROCEDURE: 1. Ultrasound-guided access of the right common femoral vein. 2. IVC cavagram 3. IVC filter placement RADIOLOGIST:  Sarkis Gerardo MD ANESTHESIA:  Lidocaine 1% CONTRAST:  CO2 FLUORO TIME:  0.8 min REFERENCE AIR KERMA: 40 mGy MEDICATIONS:  none DESCRIPTION OF PROCEDURE:  Consent was obtained following explanation of risks and benefits of the procedure. The right groin was prepped and draped in the usual sterile fashion. The right common femoral vein was noted to be compressible and patent on gray scale ultrasound. Local anesthesia was provided. The vein was then accessed with a  21-gauge needle under ultrasound guidance, and an .018 wire was advanced centrally. An ultrasound-guided access image was saved to PACS. Exchange was made for a microsheath, .035 wire, and multi-sidehole sheath, which was positioned at the iliocaval junction. IVC cavagram was performed with CO2. Renal vein insertion sites were localized. An Argon Option Elite IVC Filter was deployed infrarenally without complication. Wires and catheters were removed and hemostasis was achieved. The patient remained stable throughout the procedure. The number of guidewires used, and their integrity, was confirmed at the end of the procedure.     ULTRASOUND GUIDED VASCULAR ACCESS    Result Date: 10/25/2022  IMPRESSION:   Successful bilateral pulmonary arteriography and catheter-directed thrombolysis. COMMENT: PROCEDURE:  1. Ultrasound guided access of the central right common femoral vein. 2. Subselective right lower lobe pulmonary arteriography. 3. Subselective catheter directed thrombolysis of the right pulmonary arterial thrombus extending into the right lower lobe branch. 4. Subselective left lower lobe pulmonary arteriography. 5. Subselective catheter  directed thrombolysis of the left pulmonary arterial thrombus extending into the left lower lobe branch. RADIOLOGIST: Sarkis Gerardo MD ANESTHESIA:  Local 1% lidocaine. FLUORO TIME:  7.7 minutes REFERENCE AIR KERMA: 44 mGy CONTRAST:  20 cc of Omnipaque 300 DESCRIPTION OF PROCEDURE:    Written informed consent was obtained following explanation of risks and benefits of the procedure. Specifically, risks of cardiac arrhythmia, pulmonary arterial injury, and intracranial hemorrhage were discussed with the patient. Additionally, significantly elevated risk of abdominal hemorrhage related to known adrenal hemorrhage was clearly discussed. The patient was placed supine on the IR procedure table. Maximal sterile barrier precautions were utilized during catheter insertion including aseptic technique, the use of cap, mask, sterile gown, sterile gloves, sterile drapes, hand hygiene, and patient cutaneous antisepsis with chlorhexidene 2%. Grayscale and color Doppler ultrasound evaluation of the right common femoral vein was performed. The right common femoral vein was patent and compressible. The right groin was prepped and draped in usual sterile fashion. Local anesthesia was administered with 1% lidocaine. Under ultrasound guidance, the right common femoral vein was accessed with a 21-gauge needle. An 018 wire was advanced centrally. Sequential exchange was made for a 5 Azeri a microcatheter sheath, 035 Ness wire, and 5 Azeri vascular sheath. The sheath was secured with 0-0 silk. Utilizing an H1 catheter with Glidewire, the right main pulmonary artery was selected with successful subselection of a right lower lobe pulmonary arterial branch. Subselective right lower lobe pulmonary arteriography was performed, confirming satisfactory position within the right lower lobe branch distal to the main thrombus. Over a J Phelan wire, exchange was made for the Darrion-Svetlana flush catheter. 10 mg of TPA was then successfully  infused into the right main pulmonary artery, extending into the right lower lobe pulmonary arterial branch. Utilizing an H1 catheter with Glidewire, the left main pulmonary artery was selected with successful subselection of a left lower lobe pulmonary arterial branch. Subselective left lower lobe pulmonary arteriography was performed, confirming satisfactory position within the left lower lobe branch distal to the main thrombus. Over a J Phelan wire, exchange was made for the Darrion-Macnamara flush catheter. 10 mg of TPA was then successfully infused into the left main pulmonary artery, extending into the left lower lobe pulmonary arterial branch. Catheter and sheath were removed. Hemostasis of the right common femoral vein was achieved with manual compression.     CT ABDOMEN PELVIS WITH IV CONTRAST    Result Date: 10/24/2022  IMPRESSION: 1.  Extensive acute pulmonary arterial emboli involving the bilateral main and multiple bilateral lobar, segmental and subsegmental branches, as detailed above. Flattening of the interventricular septum with dilatation of the right ventricle suggestive of right-sided heart strain. No evidence of acute pulmonary infarct. 2.  Large, indeterminate infiltrative hypodense lesion within the right hepatic lobe measuring up to 7 cm warranting further assessment with contrast-enhanced MRI. Differential diagnosis includes metastatic disease, a primary malignancy as well as abscess. Small adjacent similar appearing indeterminate right hepatic lobe lesion. 3.  Indeterminate right adrenal mass with findings consistent with right adrenal hemorrhage. 4.  A 10 mm groundglass nodule within the right middle lobe. A follow-up CT scan of the chest in 6-12 months is recommended as per Fleischner guidelines. 5.  Additional incidental findings including ectasia of ascending thoracic aorta, enlarged prostate gland, coronary arterial atherosclerotic vascular disease and colonic diverticulosis without  evidence of diverticulitis. Findings and recommendations discussed with SUZANNA Muñoz by Dr. Thompson by telephone at 9:54 AM and 10:21 AM on 10/24/2022. Fleischner Society 2017 Guidelines for Management of Incidentally Detected Pulmonary Nodules in Adults. (Subsolid Nodules) Single ground glass: <6 mm - No routine follow-up >/= 6 mm - CT at 6-12 months to confirm persistence, then CT every 2 years until 5 years (In certain suspicious nodules < 6 mm, consider follow-up at 2 and 4 years.  If solid component(s) or growth develops, consider resection (Recommendations 3A and 4A)     IR FILTER PLACEMENT    Result Date: 10/26/2022  IMPRESSION:   Successful placement of a retrievable infrarenal IVC Filter. Device: Argon Option Elite IVC Filter COMMENT: PROCEDURE: 1. Ultrasound-guided access of the right common femoral vein. 2. IVC cavagram 3. IVC filter placement RADIOLOGIST:  Sarkis Gerardo MD ANESTHESIA:  Lidocaine 1% CONTRAST:  CO2 FLUORO TIME:  0.8 min REFERENCE AIR KERMA: 40 mGy MEDICATIONS:  none DESCRIPTION OF PROCEDURE:  Consent was obtained following explanation of risks and benefits of the procedure. The right groin was prepped and draped in the usual sterile fashion. The right common femoral vein was noted to be compressible and patent on gray scale ultrasound. Local anesthesia was provided. The vein was then accessed with a  21-gauge needle under ultrasound guidance, and an .018 wire was advanced centrally. An ultrasound-guided access image was saved to PACS. Exchange was made for a microsheath, .035 wire, and multi-sidehole sheath, which was positioned at the iliocaval junction. IVC cavagram was performed with CO2. Renal vein insertion sites were localized. An Argon Option Elite IVC Filter was deployed infrarenally without complication. Wires and catheters were removed and hemostasis was achieved. The patient remained stable throughout the procedure. The number of guidewires used, and their integrity, was  confirmed at the end of the procedure.     CT ANGIOGRAPHY CHEST PULMONARY EMBOLISM WITH IV CONTRAST    Result Date: 10/24/2022  IMPRESSION: 1.  Extensive acute pulmonary arterial emboli involving the bilateral main and multiple bilateral lobar, segmental and subsegmental branches, as detailed above. Flattening of the interventricular septum with dilatation of the right ventricle suggestive of right-sided heart strain. No evidence of acute pulmonary infarct. 2.  Large, indeterminate infiltrative hypodense lesion within the right hepatic lobe measuring up to 7 cm warranting further assessment with contrast-enhanced MRI. Differential diagnosis includes metastatic disease, a primary malignancy as well as abscess. Small adjacent similar appearing indeterminate right hepatic lobe lesion. 3.  Indeterminate right adrenal mass with findings consistent with right adrenal hemorrhage. 4.  A 10 mm groundglass nodule within the right middle lobe. A follow-up CT scan of the chest in 6-12 months is recommended as per Fleischner guidelines. 5.  Additional incidental findings including ectasia of ascending thoracic aorta, enlarged prostate gland, coronary arterial atherosclerotic vascular disease and colonic diverticulosis without evidence of diverticulitis. Findings and recommendations discussed with SUZANNA Muñoz by Dr. Thompson by telephone at 9:54 AM and 10:21 AM on 10/24/2022. Fleischner Society 2017 Guidelines for Management of Incidentally Detected Pulmonary Nodules in Adults. (Subsolid Nodules) Single ground glass: <6 mm - No routine follow-up >/= 6 mm - CT at 6-12 months to confirm persistence, then CT every 2 years until 5 years (In certain suspicious nodules < 6 mm, consider follow-up at 2 and 4 years.  If solid component(s) or growth develops, consider resection (Recommendations 3A and 4A)     IR THROMBOLYSIS    Result Date: 10/25/2022  IMPRESSION:   Successful bilateral pulmonary arteriography and catheter-directed  thrombolysis. COMMENT: PROCEDURE:  1. Ultrasound guided access of the central right common femoral vein. 2. Subselective right lower lobe pulmonary arteriography. 3. Subselective catheter directed thrombolysis of the right pulmonary arterial thrombus extending into the right lower lobe branch. 4. Subselective left lower lobe pulmonary arteriography. 5. Subselective catheter directed thrombolysis of the left pulmonary arterial thrombus extending into the left lower lobe branch. RADIOLOGIST: Sarkis Gerardo MD ANESTHESIA:  Local 1% lidocaine. FLUORO TIME:  7.7 minutes REFERENCE AIR KERMA: 44 mGy CONTRAST:  20 cc of Omnipaque 300 DESCRIPTION OF PROCEDURE:    Written informed consent was obtained following explanation of risks and benefits of the procedure. Specifically, risks of cardiac arrhythmia, pulmonary arterial injury, and intracranial hemorrhage were discussed with the patient. Additionally, significantly elevated risk of abdominal hemorrhage related to known adrenal hemorrhage was clearly discussed. The patient was placed supine on the IR procedure table. Maximal sterile barrier precautions were utilized during catheter insertion including aseptic technique, the use of cap, mask, sterile gown, sterile gloves, sterile drapes, hand hygiene, and patient cutaneous antisepsis with chlorhexidene 2%. Grayscale and color Doppler ultrasound evaluation of the right common femoral vein was performed. The right common femoral vein was patent and compressible. The right groin was prepped and draped in usual sterile fashion. Local anesthesia was administered with 1% lidocaine. Under ultrasound guidance, the right common femoral vein was accessed with a 21-gauge needle. An 018 wire was advanced centrally. Sequential exchange was made for a 5 Mongolian a microcatheter sheath, 035 Ness wire, and 5 Mongolian vascular sheath. The sheath was secured with 0-0 silk. Utilizing an H1 catheter with Glidewire, the right main pulmonary artery  was selected with successful subselection of a right lower lobe pulmonary arterial branch. Subselective right lower lobe pulmonary arteriography was performed, confirming satisfactory position within the right lower lobe branch distal to the main thrombus. Over a J Phelan wire, exchange was made for the Darrion-Macnamara flush catheter. 10 mg of TPA was then successfully infused into the right main pulmonary artery, extending into the right lower lobe pulmonary arterial branch. Utilizing an H1 catheter with Glidewire, the left main pulmonary artery was selected with successful subselection of a left lower lobe pulmonary arterial branch. Subselective left lower lobe pulmonary arteriography was performed, confirming satisfactory position within the left lower lobe branch distal to the main thrombus. Over a J Phelan wire, exchange was made for the Darrion-Macnamara flush catheter. 10 mg of TPA was then successfully infused into the left main pulmonary artery, extending into the left lower lobe pulmonary arterial branch. Catheter and sheath were removed. Hemostasis of the right common femoral vein was achieved with manual compression.     X-RAY CHEST 1 VIEW    Result Date: 10/26/2022  IMPRESSION: No acute cardiopulmonary abnormality seen.. COMMENT:    A single AP view of the chest was performed. Comparison: AP chest x-ray from 10/24/2022. The heart size and pulmonary vasculature remain within normal limits. The lung fields appear clear, and no pleural effusions are seen. No hilar abnormality is identified. There is mild tortuosity of the descending thoracic aorta. An electronic device projects over the left lung base projecting over the left anterior left fourth rib, likely a loop recorder. When compared to the prior study, there has been no significant interval change.     X-RAY CHEST 1 VIEW    Result Date: 10/24/2022  IMPRESSION: No evidence of acute pulmonary disease.     Ultrasound venous leg bilateral    Result Date:  10/25/2022  IMPRESSION: Examination positive for deep vein thrombosis bilaterally as described, with greater than right.     CT CHEST ABDOMEN PELVIS WITHOUT IV CONTRAST    Result Date: 10/25/2022  IMPRESSION: 1.  Findings concerning for a large right-sided retroperitoneal hemorrhage including the right kidney and right adrenal gland. A small to moderate amount of pelvic ascites is also noted which appears mildly hyperdense and may reflect a component of hemoperitoneum. 2.  Additional chronic and incidental findings as described above. Finding:    Unexpected significant hemorrhage (chest, abdomen, pelvis)   Acuity: Critical  Status:  CLOSED Critical read back was performed and results were read back by Dr. Simms, on 10/25/2022 8:10 PM       ASSESSMENT & PLAN  76 yo male presenting with submassive pulmonary embolism and bilateral DVT, w/ c/f liver, adrenal, and retroperitoneal hemorrhage following catheter directed thrombolysis and IVC filter placement 10/25.  Also w/ 10mm ground glass lung nodule on imaging as well.     DVT/PE, s/p lytic tx to DVT, s/p IVC filter  Course c/b mult hemorrhages (liver, adrenal, RP)  anticoag'n held and Hb stabilized  Still requiring 4 L supplemental oxygen due to submassive PE  UF hep gtt resumed without bolus 10/29/22, with CBCs every 12 hours, with low threshold to hold should hemoglobin fall once again  *hb sl lower, if trend cont's, favor stopping Hep gtt  No mass in liver on MRI  Lung mass, t/c bx once stabilizes (high c/f Trousseau syndrome with malignancy driving virulent clot burden.)  Cont to titrate pain and bowel regimens prn  Will cont to follow; d/w'd pt and wife today  Dr. Soto returns 10/31/22    Becki Holt MD

## 2022-10-31 LAB
APTT PPP: 66 SEC (ref 23–35)
APTT PPP: 75 SEC (ref 23–35)
BACTERIA BLD CULT: NORMAL
BACTERIA BLD CULT: NORMAL
ERYTHROCYTE [DISTWIDTH] IN BLOOD BY AUTOMATED COUNT: 13.5 % (ref 11.6–14.4)
GLUCOSE BLD-MCNC: 129 MG/DL (ref 70–99)
GLUCOSE BLD-MCNC: 137 MG/DL (ref 70–99)
GLUCOSE BLD-MCNC: 140 MG/DL (ref 70–99)
GLUCOSE BLD-MCNC: 155 MG/DL (ref 70–99)
HAPTOGLOB SERPL-MCNC: 186 MG/DL (ref 41–203)
HCT VFR BLDCO AUTO: 29.8 % (ref 40.1–51)
HGB BLD-MCNC: 9.5 G/DL (ref 13.7–17.5)
MCH RBC QN AUTO: 28.7 PG (ref 28–33.2)
MCHC RBC AUTO-ENTMCNC: 31.9 G/DL (ref 32.2–36.5)
MCV RBC AUTO: 90 FL (ref 83–98)
PDW BLD AUTO: 9.1 FL (ref 9.4–12.4)
PLATELET # BLD AUTO: 227 K/UL (ref 150–350)
POCT TEST: ABNORMAL
RBC # BLD AUTO: 3.31 M/UL (ref 4.5–5.8)
WBC # BLD AUTO: 11.95 K/UL (ref 3.8–10.5)

## 2022-10-31 PROCEDURE — 63700000 HC SELF-ADMINISTRABLE DRUG

## 2022-10-31 PROCEDURE — 21400000 HC ROOM AND CARE CCU/INTERMEDIATE

## 2022-10-31 PROCEDURE — 97110 THERAPEUTIC EXERCISES: CPT | Mod: GP

## 2022-10-31 PROCEDURE — 99233 SBSQ HOSP IP/OBS HIGH 50: CPT | Performed by: INTERNAL MEDICINE

## 2022-10-31 PROCEDURE — 85027 COMPLETE CBC AUTOMATED: CPT | Performed by: INTERNAL MEDICINE

## 2022-10-31 PROCEDURE — 36415 COLL VENOUS BLD VENIPUNCTURE: CPT | Performed by: INTERNAL MEDICINE

## 2022-10-31 PROCEDURE — 97116 GAIT TRAINING THERAPY: CPT | Mod: GP

## 2022-10-31 PROCEDURE — 97535 SELF CARE MNGMENT TRAINING: CPT | Mod: GO

## 2022-10-31 PROCEDURE — 85730 THROMBOPLASTIN TIME PARTIAL: CPT | Performed by: INTERNAL MEDICINE

## 2022-10-31 PROCEDURE — 63600000 HC DRUGS/DETAIL CODE: Performed by: INTERNAL MEDICINE

## 2022-10-31 PROCEDURE — 63700000 HC SELF-ADMINISTRABLE DRUG: Performed by: HOSPITALIST

## 2022-10-31 RX ADMIN — AMLODIPINE BESYLATE 5 MG: 5 TABLET ORAL at 18:03

## 2022-10-31 RX ADMIN — TIMOLOL MALEATE 1 DROP: 5 SOLUTION/ DROPS OPHTHALMIC at 09:16

## 2022-10-31 RX ADMIN — SENNOSIDES AND DOCUSATE SODIUM 2 TABLET: 50; 8.6 TABLET ORAL at 09:13

## 2022-10-31 RX ADMIN — POLYETHYLENE GLYCOL 3350 17 G: 17 POWDER, FOR SOLUTION ORAL at 09:13

## 2022-10-31 RX ADMIN — ACETAMINOPHEN 650 MG: 325 TABLET ORAL at 09:30

## 2022-10-31 RX ADMIN — HEPARIN SODIUM AND DEXTROSE 1950 UNITS/HR: 10000; 5 INJECTION INTRAVENOUS at 12:38

## 2022-10-31 RX ADMIN — INSULIN ASPART 2 UNITS: 100 INJECTION, SOLUTION INTRAVENOUS; SUBCUTANEOUS at 18:03

## 2022-10-31 RX ADMIN — METOPROLOL SUCCINATE 50 MG: 50 TABLET, FILM COATED, EXTENDED RELEASE ORAL at 18:03

## 2022-10-31 RX ADMIN — PANTOPRAZOLE SODIUM 40 MG: 40 TABLET, DELAYED RELEASE ORAL at 09:13

## 2022-10-31 ASSESSMENT — COGNITIVE AND FUNCTIONAL STATUS - GENERAL
HELP NEEDED FOR BATHING: 2 - A LOT
CLIMB 3 TO 5 STEPS WITH RAILING: 1 - TOTAL
WALKING IN HOSPITAL ROOM: 2 - A LOT
HELP NEEDED FOR PERSONAL GROOMING: 3 - A LITTLE
DRESSING REGULAR LOWER BODY CLOTHING: 2 - A LOT
STANDING UP FROM CHAIR USING ARMS: 3 - A LITTLE
DRESSING REGULAR UPPER BODY CLOTHING: 3 - A LITTLE
AFFECT: WFL;FLAT/BLUNTED AFFECT
EATING MEALS: 3 - A LITTLE
MOVING TO AND FROM BED TO CHAIR: 3 - A LITTLE
TOILETING: 3 - A LITTLE

## 2022-10-31 NOTE — ASSESSMENT & PLAN NOTE
-CT a/p 10/25: Lg R sided retroperitoneal hemorrhage including R kidney and R adrenal. Component of hemoperitoneum  -With associated acute blood loss anemia requiring prbcs  -Underlying adrenal hemorrhage noted on initial CT, but risk of not anticoagulating outweighed risk of bleed  -Will need to follow to assure no adrenal insufficiency

## 2022-10-31 NOTE — CONSULTS
Chart rev'd and pt seen for RD screen, LOS x 7 days. Admitted after fall at home, suffered facial fxs. Seen sitting up in bed, reports eating 3 meals/day her. Had 75% dinner last night, 100% breakfast this am. No n/v/d. BM yesterday. BMI is 25, overwt. Pt does not appear malnourished. Pt is not at nutrition risk at this time.      RUTH GamezN

## 2022-10-31 NOTE — PROGRESS NOTES
OCCUPATIONAL THERAPY ACUTE CARE - TREATMENT NOTE     Patient:  Tim Humphries  Location:  [facility] 3 [address] 0305  MRN:  [id number]  Today's date:  10/31/2022    Tim is a 77 y.o. male admitted on 10/24/2022 with Fall, initial encounter [W19.XXXA]  Chin laceration, initial encounter [S01.81XA]  Acute saddle pulmonary embolism with acute cor pulmonale (CMS/HCC) [I26.02]  Syncope, unspecified syncope type [R55]. Principal problem is Pulmonary embolism with acute cor pulmonale (CMS/HCC).    Past Medical History  Tim has a past medical history of Dyslipidemia, GERD (gastroesophageal reflux disease), Glaucoma, Heart murmur, HTN (hypertension), Implantable loop recorder present, Pericarditis, and Sleep apnea.    History of Present Illness   Tim Humphries is a 77 y.o. male with a past medical history of hypertension, GERD, FLAVIA, hyperlipidemia who presents with syncope, fall.  Wife is at bedside who assisted with history.  For 1 is only patient had COVID about 2-3 months ago which he fully recovered without event.  After he recovered he went to get his COVID-vaccine about 2 weeks ago.  For the last 4 to 5 days patient has noted increased dyspnea with exertion and shortness of breath.  Today while coming down stair he passed out and fell down half a flight of stair.  Wife was close by it and found him down on the floor unresponsive.  Wife report patient was unconscious for about 10 minutes.  When EMS arrived patient was found to have grunting respiration with O2 sat at 86% on room air.  Was then brought to [place] emergency room.  Patient also report he has some left leg swelling approximately 3 to 4 weeks ago which resolved by itself. In ER he was found to have extensive PE with right heart strain.  He was found to be hypoxic and put on 4 L oxygen.  His blood pressure remained stable currently.  Mentation intact.  Patient CAT scan also revealed multiple right displaced facial fracture as well as adrenal  "hemorrhage.    10/26 IVC Filter placement      Session details: daily treatment/progress note   occupational therapy    Start time:   1036  End time:  1112  Time ca min    General Observations  Start: Pt received supine in bed; he was agreeable to therapy and no issues or concerns identified by nurse prior to session   End: Pt reclined in chair at end of session; call bell in reach, chair alarm activated, all needs met, personal items in reach and nurse notified of patient performance, patient's position and patient's response to activity    Precautions:   fall and oxygen therapy device and L/min      VITALS     OT Vitals    Date/Time Pulse SpO2 Pt Activity O2 Therapy O2 Del Method O2 Flow Rate BP BP Location BP Method Pt Position [facility]   10/31/22 1045 76 95 % -- Supplemental oxygen Nasal cannula 2 L/min 132/75 Left upper arm Automatic Sitting CLG   10/31/22 1100 77 95 % At rest Supplemental oxygen Nasal cannula 2 L/min 132/75 Left upper arm Automatic Lying AC      OT Pain    Date/Time Pain Type Side/Orientation Location Rating: Rest Rating: Activity Interventions [facility]   10/31/22 1045 Pain Assessment right knee 0 3 position adjusted CLG          PRIOR LEVEL OF FUNCTION AND LIVING ENVIRONMENT     Prior Level of Function    Flowsheet Row Most Recent Value   Ambulation independent   Transferring independent   Toileting independent   Bathing independent   Dressing independent   Prior Level of Function Comment indep with no devices for ADL, avid \"walker jogger\"   Assistive Device Currently Used at Home none          Prior Living Environment    Flowsheet Row Most Recent Value   Current Living Arrangements home   Living Environment Comment 2 story house with second bedroom/bathroom walk in shower - reports recently added first floor bed/bath but does not have furniture yet          Occupational Profile    Flowsheet Row Most Recent Value   Reason for Services/Referral d/c needs   Occupational History/Life Experiences " "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 \"walker jogger\" ,  doctorate in engineering   Performance Patterns indep w/ ADL no devices   Patient Goals dc home          OBJECTIVE     Cognition   Affect/MentalStatus: flat/blunted affect  Orientation: oriented x 3  Follow Commands: follows one step commands over 90% of the time and with demonstrated repetition of directions required  Cognitive Function: safety deficit: minimal deficit (insight into deficits/self-awareness, safety precautions awareness and safety precautions follow-through)    Motor Skills: functional activity tolerance/endurance (Fair)    Balance   Static Dynamic   Sitting Fair (maintains balance unsupported without LOB and without UE support) Fair (CGA/SBA; unsupported and minimal difficulty crossing midline without LOB)   Standing Poor + (min A to maintain balance) Poor + (min A to maintain balance or right self; unable to weight shift)   General Comments:      Functional Transfers   [place] Level DME / AD Cues / Comments   Supine to Sit minimum assist and 1 person assist head of bed elevated right   Bed to/from Chair minimum assist and 1 person assist none To left with hand held assist    Sit to Stand minimum assist and 1 person assist none and walker (front-wheeled) MIN A for stability with and without RW   Stand to Sit minimum assist and 1 person assist none To chair    Toilet  minimum assist and 1 person assist grab bars/safety frame In bathroom    Functional mobility in room/ bathroom with MIN A for stability using RW.     Activities of Daily Living   [place] Level Cues / DME / Comments   Dressing: Upper Body                      Lower Body minimum assist Hospital gown,     dependent Donning bilateral socks    Grooming/hygiene minimum assist MIN A for stability standing sink side   Toileting minimum assist Pt demonstrated ability to complete toileting task of clothing management and hygiene   MIN A for stability +krishna while seated        AM-PAC  - ADL (Current " Function)     Putting on/taking off regular LB clothing 2 - A Lot   Bathing 2 - A Lot   Toileting 3 - A Little   Putting on/taking off regular UB clothing 3 - A Little   Help for taking care of personal grooming 3 - A Little   Eating meals 3 - A Little   AM-PAC  ADL Score 16       ASSESSMENT AND RECOMMENDATIONS     Progress Summary  OT treatment session complete. ADL [facility] 16 . Patient continues to present with functional limitations affecting areas of ADLs and functional transfers. Pt making good progress toward all goals. Currently, patient performs bed mobitiy Min Ax1 assist, functional transfers MIN A for stability using RW, total assist for donning bilateral socks, and MIN A for stability for grooming/ toileting in bathroom. Pt would benefit from continued skilled OT services to maximize safety and independence with daily tasks. Recommend discharge to  SNF when medically stable.     Therapy Plan  Rehab potential:  fair, will monitor progress closely  Therapy frequency:  3-5 times/wk  Therapy interventions:  occupation/activity based interventions, ROM/therapeutic exercise, transfer/mobility retraining, adaptive equipment training, BADL retraining    Discharge Plan  Recommended discharge:  skilled nursing facility  Anticipated equipment needs:  none (TBD rehab)    Patient/Family Therapy Goal Statement: get better    OT Goals    Flowsheet Row Most Recent Value   Bed Mobility Goal 1    Activity/Assistive Device bed mobility activities, all at 10/28/2022 1127   [place] supervision required at 10/28/2022 1127   Time Frame by discharge at 10/28/2022 1127   Progress/Outcome goal ongoing at 10/28/2022 1127   Transfer Goal 1    Activity/Assistive Device sit-to-stand/stand-to-sit, bed-to-chair/chair-to-bed, toilet at 10/28/2022 1127   [place] supervision required at 10/28/2022 1127   Time Frame by discharge at 10/28/2022 1127   Progress/Outcome goal ongoing at 10/28/2022 1127   Dressing Goal 1     Activity/Adaptive Equipment dressing skills, all at 10/28/2022 1127   [place] supervision required at 10/28/2022 1127   Time Frame by discharge at 10/28/2022 1127   Progress/Outcome goal ongoing at 10/28/2022 1127   Toileting Goal 1    Activity/Assistive Device toileting skills, all at 10/28/2022 1127   [place] supervision required at 10/28/2022 1127   Time Frame by discharge at 10/28/2022 1127   Progress/Outcome goal ongoing at 10/28/2022 1127

## 2022-10-31 NOTE — ASSESSMENT & PLAN NOTE
-Abnormal transaminases  -CT:  7cm hypodense lesion R hepatic lobe, small R hepatic lobe lesion.   -CA 19-9, AFP, hepatitis panel neg.   CEA 3.9 which is theoretically elevated in a non smoker  -MRI: R hepatic lobe looks more like evolving hemorrhage/infarct.  Benign liver cysts. R perinephric and R retroperitoneal hematomas, hemorrhagic R renal cyst. R adrenal hemorrhage with developing hemorrhage L adrenal.  Small R pleural effusion, possible hemothorax  -I d/w Dr Soto, no plan for bx

## 2022-10-31 NOTE — PROGRESS NOTES
Critical Care/Pulmonary  Daily Progress Note        Subjective    Interval History:    No acute events overnight.  Tolerating heparin infusion thus far  Tolerating 2L NC  Denies chest pain, SOB, abdominal pain..       Objective       Allergies: Patient has no known allergies.    CURRENT MEDS    acetaminophen, 650 mg, oral, q4h PRN    albuterol, 5 mg, nebulization, q6h PRN    amLODIPine, 5 mg, oral, q PM    glucose, 16-32 g of dextrose, oral, PRN **OR** dextrose, 15-30 g of dextrose, oral, PRN **OR** glucagon, 1 mg, intramuscular, PRN **OR** dextrose 50 % in water (D50), 25 mL, intravenous, PRN    heparin (porcine), 40-80 Units/kg, intravenous, q6h PRN    heparin infusion - MAR calculator by PTT, 100-4,000 Units/hr, intravenous, Titrated    hydrALAZINE, 10 mg, intravenous, q6h PRN    HYDROmorphone, 0.25-0.5 mg, intravenous, q3h PRN    insulin aspart U-100, 2-10 Units, subcutaneous, With meals & nightly    latanoprost, 1 drop, Both Eyes, Nightly    metoprolol succinate XL, 50 mg, oral, q PM    nitroglycerin, 0.4 mg, sublingual, q5 min PRN    oxyCODONE, 5 mg, oral, q6h PRN    pantoprazole, 40 mg, oral, Daily    polyethylene glycol, 17 g, oral, Daily    sennosides-docusate sodium, 2 tablet, oral, Daily    timolol, 1 drop, Left Eye, q AM    VITAL SIGNS  Vitals:    10/31/22 0352 10/31/22 0627 10/31/22 0700 10/31/22 0933   BP: 140/71 (!) 142/60     BP Location: Left upper arm Left upper arm     Patient Position: Lying Lying     Pulse:  78 64    Resp: 18 18     Temp: 36.5 °C (97.7 °F) 36.8 °C (98.2 °F)     TempSrc: Oral Oral     SpO2: (!) 91% 95%  93%   Weight:       Height:         Oxygen:  Oxygen Therapy: Supplemental oxygen  O2 Delivery Method: Nasal cannula     O2 Flow Rate (L/min): 2 L/min     INTAKE/OUTPUT    Intake/Output Summary (Last 24 hours) at 10/31/2022 1041  Last data filed at 10/31/2022 0930  Gross per 24 hour   Intake 480 ml   Output 1075 ml   Net -595 ml     Lines, Drains, Airways,  Wounds:  Peripheral IV (Adult) 10/24/22 Anterior;Left Wrist (Active)   Number of days: 3       Peripheral IV (Adult) 10/24/22 Posterior;Right Forearm (Active)   Number of days: 3       Peripheral IV (Adult) 10/24/22 Left;Posterior Forearm (Active)   Number of days: 3       External Urinary Catheter Small (Active)   Number of days: 1       Wound Puncture Anterior;Proximal;Right Thigh (Active)   Number of days: 1       Wound Right Chin (Active)   Number of days: 2       Catheterization Site - Venous Right Femoral 5 Fr.;Removed in lab (Active)   Number of days: 2         Physical Exam:  Physical Exam  Vitals and nursing note reviewed.   Constitutional:       General: He is not in acute distress.  HENT:      Head: Normocephalic.      Comments: Scattered facial ecchymosis     Mouth/Throat:      Mouth: Mucous membranes are moist.   Cardiovascular:      Rate and Rhythm: Normal rate and regular rhythm.      Pulses: Normal pulses.      Heart sounds: Normal heart sounds, S1 normal and S2 normal. No murmur heard.    No friction rub. No gallop.   Pulmonary:      Effort: Pulmonary effort is normal.      Breath sounds: Normal breath sounds. No wheezing, rhonchi or rales.   Abdominal:      General: Bowel sounds are normal. There is no distension.      Palpations: Abdomen is soft.      Tenderness: There is no abdominal tenderness.   Musculoskeletal:      Cervical back: Neck supple.      Right lower leg: No edema.      Left lower leg: No edema.   Skin:     General: Skin is warm and dry.      Capillary Refill: Capillary refill takes less than 2 seconds.      Findings: Ecchymosis present.      Comments: Scattered ecchymosis   Neurological:      General: No focal deficit present.      Mental Status: He is alert and oriented to person, place, and time.   Psychiatric:         Behavior: Behavior is cooperative.          Labs:  See Labs Below:  CBC  Results from last 7 days   Lab Units 10/31/22  0928 10/30/22  1753 10/30/22  0640   WBC  K/uL 11.95* 12.49* 12.47*   RBC M/uL 3.31* 3.31* 3.17*   HEMOGLOBIN g/dL 9.5* 9.5* 9.2*   HEMATOCRIT % 29.8* 29.9* 28.6*   MCV fL 90.0 90.3 90.2   MCH pg 28.7 28.7 29.0   MCHC g/dL 31.9* 31.8* 32.2   PLATELETS K/uL 227 229 172   RDW % 13.5 13.7 13.8   MPV fL 9.1* 9.0* 9.4     BMP  Results from last 7 days   Lab Units 10/30/22  0640 10/29/22  0436 10/28/22  0540 10/27/22  1605   SODIUM mEQ/L 138 139 140 138   POTASSIUM mEQ/L 4.1 4.2 4.0 4.1   CHLORIDE mEQ/L 106 108 106 108   CO2 mEQ/L 24 24 25 22   BUN mg/dL 38* 40* 41* 44*   CREATININE mg/dL 1.0 1.0 1.1 1.1   CALCIUM mg/dL 8.0* 8.0* 8.3* 8.4*   ALBUMIN g/dL  --  3.0* 3.1* 3.3*   BILIRUBIN TOTAL mg/dL  --  1.5* 1.4* 1.3*   ALK PHOS IU/L  --  58 57 48   ALT IU/L  --  92* 124* 159*   AST IU/L  --  23 30 38   GLUCOSE mg/dL 143* 135* 150* 134*     Coag  Results from last 7 days   Lab Units 10/31/22  0928 10/30/22  1407 10/30/22  0640 10/29/22  0436 10/27/22  1605 10/26/22  0444 10/25/22  2139 10/25/22  1855   PROTIME sec  --   --   --   --  14.7* 16.5*  --  16.7*   INR   --   --   --   --  1.2 1.4  --  1.4   PTT sec 66* 93* 83*   < > 32 26   < > 52*    < > = values in this interval not displayed.     Imaging:   CT HEAD WITHOUT IV CONTRAST    Result Date: 10/25/2022  IMPRESSION: 1.  No acute intracranial hemorrhage, large territorial infarct, mass effect or midline shift is identified. 2.  Multiple right maxillary sinus and zygomatic arch fractures with an air-fluid level within the right maxillary sinus.     CT HEAD WITHOUT IV CONTRAST    Result Date: 10/24/2022  IMPRESSION: 1.  No posttraumatic intracranial abnormality. 2. Chronic changes noted under the comment.    CT FACIAL BONES WITHOUT IV CONTRAST    Result Date: 10/24/2022  IMPRESSION: Multiple right-sided facial bone fractures as described under the comment.    CT CERVICAL SPINE WITHOUT IV CONTRAST    Result Date: 10/24/2022  IMPRESSION: 1.  No acute fractures. As clinically indicated, MRI of the cervical spine is  recommended for further evaluation. 2.  Other incidental findings noted under the comment.     MRI ABDOMEN WITH AND WITHOUT CONTRAST    Result Date: 10/29/2022  IMPRESSION: 1. Right hepatic lobe mass on recent CT is most compatible with evolving hemorrhage/infarct centered in segment 7. Additional hemorrhagic/infarcted focus in central segment 5/8, stable in retrospect. Additional benign liver cysts, largest in the left lobe measuring up to 3.9 cm and containing blood products. 2. Similar appearance of right perinephric and right retroperitoneal hematomas with hemorrhagic right renal cyst. 3. Similar right adrenal hemorrhage with developing hemorrhage into the left adrenal gland. 4. Small right pleural effusion with findings suspicious for a component of hemothorax. This could be confirmed with thoracentesis, if necessary. 5. No findings on subtraction imaging to suggest papo active bleeding. Overall, there are no findings suspicious for malignancy; specifically, no suspicious hepatic observations. Given motion degradation on extensive findings, follow-up contrast-enhanced MRI is recommended to assess for resolution of findings and exclude underlying hemorrhagic neoplasm.    ULTRASOUND GUIDED VASCULAR ACCESS    Result Date: 10/26/2022  IMPRESSION:   Successful placement of a retrievable infrarenal IVC Filter. Device: Argon Option Elite IVC Filter COMMENT: PROCEDURE: 1. Ultrasound-guided access of the right common femoral vein. 2. IVC cavagram 3. IVC filter placement RADIOLOGIST:  Sarkis Gerardo MD ANESTHESIA:  Lidocaine 1% CONTRAST:  CO2 FLUORO TIME:  0.8 min REFERENCE AIR KERMA: 40 mGy MEDICATIONS:  none DESCRIPTION OF PROCEDURE:  Consent was obtained following explanation of risks and benefits of the procedure. The right groin was prepped and draped in the usual sterile fashion. The right common femoral vein was noted to be compressible and patent on gray scale ultrasound. Local anesthesia was provided. The vein  was then accessed with a  21-gauge needle under ultrasound guidance, and an .018 wire was advanced centrally. An ultrasound-guided access image was saved to PACS. Exchange was made for a microsheath, .035 wire, and multi-sidehole sheath, which was positioned at the iliocaval junction. IVC cavagram was performed with CO2. Renal vein insertion sites were localized. An Argon Option Elite IVC Filter was deployed infrarenally without complication. Wires and catheters were removed and hemostasis was achieved. The patient remained stable throughout the procedure. The number of guidewires used, and their integrity, was confirmed at the end of the procedure.     ULTRASOUND GUIDED VASCULAR ACCESS    Result Date: 10/25/2022  IMPRESSION:   Successful bilateral pulmonary arteriography and catheter-directed thrombolysis. COMMENT: PROCEDURE:  1. Ultrasound guided access of the central right common femoral vein. 2. Subselective right lower lobe pulmonary arteriography. 3. Subselective catheter directed thrombolysis of the right pulmonary arterial thrombus extending into the right lower lobe branch. 4. Subselective left lower lobe pulmonary arteriography. 5. Subselective catheter directed thrombolysis of the left pulmonary arterial thrombus extending into the left lower lobe branch. RADIOLOGIST: Sarkis Gerardo MD ANESTHESIA:  Local 1% lidocaine. FLUORO TIME:  7.7 minutes REFERENCE AIR KERMA: 44 mGy CONTRAST:  20 cc of Omnipaque 300 DESCRIPTION OF PROCEDURE:    Written informed consent was obtained following explanation of risks and benefits of the procedure. Specifically, risks of cardiac arrhythmia, pulmonary arterial injury, and intracranial hemorrhage were discussed with the patient. Additionally, significantly elevated risk of abdominal hemorrhage related to known adrenal hemorrhage was clearly discussed. The patient was placed supine on the IR procedure table. Maximal sterile barrier precautions were utilized during catheter  insertion including aseptic technique, the use of cap, mask, sterile gown, sterile gloves, sterile drapes, hand hygiene, and patient cutaneous antisepsis with chlorhexidene 2%. Grayscale and color Doppler ultrasound evaluation of the right common femoral vein was performed. The right common femoral vein was patent and compressible. The right groin was prepped and draped in usual sterile fashion. Local anesthesia was administered with 1% lidocaine. Under ultrasound guidance, the right common femoral vein was accessed with a 21-gauge needle. An 018 wire was advanced centrally. Sequential exchange was made for a 5 [place] a microcatheter sheath, 035 Ness wire, and 5 [place]ian vascular sheath. The sheath was secured with 0-0 silk. Utilizing an H1 catheter with Glidewire, the right main pulmonary artery was selected with successful subselection of a right lower lobe pulmonary arterial branch. Subselective right lower lobe pulmonary arteriography was performed, confirming satisfactory position within the right lower lobe branch distal to the main thrombus. Over a J Phelan wire, exchange was made for the Darrion-Macnamara flush catheter. 10 mg of TPA was then successfully infused into the right main pulmonary artery, extending into the right lower lobe pulmonary arterial branch. Utilizing an H1 catheter with Glidewire, the left main pulmonary artery was selected with successful subselection of a left lower lobe pulmonary arterial branch. Subselective left lower lobe pulmonary arteriography was performed, confirming satisfactory position within the left lower lobe branch distal to the main thrombus. Over a J Phelan wire, exchange was made for the Darrion-Macnamara flush catheter. 10 mg of TPA was then successfully infused into the left main pulmonary artery, extending into the left lower lobe pulmonary arterial branch. Catheter and sheath were removed. Hemostasis of the right common femoral vein was achieved with manual compression.      CT ABDOMEN PELVIS WITH IV CONTRAST    Result Date: 10/24/2022  IMPRESSION: 1.  Extensive acute pulmonary arterial emboli involving the bilateral main and multiple bilateral lobar, segmental and subsegmental branches, as detailed above. Flattening of the interventricular septum with dilatation of the right ventricle suggestive of right-sided heart strain. No evidence of acute pulmonary infarct. 2.  Large, indeterminate infiltrative hypodense lesion within the right hepatic lobe measuring up to 7 cm warranting further assessment with contrast-enhanced MRI. Differential diagnosis includes metastatic disease, a primary malignancy as well as abscess. Small adjacent similar appearing indeterminate right hepatic lobe lesion. 3.  Indeterminate right adrenal mass with findings consistent with right adrenal hemorrhage. 4.  A 10 mm groundglass nodule within the right middle lobe. A follow-up CT scan of the chest in 6-12 months is recommended as per Fleischner guidelines. 5.  Additional incidental findings including ectasia of ascending thoracic aorta, enlarged prostate gland, coronary arterial atherosclerotic vascular disease and colonic diverticulosis without evidence of diverticulitis. Findings and recommendations discussed with SUZANNA Muñoz by Dr. Thompson by telephone at 9:54 AM and 10:21 AM on 10/24/2022. Fleischner Society 2017 Guidelines for Management of Incidentally Detected Pulmonary Nodules in Adults. (Subsolid Nodules) Single ground glass: <6 mm - No routine follow-up >/= 6 mm - CT at 6-12 months to confirm persistence, then CT every 2 years until 5 years (In certain suspicious nodules < 6 mm, consider follow-up at 2 and 4 years.  If solid component(s) or growth develops, consider resection (Recommendations 3A and 4A)     IR FILTER PLACEMENT    Result Date: 10/26/2022  IMPRESSION:   Successful placement of a retrievable infrarenal IVC Filter. Device: Argon Option Elite IVC Filter COMMENT: PROCEDURE: 1.  Ultrasound-guided access of the right common femoral vein. 2. IVC cavagram 3. IVC filter placement RADIOLOGIST:  Sarkis Gerardo MD ANESTHESIA:  Lidocaine 1% CONTRAST:  CO2 FLUORO TIME:  0.8 min REFERENCE AIR KERMA: 40 mGy MEDICATIONS:  none DESCRIPTION OF PROCEDURE:  Consent was obtained following explanation of risks and benefits of the procedure. The right groin was prepped and draped in the usual sterile fashion. The right common femoral vein was noted to be compressible and patent on gray scale ultrasound. Local anesthesia was provided. The vein was then accessed with a  21-gauge needle under ultrasound guidance, and an .018 wire was advanced centrally. An ultrasound-guided access image was saved to PACS. Exchange was made for a microsheath, .035 wire, and multi-sidehole sheath, which was positioned at the iliocaval junction. IVC cavagram was performed with CO2. Renal vein insertion sites were localized. An Argon Option Elite IVC Filter was deployed infrarenally without complication. Wires and catheters were removed and hemostasis was achieved. The patient remained stable throughout the procedure. The number of guidewires used, and their integrity, was confirmed at the end of the procedure.     CT ANGIOGRAPHY CHEST PULMONARY EMBOLISM WITH IV CONTRAST    Result Date: 10/24/2022  IMPRESSION: 1.  Extensive acute pulmonary arterial emboli involving the bilateral main and multiple bilateral lobar, segmental and subsegmental branches, as detailed above. Flattening of the interventricular septum with dilatation of the right ventricle suggestive of right-sided heart strain. No evidence of acute pulmonary infarct. 2.  Large, indeterminate infiltrative hypodense lesion within the right hepatic lobe measuring up to 7 cm warranting further assessment with contrast-enhanced MRI. Differential diagnosis includes metastatic disease, a primary malignancy as well as abscess. Small adjacent similar appearing indeterminate right  hepatic lobe lesion. 3.  Indeterminate right adrenal mass with findings consistent with right adrenal hemorrhage. 4.  A 10 mm groundglass nodule within the right middle lobe. A follow-up CT scan of the chest in 6-12 months is recommended as per Fleischner guidelines. 5.  Additional incidental findings including ectasia of ascending thoracic aorta, enlarged prostate gland, coronary arterial atherosclerotic vascular disease and colonic diverticulosis without evidence of diverticulitis. Findings and recommendations discussed with SUZANNA Muñoz by Dr. Thompson by telephone at 9:54 AM and 10:21 AM on 10/24/2022. Fleischner Society 2017 Guidelines for Management of Incidentally Detected Pulmonary Nodules in Adults. (Subsolid Nodules) Single ground glass: <6 mm - No routine follow-up >/= 6 mm - CT at 6-12 months to confirm persistence, then CT every 2 years until 5 years (In certain suspicious nodules < 6 mm, consider follow-up at 2 and 4 years.  If solid component(s) or growth develops, consider resection (Recommendations 3A and 4A)     IR THROMBOLYSIS    Result Date: 10/25/2022  IMPRESSION:   Successful bilateral pulmonary arteriography and catheter-directed thrombolysis. COMMENT: PROCEDURE:  1. Ultrasound guided access of the central right common femoral vein. 2. Subselective right lower lobe pulmonary arteriography. 3. Subselective catheter directed thrombolysis of the right pulmonary arterial thrombus extending into the right lower lobe branch. 4. Subselective left lower lobe pulmonary arteriography. 5. Subselective catheter directed thrombolysis of the left pulmonary arterial thrombus extending into the left lower lobe branch. RADIOLOGIST: Sarkis Gerardo MD ANESTHESIA:  Local 1% lidocaine. FLUORO TIME:  7.7 minutes REFERENCE AIR KERMA: 44 mGy CONTRAST:  20 cc of Omnipaque 300 DESCRIPTION OF PROCEDURE:    Written informed consent was obtained following explanation of risks and benefits of the procedure. Specifically,  risks of cardiac arrhythmia, pulmonary arterial injury, and intracranial hemorrhage were discussed with the patient. Additionally, significantly elevated risk of abdominal hemorrhage related to known adrenal hemorrhage was clearly discussed. The patient was placed supine on the IR procedure table. Maximal sterile barrier precautions were utilized during catheter insertion including aseptic technique, the use of cap, mask, sterile gown, sterile gloves, sterile drapes, hand hygiene, and patient cutaneous antisepsis with chlorhexidene 2%. Grayscale and color Doppler ultrasound evaluation of the right common femoral vein was performed. The right common femoral vein was patent and compressible. The right groin was prepped and draped in usual sterile fashion. Local anesthesia was administered with 1% lidocaine. Under ultrasound guidance, the right common femoral vein was accessed with a 21-gauge needle. An 018 wire was advanced centrally. Sequential exchange was made for a 5 [place] a microcatheter sheath, 035 Ness wire, and 5 [place] vascular sheath. The sheath was secured with 0-0 silk. Utilizing an H1 catheter with Glidewire, the right main pulmonary artery was selected with successful subselection of a right lower lobe pulmonary arterial branch. Subselective right lower lobe pulmonary arteriography was performed, confirming satisfactory position within the right lower lobe branch distal to the main thrombus. Over a J Phelan wire, exchange was made for the Darrion-Macnamara flush catheter. 10 mg of TPA was then successfully infused into the right main pulmonary artery, extending into the right lower lobe pulmonary arterial branch. Utilizing an H1 catheter with Glidewire, the left main pulmonary artery was selected with successful subselection of a left lower lobe pulmonary arterial branch. Subselective left lower lobe pulmonary arteriography was performed, confirming satisfactory position within the left lower lobe branch  distal to the main thrombus. Over a J Phelan wire, exchange was made for the Darrion-Macnamara flush catheter. 10 mg of TPA was then successfully infused into the left main pulmonary artery, extending into the left lower lobe pulmonary arterial branch. Catheter and sheath were removed. Hemostasis of the right common femoral vein was achieved with manual compression.     X-RAY CHEST 1 VIEW    Result Date: 10/26/2022  IMPRESSION: No acute cardiopulmonary abnormality seen.. COMMENT:    A single AP view of the chest was performed. Comparison: AP chest x-ray from 10/24/2022. The heart size and pulmonary vasculature remain within normal limits. The lung fields appear clear, and no pleural effusions are seen. No hilar abnormality is identified. There is mild tortuosity of the descending thoracic aorta. An electronic device projects over the left lung base projecting over the left anterior left fourth rib, likely a loop recorder. When compared to the prior study, there has been no significant interval change.     X-RAY CHEST 1 VIEW    Result Date: 10/24/2022  IMPRESSION: No evidence of acute pulmonary disease.     Ultrasound venous leg bilateral    Result Date: 10/25/2022  IMPRESSION: Examination positive for deep vein thrombosis bilaterally as described, with greater than right.     CT CHEST ABDOMEN PELVIS WITHOUT IV CONTRAST    Result Date: 10/25/2022  IMPRESSION: 1.  Findings concerning for a large right-sided retroperitoneal hemorrhage including the right kidney and right adrenal gland. A small to moderate amount of pelvic ascites is also noted which appears mildly hyperdense and may reflect a component of hemoperitoneum. 2.  Additional chronic and incidental findings as described above. Finding:    Unexpected significant hemorrhage (chest, abdomen, pelvis)   Acuity: Critical  Status:  CLOSED Critical read back was performed and results were read back by Dr. Simms, on 10/25/2022 8:10 PM     Micro:   Microbiology  Results     Procedure Component Value Units Date/Time    Blood Culture Blood, Venous [[id number]]  (Normal) Collected: 10/25/22 2248    Specimen: Blood, Venous Updated: 10/27/22 0600     Culture No growth at 18-24 hours    Blood Culture Blood, Venous [[id number]]  (Normal) Collected: 10/25/22 2222    Specimen: Blood, Venous Updated: 10/27/22 0600     Culture No growth at 18-24 hours    MRSA Screen, Nares Only Nose [[id number]]  (Normal) Collected: 10/25/22 2019    Specimen: Nasal Swab from Nose Updated: 10/26/22 0216     MRSA DNA, Nares Negative    SARS-CoV-2 (COVID-19), PCR Nasopharynx [[id number]]  (Normal) Collected: 10/24/22 0839    Specimen: Nasopharyngeal Swab from Nasopharynx Updated: 10/24/22 0925    Narrative:      The following orders were created for panel order SARS-CoV-2 (COVID-19), PCR Nasopharynx.  Procedure                               Abnormality         Status                     ---------                               -----------         ------                     SARS-COV-2 (COVID-19)/ F...[[id number]]  Normal              Final result                 Please view results for these tests on the individual orders.    SARS-COV-2 (COVID-19)/ FLU A/B, AND RSV, PCR Nasopharynx [[id number]]  (Normal) Collected: 10/24/22 0839    Specimen: Nasopharyngeal Swab from Nasopharynx Updated: 10/24/22 0925     SARS-CoV-2 (COVID-19) Negative     Influenza A Negative     Influenza B Negative     Respiratory Syncytial Virus Negative    Narrative:      Testing performed using real-time PCR for detection of COVID-19. EUA approved validation studies performed on site.         ECG/Telemetry  I have independently reviewed the telemetry. No events for the last 24 hours.         ASSESSMENT    76 y/o M presented 10/24 after syncopal episode with fall down stairs found to have submassive bilateral PE with RHS, s/p tPA complicated by right retroperitoneal hemorrhage.     PLAN   1. Submassive to massive pulmonary embolism with  associated DVT-see below. Initial thrombotic event.  - 10/24 TTE: Severely dilated RV, severely reduced RV systolic function  - 10/26 repeat TTE: RV appears dilated, RV demonstrated low normals, at most mildly reduced systolic function  - 10/25: subselective catheter directed tPA of the right pulmonary arterial  thrombus extending into the right lower lobe branch and subselective catheter directed tPA of the left pulmonary arterial thrombus extending into the left lower lobe branch  - Complicated by right retroperitoneal bleed with hemorrhagic shock  - 10/26 IVC filter placement given contraindication to ac  -follow up echo 10/26 with improved RV dysfunction , now right ventricle appears dilated. The right ventricle demonstrates low normal, at most mildly reduced, systolic function.  -So far he is tolerating restart of heparin gtt     2. DVT  - 10/25 US: nearly occlusive thrombus within the posterior tibial vein, a left mid femoral vein contains partially occlusive thrombus, a distal left popliteal vein and a left peroneal vein and posterior tibial veins partially to nearly occlusive thrombus.  - 10/26 IVC filter placement     3. Retroperitoneal hemorrhage  - Possibly secondary to further hemorrhage of adrenal mass versus venous puncture IR procedure  - Hemoglobin stable in the low 9s since restarting heparin gtt     4. Hepatic mass,  -MRI consistent with infarct vs hemorhage    5. Indeterminate right adrenal mass with findings consistent with adrenal hemorrhage, measures 34 x 27 mm on intial (pre IR intervention CT imaging), some involvement of hemorrhage of left adrenal gland as well     6. Right middle lobe lung nodule, 10 mm subsolid pulmonary nodule    7. Severe FLAVIA  -On auto-CPAP at home       Recommendations:  -Continue heparin infusion for now with a stable hemoglobin, however if continues to downtrend with his multifocal areas of hemorrhage within his abdomen and retroperitoneum would hold all  anticoagulation.  -With respect to his right hilar groundglass pulmonary nodule, a 5/1/2017 CT chest within the [place] system calls out a tiny patchy area of groundglass in the posterior right upper lobe close to the right hilum.  I am unsure if this is the same 10 mm right perihilar groundglass nodule that was noted on his 10/24/2022 CTA chest.  It is certainly possible this could represent an adenocarcinoma in situ (unlikely to cause a [place] syndrome however).  Recommend 3-6 month interval follow-up to see if persists.  If enlarging or developing solid component could consider navigational bronchoscopy for biopsy, although its proximal location could proved to be difficult to access.  -With his multiple facial fractures would hold off on CPAP therapy for now     35 minutes were spent obtaining a detailed interval history, detailed exam, and evaluating this high complexity case with significant medical decision making. More than 50% of time was spent counseling the patient and coordinating care with the primary team and consultants.       Edd Moreno DO  10/31/2022  10:41 AM

## 2022-10-31 NOTE — ASSESSMENT & PLAN NOTE
-Noted on CT chest 1 cm R perihilar  -No present plan for bx.  Will need repeat imaging 3-6 months, and may need navigational bronch for bx

## 2022-10-31 NOTE — ASSESSMENT & PLAN NOTE
-From retroperitoneal hematoma, needing PRBC transfusion  -Thrombocytopenia as well, improving.  DIC labs noted  -Had downtrended, but relatively stable

## 2022-10-31 NOTE — ASSESSMENT & PLAN NOTE
-CT facial bones:Multiple right-sided facial bone fractures as described under the comment.  -Seen by Trauma and plastics  -Seen by plastics who discussed potential facial deformity.  Not a candidate for reconstructive surgery, given his need for AC  -I d/w trauma, no need for abx  -Holding cpap for now

## 2022-10-31 NOTE — PLAN OF CARE
Problem: Adult Inpatient Plan of Care  Goal: Plan of Care Review  Outcome: Progressing  Flowsheets (Taken 10/31/2022 1517)  Progress: improving  Plan of Care Reviewed With: patient  Outcome Summary: Pt aox4 but forgetful. NSR. VSS. Weaned from 4L to 2L oxygen and tolerating well. Pt OOB to chair, AX1 with walker. Pt lethargic, drifting to sleep in between RN care. Complains of back pain that was resolved with Tylenol.

## 2022-10-31 NOTE — PLAN OF CARE
Problem: Adult Inpatient Plan of Care  Goal: Plan of Care Review  Outcome: Progressing  Flowsheets (Taken 10/31/2022 1110)  Progress: no change  Plan of Care Reviewed With:   patient   other (see comments)   spouse   Pt discussed in rounds, not stable for dc. Pt on hep drip. SW checked SNF referrals via [facility]. Facilities that referrals were sent to are not accepting pts at this time. SW sent additional SNF referrals to local, high rated, in-network facilities. SW attempted to call pts spouse Olena to discuss, left voicemail. CC will continue to follow. Angela SALAZAR [phone]

## 2022-10-31 NOTE — PROGRESS NOTES
Patient: Tim Humphries  Location:  [facility]  MRN:  [id number]  Today's date:  10/31/2022      General Observations  Start: Pt received supine in bed; he was agreeable to therapy and no issues or concerns identified by nurse prior to session   End: Pt reclined in chair at end of session; call bell in reach, chair alarm activated, all needs met, personal items in reach and nurse notified of patient performance and patient's response to activity  Tim is a 77 y.o. male admitted on 10/24/2022 with Fall, initial encounter [W19.XXXA]  Chin laceration, initial encounter [S01.81XA]  Acute saddle pulmonary embolism with acute cor pulmonale (CMS/HCC) [I26.02]  Syncope, unspecified syncope type [R55]. Principal problem is Pulmonary embolism with acute cor pulmonale (CMS/HCC).    Past Medical History  Tim has a past medical history of Dyslipidemia, GERD (gastroesophageal reflux disease), Glaucoma, Heart murmur, HTN (hypertension), Implantable loop recorder present, Pericarditis, and Sleep apnea.    History of Present Illness   Tim Humphries is a 77 y.o. male with a past medical history of hypertension, GERD, FLAVIA, hyperlipidemia who presents with syncope, fall.  Wife is at bedside who assisted with history.  For 1 is only patient had COVID about 2-3 months ago which he fully recovered without event.  After he recovered he went to get his COVID-vaccine about 2 weeks ago.  For the last 4 to 5 days patient has noted increased dyspnea with exertion and shortness of breath.  Today while coming down stair he passed out and fell down half a flight of stair.  Wife was close by it and found him down on the floor unresponsive.  Wife report patient was unconscious for about 10 minutes.  When EMS arrived patient was found to have grunting respiration with O2 sat at 86% on room air.  Was then brought to [place] emergency room.  Patient also report he has some left leg swelling approximately 3 to 4 weeks ago  "which resolved by itself. In ER he was found to have extensive PE with right heart strain.  He was found to be hypoxic and put on 4 L oxygen.  His blood pressure remained stable currently.  Mentation intact.  Patient CAT scan also revealed multiple right displaced facial fracture as well as adrenal hemorrhage.    10/26 IVC Filter placement      PT Vitals    Date/Time Pulse SpO2 Pt Activity O2 Therapy O2 Del Method O2 Flow Rate BP BP Location BP Method Pt Position Who   10/31/22 1100 77 95 % At rest Supplemental oxygen Nasal cannula 2 L/min 132/75 Left upper arm Automatic Lying AC          Prior Living Environment    Flowsheet Row Most Recent Value   Current Living Arrangements home   Living Environment Comment 2 story house with second bedroom/bathroom walk in shower - reports recently added first floor bed/bath but does not have furniture yet        Prior Level of Function    Flowsheet Row Most Recent Value   Ambulation independent   Transferring independent   Toileting independent   Bathing independent   Dressing independent   Prior Level of Function Comment indep with no devices for ADL, avid \"walker jogger\"   Assistive Device Currently Used at Home none           PT Evaluation and Treatment - 10/31/22 1037        PT Time Calculation    Start Time 1037     Stop Time 1112     Time Calculation (min) 35 min        Session Details    Document Type daily treatment/progress note     Mode of Treatment physical therapy        General Information    Patient Profile Reviewed yes     Existing Precautions/Restrictions fall;oxygen therapy device and L/min        Cognition/Psychosocial    Affect/Mental Status (Cognition) WFL;flat/blunted affect     Orientation Status (Cognition) oriented x 3        Bed Mobility    [place], Supine to Sit minimum assist (75% or more patient effort);verbal cues     Verbal Cues (Supine to Sit) hand placement;technique     Assistive Device bed rails;head of bed elevated     Comment (Bed " Mobility) needed assist with LLE        Sit to Stand Transfer    [place], Sit to Stand Transfer minimum assist (75% or more patient effort);verbal cues     Verbal Cues hand placement;safety;technique     Assistive Device mobility belt     Comment from EOB and from toilet during session        Stand to Sit Transfer    [place], Stand to Sit Transfer minimum assist (75% or more patient effort)     Verbal Cues hand placement;technique     Assistive Device mobility belt;walker, front-wheeled        Gait Training    [place], Gait minimum assist (75% or more patient effort);verbal cues     Assistive Device mobility belt;walker, front-wheeled     Distance in Feet 14 feet     Pattern (Gait) step-to     Deviations/Abnormal Patterns (Gait) gait speed decreased;step length decreased;antalgic     Comment (Gait/Stairs) 14 ft x 2, 1st trial of gait with IV pole for support, 2nd trial with RW.  Pt with improved balance with RW, reports some mild discomfort in L knee        Balance    Static Sitting Balance WFL     Dynamic Sitting Balance WFL     Sit to Stand Dynamic Balance mild impairment     Static Standing Balance mild impairment;supported     Dynamic Standing Balance mild impairment;supported        Therapeutic Exercise    Therapeutic Exercise lower extremity        Lower Extremity (Therapeutic Exercise)    Exercise Position/Type seated     General Exercise bilateral;ankle pumps;LAQ (long arc quad);marching while seated     Reps and Sets 20x        AM-PAC (TM) - Mobility (Current Function)    Turning from your back to your side while in a flat bed without using bedrails? 4 - None     Moving from lying on your back to sitting on the side of a flat bed without using bedrails? 3 - A Little     Moving to and from a bed to a chair? 3 - A Little     Standing up from a chair using your arms? 3 - A Little     To walk in a hospital room? 2 - A Lot     Climbing 3-5 steps with a railing? 1 - Total     AM-PAC (TM) Mobility  Score 16        Assessment/Plan (PT)    Daily Outcome Statement Pt much more awake and alert today, able to amb to/from the bathroom with RW with min A.  This is a significant decrease in his mobility as compared to his normal when he jogs 2.5 miles several times a week.               PT Assessment/Plan    Flowsheet Row Most Recent Value   PT Recommended Discharge Disposition skilled nursing facility at 10/28/2022 1128   Anticipated Equipment Needs at Discharge (PT) --  [TBD at next level of care] at 10/28/2022 1128   Patient/Family Therapy Goals Statement To go home. at 10/28/2022 1128                         PT Goals    Flowsheet Row Most Recent Value   Bed Mobility Goal 1    Activity/Assistive Device bed mobility activities, all at 10/28/2022 1128   [place] supervision required at 10/28/2022 1128   Time Frame by discharge at 10/28/2022 1128   Progress/Outcome goal ongoing at 10/28/2022 1128   Transfer Goal 1    Activity/Assistive Device sit-to-stand/stand-to-sit, bed-to-chair/chair-to-bed  [w/ least restrictive device] at 10/28/2022 1128   [place] supervision required at 10/28/2022 1128   Time Frame by discharge at 10/28/2022 1128   Progress/Outcome goal ongoing at 10/28/2022 1128   Gait Training Goal 1    Activity/Assistive Device gait (walking locomotion)  [w/ least restrictive device] at 10/28/2022 1128   [place] supervision required at 10/28/2022 1128   Distance 100 ft at 10/28/2022 1128   Time Frame by discharge at 10/28/2022 1128   Progress/Outcome goal ongoing at 10/28/2022 1128

## 2022-10-31 NOTE — PLAN OF CARE
Problem: Adult Inpatient Plan of Care  Goal: Plan of Care Review  Outcome: Progressing  Flowsheets (Taken 10/30/2022 2231)  Progress: no change  Plan of Care Reviewed With: patient  Outcome Summary: Pt AOx4, BELLO, lethragic but able to maintain attention during all interactions.  No N/V defecits on assessment, no change on reassessment.  Heparin gtt maintained per order (see MAR), Ptt therapeutic (see Results).  Care plan reviewed w/ pt and s/o, verbalized understanding.  Fall/safety precautions maintained.  Goal: Patient-Specific Goal (Individualized)  Outcome: Progressing  Goal: Absence of Hospital-Acquired Illness or Injury  Outcome: Progressing  Goal: Optimal Comfort and Wellbeing  Outcome: Progressing  Goal: Readiness for Transition of Care  Outcome: Progressing     Problem: Fall Injury Risk  Goal: Absence of Fall and Fall-Related Injury  Outcome: Progressing     Problem: Bleeding  Goal: Absence of Bleeding  Outcome: Progressing     Problem: Skin Injury Risk Increased  Goal: Skin Health and Integrity  Outcome: Progressing     Problem: Violence Risk or Actual  Goal: Anger and Impulse Control  Outcome: Progressing     Problem: Gas Exchange Impaired  Goal: Optimal Gas Exchange  Outcome: Progressing     Problem: Mobility Impairment  Goal: Optimal Mobility  Outcome: Progressing   Plan of Care Review  Plan of Care Reviewed With: patient  Progress: no change  Outcome Summary: Pt AOx4, BELLO, lethragic but able to maintain attention during all interactions.  No N/V defecits on assessment, no change on reassessment.  Heparin gtt maintained per order (see MAR), Ptt therapeutic (see Results).  Care plan reviewed w/ pt and s/o, verbalized understanding.  Fall/safety precautions maintained.

## 2022-10-31 NOTE — PROGRESS NOTES
Hematology/Oncology Progress Note       SUBJECTIVE  Patient reports feeling good.  Concerned about using CPAP.      CURRENT MEDS  Current Facility-Administered Medications   Medication Dose Route Frequency    acetaminophen  650 mg oral q4h PRN    albuterol  5 mg nebulization q6h PRN    amLODIPine  5 mg oral q PM    glucose  16-32 g of dextrose oral PRN    Or    dextrose  15-30 g of dextrose oral PRN    Or    glucagon  1 mg intramuscular PRN    Or    dextrose 50 % in water (D50)  25 mL intravenous PRN    heparin (porcine)  40-80 Units/kg intravenous q6h PRN    heparin infusion - MAR calculator by PTT  100-4,000 Units/hr intravenous Titrated    hydrALAZINE  10 mg intravenous q6h PRN    HYDROmorphone  0.25-0.5 mg intravenous q3h PRN    insulin aspart U-100  2-10 Units subcutaneous With meals & nightly    latanoprost  1 drop Both Eyes Nightly    metoprolol succinate XL  50 mg oral q PM    nitroglycerin  0.4 mg sublingual q5 min PRN    oxyCODONE  5 mg oral q6h PRN    pantoprazole  40 mg oral Daily    polyethylene glycol  17 g oral Daily    sennosides-docusate sodium  2 tablet oral Daily    timolol  1 drop Left Eye q AM       VITAL SIGNS  Temp:  [36.5 °C (97.7 °F)-36.8 °C (98.2 °F)] 36.8 °C (98.2 °F)  Heart Rate:  [64-88] 77  Resp:  [18-20] 18  BP: (119-142)/(60-80) 119/80    Intake/Output Summary (Last 24 hours) at 10/31/2022 1239  Last data filed at 10/31/2022 0930  Gross per 24 hour   Intake 480 ml   Output 1075 ml   Net -595 ml       PHYSICAL EXAM  Constitutional:  Comfortable appearing, in no acute distress.  Facial bruising.       LAB RESULTS  CBC  Results from last 7 days   Lab Units 10/31/22  0928 10/30/22  1753 10/30/22  0640 10/29/22  1633 10/29/22  0436   WBC K/uL 11.95* 12.49* 12.47* 11.85* 12.75*   RBC M/uL 3.31* 3.31* 3.17* 3.26* 3.29*   HEMOGLOBIN g/dL 9.5* 9.5* 9.2* 9.6* 9.7*   HEMATOCRIT % 29.8* 29.9* 28.6* 29.4* 29.3*   MCV fL 90.0 90.3 90.2 90.2 89.1   PLATELETS K/uL 227 229 172  156 136*     Diff  Results from last 7 days   Lab Units 10/29/22  0436 10/27/22  1605 10/27/22  0633 10/26/22  2026 10/26/22  0444   NRBC % 0.0 0.0 0.0 0.0 0.0   IMM GRANULOCYTES % 0.7 0.7 1.3 0.9 0.7   NEUTROPHILS % 82.9 84.8 84.5 83.3 82.4   LYMPHOCYTES % 5.8 5.3 4.6 6.0 5.5   MONOCYTES % 9.4 8.9 9.4 9.7 11.2   EOSINOPHILS % 1.0 0.1 0.0 0.0 0.0   BASOPHILS % 0.2 0.2 0.2 0.1 0.2   IMM GRANUCOCYTES ABS K/uL 0.09* 0.10* 0.20* 0.14* 0.12*   NEUTRO ABS K/uL 10.56* 13.04* 12.89* 13.01* 13.19*   PLT MORPHOLOGY   --   --  Normal  --   --      Chemistry   Results from last 7 days   Lab Units 10/30/22  0640 10/29/22  0436 10/28/22  0540 10/27/22  1605   SODIUM mEQ/L 138 139 140 138   POTASSIUM mEQ/L 4.1 4.2 4.0 4.1   CHLORIDE mEQ/L 106 108 106 108   CO2 mEQ/L 24 24 25 22   BUN mg/dL 38* 40* 41* 44*   CREATININE mg/dL 1.0 1.0 1.1 1.1   CALCIUM mg/dL 8.0* 8.0* 8.3* 8.4*   ALBUMIN g/dL  --  3.0* 3.1* 3.3*   BILIRUBIN TOTAL mg/dL  --  1.5* 1.4* 1.3*   ALK PHOS IU/L  --  58 57 48   ALT IU/L  --  92* 124* 159*   AST IU/L  --  23 30 38   GLUCOSE mg/dL 143* 135* 150* 134*     Coag  Results from last 7 days   Lab Units 10/31/22  0928 10/30/22  1407 10/30/22  0640 10/29/22  0436 10/27/22  1605 10/26/22  0444 10/25/22  2139 10/25/22  1855   PROTIME sec  --   --   --   --  14.7* 16.5*  --  16.7*   INR   --   --   --   --  1.2 1.4  --  1.4   PTT sec 66* 93* 83*   < > 32 26   < > 52*    < > = values in this interval not displayed.     Micro  Microbiology Results     Procedure Component Value Units Date/Time    Blood Culture Blood, Venous [[id number]]  (Normal) Collected: 10/25/22 2248    Specimen: Blood, Venous Updated: 10/28/22 0601     Culture No growth at 48 hours    Blood Culture Blood, Venous [[id number]]  (Normal) Collected: 10/25/22 2222    Specimen: Blood, Venous Updated: 10/28/22 0601     Culture No growth at 48 hours    MRSA Screen, Nares Only Nose [[id number]]  (Normal) Collected: 10/25/22 2019    Specimen: Nasal Swab from Nose  Updated: 10/26/22 0216     MRSA DNA, Nares Negative    SARS-CoV-2 (COVID-19), PCR Nasopharynx [[id number]]  (Normal) Collected: 10/24/22 0839    Specimen: Nasopharyngeal Swab from Nasopharynx Updated: 10/24/22 0925    Narrative:      The following orders were created for panel order SARS-CoV-2 (COVID-19), PCR Nasopharynx.  Procedure                               Abnormality         Status                     ---------                               -----------         ------                     SARS-COV-2 (COVID-19)/ F...[[id number]]  Normal              Final result                 Please view results for these tests on the individual orders.    SARS-COV-2 (COVID-19)/ FLU A/B, AND RSV, PCR Nasopharynx [[id number]]  (Normal) Collected: 10/24/22 0839    Specimen: Nasopharyngeal Swab from Nasopharynx Updated: 10/24/22 0925     SARS-CoV-2 (COVID-19) Negative     Influenza A Negative     Influenza B Negative     Respiratory Syncytial Virus Negative    Narrative:      Testing performed using real-time PCR for detection of COVID-19. EUA approved validation studies performed on site.         IMAGING  CT HEAD WITHOUT IV CONTRAST    Result Date: 10/25/2022  IMPRESSION: 1.  No acute intracranial hemorrhage, large territorial infarct, mass effect or midline shift is identified. 2.  Multiple right maxillary sinus and zygomatic arch fractures with an air-fluid level within the right maxillary sinus.     CT HEAD WITHOUT IV CONTRAST    Result Date: 10/24/2022  IMPRESSION: 1.  No posttraumatic intracranial abnormality. 2. Chronic changes noted under the comment.    CT FACIAL BONES WITHOUT IV CONTRAST    Result Date: 10/24/2022  IMPRESSION: Multiple right-sided facial bone fractures as described under the comment.    CT CERVICAL SPINE WITHOUT IV CONTRAST    Result Date: 10/24/2022  IMPRESSION: 1.  No acute fractures. As clinically indicated, MRI of the cervical spine is recommended for further evaluation. 2.  Other incidental findings  noted under the comment.     MRI ABDOMEN WITH AND WITHOUT CONTRAST    Result Date: 10/29/2022  IMPRESSION: 1. Right hepatic lobe mass on recent CT is most compatible with evolving hemorrhage/infarct centered in segment 7. Additional hemorrhagic/infarcted focus in central segment 5/8, stable in retrospect. Additional benign liver cysts, largest in the left lobe measuring up to 3.9 cm and containing blood products. 2. Similar appearance of right perinephric and right retroperitoneal hematomas with hemorrhagic right renal cyst. 3. Similar right adrenal hemorrhage with developing hemorrhage into the left adrenal gland. 4. Small right pleural effusion with findings suspicious for a component of hemothorax. This could be confirmed with thoracentesis, if necessary. 5. No findings on subtraction imaging to suggest papo active bleeding. Overall, there are no findings suspicious for malignancy; specifically, no suspicious hepatic observations. Given motion degradation on extensive findings, follow-up contrast-enhanced MRI is recommended to assess for resolution of findings and exclude underlying hemorrhagic neoplasm.    ULTRASOUND GUIDED VASCULAR ACCESS    Result Date: 10/26/2022  IMPRESSION:   Successful placement of a retrievable infrarenal IVC Filter. Device: Argon Option Elite IVC Filter COMMENT: PROCEDURE: 1. Ultrasound-guided access of the right common femoral vein. 2. IVC cavagram 3. IVC filter placement RADIOLOGIST:  Sarkis Gerardo MD ANESTHESIA:  Lidocaine 1% CONTRAST:  CO2 FLUORO TIME:  0.8 min REFERENCE AIR KERMA: 40 mGy MEDICATIONS:  none DESCRIPTION OF PROCEDURE:  Consent was obtained following explanation of risks and benefits of the procedure. The right groin was prepped and draped in the usual sterile fashion. The right common femoral vein was noted to be compressible and patent on gray scale ultrasound. Local anesthesia was provided. The vein was then accessed with a  21-gauge needle under ultrasound  guidance, and an .018 wire was advanced centrally. An ultrasound-guided access image was saved to PACS. Exchange was made for a microsheath, .035 wire, and multi-sidehole sheath, which was positioned at the iliocaval junction. IVC cavagram was performed with CO2. Renal vein insertion sites were localized. An Argon Option Elite IVC Filter was deployed infrarenally without complication. Wires and catheters were removed and hemostasis was achieved. The patient remained stable throughout the procedure. The number of guidewires used, and their integrity, was confirmed at the end of the procedure.     ULTRASOUND GUIDED VASCULAR ACCESS    Result Date: 10/25/2022  IMPRESSION:   Successful bilateral pulmonary arteriography and catheter-directed thrombolysis. COMMENT: PROCEDURE:  1. Ultrasound guided access of the central right common femoral vein. 2. Subselective right lower lobe pulmonary arteriography. 3. Subselective catheter directed thrombolysis of the right pulmonary arterial thrombus extending into the right lower lobe branch. 4. Subselective left lower lobe pulmonary arteriography. 5. Subselective catheter directed thrombolysis of the left pulmonary arterial thrombus extending into the left lower lobe branch. RADIOLOGIST: Sarkis Gerardo MD ANESTHESIA:  Local 1% lidocaine. FLUORO TIME:  7.7 minutes REFERENCE AIR KERMA: 44 mGy CONTRAST:  20 cc of Omnipaque 300 DESCRIPTION OF PROCEDURE:    Written informed consent was obtained following explanation of risks and benefits of the procedure. Specifically, risks of cardiac arrhythmia, pulmonary arterial injury, and intracranial hemorrhage were discussed with the patient. Additionally, significantly elevated risk of abdominal hemorrhage related to known adrenal hemorrhage was clearly discussed. The patient was placed supine on the IR procedure table. Maximal sterile barrier precautions were utilized during catheter insertion including aseptic technique, the use of cap, mask,  sterile gown, sterile gloves, sterile drapes, hand hygiene, and patient cutaneous antisepsis with chlorhexidene 2%. Grayscale and color Doppler ultrasound evaluation of the right common femoral vein was performed. The right common femoral vein was patent and compressible. The right groin was prepped and draped in usual sterile fashion. Local anesthesia was administered with 1% lidocaine. Under ultrasound guidance, the right common femoral vein was accessed with a 21-gauge needle. An 018 wire was advanced centrally. Sequential exchange was made for a 5 [place] a microcatheter sheath, 035 Ness wire, and 5 [place] vascular sheath. The sheath was secured with 0-0 silk. Utilizing an H1 catheter with Glidewire, the right main pulmonary artery was selected with successful subselection of a right lower lobe pulmonary arterial branch. Subselective right lower lobe pulmonary arteriography was performed, confirming satisfactory position within the right lower lobe branch distal to the main thrombus. Over a J Phelan wire, exchange was made for the Darrion-Macnamara flush catheter. 10 mg of TPA was then successfully infused into the right main pulmonary artery, extending into the right lower lobe pulmonary arterial branch. Utilizing an H1 catheter with Glidewire, the left main pulmonary artery was selected with successful subselection of a left lower lobe pulmonary arterial branch. Subselective left lower lobe pulmonary arteriography was performed, confirming satisfactory position within the left lower lobe branch distal to the main thrombus. Over a J Phelan wire, exchange was made for the Darrion-Macnamara flush catheter. 10 mg of TPA was then successfully infused into the left main pulmonary artery, extending into the left lower lobe pulmonary arterial branch. Catheter and sheath were removed. Hemostasis of the right common femoral vein was achieved with manual compression.     CT ABDOMEN PELVIS WITH IV CONTRAST    Result Date:  10/24/2022  IMPRESSION: 1.  Extensive acute pulmonary arterial emboli involving the bilateral main and multiple bilateral lobar, segmental and subsegmental branches, as detailed above. Flattening of the interventricular septum with dilatation of the right ventricle suggestive of right-sided heart strain. No evidence of acute pulmonary infarct. 2.  Large, indeterminate infiltrative hypodense lesion within the right hepatic lobe measuring up to 7 cm warranting further assessment with contrast-enhanced MRI. Differential diagnosis includes metastatic disease, a primary malignancy as well as abscess. Small adjacent similar appearing indeterminate right hepatic lobe lesion. 3.  Indeterminate right adrenal mass with findings consistent with right adrenal hemorrhage. 4.  A 10 mm groundglass nodule within the right middle lobe. A follow-up CT scan of the chest in 6-12 months is recommended as per Fleischner guidelines. 5.  Additional incidental findings including ectasia of ascending thoracic aorta, enlarged prostate gland, coronary arterial atherosclerotic vascular disease and colonic diverticulosis without evidence of diverticulitis. Findings and recommendations discussed with SUZANNA Muñoz by Dr. Thompson by telephone at 9:54 AM and 10:21 AM on 10/24/2022. Fleischner Society 2017 Guidelines for Management of Incidentally Detected Pulmonary Nodules in Adults. (Subsolid Nodules) Single ground glass: <6 mm - No routine follow-up >/= 6 mm - CT at 6-12 months to confirm persistence, then CT every 2 years until 5 years (In certain suspicious nodules < 6 mm, consider follow-up at 2 and 4 years.  If solid component(s) or growth develops, consider resection (Recommendations 3A and 4A)     IR FILTER PLACEMENT    Result Date: 10/26/2022  IMPRESSION:   Successful placement of a retrievable infrarenal IVC Filter. Device: Argon Option Elite IVC Filter COMMENT: PROCEDURE: 1. Ultrasound-guided access of the right common femoral vein. 2. IVC  cavagram 3. IVC filter placement RADIOLOGIST:  Sarkis Gerardo MD ANESTHESIA:  Lidocaine 1% CONTRAST:  CO2 FLUORO TIME:  0.8 min REFERENCE AIR KERMA: 40 mGy MEDICATIONS:  none DESCRIPTION OF PROCEDURE:  Consent was obtained following explanation of risks and benefits of the procedure. The right groin was prepped and draped in the usual sterile fashion. The right common femoral vein was noted to be compressible and patent on gray scale ultrasound. Local anesthesia was provided. The vein was then accessed with a  21-gauge needle under ultrasound guidance, and an .018 wire was advanced centrally. An ultrasound-guided access image was saved to PACS. Exchange was made for a microsheath, .035 wire, and multi-sidehole sheath, which was positioned at the iliocaval junction. IVC cavagram was performed with CO2. Renal vein insertion sites were localized. An Argon Option Elite IVC Filter was deployed infrarenally without complication. Wires and catheters were removed and hemostasis was achieved. The patient remained stable throughout the procedure. The number of guidewires used, and their integrity, was confirmed at the end of the procedure.     CT ANGIOGRAPHY CHEST PULMONARY EMBOLISM WITH IV CONTRAST    Result Date: 10/24/2022  IMPRESSION: 1.  Extensive acute pulmonary arterial emboli involving the bilateral main and multiple bilateral lobar, segmental and subsegmental branches, as detailed above. Flattening of the interventricular septum with dilatation of the right ventricle suggestive of right-sided heart strain. No evidence of acute pulmonary infarct. 2.  Large, indeterminate infiltrative hypodense lesion within the right hepatic lobe measuring up to 7 cm warranting further assessment with contrast-enhanced MRI. Differential diagnosis includes metastatic disease, a primary malignancy as well as abscess. Small adjacent similar appearing indeterminate right hepatic lobe lesion. 3.  Indeterminate right adrenal mass with  findings consistent with right adrenal hemorrhage. 4.  A 10 mm groundglass nodule within the right middle lobe. A follow-up CT scan of the chest in 6-12 months is recommended as per Fleischner guidelines. 5.  Additional incidental findings including ectasia of ascending thoracic aorta, enlarged prostate gland, coronary arterial atherosclerotic vascular disease and colonic diverticulosis without evidence of diverticulitis. Findings and recommendations discussed with SUZANNA Muñoz by Dr. Thompson by telephone at 9:54 AM and 10:21 AM on 10/24/2022. Fleischner Society 2017 Guidelines for Management of Incidentally Detected Pulmonary Nodules in Adults. (Subsolid Nodules) Single ground glass: <6 mm - No routine follow-up >/= 6 mm - CT at 6-12 months to confirm persistence, then CT every 2 years until 5 years (In certain suspicious nodules < 6 mm, consider follow-up at 2 and 4 years.  If solid component(s) or growth develops, consider resection (Recommendations 3A and 4A)     IR THROMBOLYSIS    Result Date: 10/25/2022  IMPRESSION:   Successful bilateral pulmonary arteriography and catheter-directed thrombolysis. COMMENT: PROCEDURE:  1. Ultrasound guided access of the central right common femoral vein. 2. Subselective right lower lobe pulmonary arteriography. 3. Subselective catheter directed thrombolysis of the right pulmonary arterial thrombus extending into the right lower lobe branch. 4. Subselective left lower lobe pulmonary arteriography. 5. Subselective catheter directed thrombolysis of the left pulmonary arterial thrombus extending into the left lower lobe branch. RADIOLOGIST: Sarkis Gerardo MD ANESTHESIA:  Local 1% lidocaine. FLUORO TIME:  7.7 minutes REFERENCE AIR KERMA: 44 mGy CONTRAST:  20 cc of Omnipaque 300 DESCRIPTION OF PROCEDURE:    Written informed consent was obtained following explanation of risks and benefits of the procedure. Specifically, risks of cardiac arrhythmia, pulmonary arterial injury, and  intracranial hemorrhage were discussed with the patient. Additionally, significantly elevated risk of abdominal hemorrhage related to known adrenal hemorrhage was clearly discussed. The patient was placed supine on the IR procedure table. Maximal sterile barrier precautions were utilized during catheter insertion including aseptic technique, the use of cap, mask, sterile gown, sterile gloves, sterile drapes, hand hygiene, and patient cutaneous antisepsis with chlorhexidene 2%. Grayscale and color Doppler ultrasound evaluation of the right common femoral vein was performed. The right common femoral vein was patent and compressible. The right groin was prepped and draped in usual sterile fashion. Local anesthesia was administered with 1% lidocaine. Under ultrasound guidance, the right common femoral vein was accessed with a 21-gauge needle. An 018 wire was advanced centrally. Sequential exchange was made for a 5 [place] a microcatheter sheath, 035 Ness wire, and 5 [place] vascular sheath. The sheath was secured with 0-0 silk. Utilizing an H1 catheter with Glidewire, the right main pulmonary artery was selected with successful subselection of a right lower lobe pulmonary arterial branch. Subselective right lower lobe pulmonary arteriography was performed, confirming satisfactory position within the right lower lobe branch distal to the main thrombus. Over a J Phelan wire, exchange was made for the Darrion-Macnamara flush catheter. 10 mg of TPA was then successfully infused into the right main pulmonary artery, extending into the right lower lobe pulmonary arterial branch. Utilizing an H1 catheter with Glidewire, the left main pulmonary artery was selected with successful subselection of a left lower lobe pulmonary arterial branch. Subselective left lower lobe pulmonary arteriography was performed, confirming satisfactory position within the left lower lobe branch distal to the main thrombus. Over a J Phelan wire, exchange  was made for the Darrion-Macnamara flush catheter. 10 mg of TPA was then successfully infused into the left main pulmonary artery, extending into the left lower lobe pulmonary arterial branch. Catheter and sheath were removed. Hemostasis of the right common femoral vein was achieved with manual compression.     X-RAY CHEST 1 VIEW    Result Date: 10/26/2022  IMPRESSION: No acute cardiopulmonary abnormality seen.. COMMENT:    A single AP view of the chest was performed. Comparison: AP chest x-ray from 10/24/2022. The heart size and pulmonary vasculature remain within normal limits. The lung fields appear clear, and no pleural effusions are seen. No hilar abnormality is identified. There is mild tortuosity of the descending thoracic aorta. An electronic device projects over the left lung base projecting over the left anterior left fourth rib, likely a loop recorder. When compared to the prior study, there has been no significant interval change.     X-RAY CHEST 1 VIEW    Result Date: 10/24/2022  IMPRESSION: No evidence of acute pulmonary disease.     Ultrasound venous leg bilateral    Result Date: 10/25/2022  IMPRESSION: Examination positive for deep vein thrombosis bilaterally as described, with greater than right.     CT CHEST ABDOMEN PELVIS WITHOUT IV CONTRAST    Result Date: 10/25/2022  IMPRESSION: 1.  Findings concerning for a large right-sided retroperitoneal hemorrhage including the right kidney and right adrenal gland. A small to moderate amount of pelvic ascites is also noted which appears mildly hyperdense and may reflect a component of hemoperitoneum. 2.  Additional chronic and incidental findings as described above. Finding:    Unexpected significant hemorrhage (chest, abdomen, pelvis)   Acuity: Critical  Status:  CLOSED Critical read back was performed and results were read back by Dr. Simms, on 10/25/2022 8:10 PM       ASSESSMENT & PLAN    76 yo male presenting with syncope found to have submassive  pulmonary embolism and bilateral DVT, liver mass, adrenal hemorrhage developing retroperitoneal hemorrhage following catheter directed thrombolysis post IVC filter placement 10/25.  10mm ground glass lung nodule on imaging as well.      Pulmonary embolism/DVT: In setting of retroperitoneal bleed, post IVC filter placement 10/26.  Concern for liver mass on CT but on MRI abdomen done 10/28 appears more c/w hemorrhage or infarct.   Restarted on heparin 10/29, hemoglobin more or less stable, no evidence of bleeding since.       Liver mass: Concern for this on CT,  tumor markers with normal AFP, , CEA.  Hepatitis panels negative.  MRI looks more c/w hemorrhage or infarct, no concerns for metastatic disease.      Adrenal hemorrhage: noted on right on admission, developing adrenal hemorrhage noted on left on MRI done 10/28, follow for adrenal insufficiency, etiology of adrenal hemorrhages not clear.         Anemia: hgb 15.3 on presentation but down to 10.6 10/25 in setting of bleed, normocytic, normal RDW.  Did get 2 units PRBC 10/25.  Requested iron studies/LDH.  Ferritin high at 612, iron sat low at 10% c/w inflammation.  LDH high at 263, likely also non specific marker of inflammation but given bili also up a bit at 1.4, could be hemolysis, ordered retic/haptoglobin.  Hgb from 10 to 9.5 since starting heparin, continue to follow, down to 9.2 on 10/30 but now 9.5.       Thrombocytopenia: plts dropping since admit, down to 95K, could be due to bleed.  Ordered DIC panel, fibrinogen ok at 473.  Plts improved to 227, no longer low.      Pulm nodule: ground glass RML hilar pulm nodule noted on initial CT, I personally reviewed images, does not look overly concerning for aggressive lung cancer that could lead to hypercoag state.  Could be adenocarcinoma spectrum lesion, likely will need to follow but can discus with pulm if amenable to bronch biopsy.        Need for CPAP: concern for patient, not able to use now due to  facial fractures, resume use as per surgery.       All of the above has been reviewed with the patient, who is in agreement with the plan of action.  I appreciate the opportunity to participate in the care of this patient, who I will follow with you.        Octavia Soto MD

## 2022-10-31 NOTE — PROGRESS NOTES
Hospital Medicine Service -  Daily Progress Note       SUBJECTIVE   Interval History: No complaints, no shortness of breath.  Notes back pain was a bit worse today.  Looking forward to getting OOB.  No fevers, chills.  2L     OBJECTIVE      Vital signs in last 24 hours:  Temp:  [36.5 °C (97.7 °F)-36.8 °C (98.2 °F)] 36.6 °C (97.8 °F)  Heart Rate:  [64-88] 81  Resp:  [18-20] 18  BP: (119-142)/(60-80) 139/75    Intake/Output Summary (Last 24 hours) at 10/31/2022 1619  Last data filed at 10/31/2022 0930  Gross per 24 hour   Intake 480 ml   Output 975 ml   Net -495 ml       PHYSICAL EXAMINATION      Physical Exam  Vitals and nursing note reviewed.   Constitutional:       Appearance: Normal appearance.   HENT:      Head: Atraumatic.   Cardiovascular:      Rate and Rhythm: Normal rate and regular rhythm.      Pulses: Normal pulses.      Heart sounds: Normal heart sounds. No murmur heard.    No friction rub. No gallop.   Pulmonary:      Effort: Pulmonary effort is normal. No respiratory distress.      Breath sounds: Normal breath sounds. No stridor.   Abdominal:      General: Abdomen is flat. Bowel sounds are normal.      Palpations: Abdomen is soft.   Musculoskeletal:      Right lower leg: No edema.      Left lower leg: No edema.   Skin:     General: Skin is warm and dry.   Neurological:      General: No focal deficit present.      Mental Status: He is alert and oriented to person, place, and time.   Psychiatric:         Mood and Affect: Mood normal.         Behavior: Behavior normal.         Thought Content: Thought content normal.         Judgment: Judgment normal.           LABS / IMAGING / TELE      Labs  Lab Results   Component Value Date    WBC 11.95 (H) 10/31/2022    HGB 9.5 (L) 10/31/2022    HCT 29.8 (L) 10/31/2022    MCV 90.0 10/31/2022     10/31/2022     Lab Results   Component Value Date    GLUCOSE 143 (H) 10/30/2022    CALCIUM 8.0 (L) 10/30/2022     10/30/2022    K 4.1 10/30/2022    CO2 24  10/30/2022     10/30/2022    BUN 38 (H) 10/30/2022    CREATININE 1.0 10/30/2022     Lab Results   Component Value Date    ALT 92 (H) 10/29/2022    AST 23 10/29/2022    ALKPHOS 58 10/29/2022    BILITOT 1.5 (H) 10/29/2022     Lab Results   Component Value Date    INR 1.2 10/27/2022    INR 1.4 10/26/2022    INR 1.4 10/25/2022       Imaging  CT HEAD WITHOUT IV CONTRAST    Result Date: 10/25/2022  IMPRESSION: 1.  No acute intracranial hemorrhage, large territorial infarct, mass effect or midline shift is identified. 2.  Multiple right maxillary sinus and zygomatic arch fractures with an air-fluid level within the right maxillary sinus.     CT HEAD WITHOUT IV CONTRAST    Result Date: 10/24/2022  IMPRESSION: 1.  No posttraumatic intracranial abnormality. 2. Chronic changes noted under the comment.    CT FACIAL BONES WITHOUT IV CONTRAST    Result Date: 10/24/2022  IMPRESSION: Multiple right-sided facial bone fractures as described under the comment.    CT CERVICAL SPINE WITHOUT IV CONTRAST    Result Date: 10/24/2022  IMPRESSION: 1.  No acute fractures. As clinically indicated, MRI of the cervical spine is recommended for further evaluation. 2.  Other incidental findings noted under the comment.     MRI ABDOMEN WITH AND WITHOUT CONTRAST    Result Date: 10/29/2022  IMPRESSION: 1. Right hepatic lobe mass on recent CT is most compatible with evolving hemorrhage/infarct centered in segment 7. Additional hemorrhagic/infarcted focus in central segment 5/8, stable in retrospect. Additional benign liver cysts, largest in the left lobe measuring up to 3.9 cm and containing blood products. 2. Similar appearance of right perinephric and right retroperitoneal hematomas with hemorrhagic right renal cyst. 3. Similar right adrenal hemorrhage with developing hemorrhage into the left adrenal gland. 4. Small right pleural effusion with findings suspicious for a component of hemothorax. This could be confirmed with thoracentesis, if  necessary. 5. No findings on subtraction imaging to suggest papo active bleeding. Overall, there are no findings suspicious for malignancy; specifically, no suspicious hepatic observations. Given motion degradation on extensive findings, follow-up contrast-enhanced MRI is recommended to assess for resolution of findings and exclude underlying hemorrhagic neoplasm.    ULTRASOUND GUIDED VASCULAR ACCESS    Result Date: 10/26/2022  IMPRESSION:   Successful placement of a retrievable infrarenal IVC Filter. Device: Argon Option Elite IVC Filter COMMENT: PROCEDURE: 1. Ultrasound-guided access of the right common femoral vein. 2. IVC cavagram 3. IVC filter placement RADIOLOGIST:  Sarkis Gerardo MD ANESTHESIA:  Lidocaine 1% CONTRAST:  CO2 FLUORO TIME:  0.8 min REFERENCE AIR KERMA: 40 mGy MEDICATIONS:  none DESCRIPTION OF PROCEDURE:  Consent was obtained following explanation of risks and benefits of the procedure. The right groin was prepped and draped in the usual sterile fashion. The right common femoral vein was noted to be compressible and patent on gray scale ultrasound. Local anesthesia was provided. The vein was then accessed with a  21-gauge needle under ultrasound guidance, and an .018 wire was advanced centrally. An ultrasound-guided access image was saved to PACS. Exchange was made for a microsheath, .035 wire, and multi-sidehole sheath, which was positioned at the iliocaval junction. IVC cavagram was performed with CO2. Renal vein insertion sites were localized. An Argon Option Elite IVC Filter was deployed infrarenally without complication. Wires and catheters were removed and hemostasis was achieved. The patient remained stable throughout the procedure. The number of guidewires used, and their integrity, was confirmed at the end of the procedure.     ULTRASOUND GUIDED VASCULAR ACCESS    Result Date: 10/25/2022  IMPRESSION:   Successful bilateral pulmonary arteriography and catheter-directed thrombolysis.  COMMENT: PROCEDURE:  1. Ultrasound guided access of the central right common femoral vein. 2. Subselective right lower lobe pulmonary arteriography. 3. Subselective catheter directed thrombolysis of the right pulmonary arterial thrombus extending into the right lower lobe branch. 4. Subselective left lower lobe pulmonary arteriography. 5. Subselective catheter directed thrombolysis of the left pulmonary arterial thrombus extending into the left lower lobe branch. RADIOLOGIST: Sarkis Gerardo MD ANESTHESIA:  Local 1% lidocaine. FLUORO TIME:  7.7 minutes REFERENCE AIR KERMA: 44 mGy CONTRAST:  20 cc of Omnipaque 300 DESCRIPTION OF PROCEDURE:    Written informed consent was obtained following explanation of risks and benefits of the procedure. Specifically, risks of cardiac arrhythmia, pulmonary arterial injury, and intracranial hemorrhage were discussed with the patient. Additionally, significantly elevated risk of abdominal hemorrhage related to known adrenal hemorrhage was clearly discussed. The patient was placed supine on the IR procedure table. Maximal sterile barrier precautions were utilized during catheter insertion including aseptic technique, the use of cap, mask, sterile gown, sterile gloves, sterile drapes, hand hygiene, and patient cutaneous antisepsis with chlorhexidene 2%. Grayscale and color Doppler ultrasound evaluation of the right common femoral vein was performed. The right common femoral vein was patent and compressible. The right groin was prepped and draped in usual sterile fashion. Local anesthesia was administered with 1% lidocaine. Under ultrasound guidance, the right common femoral vein was accessed with a 21-gauge needle. An 018 wire was advanced centrally. Sequential exchange was made for a 5 Chinese a microcatheter sheath, 035 Ness wire, and 5 Chinese vascular sheath. The sheath was secured with 0-0 silk. Utilizing an H1 catheter with Glidewire, the right main pulmonary artery was selected  with successful subselection of a right lower lobe pulmonary arterial branch. Subselective right lower lobe pulmonary arteriography was performed, confirming satisfactory position within the right lower lobe branch distal to the main thrombus. Over a J Phelan wire, exchange was made for the Darrion-Macnamara flush catheter. 10 mg of TPA was then successfully infused into the right main pulmonary artery, extending into the right lower lobe pulmonary arterial branch. Utilizing an H1 catheter with Glidewire, the left main pulmonary artery was selected with successful subselection of a left lower lobe pulmonary arterial branch. Subselective left lower lobe pulmonary arteriography was performed, confirming satisfactory position within the left lower lobe branch distal to the main thrombus. Over a J Phelan wire, exchange was made for the Darrion-Macnamara flush catheter. 10 mg of TPA was then successfully infused into the left main pulmonary artery, extending into the left lower lobe pulmonary arterial branch. Catheter and sheath were removed. Hemostasis of the right common femoral vein was achieved with manual compression.     CT ABDOMEN PELVIS WITH IV CONTRAST    Result Date: 10/24/2022  IMPRESSION: 1.  Extensive acute pulmonary arterial emboli involving the bilateral main and multiple bilateral lobar, segmental and subsegmental branches, as detailed above. Flattening of the interventricular septum with dilatation of the right ventricle suggestive of right-sided heart strain. No evidence of acute pulmonary infarct. 2.  Large, indeterminate infiltrative hypodense lesion within the right hepatic lobe measuring up to 7 cm warranting further assessment with contrast-enhanced MRI. Differential diagnosis includes metastatic disease, a primary malignancy as well as abscess. Small adjacent similar appearing indeterminate right hepatic lobe lesion. 3.  Indeterminate right adrenal mass with findings consistent with right adrenal  hemorrhage. 4.  A 10 mm groundglass nodule within the right middle lobe. A follow-up CT scan of the chest in 6-12 months is recommended as per Fleischner guidelines. 5.  Additional incidental findings including ectasia of ascending thoracic aorta, enlarged prostate gland, coronary arterial atherosclerotic vascular disease and colonic diverticulosis without evidence of diverticulitis. Findings and recommendations discussed with SUZANNA Muñoz by Dr. Thompson by telephone at 9:54 AM and 10:21 AM on 10/24/2022. Fleischner Society 2017 Guidelines for Management of Incidentally Detected Pulmonary Nodules in Adults. (Subsolid Nodules) Single ground glass: <6 mm - No routine follow-up >/= 6 mm - CT at 6-12 months to confirm persistence, then CT every 2 years until 5 years (In certain suspicious nodules < 6 mm, consider follow-up at 2 and 4 years.  If solid component(s) or growth develops, consider resection (Recommendations 3A and 4A)     IR FILTER PLACEMENT    Result Date: 10/26/2022  IMPRESSION:   Successful placement of a retrievable infrarenal IVC Filter. Device: Argon Option Elite IVC Filter COMMENT: PROCEDURE: 1. Ultrasound-guided access of the right common femoral vein. 2. IVC cavagram 3. IVC filter placement RADIOLOGIST:  Sarkis Gerardo MD ANESTHESIA:  Lidocaine 1% CONTRAST:  CO2 FLUORO TIME:  0.8 min REFERENCE AIR KERMA: 40 mGy MEDICATIONS:  none DESCRIPTION OF PROCEDURE:  Consent was obtained following explanation of risks and benefits of the procedure. The right groin was prepped and draped in the usual sterile fashion. The right common femoral vein was noted to be compressible and patent on gray scale ultrasound. Local anesthesia was provided. The vein was then accessed with a  21-gauge needle under ultrasound guidance, and an .018 wire was advanced centrally. An ultrasound-guided access image was saved to PACS. Exchange was made for a microsheath, .035 wire, and multi-sidehole sheath, which was positioned at the  iliocaval junction. IVC cavagram was performed with CO2. Renal vein insertion sites were localized. An Argon Option Elite IVC Filter was deployed infrarenally without complication. Wires and catheters were removed and hemostasis was achieved. The patient remained stable throughout the procedure. The number of guidewires used, and their integrity, was confirmed at the end of the procedure.     CT ANGIOGRAPHY CHEST PULMONARY EMBOLISM WITH IV CONTRAST    Result Date: 10/24/2022  IMPRESSION: 1.  Extensive acute pulmonary arterial emboli involving the bilateral main and multiple bilateral lobar, segmental and subsegmental branches, as detailed above. Flattening of the interventricular septum with dilatation of the right ventricle suggestive of right-sided heart strain. No evidence of acute pulmonary infarct. 2.  Large, indeterminate infiltrative hypodense lesion within the right hepatic lobe measuring up to 7 cm warranting further assessment with contrast-enhanced MRI. Differential diagnosis includes metastatic disease, a primary malignancy as well as abscess. Small adjacent similar appearing indeterminate right hepatic lobe lesion. 3.  Indeterminate right adrenal mass with findings consistent with right adrenal hemorrhage. 4.  A 10 mm groundglass nodule within the right middle lobe. A follow-up CT scan of the chest in 6-12 months is recommended as per Fleischner guidelines. 5.  Additional incidental findings including ectasia of ascending thoracic aorta, enlarged prostate gland, coronary arterial atherosclerotic vascular disease and colonic diverticulosis without evidence of diverticulitis. Findings and recommendations discussed with SUZANNA Muñoz by Dr. Thompson by telephone at 9:54 AM and 10:21 AM on 10/24/2022. Fleischner Society 2017 Guidelines for Management of Incidentally Detected Pulmonary Nodules in Adults. (Subsolid Nodules) Single ground glass: <6 mm - No routine follow-up >/= 6 mm - CT at 6-12 months to confirm  persistence, then CT every 2 years until 5 years (In certain suspicious nodules < 6 mm, consider follow-up at 2 and 4 years.  If solid component(s) or growth develops, consider resection (Recommendations 3A and 4A)     IR THROMBOLYSIS    Result Date: 10/25/2022  IMPRESSION:   Successful bilateral pulmonary arteriography and catheter-directed thrombolysis. COMMENT: PROCEDURE:  1. Ultrasound guided access of the central right common femoral vein. 2. Subselective right lower lobe pulmonary arteriography. 3. Subselective catheter directed thrombolysis of the right pulmonary arterial thrombus extending into the right lower lobe branch. 4. Subselective left lower lobe pulmonary arteriography. 5. Subselective catheter directed thrombolysis of the left pulmonary arterial thrombus extending into the left lower lobe branch. RADIOLOGIST: Sarkis Gerardo MD ANESTHESIA:  Local 1% lidocaine. FLUORO TIME:  7.7 minutes REFERENCE AIR KERMA: 44 mGy CONTRAST:  20 cc of Omnipaque 300 DESCRIPTION OF PROCEDURE:    Written informed consent was obtained following explanation of risks and benefits of the procedure. Specifically, risks of cardiac arrhythmia, pulmonary arterial injury, and intracranial hemorrhage were discussed with the patient. Additionally, significantly elevated risk of abdominal hemorrhage related to known adrenal hemorrhage was clearly discussed. The patient was placed supine on the IR procedure table. Maximal sterile barrier precautions were utilized during catheter insertion including aseptic technique, the use of cap, mask, sterile gown, sterile gloves, sterile drapes, hand hygiene, and patient cutaneous antisepsis with chlorhexidene 2%. Grayscale and color Doppler ultrasound evaluation of the right common femoral vein was performed. The right common femoral vein was patent and compressible. The right groin was prepped and draped in usual sterile fashion. Local anesthesia was administered with 1% lidocaine. Under  ultrasound guidance, the right common femoral vein was accessed with a 21-gauge needle. An 018 wire was advanced centrally. Sequential exchange was made for a 5 Gabonese a microcatheter sheath, 035 Ness wire, and 5 Gabonese vascular sheath. The sheath was secured with 0-0 silk. Utilizing an H1 catheter with Glidewire, the right main pulmonary artery was selected with successful subselection of a right lower lobe pulmonary arterial branch. Subselective right lower lobe pulmonary arteriography was performed, confirming satisfactory position within the right lower lobe branch distal to the main thrombus. Over a J Phelan wire, exchange was made for the Darrion-Macnamara flush catheter. 10 mg of TPA was then successfully infused into the right main pulmonary artery, extending into the right lower lobe pulmonary arterial branch. Utilizing an H1 catheter with Glidewire, the left main pulmonary artery was selected with successful subselection of a left lower lobe pulmonary arterial branch. Subselective left lower lobe pulmonary arteriography was performed, confirming satisfactory position within the left lower lobe branch distal to the main thrombus. Over a J Phelan wire, exchange was made for the Darrion-Macnamara flush catheter. 10 mg of TPA was then successfully infused into the left main pulmonary artery, extending into the left lower lobe pulmonary arterial branch. Catheter and sheath were removed. Hemostasis of the right common femoral vein was achieved with manual compression.     X-RAY CHEST 1 VIEW    Result Date: 10/26/2022  IMPRESSION: No acute cardiopulmonary abnormality seen.. COMMENT:    A single AP view of the chest was performed. Comparison: AP chest x-ray from 10/24/2022. The heart size and pulmonary vasculature remain within normal limits. The lung fields appear clear, and no pleural effusions are seen. No hilar abnormality is identified. There is mild tortuosity of the descending thoracic aorta. An electronic  device projects over the left lung base projecting over the left anterior left fourth rib, likely a loop recorder. When compared to the prior study, there has been no significant interval change.     X-RAY CHEST 1 VIEW    Result Date: 10/24/2022  IMPRESSION: No evidence of acute pulmonary disease.     Ultrasound venous leg bilateral    Result Date: 10/25/2022  IMPRESSION: Examination positive for deep vein thrombosis bilaterally as described, with greater than right.     CT CHEST ABDOMEN PELVIS WITHOUT IV CONTRAST    Result Date: 10/25/2022  IMPRESSION: 1.  Findings concerning for a large right-sided retroperitoneal hemorrhage including the right kidney and right adrenal gland. A small to moderate amount of pelvic ascites is also noted which appears mildly hyperdense and may reflect a component of hemoperitoneum. 2.  Additional chronic and incidental findings as described above. Finding:    Unexpected significant hemorrhage (chest, abdomen, pelvis)   Acuity: Critical  Status:  CLOSED Critical read back was performed and results were read back by Dr. Simms, on 10/25/2022 8:10 PM        ECG/Telemetry     ASSESSMENT AND PLAN      * Pulmonary embolism with acute cor pulmonale (CMS/HCC)  Assessment & Plan  CT chest: Extensive b/l PE with R heart strain. 7cm hypodense lesion R hepatic lobe, small R hepatic lobe lesion. Indeterminate R adrenal mass and hemorrhage.  10 mm RML nodule  -Provoked by underlying hepatic malignancy?   -With acute hypoxic resp failure  -Associated R heart strain  -Started on Heparin drip and underwent TPA to each pulm artery  -RRT 10/26 with hypotension and retroperitoneal hemorrhage  -DVT,  US: B/ L>R.  R nearly occlusive thrombus post tibial vein. Left: Mid femoral vein partially occlusive thrombus. Distal popliteal vein occlusive thrombus.  Peroneal and posterior tibial vein partially/nearly occlusive thrombus.  -Echo 10/24:  TDS.  EF 75%   Grade I LV diastolic dysfunction. Severely  dilated RV. Severely reduced RV systolic function c/w RV strain.  Mod dilated RA. Trace TR.   -Echo 10/26:C/w 10/24, RV size dec and fcn improved   -S/p IVC filter 10/25  -Heparin resumed without a bolus, hgb stable  -Possible transition to Eliquis tomorrow    Retroperitoneal hematoma  Assessment & Plan  -CT a/p 10/25: Lg R sided retroperitoneal hemorrhage including R kidney and R adrenal. Component of hemoperitoneum  -With associated acute blood loss anemia requiring prbcs  -Underlying adrenal hemorrhage noted on initial CT, but risk of not anticoagulating outweighed risk of bleed  -Will need to follow to assure no adrenal insufficiency    Liver mass  Assessment & Plan  -Abnormal transaminases  -CT:  7cm hypodense lesion R hepatic lobe, small R hepatic lobe lesion.   -CA 19-9, AFP, hepatitis panel neg.   CEA 3.9 which is theoretically elevated in a non smoker  -MRI: R hepatic lobe looks more like evolving hemorrhage/infarct.  Benign liver cysts. R perinephric and R retroperitoneal hematomas, hemorrhagic R renal cyst. R adrenal hemorrhage with developing hemorrhage L adrenal.  Small R pleural effusion, possible hemothorax  -I d/w Dr Soto, no plan for bx    LYNNE (acute kidney injury) (CMS/HCC)  Assessment & Plan  -Prerenal, improving  -Cr peaked at 2.3, now resolved    Acute blood loss anemia  Assessment & Plan  -From retroperitoneal hematoma, needing PRBC transfusion  -Thrombocytopenia as well, improving.  DIC labs noted  -Had downtrended, but relatively stable    Pulmonary nodule  Assessment & Plan  -Noted on CT chest 1 cm R perihilar  -No present plan for bx.  Will need repeat imaging 3-6 months, and may need navigational bronch for bx     Facial fracture (CMS/HCC)  Assessment & Plan  -CT facial bones:Multiple right-sided facial bone fractures as described under the comment.  -Seen by Trauma and plastics  -Seen by plastics who discussed potential facial deformity.  Not a candidate for reconstructive surgery, given  his need for AC  -I d/w trauma, no need for abx  -Holding cpap for now    Syncope  Assessment & Plan  -From submassive PE/R heart strain  -PT, OT    Hyperlipidemia  Assessment & Plan  -Lipitor held with abn LFTs    Myocardial injury  Assessment & Plan  -Trop peaked at 1349, R heart strain from PE    Hypertension  Assessment & Plan  -BP better since resuming Norvasc and Metoprolol  -Holding HCTZ       VTE Assessment: Padua    VTE Prophylaxis Plan: Current anticoagulants:  heparin (porcine) bolus from bag 3,400-6,750 Units, intravenous, q6h PRN  heparin infusion in D5W 100 units/mL, intravenous, Titrated      Code Status: Full Code  Estimated Discharge Date:   11/2/2022  Disposition: [facility]     Nael Rodriguez MD  10/31/2022  4:19 PM

## 2022-10-31 NOTE — ASSESSMENT & PLAN NOTE
CT chest: Extensive b/l PE with R heart strain. 7cm hypodense lesion R hepatic lobe, small R hepatic lobe lesion. Indeterminate R adrenal mass and hemorrhage.  10 mm RML nodule  -Provoked by underlying hepatic malignancy?   -With acute hypoxic resp failure  -Associated R heart strain  -Started on Heparin drip and underwent TPA to each pulm artery  -RRT 10/26 with hypotension and retroperitoneal hemorrhage  -DVT,  US: B/ L>R.  R nearly occlusive thrombus post tibial vein. Left: Mid femoral vein partially occlusive thrombus. Distal popliteal vein occlusive thrombus.  Peroneal and posterior tibial vein partially/nearly occlusive thrombus.  -Echo 10/24:  TDS.  EF 75%   Grade I LV diastolic dysfunction. Severely dilated RV. Severely reduced RV systolic function c/w RV strain.  Mod dilated RA. Trace TR.   -Echo 10/26:C/w 10/24, RV size dec and fcn improved   -S/p IVC filter 10/25  -Heparin resumed without a bolus, hgb stable  -Possible transition to Eliquis tomorrow

## 2022-10-31 NOTE — PLAN OF CARE
Plan of Care Review  Plan of Care Reviewed With: patient  Progress: no change  Outcome Summary: Pt aaox4. Flat affect. SR on monitor. Lethargic but easily aroused. Neuro checks negative for any changes Heparin gtt at 18 mL/hr. Morning PTT pending with morning (timed) CBC at 0800. Using urinal indepdently. Bed alarm set and call bell within reach. Will continue to monitor.

## 2022-11-01 LAB
ANION GAP SERPL CALC-SCNC: 7 MEQ/L (ref 3–15)
APTT PPP: 71 SEC (ref 23–35)
BUN SERPL-MCNC: 29 MG/DL (ref 8–20)
CALCIUM SERPL-MCNC: 8 MG/DL (ref 8.9–10.3)
CHLORIDE SERPL-SCNC: 105 MEQ/L (ref 98–109)
CO2 SERPL-SCNC: 21 MEQ/L (ref 22–32)
CREAT SERPL-MCNC: 0.8 MG/DL (ref 0.8–1.3)
ERYTHROCYTE [DISTWIDTH] IN BLOOD BY AUTOMATED COUNT: 13.2 % (ref 11.6–14.4)
GFR SERPL CREATININE-BSD FRML MDRD: >60 ML/MIN/1.73M*2
GLUCOSE BLD-MCNC: 126 MG/DL (ref 70–99)
GLUCOSE BLD-MCNC: 137 MG/DL (ref 70–99)
GLUCOSE BLD-MCNC: 153 MG/DL (ref 70–99)
GLUCOSE BLD-MCNC: 158 MG/DL (ref 70–99)
GLUCOSE SERPL-MCNC: 134 MG/DL (ref 70–99)
HCT VFR BLDCO AUTO: 28.6 % (ref 40.1–51)
HGB BLD-MCNC: 9 G/DL (ref 13.7–17.5)
MCH RBC QN AUTO: 28.5 PG (ref 28–33.2)
MCHC RBC AUTO-ENTMCNC: 31.5 G/DL (ref 32.2–36.5)
MCV RBC AUTO: 90.5 FL (ref 83–98)
PDW BLD AUTO: 9.3 FL (ref 9.4–12.4)
PLATELET # BLD AUTO: 259 K/UL (ref 150–350)
POCT TEST: ABNORMAL
POTASSIUM SERPL-SCNC: 4.4 MEQ/L (ref 3.6–5.1)
RBC # BLD AUTO: 3.16 M/UL (ref 4.5–5.8)
SODIUM SERPL-SCNC: 133 MEQ/L (ref 136–144)
WBC # BLD AUTO: 12.42 K/UL (ref 3.8–10.5)

## 2022-11-01 PROCEDURE — 36415 COLL VENOUS BLD VENIPUNCTURE: CPT | Performed by: INTERNAL MEDICINE

## 2022-11-01 PROCEDURE — 85730 THROMBOPLASTIN TIME PARTIAL: CPT | Performed by: INTERNAL MEDICINE

## 2022-11-01 PROCEDURE — 63700000 HC SELF-ADMINISTRABLE DRUG

## 2022-11-01 PROCEDURE — 63600000 HC DRUGS/DETAIL CODE: Performed by: INTERNAL MEDICINE

## 2022-11-01 PROCEDURE — 80048 BASIC METABOLIC PNL TOTAL CA: CPT | Performed by: INTERNAL MEDICINE

## 2022-11-01 PROCEDURE — 63700000 HC SELF-ADMINISTRABLE DRUG: Performed by: HOSPITALIST

## 2022-11-01 PROCEDURE — 99232 SBSQ HOSP IP/OBS MODERATE 35: CPT | Performed by: INTERNAL MEDICINE

## 2022-11-01 PROCEDURE — 85027 COMPLETE CBC AUTOMATED: CPT | Performed by: INTERNAL MEDICINE

## 2022-11-01 PROCEDURE — 21400000 HC ROOM AND CARE CCU/INTERMEDIATE

## 2022-11-01 RX ADMIN — PANTOPRAZOLE SODIUM 40 MG: 40 TABLET, DELAYED RELEASE ORAL at 08:45

## 2022-11-01 RX ADMIN — LATANOPROST 1 DROP: 50 SOLUTION OPHTHALMIC at 21:24

## 2022-11-01 RX ADMIN — SENNOSIDES AND DOCUSATE SODIUM 2 TABLET: 50; 8.6 TABLET ORAL at 08:45

## 2022-11-01 RX ADMIN — METOPROLOL SUCCINATE 50 MG: 50 TABLET, FILM COATED, EXTENDED RELEASE ORAL at 17:33

## 2022-11-01 RX ADMIN — AMLODIPINE BESYLATE 5 MG: 5 TABLET ORAL at 17:33

## 2022-11-01 RX ADMIN — INSULIN ASPART 2 UNITS: 100 INJECTION, SOLUTION INTRAVENOUS; SUBCUTANEOUS at 21:25

## 2022-11-01 RX ADMIN — INSULIN ASPART 2 UNITS: 100 INJECTION, SOLUTION INTRAVENOUS; SUBCUTANEOUS at 08:44

## 2022-11-01 RX ADMIN — POLYETHYLENE GLYCOL 3350 17 G: 17 POWDER, FOR SOLUTION ORAL at 08:45

## 2022-11-01 RX ADMIN — HEPARIN SODIUM AND DEXTROSE 1950 UNITS/HR: 10000; 5 INJECTION INTRAVENOUS at 02:00

## 2022-11-01 RX ADMIN — TIMOLOL MALEATE 1 DROP: 5 SOLUTION/ DROPS OPHTHALMIC at 08:46

## 2022-11-01 RX ADMIN — HEPARIN SODIUM AND DEXTROSE 1950 UNITS/HR: 10000; 5 INJECTION INTRAVENOUS at 15:07

## 2022-11-01 NOTE — PROGRESS NOTES
Hospital Medicine Service -  Daily Progress Note       SUBJECTIVE   Interval History: Denies worsening back pain, shortness of breath, chest pain.  Says he has been more active moving around.  No dizziness, lightheadedness.     OBJECTIVE      Vital signs in last 24 hours:  Temp:  [36.6 °C (97.8 °F)-37.6 °C (99.7 °F)] 37.1 °C (98.7 °F)  Heart Rate:  [75-91] 75  Resp:  [18] 18  BP: (119-139)/(72-80) 119/76    Intake/Output Summary (Last 24 hours) at 11/1/2022 1005  Last data filed at 11/1/2022 0900  Gross per 24 hour   Intake --   Output 750 ml   Net -750 ml       PHYSICAL EXAMINATION      Physical Exam  Vitals and nursing note reviewed.   Constitutional:       Appearance: Normal appearance.   HENT:      Head: Normocephalic and atraumatic.   Cardiovascular:      Rate and Rhythm: Normal rate and regular rhythm.      Pulses: Normal pulses.      Heart sounds: Normal heart sounds.   Pulmonary:      Effort: Pulmonary effort is normal. No respiratory distress.      Breath sounds: Normal breath sounds. No stridor. No wheezing or rhonchi.   Abdominal:      General: Abdomen is flat. Bowel sounds are normal. There is no distension.      Palpations: There is no mass.      Tenderness: There is no abdominal tenderness. There is no rebound.      Hernia: No hernia is present.   Musculoskeletal:      Right lower leg: No edema.      Left lower leg: No edema.   Skin:     General: Skin is warm and dry.   Neurological:      General: No focal deficit present.      Mental Status: He is alert and oriented to person, place, and time.   Psychiatric:         Mood and Affect: Mood normal.         Behavior: Behavior normal.         Thought Content: Thought content normal.         Judgment: Judgment normal.           LABS / IMAGING / TELE      Labs  Lab Results   Component Value Date    WBC 12.42 (H) 11/01/2022    HGB 9.0 (L) 11/01/2022    HCT 28.6 (L) 11/01/2022    MCV 90.5 11/01/2022     11/01/2022     Lab Results   Component Value  Date    GLUCOSE 134 (H) 11/01/2022    CALCIUM 8.0 (L) 11/01/2022     (L) 11/01/2022    K 4.4 11/01/2022    CO2 21 (L) 11/01/2022     11/01/2022    BUN 29 (H) 11/01/2022    CREATININE 0.8 11/01/2022     Lab Results   Component Value Date    ALT 92 (H) 10/29/2022    AST 23 10/29/2022    ALKPHOS 58 10/29/2022    BILITOT 1.5 (H) 10/29/2022     Lab Results   Component Value Date    INR 1.2 10/27/2022    INR 1.4 10/26/2022    INR 1.4 10/25/2022       Imaging  CT HEAD WITHOUT IV CONTRAST    Result Date: 10/25/2022  IMPRESSION: 1.  No acute intracranial hemorrhage, large territorial infarct, mass effect or midline shift is identified. 2.  Multiple right maxillary sinus and zygomatic arch fractures with an air-fluid level within the right maxillary sinus.     CT HEAD WITHOUT IV CONTRAST    Result Date: 10/24/2022  IMPRESSION: 1.  No posttraumatic intracranial abnormality. 2. Chronic changes noted under the comment.    CT FACIAL BONES WITHOUT IV CONTRAST    Result Date: 10/24/2022  IMPRESSION: Multiple right-sided facial bone fractures as described under the comment.    CT CERVICAL SPINE WITHOUT IV CONTRAST    Result Date: 10/24/2022  IMPRESSION: 1.  No acute fractures. As clinically indicated, MRI of the cervical spine is recommended for further evaluation. 2.  Other incidental findings noted under the comment.     MRI ABDOMEN WITH AND WITHOUT CONTRAST    Result Date: 10/29/2022  IMPRESSION: 1. Right hepatic lobe mass on recent CT is most compatible with evolving hemorrhage/infarct centered in segment 7. Additional hemorrhagic/infarcted focus in central segment 5/8, stable in retrospect. Additional benign liver cysts, largest in the left lobe measuring up to 3.9 cm and containing blood products. 2. Similar appearance of right perinephric and right retroperitoneal hematomas with hemorrhagic right renal cyst. 3. Similar right adrenal hemorrhage with developing hemorrhage into the left adrenal gland. 4. Small right  pleural effusion with findings suspicious for a component of hemothorax. This could be confirmed with thoracentesis, if necessary. 5. No findings on subtraction imaging to suggest papo active bleeding. Overall, there are no findings suspicious for malignancy; specifically, no suspicious hepatic observations. Given motion degradation on extensive findings, follow-up contrast-enhanced MRI is recommended to assess for resolution of findings and exclude underlying hemorrhagic neoplasm.    ULTRASOUND GUIDED VASCULAR ACCESS    Result Date: 10/26/2022  IMPRESSION:   Successful placement of a retrievable infrarenal IVC Filter. Device: Argon Option Elite IVC Filter COMMENT: PROCEDURE: 1. Ultrasound-guided access of the right common femoral vein. 2. IVC cavagram 3. IVC filter placement RADIOLOGIST:  Sarkis Gerardo MD ANESTHESIA:  Lidocaine 1% CONTRAST:  CO2 FLUORO TIME:  0.8 min REFERENCE AIR KERMA: 40 mGy MEDICATIONS:  none DESCRIPTION OF PROCEDURE:  Consent was obtained following explanation of risks and benefits of the procedure. The right groin was prepped and draped in the usual sterile fashion. The right common femoral vein was noted to be compressible and patent on gray scale ultrasound. Local anesthesia was provided. The vein was then accessed with a  21-gauge needle under ultrasound guidance, and an .018 wire was advanced centrally. An ultrasound-guided access image was saved to PACS. Exchange was made for a microsheath, .035 wire, and multi-sidehole sheath, which was positioned at the iliocaval junction. IVC cavagram was performed with CO2. Renal vein insertion sites were localized. An Argon Option Elite IVC Filter was deployed infrarenally without complication. Wires and catheters were removed and hemostasis was achieved. The patient remained stable throughout the procedure. The number of guidewires used, and their integrity, was confirmed at the end of the procedure.     ULTRASOUND GUIDED VASCULAR  ACCESS    Result Date: 10/25/2022  IMPRESSION:   Successful bilateral pulmonary arteriography and catheter-directed thrombolysis. COMMENT: PROCEDURE:  1. Ultrasound guided access of the central right common femoral vein. 2. Subselective right lower lobe pulmonary arteriography. 3. Subselective catheter directed thrombolysis of the right pulmonary arterial thrombus extending into the right lower lobe branch. 4. Subselective left lower lobe pulmonary arteriography. 5. Subselective catheter directed thrombolysis of the left pulmonary arterial thrombus extending into the left lower lobe branch. RADIOLOGIST: Sarkis Gerardo MD ANESTHESIA:  Local 1% lidocaine. FLUORO TIME:  7.7 minutes REFERENCE AIR KERMA: 44 mGy CONTRAST:  20 cc of Omnipaque 300 DESCRIPTION OF PROCEDURE:    Written informed consent was obtained following explanation of risks and benefits of the procedure. Specifically, risks of cardiac arrhythmia, pulmonary arterial injury, and intracranial hemorrhage were discussed with the patient. Additionally, significantly elevated risk of abdominal hemorrhage related to known adrenal hemorrhage was clearly discussed. The patient was placed supine on the IR procedure table. Maximal sterile barrier precautions were utilized during catheter insertion including aseptic technique, the use of cap, mask, sterile gown, sterile gloves, sterile drapes, hand hygiene, and patient cutaneous antisepsis with chlorhexidene 2%. Grayscale and color Doppler ultrasound evaluation of the right common femoral vein was performed. The right common femoral vein was patent and compressible. The right groin was prepped and draped in usual sterile fashion. Local anesthesia was administered with 1% lidocaine. Under ultrasound guidance, the right common femoral vein was accessed with a 21-gauge needle. An 018 wire was advanced centrally. Sequential exchange was made for a 5 [place]i a microcatheter sheath, 035 Ness wire, and 5 [place]i vascular  sheath. The sheath was secured with 0-0 silk. Utilizing an H1 catheter with Glidewire, the right main pulmonary artery was selected with successful subselection of a right lower lobe pulmonary arterial branch. Subselective right lower lobe pulmonary arteriography was performed, confirming satisfactory position within the right lower lobe branch distal to the main thrombus. Over a J Phelan wire, exchange was made for the Darrion-Macnamara flush catheter. 10 mg of TPA was then successfully infused into the right main pulmonary artery, extending into the right lower lobe pulmonary arterial branch. Utilizing an H1 catheter with Glidewire, the left main pulmonary artery was selected with successful subselection of a left lower lobe pulmonary arterial branch. Subselective left lower lobe pulmonary arteriography was performed, confirming satisfactory position within the left lower lobe branch distal to the main thrombus. Over a J Phelan wire, exchange was made for the Darrion-Macnamara flush catheter. 10 mg of TPA was then successfully infused into the left main pulmonary artery, extending into the left lower lobe pulmonary arterial branch. Catheter and sheath were removed. Hemostasis of the right common femoral vein was achieved with manual compression.     CT ABDOMEN PELVIS WITH IV CONTRAST    Result Date: 10/24/2022  IMPRESSION: 1.  Extensive acute pulmonary arterial emboli involving the bilateral main and multiple bilateral lobar, segmental and subsegmental branches, as detailed above. Flattening of the interventricular septum with dilatation of the right ventricle suggestive of right-sided heart strain. No evidence of acute pulmonary infarct. 2.  Large, indeterminate infiltrative hypodense lesion within the right hepatic lobe measuring up to 7 cm warranting further assessment with contrast-enhanced MRI. Differential diagnosis includes metastatic disease, a primary malignancy as well as abscess. Small adjacent similar  appearing indeterminate right hepatic lobe lesion. 3.  Indeterminate right adrenal mass with findings consistent with right adrenal hemorrhage. 4.  A 10 mm groundglass nodule within the right middle lobe. A follow-up CT scan of the chest in 6-12 months is recommended as per Fleischner guidelines. 5.  Additional incidental findings including ectasia of ascending thoracic aorta, enlarged prostate gland, coronary arterial atherosclerotic vascular disease and colonic diverticulosis without evidence of diverticulitis. Findings and recommendations discussed with SUZANNA Muñoz by Dr. Thompson by telephone at 9:54 AM and 10:21 AM on 10/24/2022. Fleischner Society 2017 Guidelines for Management of Incidentally Detected Pulmonary Nodules in Adults. (Subsolid Nodules) Single ground glass: <6 mm - No routine follow-up >/= 6 mm - CT at 6-12 months to confirm persistence, then CT every 2 years until 5 years (In certain suspicious nodules < 6 mm, consider follow-up at 2 and 4 years.  If solid component(s) or growth develops, consider resection (Recommendations 3A and 4A)     IR FILTER PLACEMENT    Result Date: 10/26/2022  IMPRESSION:   Successful placement of a retrievable infrarenal IVC Filter. Device: Argon Option Elite IVC Filter COMMENT: PROCEDURE: 1. Ultrasound-guided access of the right common femoral vein. 2. IVC cavagram 3. IVC filter placement RADIOLOGIST:  Sarkis Gerardo MD ANESTHESIA:  Lidocaine 1% CONTRAST:  CO2 FLUORO TIME:  0.8 min REFERENCE AIR KERMA: 40 mGy MEDICATIONS:  none DESCRIPTION OF PROCEDURE:  Consent was obtained following explanation of risks and benefits of the procedure. The right groin was prepped and draped in the usual sterile fashion. The right common femoral vein was noted to be compressible and patent on gray scale ultrasound. Local anesthesia was provided. The vein was then accessed with a  21-gauge needle under ultrasound guidance, and an .018 wire was advanced centrally. An ultrasound-guided  access image was saved to PACS. Exchange was made for a microsheath, .035 wire, and multi-sidehole sheath, which was positioned at the iliocaval junction. IVC cavagram was performed with CO2. Renal vein insertion sites were localized. An Argon Option Elite IVC Filter was deployed infrarenally without complication. Wires and catheters were removed and hemostasis was achieved. The patient remained stable throughout the procedure. The number of guidewires used, and their integrity, was confirmed at the end of the procedure.     CT ANGIOGRAPHY CHEST PULMONARY EMBOLISM WITH IV CONTRAST    Result Date: 10/24/2022  IMPRESSION: 1.  Extensive acute pulmonary arterial emboli involving the bilateral main and multiple bilateral lobar, segmental and subsegmental branches, as detailed above. Flattening of the interventricular septum with dilatation of the right ventricle suggestive of right-sided heart strain. No evidence of acute pulmonary infarct. 2.  Large, indeterminate infiltrative hypodense lesion within the right hepatic lobe measuring up to 7 cm warranting further assessment with contrast-enhanced MRI. Differential diagnosis includes metastatic disease, a primary malignancy as well as abscess. Small adjacent similar appearing indeterminate right hepatic lobe lesion. 3.  Indeterminate right adrenal mass with findings consistent with right adrenal hemorrhage. 4.  A 10 mm groundglass nodule within the right middle lobe. A follow-up CT scan of the chest in 6-12 months is recommended as per Fleischner guidelines. 5.  Additional incidental findings including ectasia of ascending thoracic aorta, enlarged prostate gland, coronary arterial atherosclerotic vascular disease and colonic diverticulosis without evidence of diverticulitis. Findings and recommendations discussed with SUZANNA Muñoz by Dr. Thompson by telephone at 9:54 AM and 10:21 AM on 10/24/2022. Fleischner Society 2017 Guidelines for Management of Incidentally Detected  Pulmonary Nodules in Adults. (Subsolid Nodules) Single ground glass: <6 mm - No routine follow-up >/= 6 mm - CT at 6-12 months to confirm persistence, then CT every 2 years until 5 years (In certain suspicious nodules < 6 mm, consider follow-up at 2 and 4 years.  If solid component(s) or growth develops, consider resection (Recommendations 3A and 4A)     IR THROMBOLYSIS    Result Date: 10/25/2022  IMPRESSION:   Successful bilateral pulmonary arteriography and catheter-directed thrombolysis. COMMENT: PROCEDURE:  1. Ultrasound guided access of the central right common femoral vein. 2. Subselective right lower lobe pulmonary arteriography. 3. Subselective catheter directed thrombolysis of the right pulmonary arterial thrombus extending into the right lower lobe branch. 4. Subselective left lower lobe pulmonary arteriography. 5. Subselective catheter directed thrombolysis of the left pulmonary arterial thrombus extending into the left lower lobe branch. RADIOLOGIST: Sarkis Gerardo MD ANESTHESIA:  Local 1% lidocaine. FLUORO TIME:  7.7 minutes REFERENCE AIR KERMA: 44 mGy CONTRAST:  20 cc of Omnipaque 300 DESCRIPTION OF PROCEDURE:    Written informed consent was obtained following explanation of risks and benefits of the procedure. Specifically, risks of cardiac arrhythmia, pulmonary arterial injury, and intracranial hemorrhage were discussed with the patient. Additionally, significantly elevated risk of abdominal hemorrhage related to known adrenal hemorrhage was clearly discussed. The patient was placed supine on the IR procedure table. Maximal sterile barrier precautions were utilized during catheter insertion including aseptic technique, the use of cap, mask, sterile gown, sterile gloves, sterile drapes, hand hygiene, and patient cutaneous antisepsis with chlorhexidene 2%. Grayscale and color Doppler ultrasound evaluation of the right common femoral vein was performed. The right common femoral vein was patent and  compressible. The right groin was prepped and draped in usual sterile fashion. Local anesthesia was administered with 1% lidocaine. Under ultrasound guidance, the right common femoral vein was accessed with a 21-gauge needle. An 018 wire was advanced centrally. Sequential exchange was made for a 5 [place]e a microcatheter sheath, 035 Ness wire, and 5 [place]e vascular sheath. The sheath was secured with 0-0 silk. Utilizing an H1 catheter with Glidewire, the right main pulmonary artery was selected with successful subselection of a right lower lobe pulmonary arterial branch. Subselective right lower lobe pulmonary arteriography was performed, confirming satisfactory position within the right lower lobe branch distal to the main thrombus. Over a J Phelan wire, exchange was made for the Darrion-Macnamara flush catheter. 10 mg of TPA was then successfully infused into the right main pulmonary artery, extending into the right lower lobe pulmonary arterial branch. Utilizing an H1 catheter with Glidewire, the left main pulmonary artery was selected with successful subselection of a left lower lobe pulmonary arterial branch. Subselective left lower lobe pulmonary arteriography was performed, confirming satisfactory position within the left lower lobe branch distal to the main thrombus. Over a J Phelan wire, exchange was made for the Darrion-Macnamara flush catheter. 10 mg of TPA was then successfully infused into the left main pulmonary artery, extending into the left lower lobe pulmonary arterial branch. Catheter and sheath were removed. Hemostasis of the right common femoral vein was achieved with manual compression.     X-RAY CHEST 1 VIEW    Result Date: 10/26/2022  IMPRESSION: No acute cardiopulmonary abnormality seen.. COMMENT:    A single AP view of the chest was performed. Comparison: AP chest x-ray from 10/24/2022. The heart size and pulmonary vasculature remain within normal limits. The lung fields appear clear, and no  pleural effusions are seen. No hilar abnormality is identified. There is mild tortuosity of the descending thoracic aorta. An electronic device projects over the left lung base projecting over the left anterior left fourth rib, likely a loop recorder. When compared to the prior study, there has been no significant interval change.     X-RAY CHEST 1 VIEW    Result Date: 10/24/2022  IMPRESSION: No evidence of acute pulmonary disease.     Ultrasound venous leg bilateral    Result Date: 10/25/2022  IMPRESSION: Examination positive for deep vein thrombosis bilaterally as described, with greater than right.     CT CHEST ABDOMEN PELVIS WITHOUT IV CONTRAST    Result Date: 10/25/2022  IMPRESSION: 1.  Findings concerning for a large right-sided retroperitoneal hemorrhage including the right kidney and right adrenal gland. A small to moderate amount of pelvic ascites is also noted which appears mildly hyperdense and may reflect a component of hemoperitoneum. 2.  Additional chronic and incidental findings as described above. Finding:    Unexpected significant hemorrhage (chest, abdomen, pelvis)   Acuity: Critical  Status:  CLOSED Critical read back was performed and results were read back by Dr. Simms, on 10/25/2022 8:10 PM        ECG/Telemetry     ASSESSMENT AND PLAN      * Pulmonary embolism with acute cor pulmonale (CMS/HCC)  Assessment & Plan  CT chest: Extensive b/l PE with R heart strain. 7cm hypodense lesion R hepatic lobe, small R hepatic lobe lesion. Indeterminate R adrenal mass and hemorrhage.  10 mm RML nodule  -Provoked by underlying hepatic malignancy?   -With acute hypoxic resp failure  -Associated R heart strain  -Started on Heparin drip and underwent TPA to each pulm artery  -RRT 10/26 with hypotension and retroperitoneal hemorrhage  -DVT,  US: B/ L>R.  R nearly occlusive thrombus post tibial vein. Left: Mid femoral vein partially occlusive thrombus. Distal popliteal vein occlusive thrombus.  Peroneal and  posterior tibial vein partially/nearly occlusive thrombus.  -Echo 10/24:  TDS.  EF 75%   Grade I LV diastolic dysfunction. Severely dilated RV. Severely reduced RV systolic function c/w RV strain.  Mod dilated RA. Trace TR.   -Echo 10/26:C/w 10/24, RV size dec and fcn improved   -S/p IVC filter 10/25  -Heparin resumed without a bolus, hgb stable  -Will drop in Hgb, will continue Heparin for today and trend Hgb with goal to start Eliquis tomorrow    Retroperitoneal hematoma  Assessment & Plan  -CT a/p 10/25: Lg R sided retroperitoneal hemorrhage including R kidney and R adrenal. Component of hemoperitoneum  -With associated acute blood loss anemia requiring prbcs  -Underlying adrenal hemorrhage noted on initial CT, but risk of not anticoagulating outweighed risk of bleed  -Will need to follow to assure no adrenal insufficiency    Liver mass  Assessment & Plan  -Abnormal transaminases  -CT:  7cm hypodense lesion R hepatic lobe, small R hepatic lobe lesion.   -CA 19-9, AFP, hepatitis panel neg.   CEA 3.9 which is theoretically elevated in a non smoker  -MRI: R hepatic lobe looks more like evolving hemorrhage/infarct.  Benign liver cysts. R perinephric and R retroperitoneal hematomas, hemorrhagic R renal cyst. R adrenal hemorrhage with developing hemorrhage L adrenal.  Small R pleural effusion, possible hemothorax  -I d/w Dr Soto, no plan for bx    LYNNE (acute kidney injury) (CMS/HCC)  Assessment & Plan  -Prerenal, improving  -Cr peaked at 2.3, now resolved    Acute blood loss anemia  Assessment & Plan  -From retroperitoneal hematoma, needing PRBC transfusion  -Thrombocytopenia as well, improving.  DIC labs noted  -Downtrending, continue to monitor    Pulmonary nodule  Assessment & Plan  -Noted on CT chest 1 cm R perihilar  -No present plan for bx.  Will need repeat imaging 3-6 months, and may need navigational bronch for bx     Facial fracture (CMS/HCC)  Assessment & Plan  -CT facial bones:Multiple right-sided facial  bone fractures as described under the comment.  -Seen by Trauma and plastics  -Seen by plastics who discussed potential facial deformity.  Not a candidate for reconstructive surgery, given his need for AC  -I d/w trauma, no need for abx  -Holding cpap for now    Syncope  Assessment & Plan  -From submassive PE/R heart strain  -PT, OT    Hyperlipidemia  Assessment & Plan  -Lipitor held with abn LFTs    Myocardial injury  Assessment & Plan  -Trop peaked at 1349, R heart strain from PE    Hypertension  Assessment & Plan  -BP better since resuming Norvasc and Metoprolol  -Holding HCTZ         VTE Assessment: Pad[place]    VTE Prophylaxis Plan: Current anticoagulants:  heparin (porcine) bolus from bag 3,400-6,750 Units, intravenous, q6h PRN  heparin infusion in D5W 100 units/mL, intravenous, Titrated      Code Status: Full Code  Estimated Discharge Date:   11/3/2022  Disposition: SNF - once hgb stable and switched to PO AC     Nael Rodriguez MD  11/1/2022  10:05 AM

## 2022-11-01 NOTE — ASSESSMENT & PLAN NOTE
-From retroperitoneal hematoma, needing PRBC transfusion  -Thrombocytopenia as well, improving.  DIC labs noted  -Downtrending, continue to monitor

## 2022-11-01 NOTE — PLAN OF CARE
Plan of Care Review  Plan of Care Reviewed With: patient  Progress: no change  Outcome Summary: Pt resting in bed, ambulating to BR as needed. still remains on heparin gtt. tolerating 2L o2. ax1 with walker. denies pain. bed alarm on. hopeful to transition to PO elequis tomorrow.

## 2022-11-01 NOTE — PROGRESS NOTES
11/1/2022 3:01 PM pt not stable for dc, hgb unstable, on hep drip. PT/OT rec SNF. SW checked SNF referrals on [facility], [facility], [facility], and [facility] able to accept. SW met w pt and pts spouse at bedside to discuss. pts spouse reports she would like to research facilities before making any decisions. SW will f/u w pt and pts spouse about SNF choice. SW continue to follow for medical stability. Angela WILKINSW [phone]

## 2022-11-01 NOTE — ASSESSMENT & PLAN NOTE
CT chest: Extensive b/l PE with R heart strain. 7cm hypodense lesion R hepatic lobe, small R hepatic lobe lesion. Indeterminate R adrenal mass and hemorrhage.  10 mm RML nodule  -Provoked by underlying hepatic malignancy?   -With acute hypoxic resp failure  -Associated R heart strain  -Started on Heparin drip and underwent TPA to each pulm artery  -RRT 10/26 with hypotension and retroperitoneal hemorrhage  -DVT,  US: B/ L>R.  R nearly occlusive thrombus post tibial vein. Left: Mid femoral vein partially occlusive thrombus. Distal popliteal vein occlusive thrombus.  Peroneal and posterior tibial vein partially/nearly occlusive thrombus.  -Echo 10/24:  TDS.  EF 75%   Grade I LV diastolic dysfunction. Severely dilated RV. Severely reduced RV systolic function c/w RV strain.  Mod dilated RA. Trace TR.   -Echo 10/26:C/w 10/24, RV size dec and fcn improved   -S/p IVC filter 10/25  -Heparin resumed without a bolus, hgb stable  -Will drop in Hgb, will continue Heparin for today and trend Hgb with goal to start Eliquis tomorrow

## 2022-11-02 ENCOUNTER — APPOINTMENT (INPATIENT)
Dept: RADIOLOGY | Facility: HOSPITAL | Age: 77
DRG: 163 | End: 2022-11-02
Attending: HOSPITALIST
Payer: COMMERCIAL

## 2022-11-02 LAB
ALBUMIN SERPL-MCNC: 2.8 G/DL (ref 3.4–5)
ALP SERPL-CCNC: 69 IU/L (ref 35–126)
ALT SERPL-CCNC: 56 IU/L (ref 16–63)
ANION GAP SERPL CALC-SCNC: 7 MEQ/L (ref 3–15)
APTT PPP: 72 SEC (ref 23–35)
AST SERPL-CCNC: 27 IU/L (ref 15–41)
BILIRUB SERPL-MCNC: 1.5 MG/DL (ref 0.3–1.2)
BUN SERPL-MCNC: 27 MG/DL (ref 8–20)
CALCIUM SERPL-MCNC: 8.2 MG/DL (ref 8.9–10.3)
CHLORIDE SERPL-SCNC: 102 MEQ/L (ref 98–109)
CO2 SERPL-SCNC: 25 MEQ/L (ref 22–32)
CREAT SERPL-MCNC: 0.9 MG/DL (ref 0.8–1.3)
ERYTHROCYTE [DISTWIDTH] IN BLOOD BY AUTOMATED COUNT: 13.6 % (ref 11.6–14.4)
GFR SERPL CREATININE-BSD FRML MDRD: >60 ML/MIN/1.73M*2
GLUCOSE BLD-MCNC: 124 MG/DL (ref 70–99)
GLUCOSE BLD-MCNC: 138 MG/DL (ref 70–99)
GLUCOSE BLD-MCNC: 144 MG/DL (ref 70–99)
GLUCOSE BLD-MCNC: 198 MG/DL (ref 70–99)
GLUCOSE SERPL-MCNC: 136 MG/DL (ref 70–99)
HCT VFR BLDCO AUTO: 30.7 % (ref 40.1–51)
HGB BLD-MCNC: 10 G/DL (ref 13.7–17.5)
MAGNESIUM SERPL-MCNC: 1.9 MG/DL (ref 1.8–2.5)
MCH RBC QN AUTO: 29.1 PG (ref 28–33.2)
MCHC RBC AUTO-ENTMCNC: 32.6 G/DL (ref 32.2–36.5)
MCV RBC AUTO: 89.2 FL (ref 83–98)
PDW BLD AUTO: 9.2 FL (ref 9.4–12.4)
PLATELET # BLD AUTO: 336 K/UL (ref 150–350)
POCT TEST: ABNORMAL
POTASSIUM SERPL-SCNC: 4.2 MEQ/L (ref 3.6–5.1)
PROT SERPL-MCNC: 5.4 G/DL (ref 6–8.2)
RBC # BLD AUTO: 3.44 M/UL (ref 4.5–5.8)
SODIUM SERPL-SCNC: 134 MEQ/L (ref 136–144)
WBC # BLD AUTO: 11.91 K/UL (ref 3.8–10.5)

## 2022-11-02 PROCEDURE — 63700000 HC SELF-ADMINISTRABLE DRUG: Performed by: HOSPITALIST

## 2022-11-02 PROCEDURE — 97530 THERAPEUTIC ACTIVITIES: CPT | Mod: GP

## 2022-11-02 PROCEDURE — 80053 COMPREHEN METABOLIC PANEL: CPT | Performed by: INTERNAL MEDICINE

## 2022-11-02 PROCEDURE — G1004 CDSM NDSC: HCPCS

## 2022-11-02 PROCEDURE — 85730 THROMBOPLASTIN TIME PARTIAL: CPT | Performed by: INTERNAL MEDICINE

## 2022-11-02 PROCEDURE — 36415 COLL VENOUS BLD VENIPUNCTURE: CPT | Performed by: INTERNAL MEDICINE

## 2022-11-02 PROCEDURE — 63700000 HC SELF-ADMINISTRABLE DRUG

## 2022-11-02 PROCEDURE — 63600000 HC DRUGS/DETAIL CODE: Performed by: INTERNAL MEDICINE

## 2022-11-02 PROCEDURE — 83735 ASSAY OF MAGNESIUM: CPT | Performed by: INTERNAL MEDICINE

## 2022-11-02 PROCEDURE — 99233 SBSQ HOSP IP/OBS HIGH 50: CPT | Performed by: HOSPITALIST

## 2022-11-02 PROCEDURE — 21400000 HC ROOM AND CARE CCU/INTERMEDIATE

## 2022-11-02 PROCEDURE — 97116 GAIT TRAINING THERAPY: CPT | Mod: GP

## 2022-11-02 PROCEDURE — 85027 COMPLETE CBC AUTOMATED: CPT | Performed by: INTERNAL MEDICINE

## 2022-11-02 RX ORDER — IBUPROFEN/PSEUDOEPHEDRINE HCL 200MG-30MG
6 TABLET ORAL NIGHTLY
Status: DISCONTINUED | OUTPATIENT
Start: 2022-11-02 | End: 2022-11-06 | Stop reason: HOSPADM

## 2022-11-02 RX ORDER — AMLODIPINE BESYLATE 10 MG/1
10 TABLET ORAL EVERY EVENING
Status: DISCONTINUED | OUTPATIENT
Start: 2022-11-02 | End: 2022-11-03

## 2022-11-02 RX ORDER — AMLODIPINE BESYLATE 5 MG/1
5 TABLET ORAL ONCE
Status: DISCONTINUED | OUTPATIENT
Start: 2022-11-02 | End: 2022-11-02

## 2022-11-02 RX ORDER — ATORVASTATIN CALCIUM 20 MG/1
20 TABLET, FILM COATED ORAL
Status: DISCONTINUED | OUTPATIENT
Start: 2022-11-02 | End: 2022-11-06 | Stop reason: HOSPADM

## 2022-11-02 RX ADMIN — METOPROLOL SUCCINATE 50 MG: 50 TABLET, FILM COATED, EXTENDED RELEASE ORAL at 18:13

## 2022-11-02 RX ADMIN — SENNOSIDES AND DOCUSATE SODIUM 2 TABLET: 50; 8.6 TABLET ORAL at 09:24

## 2022-11-02 RX ADMIN — HEPARIN SODIUM AND DEXTROSE 1950 UNITS/HR: 10000; 5 INJECTION INTRAVENOUS at 03:10

## 2022-11-02 RX ADMIN — ATORVASTATIN CALCIUM 20 MG: 20 TABLET, FILM COATED ORAL at 18:13

## 2022-11-02 RX ADMIN — AMLODIPINE BESYLATE 10 MG: 10 TABLET ORAL at 18:13

## 2022-11-02 RX ADMIN — TIMOLOL MALEATE 1 DROP: 5 SOLUTION/ DROPS OPHTHALMIC at 09:24

## 2022-11-02 RX ADMIN — LATANOPROST 1 DROP: 50 SOLUTION OPHTHALMIC at 22:01

## 2022-11-02 RX ADMIN — POLYETHYLENE GLYCOL 3350 17 G: 17 POWDER, FOR SOLUTION ORAL at 09:24

## 2022-11-02 RX ADMIN — Medication 6 MG: at 22:01

## 2022-11-02 RX ADMIN — INSULIN ASPART 2 UNITS: 100 INJECTION, SOLUTION INTRAVENOUS; SUBCUTANEOUS at 18:12

## 2022-11-02 RX ADMIN — HEPARIN SODIUM AND DEXTROSE 1950 UNITS/HR: 10000; 5 INJECTION INTRAVENOUS at 16:06

## 2022-11-02 RX ADMIN — PANTOPRAZOLE SODIUM 40 MG: 40 TABLET, DELAYED RELEASE ORAL at 09:24

## 2022-11-02 ASSESSMENT — COGNITIVE AND FUNCTIONAL STATUS - GENERAL
MOVING TO AND FROM BED TO CHAIR: 3 - A LITTLE
AFFECT: FLAT/BLUNTED AFFECT
WALKING IN HOSPITAL ROOM: 3 - A LITTLE
STANDING UP FROM CHAIR USING ARMS: 2 - A LOT
CLIMB 3 TO 5 STEPS WITH RAILING: 2 - A LOT

## 2022-11-02 NOTE — PATIENT CARE CONFERENCE
Care Progression Rounds Note  Date: 11/2/2022  Time: 11:04 AM     Patient Name: Tim Humphries     Medical Record Number: [id number]   YOB: 1945  Sex: Male      Room/Bed: 0305    Admitting Diagnosis: Fall, initial encounter [W19.XXXA]  Chin laceration, initial encounter [S01.81XA]  Acute saddle pulmonary embolism with acute cor pulmonale (CMS/HCC) [I26.02]  Syncope, unspecified syncope type [R55]   Admit Date/Time: 10/24/2022  8:21 AM    Primary Diagnosis: Pulmonary embolism with acute cor pulmonale (CMS/HCC)  Principal Problem: Pulmonary embolism with acute cor pulmonale (CMS/HCC)    GMLOS: 4.1  Anticipated Discharge Date: 11/4/2022    AM-PAC:  Mobility Score: 16    Discharge Planning:  Current Living Arrangements: home  Concerns to be Addressed: care coordination/care conferences, discharge planning  Anticipated Discharge Disposition: skilled nursing facility    Barriers to Discharge:  Medical issues not resolved, Test pending, Consultant recommendations pending    Comments:       Participants:  social work/services, nursing, , physical therapy, pharmacy

## 2022-11-02 NOTE — PROGRESS NOTES
Hematology/Oncology Progress Note       SUBJECTIVE  Patient reports feeling good.  Noticing improvement in facial pain.    CURRENT MEDS  Current Facility-Administered Medications   Medication Dose Route Frequency    acetaminophen  650 mg oral q4h PRN    albuterol  5 mg nebulization q6h PRN    amLODIPine  5 mg oral q PM    glucose  16-32 g of dextrose oral PRN    Or    dextrose  15-30 g of dextrose oral PRN    Or    glucagon  1 mg intramuscular PRN    Or    dextrose 50 % in water (D50)  25 mL intravenous PRN    heparin (porcine)  40-80 Units/kg intravenous q6h PRN    heparin infusion - MAR calculator by PTT  100-4,000 Units/hr intravenous Titrated    hydrALAZINE  10 mg intravenous q6h PRN    HYDROmorphone  0.25-0.5 mg intravenous q3h PRN    insulin aspart U-100  2-10 Units subcutaneous With meals & nightly    latanoprost  1 drop Both Eyes Nightly    metoprolol succinate XL  50 mg oral q PM    nitroglycerin  0.4 mg sublingual q5 min PRN    oxyCODONE  5 mg oral q6h PRN    pantoprazole  40 mg oral Daily    polyethylene glycol  17 g oral Daily    sennosides-docusate sodium  2 tablet oral Daily    timolol  1 drop Left Eye q AM       VITAL SIGNS  Temp:  [36.4 °C (97.6 °F)-37.6 °C (99.7 °F)] 37 °C (98.6 °F)  Heart Rate:  [75-91] 89  Resp:  [18] 18  BP: (119-151)/(72-86) 147/84    Intake/Output Summary (Last 24 hours) at 11/1/2022 2010  Last data filed at 11/1/2022 1700  Gross per 24 hour   Intake --   Output 700 ml   Net -700 ml       PHYSICAL EXAM  Constitutional:  Comfortable appearing, in no acute distress.  Facial bruising.       LAB RESULTS  CBC  Results from last 7 days   Lab Units 11/01/22  0435 10/31/22  0928 10/30/22  1753 10/30/22  0640 10/29/22  1633   WBC K/uL 12.42* 11.95* 12.49* 12.47* 11.85*   RBC M/uL 3.16* 3.31* 3.31* 3.17* 3.26*   HEMOGLOBIN g/dL 9.0* 9.5* 9.5* 9.2* 9.6*   HEMATOCRIT % 28.6* 29.8* 29.9* 28.6* 29.4*   MCV fL 90.5 90.0 90.3 90.2 90.2   PLATELETS K/uL 259 227 229 172  156     Diff  Results from last 7 days   Lab Units 10/29/22  0436 10/27/22  1605 10/27/22  0633 10/26/22  2026 10/26/22  0444   NRBC % 0.0 0.0 0.0 0.0 0.0   IMM GRANULOCYTES % 0.7 0.7 1.3 0.9 0.7   NEUTROPHILS % 82.9 84.8 84.5 83.3 82.4   LYMPHOCYTES % 5.8 5.3 4.6 6.0 5.5   MONOCYTES % 9.4 8.9 9.4 9.7 11.2   EOSINOPHILS % 1.0 0.1 0.0 0.0 0.0   BASOPHILS % 0.2 0.2 0.2 0.1 0.2   IMM GRANUCOCYTES ABS K/uL 0.09* 0.10* 0.20* 0.14* 0.12*   NEUTRO ABS K/uL 10.56* 13.04* 12.89* 13.01* 13.19*   PLT MORPHOLOGY   --   --  Normal  --   --      Chemistry   Results from last 7 days   Lab Units 11/01/22  0435 10/30/22  0640 10/29/22  0436 10/28/22  0540 10/27/22  1605   SODIUM mEQ/L 133* 138 139 140 138   POTASSIUM mEQ/L 4.4 4.1 4.2 4.0 4.1   CHLORIDE mEQ/L 105 106 108 106 108   CO2 mEQ/L 21* 24 24 25 22   BUN mg/dL 29* 38* 40* 41* 44*   CREATININE mg/dL 0.8 1.0 1.0 1.1 1.1   CALCIUM mg/dL 8.0* 8.0* 8.0* 8.3* 8.4*   ALBUMIN g/dL  --   --  3.0* 3.1* 3.3*   BILIRUBIN TOTAL mg/dL  --   --  1.5* 1.4* 1.3*   ALK PHOS IU/L  --   --  58 57 48   ALT IU/L  --   --  92* 124* 159*   AST IU/L  --   --  23 30 38   GLUCOSE mg/dL 134* 143* 135* 150* 134*     Coag  Results from last 7 days   Lab Units 11/01/22  0449 10/31/22  1741 10/31/22  0928 10/29/22  0436 10/27/22  1605 10/26/22  0444   PROTIME sec  --   --   --   --  14.7* 16.5*   INR   --   --   --   --  1.2 1.4   PTT sec 71* 75* 66*   < > 32 26    < > = values in this interval not displayed.     Micro  Microbiology Results     Procedure Component Value Units Date/Time    Blood Culture Blood, Venous [[id number]]  (Normal) Collected: 10/25/22 2248    Specimen: Blood, Venous Updated: 10/28/22 0601     Culture No growth at 48 hours    Blood Culture Blood, Venous [[id number]]  (Normal) Collected: 10/25/22 2222    Specimen: Blood, Venous Updated: 10/28/22 0601     Culture No growth at 48 hours    MRSA Screen, Nares Only Nose [[id number]]  (Normal) Collected: 10/25/22 2019    Specimen: Nasal Swab  from Nose Updated: 10/26/22 0216     MRSA DNA, Nares Negative    SARS-CoV-2 (COVID-19), PCR Nasopharynx [[id number]]  (Normal) Collected: 10/24/22 0839    Specimen: Nasopharyngeal Swab from Nasopharynx Updated: 10/24/22 0925    Narrative:      The following orders were created for panel order SARS-CoV-2 (COVID-19), PCR Nasopharynx.  Procedure                               Abnormality         Status                     ---------                               -----------         ------                     SARS-COV-2 (COVID-19)/ F...[[id number]]  Normal              Final result                 Please view results for these tests on the individual orders.    SARS-COV-2 (COVID-19)/ FLU A/B, AND RSV, PCR Nasopharynx [[id number]]  (Normal) Collected: 10/24/22 0839    Specimen: Nasopharyngeal Swab from Nasopharynx Updated: 10/24/22 0925     SARS-CoV-2 (COVID-19) Negative     Influenza A Negative     Influenza B Negative     Respiratory Syncytial Virus Negative    Narrative:      Testing performed using real-time PCR for detection of COVID-19. EUA approved validation studies performed on site.         IMAGING  CT HEAD WITHOUT IV CONTRAST    Result Date: 10/25/2022  IMPRESSION: 1.  No acute intracranial hemorrhage, large territorial infarct, mass effect or midline shift is identified. 2.  Multiple right maxillary sinus and zygomatic arch fractures with an air-fluid level within the right maxillary sinus.     CT HEAD WITHOUT IV CONTRAST    Result Date: 10/24/2022  IMPRESSION: 1.  No posttraumatic intracranial abnormality. 2. Chronic changes noted under the comment.    CT FACIAL BONES WITHOUT IV CONTRAST    Result Date: 10/24/2022  IMPRESSION: Multiple right-sided facial bone fractures as described under the comment.    CT CERVICAL SPINE WITHOUT IV CONTRAST    Result Date: 10/24/2022  IMPRESSION: 1.  No acute fractures. As clinically indicated, MRI of the cervical spine is recommended for further evaluation. 2.  Other incidental  findings noted under the comment.     MRI ABDOMEN WITH AND WITHOUT CONTRAST    Result Date: 10/29/2022  IMPRESSION: 1. Right hepatic lobe mass on recent CT is most compatible with evolving hemorrhage/infarct centered in segment 7. Additional hemorrhagic/infarcted focus in central segment 5/8, stable in retrospect. Additional benign liver cysts, largest in the left lobe measuring up to 3.9 cm and containing blood products. 2. Similar appearance of right perinephric and right retroperitoneal hematomas with hemorrhagic right renal cyst. 3. Similar right adrenal hemorrhage with developing hemorrhage into the left adrenal gland. 4. Small right pleural effusion with findings suspicious for a component of hemothorax. This could be confirmed with thoracentesis, if necessary. 5. No findings on subtraction imaging to suggest papo active bleeding. Overall, there are no findings suspicious for malignancy; specifically, no suspicious hepatic observations. Given motion degradation on extensive findings, follow-up contrast-enhanced MRI is recommended to assess for resolution of findings and exclude underlying hemorrhagic neoplasm.    ULTRASOUND GUIDED VASCULAR ACCESS    Result Date: 10/26/2022  IMPRESSION:   Successful placement of a retrievable infrarenal IVC Filter. Device: Argon Option Elite IVC Filter COMMENT: PROCEDURE: 1. Ultrasound-guided access of the right common femoral vein. 2. IVC cavagram 3. IVC filter placement RADIOLOGIST:  Sarkis Gerardo MD ANESTHESIA:  Lidocaine 1% CONTRAST:  CO2 FLUORO TIME:  0.8 min REFERENCE AIR KERMA: 40 mGy MEDICATIONS:  none DESCRIPTION OF PROCEDURE:  Consent was obtained following explanation of risks and benefits of the procedure. The right groin was prepped and draped in the usual sterile fashion. The right common femoral vein was noted to be compressible and patent on gray scale ultrasound. Local anesthesia was provided. The vein was then accessed with a  21-gauge needle under  ultrasound guidance, and an .018 wire was advanced centrally. An ultrasound-guided access image was saved to PACS. Exchange was made for a microsheath, .035 wire, and multi-sidehole sheath, which was positioned at the iliocaval junction. IVC cavagram was performed with CO2. Renal vein insertion sites were localized. An Argon Option Elite IVC Filter was deployed infrarenally without complication. Wires and catheters were removed and hemostasis was achieved. The patient remained stable throughout the procedure. The number of guidewires used, and their integrity, was confirmed at the end of the procedure.     ULTRASOUND GUIDED VASCULAR ACCESS    Result Date: 10/25/2022  IMPRESSION:   Successful bilateral pulmonary arteriography and catheter-directed thrombolysis. COMMENT: PROCEDURE:  1. Ultrasound guided access of the central right common femoral vein. 2. Subselective right lower lobe pulmonary arteriography. 3. Subselective catheter directed thrombolysis of the right pulmonary arterial thrombus extending into the right lower lobe branch. 4. Subselective left lower lobe pulmonary arteriography. 5. Subselective catheter directed thrombolysis of the left pulmonary arterial thrombus extending into the left lower lobe branch. RADIOLOGIST: Sarkis Gerardo MD ANESTHESIA:  Local 1% lidocaine. FLUORO TIME:  7.7 minutes REFERENCE AIR KERMA: 44 mGy CONTRAST:  20 cc of Omnipaque 300 DESCRIPTION OF PROCEDURE:    Written informed consent was obtained following explanation of risks and benefits of the procedure. Specifically, risks of cardiac arrhythmia, pulmonary arterial injury, and intracranial hemorrhage were discussed with the patient. Additionally, significantly elevated risk of abdominal hemorrhage related to known adrenal hemorrhage was clearly discussed. The patient was placed supine on the IR procedure table. Maximal sterile barrier precautions were utilized during catheter insertion including aseptic technique, the use of  cap, mask, sterile gown, sterile gloves, sterile drapes, hand hygiene, and patient cutaneous antisepsis with chlorhexidene 2%. Grayscale and color Doppler ultrasound evaluation of the right common femoral vein was performed. The right common femoral vein was patent and compressible. The right groin was prepped and draped in usual sterile fashion. Local anesthesia was administered with 1% lidocaine. Under ultrasound guidance, the right common femoral vein was accessed with a 21-gauge needle. An 018 wire was advanced centrally. Sequential exchange was made for a 5 Macedonian a microcatheter sheath, 035 Ness wire, and 5 Macedonian vascular sheath. The sheath was secured with 0-0 silk. Utilizing an H1 catheter with Glidewire, the right main pulmonary artery was selected with successful subselection of a right lower lobe pulmonary arterial branch. Subselective right lower lobe pulmonary arteriography was performed, confirming satisfactory position within the right lower lobe branch distal to the main thrombus. Over a J Phelan wire, exchange was made for the Darrion-Macnamara flush catheter. 10 mg of TPA was then successfully infused into the right main pulmonary artery, extending into the right lower lobe pulmonary arterial branch. Utilizing an H1 catheter with Glidewire, the left main pulmonary artery was selected with successful subselection of a left lower lobe pulmonary arterial branch. Subselective left lower lobe pulmonary arteriography was performed, confirming satisfactory position within the left lower lobe branch distal to the main thrombus. Over a J Phelan wire, exchange was made for the Darrion-Macnamara flush catheter. 10 mg of TPA was then successfully infused into the left main pulmonary artery, extending into the left lower lobe pulmonary arterial branch. Catheter and sheath were removed. Hemostasis of the right common femoral vein was achieved with manual compression.     CT ABDOMEN PELVIS WITH IV CONTRAST    Result  Date: 10/24/2022  IMPRESSION: 1.  Extensive acute pulmonary arterial emboli involving the bilateral main and multiple bilateral lobar, segmental and subsegmental branches, as detailed above. Flattening of the interventricular septum with dilatation of the right ventricle suggestive of right-sided heart strain. No evidence of acute pulmonary infarct. 2.  Large, indeterminate infiltrative hypodense lesion within the right hepatic lobe measuring up to 7 cm warranting further assessment with contrast-enhanced MRI. Differential diagnosis includes metastatic disease, a primary malignancy as well as abscess. Small adjacent similar appearing indeterminate right hepatic lobe lesion. 3.  Indeterminate right adrenal mass with findings consistent with right adrenal hemorrhage. 4.  A 10 mm groundglass nodule within the right middle lobe. A follow-up CT scan of the chest in 6-12 months is recommended as per Fleischner guidelines. 5.  Additional incidental findings including ectasia of ascending thoracic aorta, enlarged prostate gland, coronary arterial atherosclerotic vascular disease and colonic diverticulosis without evidence of diverticulitis. Findings and recommendations discussed with SUZANNA Muñoz by Dr. Thompson by telephone at 9:54 AM and 10:21 AM on 10/24/2022. Fleischner Society 2017 Guidelines for Management of Incidentally Detected Pulmonary Nodules in Adults. (Subsolid Nodules) Single ground glass: <6 mm - No routine follow-up >/= 6 mm - CT at 6-12 months to confirm persistence, then CT every 2 years until 5 years (In certain suspicious nodules < 6 mm, consider follow-up at 2 and 4 years.  If solid component(s) or growth develops, consider resection (Recommendations 3A and 4A)     IR FILTER PLACEMENT    Result Date: 10/26/2022  IMPRESSION:   Successful placement of a retrievable infrarenal IVC Filter. Device: Argon Option Elite IVC Filter COMMENT: PROCEDURE: 1. Ultrasound-guided access of the right common femoral vein. 2.  IVC cavagram 3. IVC filter placement RADIOLOGIST:  Sarkis Gerardo MD ANESTHESIA:  Lidocaine 1% CONTRAST:  CO2 FLUORO TIME:  0.8 min REFERENCE AIR KERMA: 40 mGy MEDICATIONS:  none DESCRIPTION OF PROCEDURE:  Consent was obtained following explanation of risks and benefits of the procedure. The right groin was prepped and draped in the usual sterile fashion. The right common femoral vein was noted to be compressible and patent on gray scale ultrasound. Local anesthesia was provided. The vein was then accessed with a  21-gauge needle under ultrasound guidance, and an .018 wire was advanced centrally. An ultrasound-guided access image was saved to PACS. Exchange was made for a microsheath, .035 wire, and multi-sidehole sheath, which was positioned at the iliocaval junction. IVC cavagram was performed with CO2. Renal vein insertion sites were localized. An Argon Option Elite IVC Filter was deployed infrarenally without complication. Wires and catheters were removed and hemostasis was achieved. The patient remained stable throughout the procedure. The number of guidewires used, and their integrity, was confirmed at the end of the procedure.     CT ANGIOGRAPHY CHEST PULMONARY EMBOLISM WITH IV CONTRAST    Result Date: 10/24/2022  IMPRESSION: 1.  Extensive acute pulmonary arterial emboli involving the bilateral main and multiple bilateral lobar, segmental and subsegmental branches, as detailed above. Flattening of the interventricular septum with dilatation of the right ventricle suggestive of right-sided heart strain. No evidence of acute pulmonary infarct. 2.  Large, indeterminate infiltrative hypodense lesion within the right hepatic lobe measuring up to 7 cm warranting further assessment with contrast-enhanced MRI. Differential diagnosis includes metastatic disease, a primary malignancy as well as abscess. Small adjacent similar appearing indeterminate right hepatic lobe lesion. 3.  Indeterminate right adrenal mass with  findings consistent with right adrenal hemorrhage. 4.  A 10 mm groundglass nodule within the right middle lobe. A follow-up CT scan of the chest in 6-12 months is recommended as per Fleischner guidelines. 5.  Additional incidental findings including ectasia of ascending thoracic aorta, enlarged prostate gland, coronary arterial atherosclerotic vascular disease and colonic diverticulosis without evidence of diverticulitis. Findings and recommendations discussed with SUZANNA Muñoz by Dr. Thompson by telephone at 9:54 AM and 10:21 AM on 10/24/2022. Fleischner Society 2017 Guidelines for Management of Incidentally Detected Pulmonary Nodules in Adults. (Subsolid Nodules) Single ground glass: <6 mm - No routine follow-up >/= 6 mm - CT at 6-12 months to confirm persistence, then CT every 2 years until 5 years (In certain suspicious nodules < 6 mm, consider follow-up at 2 and 4 years.  If solid component(s) or growth develops, consider resection (Recommendations 3A and 4A)     IR THROMBOLYSIS    Result Date: 10/25/2022  IMPRESSION:   Successful bilateral pulmonary arteriography and catheter-directed thrombolysis. COMMENT: PROCEDURE:  1. Ultrasound guided access of the central right common femoral vein. 2. Subselective right lower lobe pulmonary arteriography. 3. Subselective catheter directed thrombolysis of the right pulmonary arterial thrombus extending into the right lower lobe branch. 4. Subselective left lower lobe pulmonary arteriography. 5. Subselective catheter directed thrombolysis of the left pulmonary arterial thrombus extending into the left lower lobe branch. RADIOLOGIST: Sarkis Gerardo MD ANESTHESIA:  Local 1% lidocaine. FLUORO TIME:  7.7 minutes REFERENCE AIR KERMA: 44 mGy CONTRAST:  20 cc of Omnipaque 300 DESCRIPTION OF PROCEDURE:    Written informed consent was obtained following explanation of risks and benefits of the procedure. Specifically, risks of cardiac arrhythmia, pulmonary arterial injury, and  intracranial hemorrhage were discussed with the patient. Additionally, significantly elevated risk of abdominal hemorrhage related to known adrenal hemorrhage was clearly discussed. The patient was placed supine on the IR procedure table. Maximal sterile barrier precautions were utilized during catheter insertion including aseptic technique, the use of cap, mask, sterile gown, sterile gloves, sterile drapes, hand hygiene, and patient cutaneous antisepsis with chlorhexidene 2%. Grayscale and color Doppler ultrasound evaluation of the right common femoral vein was performed. The right common femoral vein was patent and compressible. The right groin was prepped and draped in usual sterile fashion. Local anesthesia was administered with 1% lidocaine. Under ultrasound guidance, the right common femoral vein was accessed with a 21-gauge needle. An 018 wire was advanced centrally. Sequential exchange was made for a 5 Japanese a microcatheter sheath, 035 Ness wire, and 5 Japanese vascular sheath. The sheath was secured with 0-0 silk. Utilizing an H1 catheter with Glidewire, the right main pulmonary artery was selected with successful subselection of a right lower lobe pulmonary arterial branch. Subselective right lower lobe pulmonary arteriography was performed, confirming satisfactory position within the right lower lobe branch distal to the main thrombus. Over a J Phelan wire, exchange was made for the Darrion-Macnamara flush catheter. 10 mg of TPA was then successfully infused into the right main pulmonary artery, extending into the right lower lobe pulmonary arterial branch. Utilizing an H1 catheter with Glidewire, the left main pulmonary artery was selected with successful subselection of a left lower lobe pulmonary arterial branch. Subselective left lower lobe pulmonary arteriography was performed, confirming satisfactory position within the left lower lobe branch distal to the main thrombus. Over a J Phelan wire, exchange  was made for the Darrion-Macnamara flush catheter. 10 mg of TPA was then successfully infused into the left main pulmonary artery, extending into the left lower lobe pulmonary arterial branch. Catheter and sheath were removed. Hemostasis of the right common femoral vein was achieved with manual compression.     X-RAY CHEST 1 VIEW    Result Date: 10/26/2022  IMPRESSION: No acute cardiopulmonary abnormality seen.. COMMENT:    A single AP view of the chest was performed. Comparison: AP chest x-ray from 10/24/2022. The heart size and pulmonary vasculature remain within normal limits. The lung fields appear clear, and no pleural effusions are seen. No hilar abnormality is identified. There is mild tortuosity of the descending thoracic aorta. An electronic device projects over the left lung base projecting over the left anterior left fourth rib, likely a loop recorder. When compared to the prior study, there has been no significant interval change.     X-RAY CHEST 1 VIEW    Result Date: 10/24/2022  IMPRESSION: No evidence of acute pulmonary disease.     Ultrasound venous leg bilateral    Result Date: 10/25/2022  IMPRESSION: Examination positive for deep vein thrombosis bilaterally as described, with greater than right.     CT CHEST ABDOMEN PELVIS WITHOUT IV CONTRAST    Result Date: 10/25/2022  IMPRESSION: 1.  Findings concerning for a large right-sided retroperitoneal hemorrhage including the right kidney and right adrenal gland. A small to moderate amount of pelvic ascites is also noted which appears mildly hyperdense and may reflect a component of hemoperitoneum. 2.  Additional chronic and incidental findings as described above. Finding:    Unexpected significant hemorrhage (chest, abdomen, pelvis)   Acuity: Critical  Status:  CLOSED Critical read back was performed and results were read back by Dr. Simms, on 10/25/2022 8:10 PM       ASSESSMENT & PLAN    78 yo male presenting with syncope found to have submassive  pulmonary embolism and bilateral DVT, liver mass, adrenal hemorrhage developing retroperitoneal hemorrhage following catheter directed thrombolysis post IVC filter placement 10/25.  10mm ground glass lung nodule on imaging as well.      Pulmonary embolism/DVT: In setting of retroperitoneal bleed, post IVC filter placement 10/26.  Concern for liver mass on CT but on MRI abdomen done 10/28 appears more c/w hemorrhage or infarct.   Restarted on heparin 10/29, hemoglobin more or less stable currently at 9, no evidence of bleeding since.  Continues on heparin, plan for Eliquis tomorrow.       Liver mass: Concern for this on CT,  tumor markers with normal AFP, , CEA.  Hepatitis panels negative.  MRI looks more c/w hemorrhage or infarct, no concerns for metastatic disease.      Adrenal hemorrhage: noted on right on admission, developing adrenal hemorrhage noted on left on MRI done 10/28, follow for adrenal insufficiency, etiology of adrenal hemorrhages not clear.         Anemia: hgb 15.3 on presentation but down to 10.6 10/25 in setting of bleed, normocytic, normal RDW.  Did get 2 units PRBC 10/25.  Requested iron studies/LDH.  Ferritin high at 612, iron sat low at 10% c/w inflammation.  LDH high at 263, likely also non specific marker of inflammation but given bili also up a bit at 1.4, could be hemolysis, ordered retic/haptoglobin.  Hgb from 10 to 9.5 and today to 9.0 since starting heparin, continue to follow.      Thrombocytopenia: resolved.      Pulm nodule: ground glass RML hilar pulm nodule noted on initial CT, I personally reviewed images, does not look overly concerning for aggressive lung cancer that could lead to hypercoag state.  Could be adenocarcinoma spectrum lesion, per pulm, follow up imaging 3-6 months.     Need for CPAP: concern for patient, not able to use now due to facial fractures, resume use as per surgery.       All of the above has been reviewed with the patient, who is in agreement with the  plan of action.  I appreciate the opportunity to participate in the care of this patient, who I will follow with you.        Octavia Soto MD

## 2022-11-02 NOTE — PROGRESS NOTES
Patient: Tim Humphries  Location:  [facility] 3 Main 0305  MRN:  [id number]  Today's date:  11/2/2022  General Observations  Start: Pt received supine in bed; he was agreeable to therapy and no issues or concerns identified by nurse prior to session   End: Pt seated in chair at end of session; call bell in reach, chair alarm activated, all needs met, personal items in reach and nurse notified of patient performance, patient's position and patient's response to activity    Tim is a 77 y.o. male admitted on 10/24/2022 with Fall, initial encounter [W19.XXXA]  Chin laceration, initial encounter [S01.81XA]  Acute saddle pulmonary embolism with acute cor pulmonale (CMS/HCC) [I26.02]  Syncope, unspecified syncope type [R55]. Principal problem is Pulmonary embolism with acute cor pulmonale (CMS/HCC).    Past Medical History  Tim has a past medical history of Dyslipidemia, GERD (gastroesophageal reflux disease), Glaucoma, Heart murmur, HTN (hypertension), Implantable loop recorder present, Pericarditis, and Sleep apnea.    History of Present Illness   Tim Humphries is a 77 y.o. male with a past medical history of hypertension, GERD, FLAVIA, hyperlipidemia who presents with syncope, fall.  Wife is at bedside who assisted with history.  For 1 is only patient had COVID about 2-3 months ago which he fully recovered without event.  After he recovered he went to get his COVID-vaccine about 2 weeks ago.  For the last 4 to 5 days patient has noted increased dyspnea with exertion and shortness of breath.  Today while coming down stair he passed out and fell down half a flight of stair.  Wife was close by it and found him down on the floor unresponsive.  Wife report patient was unconscious for about 10 minutes.  When EMS arrived patient was found to have grunting respiration with O2 sat at 86% on room air.  Was then brought to [place] emergency room.  Patient also report he has some left leg swelling approximately 3  "to 4 weeks ago which resolved by itself. In ER he was found to have extensive PE with right heart strain.  He was found to be hypoxic and put on 4 L oxygen.  His blood pressure remained stable currently.  Mentation intact.  Patient CAT scan also revealed multiple right displaced facial fracture as well as adrenal hemorrhage.    10/26 IVC Filter placement      PT Vitals    Date/Time Pulse SpO2 Pt Activity O2 Therapy BP BP Location BP Method Pt Position [facility]   11/02/22 1440 94 92 % At rest None (Room air) 136/73 Right upper arm Automatic Lying    11/02/22 1445 96 94 % At rest None (Room air) 146/73 Right upper arm Automatic Sitting    11/02/22 1450 102 95 % Walking -- -- -- -- --    11/02/22 1504 97 -- -- -- 139/74 Right upper arm Automatic Sitting LG      PT Pain    Date/Time Pain Type Rating: Rest Rating: Activity [facility]   11/02/22 1440 Pain Assessment 0 - no pain 0 - no pain    11/02/22 1504 Pain Assessment;Post Activity 0 - no pain 0 - no pain LG          Prior Living Environment    Flowsheet Row Most Recent Value   Current Living Arrangements home   Living Environment Comment 2 story house with second bedroom/bathroom walk in shower - reports recently added first floor bed/bath but does not have furniture yet        Prior Level of Function    Flowsheet Row Most Recent Value   Ambulation independent   Transferring independent   Toileting independent   Bathing independent   Dressing independent   Prior Level of Function Comment indep with no devices for ADL, avid \"walker jogger\"   Assistive Device Currently Used at Home none           PT Evaluation and Treatment - 11/02/22 1440        PT Time Calculation    Start Time 1440     Stop Time 1506     Time Calculation (min) 26 min        Session Details    Document Type daily treatment/progress note     Mode of Treatment physical therapy        General Information    Patient Profile Reviewed yes     Onset of Illness/Injury or Date of Surgery 10/24/22     Referring " Physician [facility]     Patient/Family/Caregiver Comments/Observations cleared by RN for session     General Observations of Patient resting in bed, starting lunch, spouse present. willing to intterrupt meal for therapy session     Existing Precautions/Restrictions fall     Limitations/Impairments safety/cognitive        Cognition/Psychosocial    Affect/Mental Status (Cognition) flat/blunted affect     Orientation Status (Cognition) oriented to;person;situation;place   place=hospital, not name of hosptial. [id number]    Follows Commands (Cognition) follows one-step commands;75-90% accuracy;increased processing time needed;delayed response/completion;verbal cues/prompting required;repetition of directions required     Safety Deficit (Cognition) moderate deficit;awareness of need for assistance;insight into deficits/self-awareness;judgment;ability to follow commands        Bed Mobility    [place], Supine to Sit minimum assist (75% or more patient effort);increased time to complete;1 person assist     Verbal Cues (Supine to Sit) hand placement;preparatory posture;proper use of assistive device;technique     Assistive Device head of bed elevated;draw sheet;mattress firmed;bed rails     Comment (Bed Mobility) inc time,repeated redirection to task required        Transfers    Comment mobility belt applied whileseated EOB, used t/o session, removed upon sitting in chair        Sit to Stand Transfer    [place], Sit to Stand Transfer minimum assist (75% or more patient effort);1 person assist     Verbal Cues hand placement;maintaining center of gravity over base of support;maintaining precautions;preparatory posture;proper use of assistive device;safety;technique     Assistive Device mobility belt;walker, front-wheeled     Comment Min A x 1 from bed height. Mod A x 1 from chair height. inc time, repeated cues for hand placement, assist to correct flexed trunk posutre and achieve full upright standing posture from bed and  chair        Stand to Sit Transfer    [place], Stand to Sit Transfer minimum assist (75% or more patient effort);increased time to complete;1 person assist     Verbal Cues hand placement;technique;safety;proper use of assistive device;preparatory posture     Assistive Device mobility belt;walker, front-wheeled     Comment to chair x 2 trials        Gait Training    [place], Gait minimum assist (75% or more patient effort);1 person assist;increased time to complete     Assistive Device mobility belt;walker, front-wheeled     Distance in Feet 36 feet     Pattern (Gait) step-to     Deviations/Abnormal Patterns (Gait) gait speed decreased;step length decreased;obdulia decreased     Comment (Gait/Stairs) dec foot clearance BLE,dec HS/toe off. dec steplength, cues to stay within confines of RW, distracted in hallway. distance limited by fatigue        Safety Issues, Functional Mobility    Safety Issues Affecting Function (Mobility) ability to follow commands;awareness of need for assistance;insight into deficits/self-awareness;positioning of assistive device;judgment;problem-solving;safety precaution awareness     Impairments Affecting Function (Mobility) balance;cognition;endurance/activity tolerance;postural/trunk control     Cognitive Impairments, Mobility Safety/Performance attention;awareness, need for assistance;insight into deficits/self-awareness;judgment;problem-solving/reasoning        Balance    Balance Assessment sitting static balance;sit to stand dynamic balance;sitting dynamic balance;standing static balance;standing dynamic balance     Static Sitting Balance WFL;supported;unsupported;sitting in chair     Dynamic Sitting Balance mild impairment;supported;unsupported;sitting in chair     Sit to Stand Dynamic Balance supported;moderate impairment;unsupported     Static Standing Balance mild impairment;supported     Dynamic Standing Balance mild impairment;supported     Balance Test Results Five  Times Sit to Stand (FTSTS)     Five Times to Sit to Stand (FTSTS) unable to complete 1 trial from chair w/o UE support and Mod A     Comment, Balance seated without back support: able to reach within CL in mult directions w/o LOB. Reluctant to reach across midline or attempt reaching outside CL, w/o unilat UE support. Standing + use of RW, narrow CL.        Therapeutic Exercise    Therapeutic Exercise lower extremity        Lower Extremity (Therapeutic Exercise)    Exercise Position/Type seated;AROM (active range of motion)     General Exercise bilateral;ankle pumps;LAQ (long arc quad);marching while seated     Reps and Sets 10 reps each        AM-PAC (TM) - Mobility (Current Function)    Turning from your back to your side while in a flat bed without using bedrails? 3 - A Little     Moving from lying on your back to sitting on the side of a flat bed without using bedrails? 3 - A Little     Moving to and from a bed to a chair? 3 - A Little     Standing up from a chair using your arms? 2 - A Lot     To walk in a hospital room? 3 - A Little     Climbing 3-5 steps with a railing? 2 - A Lot     AM-PAC (TM) Mobility Score 16        Assessment/Plan (PT)    Daily Outcome Statement pt seen in PT f/u today. Flat affect t/o session. willing to participate. ptcont to require assist for bed mobility/sit to/from stand/amb w/RW which is a change from PLOF. will benefit from cont skilled therapy to maximize indepndence. [facility] 16. anticipate SNF at discharge     Rehab Potential good, to achieve stated therapy goals     Therapy Frequency 3-5 times/wk     Planned Therapy Interventions balance training;bed mobility training;gait training;transfer training;patient/family education;postural re-education               PT Assessment/Plan    Flowsheet Row Most Recent Value   PT Recommended Discharge Disposition skilled nursing facility at 11/02/2022 1440   Anticipated Equipment Needs at Discharge (PT) --  [tbd at next level of care] at  11/02/2022 1440   Patient/Family Therapy Goals Statement no falls at 11/02/2022 1440                    Education Documentation  Signs/Symptoms, taught by Eva Govea, PT at 11/2/2022  3:33 PM.  Learner: Patient  Readiness: Acceptance  Method: Explanation  Response: Verbalizes Understanding, Demonstrated Understanding, Needs Reinforcement  Comment: role of PT, use of mobiliyt belt, hand palcement w/transfers, mobility w/assist, use of call bell, LE TE.    Risk Factors, taught by Eva Govea, PT at 11/2/2022  3:33 PM.  Learner: Patient  Readiness: Acceptance  Method: Explanation  Response: Verbalizes Understanding, Demonstrated Understanding, Needs Reinforcement  Comment: role of PT, use of mobiliyt belt, hand palcement w/transfers, mobility w/assist, use of call bell, LE TE.          PT Goals    Flowsheet Row Most Recent Value   Bed Mobility Goal 1    Activity/Assistive Device bed mobility activities, all at 10/28/2022 1128   Jennings supervision required at 10/28/2022 1128   Time Frame by discharge at 10/28/2022 1128   Progress/Outcome good progress toward goal, goal ongoing at 11/02/2022 1440   Transfer Goal 1    Activity/Assistive Device sit-to-stand/stand-to-sit, bed-to-chair/chair-to-bed  [w/ least restrictive device] at 10/28/2022 1128   Jennings supervision required at 10/28/2022 1128   Time Frame by discharge at 10/28/2022 1128   Progress/Outcome good progress toward goal, goal ongoing at 11/02/2022 1440   Gait Training Goal 1    Activity/Assistive Device gait (walking locomotion)  [w/ least restrictive device] at 10/28/2022 1128   Jennings supervision required at 10/28/2022 1128   Distance 100 ft at 10/28/2022 1128   Time Frame by discharge at 10/28/2022 1128   Progress/Outcome good progress toward goal, goal ongoing at 11/02/2022 1440

## 2022-11-02 NOTE — ASSESSMENT & PLAN NOTE
-BP better since resuming Norvasc and Metoprolol  -Holding HCTZ    Blood pressure elevated increase Norvasc from 5 mg to 10 mg

## 2022-11-02 NOTE — PROGRESS NOTES
Hematology/Oncology Progress Note       SUBJECTIVE  Patient reports feeling good. Breathing is good.      CURRENT MEDS  Current Facility-Administered Medications   Medication Dose Route Frequency    acetaminophen  650 mg oral q4h PRN    albuterol  5 mg nebulization q6h PRN    amLODIPine  5 mg oral q PM    glucose  16-32 g of dextrose oral PRN    Or    dextrose  15-30 g of dextrose oral PRN    Or    glucagon  1 mg intramuscular PRN    Or    dextrose 50 % in water (D50)  25 mL intravenous PRN    heparin (porcine)  40-80 Units/kg intravenous q6h PRN    heparin infusion - MAR calculator by PTT  100-4,000 Units/hr intravenous Titrated    hydrALAZINE  10 mg intravenous q6h PRN    HYDROmorphone  0.25-0.5 mg intravenous q3h PRN    insulin aspart U-100  2-10 Units subcutaneous With meals & nightly    latanoprost  1 drop Both Eyes Nightly    melatonin  6 mg oral Nightly    metoprolol succinate XL  50 mg oral q PM    nitroglycerin  0.4 mg sublingual q5 min PRN    oxyCODONE  5 mg oral q6h PRN    pantoprazole  40 mg oral Daily    polyethylene glycol  17 g oral Daily    sennosides-docusate sodium  2 tablet oral Daily    timolol  1 drop Left Eye q AM       VITAL SIGNS  Temp:  [36.1 °C (97 °F)-37.6 °C (99.7 °F)] 36.1 °C (97 °F)  Heart Rate:  [75-89] 86  Resp:  [16-18] 18  BP: (142-156)/(72-88) 150/88    Intake/Output Summary (Last 24 hours) at 11/2/2022 1253  Last data filed at 11/2/2022 0900  Gross per 24 hour   Intake --   Output 1410 ml   Net -1410 ml       PHYSICAL EXAM  Constitutional:  Comfortable appearing, in no acute distress.  Facial bruising. Appears to have bit of a right facial droop.       LAB RESULTS  CBC  Results from last 7 days   Lab Units 11/02/22  0326 11/01/22  0435 10/31/22  0928 10/30/22  1753 10/30/22  0640   WBC K/uL 11.91* 12.42* 11.95* 12.49* 12.47*   RBC M/uL 3.44* 3.16* 3.31* 3.31* 3.17*   HEMOGLOBIN g/dL 10.0* 9.0* 9.5* 9.5* 9.2*   HEMATOCRIT % 30.7* 28.6* 29.8* 29.9* 28.6*    MCV fL 89.2 90.5 90.0 90.3 90.2   PLATELETS K/uL 336 259 227 229 172     Diff  Results from last 7 days   Lab Units 10/29/22  0436 10/27/22  1605 10/27/22  0633 10/26/22  2026   NRBC % 0.0 0.0 0.0 0.0   IMM GRANULOCYTES % 0.7 0.7 1.3 0.9   NEUTROPHILS % 82.9 84.8 84.5 83.3   LYMPHOCYTES % 5.8 5.3 4.6 6.0   MONOCYTES % 9.4 8.9 9.4 9.7   EOSINOPHILS % 1.0 0.1 0.0 0.0   BASOPHILS % 0.2 0.2 0.2 0.1   IMM GRANUCOCYTES ABS K/uL 0.09* 0.10* 0.20* 0.14*   NEUTRO ABS K/uL 10.56* 13.04* 12.89* 13.01*   PLT MORPHOLOGY   --   --  Normal  --      Chemistry   Results from last 7 days   Lab Units 11/02/22  0326 11/01/22  0435 10/30/22  0640 10/29/22  0436 10/28/22  0540   SODIUM mEQ/L 134* 133* 138 139 140   POTASSIUM mEQ/L 4.2 4.4 4.1 4.2 4.0   CHLORIDE mEQ/L 102 105 106 108 106   CO2 mEQ/L 25 21* 24 24 25   BUN mg/dL 27* 29* 38* 40* 41*   CREATININE mg/dL 0.9 0.8 1.0 1.0 1.1   CALCIUM mg/dL 8.2* 8.0* 8.0* 8.0* 8.3*   ALBUMIN g/dL 2.8*  --   --  3.0* 3.1*   BILIRUBIN TOTAL mg/dL 1.5*  --   --  1.5* 1.4*   ALK PHOS IU/L 69  --   --  58 57   ALT IU/L 56  --   --  92* 124*   AST IU/L 27  --   --  23 30   GLUCOSE mg/dL 136* 134* 143* 135* 150*     Coag  Results from last 7 days   Lab Units 11/02/22 0326 11/01/22  0449 10/31/22  1741 10/29/22  0436 10/27/22  1605   PROTIME sec  --   --   --   --  14.7*   INR   --   --   --   --  1.2   PTT sec 72* 71* 75*   < > 32    < > = values in this interval not displayed.     Micro  Microbiology Results     Procedure Component Value Units Date/Time    Blood Culture Blood, Venous [[id number]]  (Normal) Collected: 10/25/22 2248    Specimen: Blood, Venous Updated: 10/28/22 0601     Culture No growth at 48 hours    Blood Culture Blood, Venous [[id number]]  (Normal) Collected: 10/25/22 2222    Specimen: Blood, Venous Updated: 10/28/22 0601     Culture No growth at 48 hours    MRSA Screen, Nares Only Nose [[id number]]  (Normal) Collected: 10/25/22 2019    Specimen: Nasal Swab from Nose Updated:  10/26/22 0216     MRSA DNA, Nares Negative    SARS-CoV-2 (COVID-19), PCR Nasopharynx [[id number]]  (Normal) Collected: 10/24/22 0839    Specimen: Nasopharyngeal Swab from Nasopharynx Updated: 10/24/22 0925    Narrative:      The following orders were created for panel order SARS-CoV-2 (COVID-19), PCR Nasopharynx.  Procedure                               Abnormality         Status                     ---------                               -----------         ------                     SARS-COV-2 (COVID-19)/ F...[[id number]]  Normal              Final result                 Please view results for these tests on the individual orders.    SARS-COV-2 (COVID-19)/ FLU A/B, AND RSV, PCR Nasopharynx [[id number]]  (Normal) Collected: 10/24/22 0839    Specimen: Nasopharyngeal Swab from Nasopharynx Updated: 10/24/22 0925     SARS-CoV-2 (COVID-19) Negative     Influenza A Negative     Influenza B Negative     Respiratory Syncytial Virus Negative    Narrative:      Testing performed using real-time PCR for detection of COVID-19. EUA approved validation studies performed on site.         IMAGING  CT HEAD WITHOUT IV CONTRAST    Result Date: 11/2/2022  IMPRESSION: No acute intracranial abnormality.  Chronic ischemic white matter changes are noted.    CT HEAD WITHOUT IV CONTRAST    Result Date: 10/25/2022  IMPRESSION: 1.  No acute intracranial hemorrhage, large territorial infarct, mass effect or midline shift is identified. 2.  Multiple right maxillary sinus and zygomatic arch fractures with an air-fluid level within the right maxillary sinus.     CT HEAD WITHOUT IV CONTRAST    Result Date: 10/24/2022  IMPRESSION: 1.  No posttraumatic intracranial abnormality. 2. Chronic changes noted under the comment.    CT FACIAL BONES WITHOUT IV CONTRAST    Result Date: 10/24/2022  IMPRESSION: Multiple right-sided facial bone fractures as described under the comment.    CT CERVICAL SPINE WITHOUT IV CONTRAST    Result Date: 10/24/2022  IMPRESSION:  1.  No acute fractures. As clinically indicated, MRI of the cervical spine is recommended for further evaluation. 2.  Other incidental findings noted under the comment.     MRI ABDOMEN WITH AND WITHOUT CONTRAST    Result Date: 10/29/2022  IMPRESSION: 1. Right hepatic lobe mass on recent CT is most compatible with evolving hemorrhage/infarct centered in segment 7. Additional hemorrhagic/infarcted focus in central segment 5/8, stable in retrospect. Additional benign liver cysts, largest in the left lobe measuring up to 3.9 cm and containing blood products. 2. Similar appearance of right perinephric and right retroperitoneal hematomas with hemorrhagic right renal cyst. 3. Similar right adrenal hemorrhage with developing hemorrhage into the left adrenal gland. 4. Small right pleural effusion with findings suspicious for a component of hemothorax. This could be confirmed with thoracentesis, if necessary. 5. No findings on subtraction imaging to suggest papo active bleeding. Overall, there are no findings suspicious for malignancy; specifically, no suspicious hepatic observations. Given motion degradation on extensive findings, follow-up contrast-enhanced MRI is recommended to assess for resolution of findings and exclude underlying hemorrhagic neoplasm.    ULTRASOUND GUIDED VASCULAR ACCESS    Result Date: 10/26/2022  IMPRESSION:   Successful placement of a retrievable infrarenal IVC Filter. Device: Argon Option Elite IVC Filter COMMENT: PROCEDURE: 1. Ultrasound-guided access of the right common femoral vein. 2. IVC cavagram 3. IVC filter placement RADIOLOGIST:  Sarkis Gerardo MD ANESTHESIA:  Lidocaine 1% CONTRAST:  CO2 FLUORO TIME:  0.8 min REFERENCE AIR KERMA: 40 mGy MEDICATIONS:  none DESCRIPTION OF PROCEDURE:  Consent was obtained following explanation of risks and benefits of the procedure. The right groin was prepped and draped in the usual sterile fashion. The right common femoral vein was noted to be  compressible and patent on gray scale ultrasound. Local anesthesia was provided. The vein was then accessed with a  21-gauge needle under ultrasound guidance, and an .018 wire was advanced centrally. An ultrasound-guided access image was saved to PACS. Exchange was made for a microsheath, .035 wire, and multi-sidehole sheath, which was positioned at the iliocaval junction. IVC cavagram was performed with CO2. Renal vein insertion sites were localized. An Argon Option Elite IVC Filter was deployed infrarenally without complication. Wires and catheters were removed and hemostasis was achieved. The patient remained stable throughout the procedure. The number of guidewires used, and their integrity, was confirmed at the end of the procedure.     ULTRASOUND GUIDED VASCULAR ACCESS    Result Date: 10/25/2022  IMPRESSION:   Successful bilateral pulmonary arteriography and catheter-directed thrombolysis. COMMENT: PROCEDURE:  1. Ultrasound guided access of the central right common femoral vein. 2. Subselective right lower lobe pulmonary arteriography. 3. Subselective catheter directed thrombolysis of the right pulmonary arterial thrombus extending into the right lower lobe branch. 4. Subselective left lower lobe pulmonary arteriography. 5. Subselective catheter directed thrombolysis of the left pulmonary arterial thrombus extending into the left lower lobe branch. RADIOLOGIST: Sarkis Gerardo MD ANESTHESIA:  Local 1% lidocaine. FLUORO TIME:  7.7 minutes REFERENCE AIR KERMA: 44 mGy CONTRAST:  20 cc of Omnipaque 300 DESCRIPTION OF PROCEDURE:    Written informed consent was obtained following explanation of risks and benefits of the procedure. Specifically, risks of cardiac arrhythmia, pulmonary arterial injury, and intracranial hemorrhage were discussed with the patient. Additionally, significantly elevated risk of abdominal hemorrhage related to known adrenal hemorrhage was clearly discussed. The patient was placed supine on  the IR procedure table. Maximal sterile barrier precautions were utilized during catheter insertion including aseptic technique, the use of cap, mask, sterile gown, sterile gloves, sterile drapes, hand hygiene, and patient cutaneous antisepsis with chlorhexidene 2%. Grayscale and color Doppler ultrasound evaluation of the right common femoral vein was performed. The right common femoral vein was patent and compressible. The right groin was prepped and draped in usual sterile fashion. Local anesthesia was administered with 1% lidocaine. Under ultrasound guidance, the right common femoral vein was accessed with a 21-gauge needle. An 018 wire was advanced centrally. Sequential exchange was made for a 5 Malawian a microcatheter sheath, 035 Ness wire, and 5 Malawian vascular sheath. The sheath was secured with 0-0 silk. Utilizing an H1 catheter with Glidewire, the right main pulmonary artery was selected with successful subselection of a right lower lobe pulmonary arterial branch. Subselective right lower lobe pulmonary arteriography was performed, confirming satisfactory position within the right lower lobe branch distal to the main thrombus. Over a J Phelan wire, exchange was made for the Darrion-Macnamara flush catheter. 10 mg of TPA was then successfully infused into the right main pulmonary artery, extending into the right lower lobe pulmonary arterial branch. Utilizing an H1 catheter with Glidewire, the left main pulmonary artery was selected with successful subselection of a left lower lobe pulmonary arterial branch. Subselective left lower lobe pulmonary arteriography was performed, confirming satisfactory position within the left lower lobe branch distal to the main thrombus. Over a J Phelan wire, exchange was made for the Darrion-Macnamara flush catheter. 10 mg of TPA was then successfully infused into the left main pulmonary artery, extending into the left lower lobe pulmonary arterial branch. Catheter and sheath were  removed. Hemostasis of the right common femoral vein was achieved with manual compression.     CT ABDOMEN PELVIS WITH IV CONTRAST    Result Date: 10/24/2022  IMPRESSION: 1.  Extensive acute pulmonary arterial emboli involving the bilateral main and multiple bilateral lobar, segmental and subsegmental branches, as detailed above. Flattening of the interventricular septum with dilatation of the right ventricle suggestive of right-sided heart strain. No evidence of acute pulmonary infarct. 2.  Large, indeterminate infiltrative hypodense lesion within the right hepatic lobe measuring up to 7 cm warranting further assessment with contrast-enhanced MRI. Differential diagnosis includes metastatic disease, a primary malignancy as well as abscess. Small adjacent similar appearing indeterminate right hepatic lobe lesion. 3.  Indeterminate right adrenal mass with findings consistent with right adrenal hemorrhage. 4.  A 10 mm groundglass nodule within the right middle lobe. A follow-up CT scan of the chest in 6-12 months is recommended as per Fleischner guidelines. 5.  Additional incidental findings including ectasia of ascending thoracic aorta, enlarged prostate gland, coronary arterial atherosclerotic vascular disease and colonic diverticulosis without evidence of diverticulitis. Findings and recommendations discussed with SUZANNA Muñoz by Dr. Thompson by telephone at 9:54 AM and 10:21 AM on 10/24/2022. Fleischner Society 2017 Guidelines for Management of Incidentally Detected Pulmonary Nodules in Adults. (Subsolid Nodules) Single ground glass: <6 mm - No routine follow-up >/= 6 mm - CT at 6-12 months to confirm persistence, then CT every 2 years until 5 years (In certain suspicious nodules < 6 mm, consider follow-up at 2 and 4 years.  If solid component(s) or growth develops, consider resection (Recommendations 3A and 4A)     IR FILTER PLACEMENT    Result Date: 10/26/2022  IMPRESSION:   Successful placement of a retrievable  infrarenal IVC Filter. Device: Argon Option Elite IVC Filter COMMENT: PROCEDURE: 1. Ultrasound-guided access of the right common femoral vein. 2. IVC cavagram 3. IVC filter placement RADIOLOGIST:  Sarkis Gerardo MD ANESTHESIA:  Lidocaine 1% CONTRAST:  CO2 FLUORO TIME:  0.8 min REFERENCE AIR KERMA: 40 mGy MEDICATIONS:  none DESCRIPTION OF PROCEDURE:  Consent was obtained following explanation of risks and benefits of the procedure. The right groin was prepped and draped in the usual sterile fashion. The right common femoral vein was noted to be compressible and patent on gray scale ultrasound. Local anesthesia was provided. The vein was then accessed with a  21-gauge needle under ultrasound guidance, and an .018 wire was advanced centrally. An ultrasound-guided access image was saved to PACS. Exchange was made for a microsheath, .035 wire, and multi-sidehole sheath, which was positioned at the iliocaval junction. IVC cavagram was performed with CO2. Renal vein insertion sites were localized. An Argon Option Elite IVC Filter was deployed infrarenally without complication. Wires and catheters were removed and hemostasis was achieved. The patient remained stable throughout the procedure. The number of guidewires used, and their integrity, was confirmed at the end of the procedure.     CT ANGIOGRAPHY CHEST PULMONARY EMBOLISM WITH IV CONTRAST    Result Date: 10/24/2022  IMPRESSION: 1.  Extensive acute pulmonary arterial emboli involving the bilateral main and multiple bilateral lobar, segmental and subsegmental branches, as detailed above. Flattening of the interventricular septum with dilatation of the right ventricle suggestive of right-sided heart strain. No evidence of acute pulmonary infarct. 2.  Large, indeterminate infiltrative hypodense lesion within the right hepatic lobe measuring up to 7 cm warranting further assessment with contrast-enhanced MRI. Differential diagnosis includes metastatic disease, a primary  malignancy as well as abscess. Small adjacent similar appearing indeterminate right hepatic lobe lesion. 3.  Indeterminate right adrenal mass with findings consistent with right adrenal hemorrhage. 4.  A 10 mm groundglass nodule within the right middle lobe. A follow-up CT scan of the chest in 6-12 months is recommended as per Fleischner guidelines. 5.  Additional incidental findings including ectasia of ascending thoracic aorta, enlarged prostate gland, coronary arterial atherosclerotic vascular disease and colonic diverticulosis without evidence of diverticulitis. Findings and recommendations discussed with SUZANNA Muñoz by Dr. Thompson by telephone at 9:54 AM and 10:21 AM on 10/24/2022. Fleischner Society 2017 Guidelines for Management of Incidentally Detected Pulmonary Nodules in Adults. (Subsolid Nodules) Single ground glass: <6 mm - No routine follow-up >/= 6 mm - CT at 6-12 months to confirm persistence, then CT every 2 years until 5 years (In certain suspicious nodules < 6 mm, consider follow-up at 2 and 4 years.  If solid component(s) or growth develops, consider resection (Recommendations 3A and 4A)     IR THROMBOLYSIS    Result Date: 10/25/2022  IMPRESSION:   Successful bilateral pulmonary arteriography and catheter-directed thrombolysis. COMMENT: PROCEDURE:  1. Ultrasound guided access of the central right common femoral vein. 2. Subselective right lower lobe pulmonary arteriography. 3. Subselective catheter directed thrombolysis of the right pulmonary arterial thrombus extending into the right lower lobe branch. 4. Subselective left lower lobe pulmonary arteriography. 5. Subselective catheter directed thrombolysis of the left pulmonary arterial thrombus extending into the left lower lobe branch. RADIOLOGIST: Sarkis Gerardo MD ANESTHESIA:  Local 1% lidocaine. FLUORO TIME:  7.7 minutes REFERENCE AIR KERMA: 44 mGy CONTRAST:  20 cc of Omnipaque 300 DESCRIPTION OF PROCEDURE:    Written informed consent was  obtained following explanation of risks and benefits of the procedure. Specifically, risks of cardiac arrhythmia, pulmonary arterial injury, and intracranial hemorrhage were discussed with the patient. Additionally, significantly elevated risk of abdominal hemorrhage related to known adrenal hemorrhage was clearly discussed. The patient was placed supine on the IR procedure table. Maximal sterile barrier precautions were utilized during catheter insertion including aseptic technique, the use of cap, mask, sterile gown, sterile gloves, sterile drapes, hand hygiene, and patient cutaneous antisepsis with chlorhexidene 2%. Grayscale and color Doppler ultrasound evaluation of the right common femoral vein was performed. The right common femoral vein was patent and compressible. The right groin was prepped and draped in usual sterile fashion. Local anesthesia was administered with 1% lidocaine. Under ultrasound guidance, the right common femoral vein was accessed with a 21-gauge needle. An 018 wire was advanced centrally. Sequential exchange was made for a 5 [place]inean a microcatheter sheath, 035 Ness wire, and 5 Guinean vascular sheath. The sheath was secured with 0-0 silk. Utilizing an H1 catheter with Glidewire, the right main pulmonary artery was selected with successful subselection of a right lower lobe pulmonary arterial branch. Subselective right lower lobe pulmonary arteriography was performed, confirming satisfactory position within the right lower lobe branch distal to the main thrombus. Over a J Phelan wire, exchange was made for the Darrion-Svetlana flush catheter. 10 mg of TPA was then successfully infused into the right main pulmonary artery, extending into the right lower lobe pulmonary arterial branch. Utilizing an H1 catheter with Glidewire, the left main pulmonary artery was selected with successful subselection of a left lower lobe pulmonary arterial branch. Subselective left lower lobe pulmonary  arteriography was performed, confirming satisfactory position within the left lower lobe branch distal to the main thrombus. Over a J Phelan wire, exchange was made for the Darrion-Macnamara flush catheter. 10 mg of TPA was then successfully infused into the left main pulmonary artery, extending into the left lower lobe pulmonary arterial branch. Catheter and sheath were removed. Hemostasis of the right common femoral vein was achieved with manual compression.     X-RAY CHEST 1 VIEW    Result Date: 10/26/2022  IMPRESSION: No acute cardiopulmonary abnormality seen.. COMMENT:    A single AP view of the chest was performed. Comparison: AP chest x-ray from 10/24/2022. The heart size and pulmonary vasculature remain within normal limits. The lung fields appear clear, and no pleural effusions are seen. No hilar abnormality is identified. There is mild tortuosity of the descending thoracic aorta. An electronic device projects over the left lung base projecting over the left anterior left fourth rib, likely a loop recorder. When compared to the prior study, there has been no significant interval change.     X-RAY CHEST 1 VIEW    Result Date: 10/24/2022  IMPRESSION: No evidence of acute pulmonary disease.     Ultrasound venous leg bilateral    Result Date: 10/25/2022  IMPRESSION: Examination positive for deep vein thrombosis bilaterally as described, with greater than right.     CT CHEST ABDOMEN PELVIS WITHOUT IV CONTRAST    Result Date: 10/25/2022  IMPRESSION: 1.  Findings concerning for a large right-sided retroperitoneal hemorrhage including the right kidney and right adrenal gland. A small to moderate amount of pelvic ascites is also noted which appears mildly hyperdense and may reflect a component of hemoperitoneum. 2.  Additional chronic and incidental findings as described above. Finding:    Unexpected significant hemorrhage (chest, abdomen, pelvis)   Acuity: Critical  Status:  CLOSED Critical read back was performed and  results were read back by Dr. Simms, on 10/25/2022 8:10 PM       ASSESSMENT & PLAN    76 yo male presenting with syncope found to have submassive pulmonary embolism and bilateral DVT, liver mass, adrenal hemorrhage developing retroperitoneal hemorrhage following catheter directed thrombolysis post IVC filter placement 10/25.  10mm ground glass lung nodule on imaging as well.      Pulmonary embolism/DVT: In setting of retroperitoneal bleed, post IVC filter placement 10/26.  Concern for liver mass on CT but on MRI abdomen done 10/28 appears more c/w hemorrhage or infarct.   Restarted on heparin 10/29, hemoglobin actually increased today to 10 from 9 yesterday, no evidence of bleeding since.  Continues on heparin, plan was for Eliquis today.  Patient does note did have left sided leg swelling for couple of weeks before event, no clear provocation though does have chronic issues kenn leg due to injury.  Likely should consider indefinite anticoagulation.  Ideally if tolerates anticoag should get IVC catheter removed at some point.       Liver mass: Concern for this on CT,  tumor markers with normal AFP, , CEA.  Hepatitis panels negative.  MRI looks more c/w hemorrhage or infarct, no concerns for metastatic disease.  Would repeat imaging in future.      Adrenal hemorrhage: noted on right on admission, developing adrenal hemorrhage noted on left on MRI done 10/28, follow for adrenal insufficiency, etiology of adrenal hemorrhages not clear.         Anemia: hgb 15.3 on presentation but down to 10.6 10/25 in setting of bleed, normocytic, normal RDW.  Did get 2 units PRBC 10/25.  Requested iron studies/LDH.  Ferritin high at 612, iron sat low at 10% c/w inflammation.  LDH high at 263, likely also non specific marker of inflammation but given bili also up a bit at 1.4, could be hemolysis, ordered retic/haptoglobin.  Hgb 10 today     Thrombocytopenia: resolved.      Pulm nodule: ground glass RML hilar pulm nodule  noted on initial CT, I personally reviewed images, does not look overly concerning for aggressive lung cancer that could lead to hypercoag state.  Could be adenocarcinoma spectrum lesion, per pulm, follow up imaging 3-6 months.     Need for CPAP: concern for patient, not able to use now due to facial fractures, resume use as per surgery.      ? Right facial droop: Could be due to facial fractures but patient also notes issues with balance, consider MRI brain with and without contrast.      All of the above has been reviewed with the patient, who is in agreement with the plan of action.  I appreciate the opportunity to participate in the care of this patient, who I will follow with you.      Anticipate discharge likely soon, asked my office to set him up for follow up with me about 2 weeks post discharge.        Octavia Soto MD

## 2022-11-02 NOTE — PROGRESS NOTES
Patient: Tim Humphries  Location: [facility] 0305  MRN:  [id number]  Today's date:  11/2/2022    Attempted to see patient for therapy. Unable due to patient at test or procedure (at CT, discussed with RN).

## 2022-11-02 NOTE — PLAN OF CARE
Plan of Care Review  Plan of Care Reviewed With: patient  Progress: no change  Outcome Summary: VSS. Neuro checks performed as ordered. Heparin gtt continued as ordered, PTT theraputic. Fall precautions in place, call bell in reach. No further complaints at this time.

## 2022-11-02 NOTE — PROGRESS NOTES
Hospital Medicine Service -  Daily Progress Note       SUBJECTIVE   Interval History:   Tim Humphries was seen and examined  no acute overnight event reported  patient reported no chest pain, sob, N/V/D, overt bleeding.    Patient is comfortable calm in bed not in acute distress denied headache  Seen and examined with Dr. Soto at bedside she mentioned right-sided mouth: corner questionable drooping.  Patient do not have other neurodeficits, sensory intact not sure it is new or old not sure it is not from facial fracture or not, since patient has a profound blood clot and internal bleeding therefore I will continue heparin drip for now I will check CT scan head stat to rule out bleeding I would check MRI brain to rule out stroke patient is not a candidate for tPA for sure further plan will determine by finding  Blood pressure elevated I will increase amlodipine to 10 mg  LFT normal I will restart statin therapy       OBJECTIVE      Vital signs in last 24 hours:  Vitals:    11/02/22 1149   BP: (!) 150/88   Pulse: 86   Resp: 18   Temp: 36.1 °C (97 °F)   SpO2: (!) 91%       Intake/Output Summary (Last 24 hours) at 11/2/2022 1333  Last data filed at 11/2/2022 0900  Gross per 24 hour   Intake --   Output 1410 ml   Net -1410 ml     PHYSICAL EXAMINATION      General Appearance:  Awake, Alert, no distress     Head:    Normocephalic, without obvious abnormality, atraumatic   Neck: Supple      Lungs:   Bilateral equal expansion  No labored breathing     Heart:  S1 and S2 normal  no rub or gallop     Abdomen:   Soft  no masses  no organomegaly     Vascular: Bilateral radial pulse equally palpated      Extremities: no calf tenderness      Behavior/Emotional: Mood stable      Skin:                                     no rash     Neuro:                                 Spontaneous move bilateral upper and lower extremity                                                No focal deficits   LINES, CATHETERS, DRAINS, AIRWAYS,  AND WOUNDS   Lines, Drains, Airways, Wounds:  Peripheral IV (Adult) 10/24/22 Anterior;Left Wrist (Active)   Number of days: 9       Peripheral IV (Adult) 10/30/22 Anterior;Right Forearm (Active)   Number of days: 3       Wound Puncture Anterior;Proximal;Right Thigh (Active)   Number of days: 7       Wound Right Chin (Active)   Number of days: 8       Catheterization Site - Venous Right Femoral 5 Fr.;Removed in lab (Active)   Number of days: 8       Comments:      LABS / IMAGING / TELE      Labs  CMP Results       11/02/22 11/01/22 10/30/22     0326 0435 0640     133 138    K 4.2 4.4 4.1    Cl 102 105 106    CO2 25 21 24    Glucose 136 134 143    BUN 27 29 38    Creatinine 0.9 0.8 1.0    Calcium 8.2 8.0 8.0    Anion Gap 7 7 8    AST 27 -- --    ALT 56 -- --    Albumin 2.8 -- --    EGFR >60.0 >60.0 >60.0        CBC Results       11/02/22 11/01/22 10/31/22     0326 0435 0928    WBC 11.91 12.42 11.95    RBC 3.44 3.16 3.31    HGB 10.0 9.0 9.5    HCT 30.7 28.6 29.8    MCV 89.2 90.5 90.0    MCH 29.1 28.5 28.7    MCHC 32.6 31.5 31.9     259 227        Troponin I Results       10/25/22 10/25/22 10/24/22     2139 1855 1047    HS Troponin I 1,349.2 1,349.8 1,150.4        PT/PTT Results       11/02/22 11/01/22 10/31/22     0326 0449 1741    PTT 72 71 75         Comment for PTT at 0326 on 11/02/22: The Standard Therapeutic Range for Heparin is 68 to 101 seconds.    Comment for PTT at 0449 on 11/01/22: The Standard Therapeutic Range for Heparin is 68 to 101 seconds.    Comment for PTT at 1741 on 10/31/22: The Standard Therapeutic Range for Heparin is 68 to 101 seconds.        Lab Results   Component Value Date    HGBA1C 5.9 (H) 10/25/2022     Imaging  CT HEAD WITHOUT IV CONTRAST    Result Date: 11/2/2022  IMPRESSION: No acute intracranial abnormality.  Chronic ischemic white matter changes are noted.    CT HEAD WITHOUT IV CONTRAST    Result Date: 10/25/2022  IMPRESSION: 1.  No acute intracranial hemorrhage, large  territorial infarct, mass effect or midline shift is identified. 2.  Multiple right maxillary sinus and zygomatic arch fractures with an air-fluid level within the right maxillary sinus.     CT HEAD WITHOUT IV CONTRAST    Result Date: 10/24/2022  IMPRESSION: 1.  No posttraumatic intracranial abnormality. 2. Chronic changes noted under the comment.    CT FACIAL BONES WITHOUT IV CONTRAST    Result Date: 10/24/2022  IMPRESSION: Multiple right-sided facial bone fractures as described under the comment.    CT CERVICAL SPINE WITHOUT IV CONTRAST    Result Date: 10/24/2022  IMPRESSION: 1.  No acute fractures. As clinically indicated, MRI of the cervical spine is recommended for further evaluation. 2.  Other incidental findings noted under the comment.     MRI ABDOMEN WITH AND WITHOUT CONTRAST    Result Date: 10/29/2022  IMPRESSION: 1. Right hepatic lobe mass on recent CT is most compatible with evolving hemorrhage/infarct centered in segment 7. Additional hemorrhagic/infarcted focus in central segment 5/8, stable in retrospect. Additional benign liver cysts, largest in the left lobe measuring up to 3.9 cm and containing blood products. 2. Similar appearance of right perinephric and right retroperitoneal hematomas with hemorrhagic right renal cyst. 3. Similar right adrenal hemorrhage with developing hemorrhage into the left adrenal gland. 4. Small right pleural effusion with findings suspicious for a component of hemothorax. This could be confirmed with thoracentesis, if necessary. 5. No findings on subtraction imaging to suggest papo active bleeding. Overall, there are no findings suspicious for malignancy; specifically, no suspicious hepatic observations. Given motion degradation on extensive findings, follow-up contrast-enhanced MRI is recommended to assess for resolution of findings and exclude underlying hemorrhagic neoplasm.    ULTRASOUND GUIDED VASCULAR ACCESS    Result Date: 10/26/2022  IMPRESSION:   Successful  placement of a retrievable infrarenal IVC Filter. Device: Argon Option Elite IVC Filter COMMENT: PROCEDURE: 1. Ultrasound-guided access of the right common femoral vein. 2. IVC cavagram 3. IVC filter placement RADIOLOGIST:  Sarkis Gerardo MD ANESTHESIA:  Lidocaine 1% CONTRAST:  CO2 FLUORO TIME:  0.8 min REFERENCE AIR KERMA: 40 mGy MEDICATIONS:  none DESCRIPTION OF PROCEDURE:  Consent was obtained following explanation of risks and benefits of the procedure. The right groin was prepped and draped in the usual sterile fashion. The right common femoral vein was noted to be compressible and patent on gray scale ultrasound. Local anesthesia was provided. The vein was then accessed with a  21-gauge needle under ultrasound guidance, and an .018 wire was advanced centrally. An ultrasound-guided access image was saved to PACS. Exchange was made for a microsheath, .035 wire, and multi-sidehole sheath, which was positioned at the iliocaval junction. IVC cavagram was performed with CO2. Renal vein insertion sites were localized. An Argon Option Elite IVC Filter was deployed infrarenally without complication. Wires and catheters were removed and hemostasis was achieved. The patient remained stable throughout the procedure. The number of guidewires used, and their integrity, was confirmed at the end of the procedure.     ULTRASOUND GUIDED VASCULAR ACCESS    Result Date: 10/25/2022  IMPRESSION:   Successful bilateral pulmonary arteriography and catheter-directed thrombolysis. COMMENT: PROCEDURE:  1. Ultrasound guided access of the central right common femoral vein. 2. Subselective right lower lobe pulmonary arteriography. 3. Subselective catheter directed thrombolysis of the right pulmonary arterial thrombus extending into the right lower lobe branch. 4. Subselective left lower lobe pulmonary arteriography. 5. Subselective catheter directed thrombolysis of the left pulmonary arterial thrombus extending into the left lower lobe  branch. RADIOLOGIST: Sarkis Gerardo MD ANESTHESIA:  Local 1% lidocaine. FLUORO TIME:  7.7 minutes REFERENCE AIR KERMA: 44 mGy CONTRAST:  20 cc of Omnipaque 300 DESCRIPTION OF PROCEDURE:    Written informed consent was obtained following explanation of risks and benefits of the procedure. Specifically, risks of cardiac arrhythmia, pulmonary arterial injury, and intracranial hemorrhage were discussed with the patient. Additionally, significantly elevated risk of abdominal hemorrhage related to known adrenal hemorrhage was clearly discussed. The patient was placed supine on the IR procedure table. Maximal sterile barrier precautions were utilized during catheter insertion including aseptic technique, the use of cap, mask, sterile gown, sterile gloves, sterile drapes, hand hygiene, and patient cutaneous antisepsis with chlorhexidene 2%. Grayscale and color Doppler ultrasound evaluation of the right common femoral vein was performed. The right common femoral vein was patent and compressible. The right groin was prepped and draped in usual sterile fashion. Local anesthesia was administered with 1% lidocaine. Under ultrasound guidance, the right common femoral vein was accessed with a 21-gauge needle. An 018 wire was advanced centrally. Sequential exchange was made for a 5 Mongolian a microcatheter sheath, 035 Ness wire, and 5 Mongolian vascular sheath. The sheath was secured with 0-0 silk. Utilizing an H1 catheter with Glidewire, the right main pulmonary artery was selected with successful subselection of a right lower lobe pulmonary arterial branch. Subselective right lower lobe pulmonary arteriography was performed, confirming satisfactory position within the right lower lobe branch distal to the main thrombus. Over a J Phelan wire, exchange was made for the Darrion-Macnimesh flush catheter. 10 mg of TPA was then successfully infused into the right main pulmonary artery, extending into the right lower lobe pulmonary arterial  branch. Utilizing an H1 catheter with Glidewire, the left main pulmonary artery was selected with successful subselection of a left lower lobe pulmonary arterial branch. Subselective left lower lobe pulmonary arteriography was performed, confirming satisfactory position within the left lower lobe branch distal to the main thrombus. Over a J Phelan wire, exchange was made for the Darrion-Macnamara flush catheter. 10 mg of TPA was then successfully infused into the left main pulmonary artery, extending into the left lower lobe pulmonary arterial branch. Catheter and sheath were removed. Hemostasis of the right common femoral vein was achieved with manual compression.     CT ABDOMEN PELVIS WITH IV CONTRAST    Result Date: 10/24/2022  IMPRESSION: 1.  Extensive acute pulmonary arterial emboli involving the bilateral main and multiple bilateral lobar, segmental and subsegmental branches, as detailed above. Flattening of the interventricular septum with dilatation of the right ventricle suggestive of right-sided heart strain. No evidence of acute pulmonary infarct. 2.  Large, indeterminate infiltrative hypodense lesion within the right hepatic lobe measuring up to 7 cm warranting further assessment with contrast-enhanced MRI. Differential diagnosis includes metastatic disease, a primary malignancy as well as abscess. Small adjacent similar appearing indeterminate right hepatic lobe lesion. 3.  Indeterminate right adrenal mass with findings consistent with right adrenal hemorrhage. 4.  A 10 mm groundglass nodule within the right middle lobe. A follow-up CT scan of the chest in 6-12 months is recommended as per Fleischner guidelines. 5.  Additional incidental findings including ectasia of ascending thoracic aorta, enlarged prostate gland, coronary arterial atherosclerotic vascular disease and colonic diverticulosis without evidence of diverticulitis. Findings and recommendations discussed with SUZANNA Muñoz by Dr. Thompson by  telephone at 9:54 AM and 10:21 AM on 10/24/2022. Fleischner Society 2017 Guidelines for Management of Incidentally Detected Pulmonary Nodules in Adults. (Subsolid Nodules) Single ground glass: <6 mm - No routine follow-up >/= 6 mm - CT at 6-12 months to confirm persistence, then CT every 2 years until 5 years (In certain suspicious nodules < 6 mm, consider follow-up at 2 and 4 years.  If solid component(s) or growth develops, consider resection (Recommendations 3A and 4A)     IR FILTER PLACEMENT    Result Date: 10/26/2022  IMPRESSION:   Successful placement of a retrievable infrarenal IVC Filter. Device: Argon Option Elite IVC Filter COMMENT: PROCEDURE: 1. Ultrasound-guided access of the right common femoral vein. 2. IVC cavagram 3. IVC filter placement RADIOLOGIST:  Sarkis Gerardo MD ANESTHESIA:  Lidocaine 1% CONTRAST:  CO2 FLUORO TIME:  0.8 min REFERENCE AIR KERMA: 40 mGy MEDICATIONS:  none DESCRIPTION OF PROCEDURE:  Consent was obtained following explanation of risks and benefits of the procedure. The right groin was prepped and draped in the usual sterile fashion. The right common femoral vein was noted to be compressible and patent on gray scale ultrasound. Local anesthesia was provided. The vein was then accessed with a  21-gauge needle under ultrasound guidance, and an .018 wire was advanced centrally. An ultrasound-guided access image was saved to PACS. Exchange was made for a microsheath, .035 wire, and multi-sidehole sheath, which was positioned at the iliocaval junction. IVC cavagram was performed with CO2. Renal vein insertion sites were localized. An Argon Option Elite IVC Filter was deployed infrarenally without complication. Wires and catheters were removed and hemostasis was achieved. The patient remained stable throughout the procedure. The number of guidewires used, and their integrity, was confirmed at the end of the procedure.     CT ANGIOGRAPHY CHEST PULMONARY EMBOLISM WITH IV  CONTRAST    Result Date: 10/24/2022  IMPRESSION: 1.  Extensive acute pulmonary arterial emboli involving the bilateral main and multiple bilateral lobar, segmental and subsegmental branches, as detailed above. Flattening of the interventricular septum with dilatation of the right ventricle suggestive of right-sided heart strain. No evidence of acute pulmonary infarct. 2.  Large, indeterminate infiltrative hypodense lesion within the right hepatic lobe measuring up to 7 cm warranting further assessment with contrast-enhanced MRI. Differential diagnosis includes metastatic disease, a primary malignancy as well as abscess. Small adjacent similar appearing indeterminate right hepatic lobe lesion. 3.  Indeterminate right adrenal mass with findings consistent with right adrenal hemorrhage. 4.  A 10 mm groundglass nodule within the right middle lobe. A follow-up CT scan of the chest in 6-12 months is recommended as per Fleischner guidelines. 5.  Additional incidental findings including ectasia of ascending thoracic aorta, enlarged prostate gland, coronary arterial atherosclerotic vascular disease and colonic diverticulosis without evidence of diverticulitis. Findings and recommendations discussed with SUZANNA Muñoz by Dr. Thompson by telephone at 9:54 AM and 10:21 AM on 10/24/2022. Fleischner Society 2017 Guidelines for Management of Incidentally Detected Pulmonary Nodules in Adults. (Subsolid Nodules) Single ground glass: <6 mm - No routine follow-up >/= 6 mm - CT at 6-12 months to confirm persistence, then CT every 2 years until 5 years (In certain suspicious nodules < 6 mm, consider follow-up at 2 and 4 years.  If solid component(s) or growth develops, consider resection (Recommendations 3A and 4A)     IR THROMBOLYSIS    Result Date: 10/25/2022  IMPRESSION:   Successful bilateral pulmonary arteriography and catheter-directed thrombolysis. COMMENT: PROCEDURE:  1. Ultrasound guided access of the central right common femoral  vein. 2. Subselective right lower lobe pulmonary arteriography. 3. Subselective catheter directed thrombolysis of the right pulmonary arterial thrombus extending into the right lower lobe branch. 4. Subselective left lower lobe pulmonary arteriography. 5. Subselective catheter directed thrombolysis of the left pulmonary arterial thrombus extending into the left lower lobe branch. RADIOLOGIST: Sarkis Gerardo MD ANESTHESIA:  Local 1% lidocaine. FLUORO TIME:  7.7 minutes REFERENCE AIR KERMA: 44 mGy CONTRAST:  20 cc of Omnipaque 300 DESCRIPTION OF PROCEDURE:    Written informed consent was obtained following explanation of risks and benefits of the procedure. Specifically, risks of cardiac arrhythmia, pulmonary arterial injury, and intracranial hemorrhage were discussed with the patient. Additionally, significantly elevated risk of abdominal hemorrhage related to known adrenal hemorrhage was clearly discussed. The patient was placed supine on the IR procedure table. Maximal sterile barrier precautions were utilized during catheter insertion including aseptic technique, the use of cap, mask, sterile gown, sterile gloves, sterile drapes, hand hygiene, and patient cutaneous antisepsis with chlorhexidene 2%. Grayscale and color Doppler ultrasound evaluation of the right common femoral vein was performed. The right common femoral vein was patent and compressible. The right groin was prepped and draped in usual sterile fashion. Local anesthesia was administered with 1% lidocaine. Under ultrasound guidance, the right common femoral vein was accessed with a 21-gauge needle. An 018 wire was advanced centrally. Sequential exchange was made for a 5 Moroccan a microcatheter sheath, 035 Ness wire, and 5 Moroccan vascular sheath. The sheath was secured with 0-0 silk. Utilizing an H1 catheter with Glidewire, the right main pulmonary artery was selected with successful subselection of a right lower lobe pulmonary arterial branch.  Subselective right lower lobe pulmonary arteriography was performed, confirming satisfactory position within the right lower lobe branch distal to the main thrombus. Over a J Phelan wire, exchange was made for the Darrion-Macnamara flush catheter. 10 mg of TPA was then successfully infused into the right main pulmonary artery, extending into the right lower lobe pulmonary arterial branch. Utilizing an H1 catheter with Glidewire, the left main pulmonary artery was selected with successful subselection of a left lower lobe pulmonary arterial branch. Subselective left lower lobe pulmonary arteriography was performed, confirming satisfactory position within the left lower lobe branch distal to the main thrombus. Over a J Phelan wire, exchange was made for the Darrion-Macnamara flush catheter. 10 mg of TPA was then successfully infused into the left main pulmonary artery, extending into the left lower lobe pulmonary arterial branch. Catheter and sheath were removed. Hemostasis of the right common femoral vein was achieved with manual compression.     X-RAY CHEST 1 VIEW    Result Date: 10/26/2022  IMPRESSION: No acute cardiopulmonary abnormality seen.. COMMENT:    A single AP view of the chest was performed. Comparison: AP chest x-ray from 10/24/2022. The heart size and pulmonary vasculature remain within normal limits. The lung fields appear clear, and no pleural effusions are seen. No hilar abnormality is identified. There is mild tortuosity of the descending thoracic aorta. An electronic device projects over the left lung base projecting over the left anterior left fourth rib, likely a loop recorder. When compared to the prior study, there has been no significant interval change.     X-RAY CHEST 1 VIEW    Result Date: 10/24/2022  IMPRESSION: No evidence of acute pulmonary disease.     Ultrasound venous leg bilateral    Result Date: 10/25/2022  IMPRESSION: Examination positive for deep vein thrombosis bilaterally as  described, with greater than right.     CT CHEST ABDOMEN PELVIS WITHOUT IV CONTRAST    Result Date: 10/25/2022  IMPRESSION: 1.  Findings concerning for a large right-sided retroperitoneal hemorrhage including the right kidney and right adrenal gland. A small to moderate amount of pelvic ascites is also noted which appears mildly hyperdense and may reflect a component of hemoperitoneum. 2.  Additional chronic and incidental findings as described above. Finding:    Unexpected significant hemorrhage (chest, abdomen, pelvis)   Acuity: Critical  Status:  CLOSED Critical read back was performed and results were read back by Dr. Simms, on 10/25/2022 8:10 PM     ECG/Telemetry  ASSESSMENT AND PLAN        LYNNE (acute kidney injury) (CMS/HCC)  Assessment & Plan  -Prerenal, improving  -Cr peaked at 2.3, now resolved    Acute blood loss anemia  Assessment & Plan  -From retroperitoneal hematoma, needing PRBC transfusion  -Thrombocytopenia as well, improving.  DIC labs noted  -Downtrending, continue to monitor    Facial fracture (CMS/HCC)  Assessment & Plan  -CT facial bones:Multiple right-sided facial bone fractures as described under the comment.  -Seen by Trauma and plastics  -Seen by plastics who discussed potential facial deformity.  Not a candidate for reconstructive surgery, given his need for AC  -I d/w trauma, no need for abx  -Holding cpap for now    Hyperlipidemia  Assessment & Plan  -Lipitor held with abn LFTs    LFT normal resume statin    Hypertension  Assessment & Plan  -BP better since resuming Norvasc and Metoprolol  -Holding HCTZ    Blood pressure elevated increase Norvasc from 5 mg to 10 mg    * Pulmonary embolism with acute cor pulmonale (CMS/HCC)  Assessment & Plan  CT chest: Extensive b/l PE with R heart strain. 7cm hypodense lesion R hepatic lobe, small R hepatic lobe lesion. Indeterminate R adrenal mass and hemorrhage.  10 mm RML nodule  -Provoked by underlying hepatic malignancy?   -With acute hypoxic  resp failure  -Associated R heart strain  -Started on Heparin drip and underwent TPA to each pulm artery  -RRT 10/26 with hypotension and retroperitoneal hemorrhage  -DVT,  US: B/ L>R.  R nearly occlusive thrombus post tibial vein. Left: Mid femoral vein partially occlusive thrombus. Distal popliteal vein occlusive thrombus.  Peroneal and posterior tibial vein partially/nearly occlusive thrombus.  -Echo 10/24:  TDS.  EF 75%   Grade I LV diastolic dysfunction. Severely dilated RV. Severely reduced RV systolic function c/w RV strain.  Mod dilated RA. Trace TR.   -Echo 10/26:C/w 10/24, RV size dec and fcn improved   -S/p IVC filter 10/25  -Heparin resumed without a bolus, hgb stable  -Will drop in Hgb, will continue Heparin for today and trend Hgb with goal to start Eliquis tomorrow        Disposition Planning: Pending progression     VTE Assessment: Padua    VTE Prophylaxis Plan: Continue current DVT Prophylaxis   Code Status: Full Code  Estimated Discharge Date: 11/4/2022    This patient note has been dictated using speech recognition software. Inadvertent speech recognition errors should be disregarded. Please do not hesitate to call [facility] office for clarifications.     Tish Frazier MD  11/2/2022

## 2022-11-02 NOTE — ASSESSMENT & PLAN NOTE
CT chest: Extensive b/l PE with R heart strain. 7cm hypodense lesion R hepatic lobe, small R hepatic lobe lesion. Indeterminate R adrenal mass and hemorrhage.  10 mm RML nodule  -Provoked by underlying hepatic malignancy?   -With acute hypoxic resp failure  -Associated R heart strain  -Started on Heparin drip and underwent TPA to each pulm artery  -RRT 10/26 with hypotension and retroperitoneal hemorrhage  -DVT,  US: B/ L>R.  R nearly occlusive thrombus post tibial vein. Left: Mid femoral vein partially occlusive thrombus. Distal popliteal vein occlusive thrombus.  Peroneal and posterior tibial vein partially/nearly occlusive thrombus.  -Echo 10/24:  TDS.  EF 75%   Grade I LV diastolic dysfunction. Severely dilated RV. Severely reduced RV systolic function c/w RV strain.  Mod dilated RA. Trace TR.   -Echo 10/26:C/w 10/24, RV size dec and fcn improved   -S/p IVC filter 10/25  -Heparin resumed without a bolus, hgb stable  -Hb stable, change Hep gtt to Eliquis today 11/3  - MRI 11/3: No intracranial mass or acute abnormality.

## 2022-11-02 NOTE — PLAN OF CARE
Problem: Adult Inpatient Plan of Care  Goal: Plan of Care Review  Outcome: Progressing  Flowsheets (Taken 11/2/2022 1343)  Progress: no change  Plan of Care Reviewed With:   patient   spouse   other (see comments)   Spoke w [facility], pt not stable for dc, on hep drip. SW met w pt and pts spouse at bedside to discuss SNF choice. Pts spouse requests SW send additional SNF referral to [facility]. SW sent SNF referral and left voicemail for admissions at [facility]. Pt still has 3 accepting facilities: [facility], [facility], and [facility]. Pt and pts spouse reports they would like to discuss further before making a final decision. CC will continue to follow. Angela SALAZAR [phone]    2:30PM MATEUSZ spoke w [facility] admissions who reports they are only accepting their own residents at their short term rehab at this time. SW updated pt and pts spouse. pts spouse reports they will continue to discuss before making final decision. CC to follow. Angela SALAZAR

## 2022-11-02 NOTE — PROGRESS NOTES
Patient: Tim Humphries  Location: [facility] 3 Main 0305  MRN:  [id number]  Today's date:  11/2/2022    Attempted to see patient for therapy. Unable due to patient at test or procedure (off floor at CT, will reattempt as able).

## 2022-11-02 NOTE — PLAN OF CARE
Problem: Adult Inpatient Plan of Care  Goal: Plan of Care Review  Outcome: Progressing  Flowsheets (Taken 11/2/2022 3946)  Progress: improving  Plan of Care Reviewed With:   patient   spouse  Outcome Summary: Continue Heparin gtt.  Repeat CT head.  Patient OOB and ambulating in the room.  Without c/o pain or discomfort this shift.  Wife updated on POC at bedside.  Neuro check Q4.  Will continue to monitor.  Goal: Optimal Comfort and Wellbeing  Outcome: Progressing     Problem: Fall Injury Risk  Goal: Absence of Fall and Fall-Related Injury  Outcome: Progressing

## 2022-11-03 ENCOUNTER — APPOINTMENT (INPATIENT)
Dept: RADIOLOGY | Facility: HOSPITAL | Age: 77
DRG: 163 | End: 2022-11-03
Attending: HOSPITALIST
Payer: COMMERCIAL

## 2022-11-03 LAB
ANION GAP SERPL CALC-SCNC: 5 MEQ/L (ref 3–15)
APTT PPP: 68 SEC (ref 23–35)
BASOPHILS # BLD: 0.07 K/UL (ref 0.01–0.1)
BASOPHILS NFR BLD: 0.6 %
BUN SERPL-MCNC: 26 MG/DL (ref 8–20)
CALCIUM SERPL-MCNC: 8.1 MG/DL (ref 8.9–10.3)
CHLORIDE SERPL-SCNC: 104 MEQ/L (ref 98–109)
CO2 SERPL-SCNC: 25 MEQ/L (ref 22–32)
CREAT SERPL-MCNC: 0.8 MG/DL (ref 0.8–1.3)
DIFFERENTIAL METHOD BLD: ABNORMAL
EOSINOPHIL # BLD: 0.19 K/UL (ref 0.04–0.54)
EOSINOPHIL NFR BLD: 1.5 %
ERYTHROCYTE [DISTWIDTH] IN BLOOD BY AUTOMATED COUNT: 13.5 % (ref 11.6–14.4)
GFR SERPL CREATININE-BSD FRML MDRD: >60 ML/MIN/1.73M*2
GLUCOSE BLD-MCNC: 118 MG/DL (ref 70–99)
GLUCOSE BLD-MCNC: 154 MG/DL (ref 70–99)
GLUCOSE BLD-MCNC: 162 MG/DL (ref 70–99)
GLUCOSE BLD-MCNC: 165 MG/DL (ref 70–99)
GLUCOSE SERPL-MCNC: 153 MG/DL (ref 70–99)
HCT VFR BLDCO AUTO: 28.9 % (ref 40.1–51)
HGB BLD-MCNC: 9.5 G/DL (ref 13.7–17.5)
IMM GRANULOCYTES # BLD AUTO: 0.34 K/UL (ref 0–0.08)
IMM GRANULOCYTES NFR BLD AUTO: 2.7 %
LYMPHOCYTES # BLD: 1.05 K/UL (ref 1.2–3.5)
LYMPHOCYTES NFR BLD: 8.3 %
MAGNESIUM SERPL-MCNC: 2 MG/DL (ref 1.8–2.5)
MCH RBC QN AUTO: 29 PG (ref 28–33.2)
MCHC RBC AUTO-ENTMCNC: 32.9 G/DL (ref 32.2–36.5)
MCV RBC AUTO: 88.1 FL (ref 83–98)
MONOCYTES # BLD: 1 K/UL (ref 0.3–1)
MONOCYTES NFR BLD: 7.9 %
NEUTROPHILS # BLD: 10.02 K/UL (ref 1.7–7)
NEUTS SEG NFR BLD: 79 %
NRBC BLD-RTO: 0 %
PDW BLD AUTO: 9.2 FL (ref 9.4–12.4)
PHOSPHATE SERPL-MCNC: 3.3 MG/DL (ref 2.4–4.7)
PLATELET # BLD AUTO: 324 K/UL (ref 150–350)
POCT TEST: ABNORMAL
POTASSIUM SERPL-SCNC: 4.2 MEQ/L (ref 3.6–5.1)
RBC # BLD AUTO: 3.28 M/UL (ref 4.5–5.8)
SODIUM SERPL-SCNC: 134 MEQ/L (ref 136–144)
WBC # BLD AUTO: 12.67 K/UL (ref 3.8–10.5)

## 2022-11-03 PROCEDURE — 97535 SELF CARE MNGMENT TRAINING: CPT | Mod: GO

## 2022-11-03 PROCEDURE — 85025 COMPLETE CBC W/AUTO DIFF WBC: CPT | Performed by: HOSPITALIST

## 2022-11-03 PROCEDURE — 84100 ASSAY OF PHOSPHORUS: CPT | Performed by: HOSPITALIST

## 2022-11-03 PROCEDURE — 63700000 HC SELF-ADMINISTRABLE DRUG: Performed by: HOSPITALIST

## 2022-11-03 PROCEDURE — 99232 SBSQ HOSP IP/OBS MODERATE 35: CPT | Performed by: HOSPITALIST

## 2022-11-03 PROCEDURE — 63700000 HC SELF-ADMINISTRABLE DRUG

## 2022-11-03 PROCEDURE — 80048 BASIC METABOLIC PNL TOTAL CA: CPT | Performed by: HOSPITALIST

## 2022-11-03 PROCEDURE — 63600000 HC DRUGS/DETAIL CODE: Performed by: INTERNAL MEDICINE

## 2022-11-03 PROCEDURE — 36415 COLL VENOUS BLD VENIPUNCTURE: CPT | Performed by: INTERNAL MEDICINE

## 2022-11-03 PROCEDURE — G1004 CDSM NDSC: HCPCS

## 2022-11-03 PROCEDURE — 21400000 HC ROOM AND CARE CCU/INTERMEDIATE

## 2022-11-03 PROCEDURE — 83735 ASSAY OF MAGNESIUM: CPT | Performed by: HOSPITALIST

## 2022-11-03 PROCEDURE — 97530 THERAPEUTIC ACTIVITIES: CPT | Mod: GP,CQ

## 2022-11-03 PROCEDURE — 85730 THROMBOPLASTIN TIME PARTIAL: CPT | Performed by: INTERNAL MEDICINE

## 2022-11-03 RX ORDER — POLYETHYLENE GLYCOL 3350 17 G/17G
17 POWDER, FOR SOLUTION ORAL DAILY
Qty: 3 PACKET | Refills: 0 | Status: SHIPPED | OUTPATIENT
Start: 2022-11-04 | End: 2022-11-07

## 2022-11-03 RX ORDER — AMOXICILLIN 250 MG
2 CAPSULE ORAL DAILY
Qty: 60 TABLET | Refills: 0 | Status: SHIPPED | OUTPATIENT
Start: 2022-11-04 | End: 2022-12-04

## 2022-11-03 RX ADMIN — LATANOPROST 1 DROP: 50 SOLUTION OPHTHALMIC at 22:39

## 2022-11-03 RX ADMIN — HEPARIN SODIUM AND DEXTROSE 1950 UNITS/HR: 10000; 5 INJECTION INTRAVENOUS at 05:35

## 2022-11-03 RX ADMIN — SENNOSIDES AND DOCUSATE SODIUM 2 TABLET: 50; 8.6 TABLET ORAL at 09:12

## 2022-11-03 RX ADMIN — METOPROLOL SUCCINATE 50 MG: 50 TABLET, FILM COATED, EXTENDED RELEASE ORAL at 17:35

## 2022-11-03 RX ADMIN — POLYETHYLENE GLYCOL 3350 17 G: 17 POWDER, FOR SOLUTION ORAL at 09:13

## 2022-11-03 RX ADMIN — INSULIN ASPART 2 UNITS: 100 INJECTION, SOLUTION INTRAVENOUS; SUBCUTANEOUS at 12:17

## 2022-11-03 RX ADMIN — INSULIN ASPART 2 UNITS: 100 INJECTION, SOLUTION INTRAVENOUS; SUBCUTANEOUS at 22:40

## 2022-11-03 RX ADMIN — INSULIN ASPART 2 UNITS: 100 INJECTION, SOLUTION INTRAVENOUS; SUBCUTANEOUS at 09:12

## 2022-11-03 RX ADMIN — APIXABAN 5 MG: 5 TABLET, FILM COATED ORAL at 12:17

## 2022-11-03 RX ADMIN — APIXABAN 5 MG: 5 TABLET, FILM COATED ORAL at 20:41

## 2022-11-03 RX ADMIN — ACETAMINOPHEN 650 MG: 325 TABLET ORAL at 22:40

## 2022-11-03 RX ADMIN — PANTOPRAZOLE SODIUM 40 MG: 40 TABLET, DELAYED RELEASE ORAL at 09:13

## 2022-11-03 RX ADMIN — ATORVASTATIN CALCIUM 20 MG: 20 TABLET, FILM COATED ORAL at 17:35

## 2022-11-03 RX ADMIN — TIMOLOL MALEATE 1 DROP: 5 SOLUTION/ DROPS OPHTHALMIC at 09:12

## 2022-11-03 ASSESSMENT — COGNITIVE AND FUNCTIONAL STATUS - GENERAL
STANDING UP FROM CHAIR USING ARMS: 3 - A LITTLE
CLIMB 3 TO 5 STEPS WITH RAILING: 3 - A LITTLE
EATING MEALS: 3 - A LITTLE
WALKING IN HOSPITAL ROOM: 3 - A LITTLE
DRESSING REGULAR UPPER BODY CLOTHING: 3 - A LITTLE
HELP NEEDED FOR PERSONAL GROOMING: 3 - A LITTLE
HELP NEEDED FOR BATHING: 3 - A LITTLE
DRESSING REGULAR LOWER BODY CLOTHING: 3 - A LITTLE
TOILETING: 3 - A LITTLE
MOVING TO AND FROM BED TO CHAIR: 3 - A LITTLE

## 2022-11-03 NOTE — PROGRESS NOTES
[facility]....Referral rec'd from .  Per CM, pt agreeable to homecare.  Spoke with pt's wife, as requested, who is agreeable to home RN, PT, OT visits.  Demographics verified.  DME in home: none  HC referral to be completed by dc.      Ordering pt a transport WC and a BSC via [employer] portal online to be delivered by [employer]  ODALIS FLORES to be notified to sign dme scripts via secure email.

## 2022-11-03 NOTE — PROGRESS NOTES
Hospital Medicine Service -  Daily Progress Note       SUBJECTIVE   Interval History:   Tim Humphries was seen and examined  no acute overnight event reported  patient reported no chest pain, sob, N/V/D, overt bleeding.    MRI brain CT scan had without acute finding  Hemoglobin stable patient has no other complaints  Switch heparin drip to Eliquis today  Anticipate discharge tomorrow  PT eval for DC planning   OBJECTIVE      Vital signs in last 24 hours:  Vitals:    11/03/22 1011   BP: 136/78   Pulse: 80   Resp:    Temp:    SpO2: 97%       Intake/Output Summary (Last 24 hours) at 11/3/2022 1124  Last data filed at 11/3/2022 0400  Gross per 24 hour   Intake --   Output 600 ml   Net -600 ml     PHYSICAL EXAMINATION      General Appearance:  Awake, Alert, no distress     Head:    Normocephalic, without obvious abnormality, atraumatic   Neck: Supple      Lungs:   Bilateral equal expansion  No labored breathing     Heart:  S1 and S2 normal  no rub or gallop     Abdomen:   Soft  no masses  no organomegaly     Vascular: Bilateral radial pulse equally palpated      Extremities: no calf tenderness      Behavior/Emotional: Mood stable      Skin:                                     no rash     Neuro:                                 Spontaneous move bilateral upper and lower extremity                                                No focal deficits   LINES, CATHETERS, DRAINS, AIRWAYS, AND WOUNDS   Lines, Drains, Airways, Wounds:  Peripheral IV (Adult) 10/30/22 Anterior;Right Forearm (Active)   Number of days: 4       Wound Puncture Anterior;Proximal;Right Thigh (Active)   Number of days: 8       Wound Right Chin (Active)   Number of days: 9       Catheterization Site - Venous Right Femoral 5 Fr.;Removed in lab (Active)   Number of days: 9       Comments:      LABS / IMAGING / TELE      Labs  CMP Results       11/03/22 11/02/22 11/01/22     0411 0326 0435     134 133    K 4.2 4.2 4.4    Cl 104 102 105    CO2 25 25  21    Glucose 153 136 134    BUN 26 27 29    Creatinine 0.8 0.9 0.8    Calcium 8.1 8.2 8.0    Anion Gap 5 7 7    AST -- 27 --    ALT -- 56 --    Albumin -- 2.8 --    EGFR >60.0 >60.0 >60.0        CBC Results       11/03/22 11/02/22 11/01/22     0411 0326 0435    WBC 12.67 11.91 12.42    RBC 3.28 3.44 3.16    HGB 9.5 10.0 9.0    HCT 28.9 30.7 28.6    MCV 88.1 89.2 90.5    MCH 29.0 29.1 28.5    MCHC 32.9 32.6 31.5     336 259        Troponin I Results       10/25/22 10/25/22 10/24/22     2139 1855 1047    HS Troponin I 1,349.2 1,349.8 1,150.4        PT/PTT Results       11/03/22 11/02/22 11/01/22     0411 0326 0449    PTT 68 72 71         Comment for PTT at 0411 on 11/03/22: The Standard Therapeutic Range for Heparin is 68 to 101 seconds.    Comment for PTT at 0326 on 11/02/22: The Standard Therapeutic Range for Heparin is 68 to 101 seconds.    Comment for PTT at 0449 on 11/01/22: The Standard Therapeutic Range for Heparin is 68 to 101 seconds.        Lab Results   Component Value Date    HGBA1C 5.9 (H) 10/25/2022     Imaging  CT HEAD WITHOUT IV CONTRAST    Result Date: 11/2/2022  IMPRESSION: No acute intracranial abnormality.  Chronic ischemic white matter changes are noted.    CT HEAD WITHOUT IV CONTRAST    Result Date: 10/25/2022  IMPRESSION: 1.  No acute intracranial hemorrhage, large territorial infarct, mass effect or midline shift is identified. 2.  Multiple right maxillary sinus and zygomatic arch fractures with an air-fluid level within the right maxillary sinus.     CT HEAD WITHOUT IV CONTRAST    Result Date: 10/24/2022  IMPRESSION: 1.  No posttraumatic intracranial abnormality. 2. Chronic changes noted under the comment.    CT FACIAL BONES WITHOUT IV CONTRAST    Result Date: 10/24/2022  IMPRESSION: Multiple right-sided facial bone fractures as described under the comment.    CT CERVICAL SPINE WITHOUT IV CONTRAST    Result Date: 10/24/2022  IMPRESSION: 1.  No acute fractures. As clinically indicated,  MRI of the cervical spine is recommended for further evaluation. 2.  Other incidental findings noted under the comment.     MRI BRAIN WITHOUT CONTRAST    Result Date: 11/3/2022  IMPRESSION: No intracranial mass or acute abnormality.    MRI ABDOMEN WITH AND WITHOUT CONTRAST    Result Date: 10/29/2022  IMPRESSION: 1. Right hepatic lobe mass on recent CT is most compatible with evolving hemorrhage/infarct centered in segment 7. Additional hemorrhagic/infarcted focus in central segment 5/8, stable in retrospect. Additional benign liver cysts, largest in the left lobe measuring up to 3.9 cm and containing blood products. 2. Similar appearance of right perinephric and right retroperitoneal hematomas with hemorrhagic right renal cyst. 3. Similar right adrenal hemorrhage with developing hemorrhage into the left adrenal gland. 4. Small right pleural effusion with findings suspicious for a component of hemothorax. This could be confirmed with thoracentesis, if necessary. 5. No findings on subtraction imaging to suggest papo active bleeding. Overall, there are no findings suspicious for malignancy; specifically, no suspicious hepatic observations. Given motion degradation on extensive findings, follow-up contrast-enhanced MRI is recommended to assess for resolution of findings and exclude underlying hemorrhagic neoplasm.    ULTRASOUND GUIDED VASCULAR ACCESS    Result Date: 10/26/2022  IMPRESSION:   Successful placement of a retrievable infrarenal IVC Filter. Device: Argon Option Elite IVC Filter COMMENT: PROCEDURE: 1. Ultrasound-guided access of the right common femoral vein. 2. IVC cavagram 3. IVC filter placement RADIOLOGIST:  Sarkis Gerardo MD ANESTHESIA:  Lidocaine 1% CONTRAST:  CO2 FLUORO TIME:  0.8 min REFERENCE AIR KERMA: 40 mGy MEDICATIONS:  none DESCRIPTION OF PROCEDURE:  Consent was obtained following explanation of risks and benefits of the procedure. The right groin was prepped and draped in the usual sterile  fashion. The right common femoral vein was noted to be compressible and patent on gray scale ultrasound. Local anesthesia was provided. The vein was then accessed with a  21-gauge needle under ultrasound guidance, and an .018 wire was advanced centrally. An ultrasound-guided access image was saved to PACS. Exchange was made for a microsheath, .035 wire, and multi-sidehole sheath, which was positioned at the iliocaval junction. IVC cavagram was performed with CO2. Renal vein insertion sites were localized. An Argon Option Elite IVC Filter was deployed infrarenally without complication. Wires and catheters were removed and hemostasis was achieved. The patient remained stable throughout the procedure. The number of guidewires used, and their integrity, was confirmed at the end of the procedure.     ULTRASOUND GUIDED VASCULAR ACCESS    Result Date: 10/25/2022  IMPRESSION:   Successful bilateral pulmonary arteriography and catheter-directed thrombolysis. COMMENT: PROCEDURE:  1. Ultrasound guided access of the central right common femoral vein. 2. Subselective right lower lobe pulmonary arteriography. 3. Subselective catheter directed thrombolysis of the right pulmonary arterial thrombus extending into the right lower lobe branch. 4. Subselective left lower lobe pulmonary arteriography. 5. Subselective catheter directed thrombolysis of the left pulmonary arterial thrombus extending into the left lower lobe branch. RADIOLOGIST: Sarkis Gerardo MD ANESTHESIA:  Local 1% lidocaine. FLUORO TIME:  7.7 minutes REFERENCE AIR KERMA: 44 mGy CONTRAST:  20 cc of Omnipaque 300 DESCRIPTION OF PROCEDURE:    Written informed consent was obtained following explanation of risks and benefits of the procedure. Specifically, risks of cardiac arrhythmia, pulmonary arterial injury, and intracranial hemorrhage were discussed with the patient. Additionally, significantly elevated risk of abdominal hemorrhage related to known adrenal hemorrhage  was clearly discussed. The patient was placed supine on the IR procedure table. Maximal sterile barrier precautions were utilized during catheter insertion including aseptic technique, the use of cap, mask, sterile gown, sterile gloves, sterile drapes, hand hygiene, and patient cutaneous antisepsis with chlorhexidene 2%. Grayscale and color Doppler ultrasound evaluation of the right common femoral vein was performed. The right common femoral vein was patent and compressible. The right groin was prepped and draped in usual sterile fashion. Local anesthesia was administered with 1% lidocaine. Under ultrasound guidance, the right common femoral vein was accessed with a 21-gauge needle. An 018 wire was advanced centrally. Sequential exchange was made for a 5 Israeli a microcatheter sheath, 035 Ness wire, and 5 Israeli vascular sheath. The sheath was secured with 0-0 silk. Utilizing an H1 catheter with Glidewire, the right main pulmonary artery was selected with successful subselection of a right lower lobe pulmonary arterial branch. Subselective right lower lobe pulmonary arteriography was performed, confirming satisfactory position within the right lower lobe branch distal to the main thrombus. Over a J Phelan wire, exchange was made for the Darrion-Macnamara flush catheter. 10 mg of TPA was then successfully infused into the right main pulmonary artery, extending into the right lower lobe pulmonary arterial branch. Utilizing an H1 catheter with Glidewire, the left main pulmonary artery was selected with successful subselection of a left lower lobe pulmonary arterial branch. Subselective left lower lobe pulmonary arteriography was performed, confirming satisfactory position within the left lower lobe branch distal to the main thrombus. Over a J Phelan wire, exchange was made for the Darrion-Macnamara flush catheter. 10 mg of TPA was then successfully infused into the left main pulmonary artery, extending into the left lower  lobe pulmonary arterial branch. Catheter and sheath were removed. Hemostasis of the right common femoral vein was achieved with manual compression.     CT ABDOMEN PELVIS WITH IV CONTRAST    Result Date: 10/24/2022  IMPRESSION: 1.  Extensive acute pulmonary arterial emboli involving the bilateral main and multiple bilateral lobar, segmental and subsegmental branches, as detailed above. Flattening of the interventricular septum with dilatation of the right ventricle suggestive of right-sided heart strain. No evidence of acute pulmonary infarct. 2.  Large, indeterminate infiltrative hypodense lesion within the right hepatic lobe measuring up to 7 cm warranting further assessment with contrast-enhanced MRI. Differential diagnosis includes metastatic disease, a primary malignancy as well as abscess. Small adjacent similar appearing indeterminate right hepatic lobe lesion. 3.  Indeterminate right adrenal mass with findings consistent with right adrenal hemorrhage. 4.  A 10 mm groundglass nodule within the right middle lobe. A follow-up CT scan of the chest in 6-12 months is recommended as per Fleischner guidelines. 5.  Additional incidental findings including ectasia of ascending thoracic aorta, enlarged prostate gland, coronary arterial atherosclerotic vascular disease and colonic diverticulosis without evidence of diverticulitis. Findings and recommendations discussed with SUZANNA Muñoz by Dr. Thompson by telephone at 9:54 AM and 10:21 AM on 10/24/2022. Fleischner Society 2017 Guidelines for Management of Incidentally Detected Pulmonary Nodules in Adults. (Subsolid Nodules) Single ground glass: <6 mm - No routine follow-up >/= 6 mm - CT at 6-12 months to confirm persistence, then CT every 2 years until 5 years (In certain suspicious nodules < 6 mm, consider follow-up at 2 and 4 years.  If solid component(s) or growth develops, consider resection (Recommendations 3A and 4A)     IR FILTER PLACEMENT    Result Date:  10/26/2022  IMPRESSION:   Successful placement of a retrievable infrarenal IVC Filter. Device: Argon Option Elite IVC Filter COMMENT: PROCEDURE: 1. Ultrasound-guided access of the right common femoral vein. 2. IVC cavagram 3. IVC filter placement RADIOLOGIST:  Sarkis Gerardo MD ANESTHESIA:  Lidocaine 1% CONTRAST:  CO2 FLUORO TIME:  0.8 min REFERENCE AIR KERMA: 40 mGy MEDICATIONS:  none DESCRIPTION OF PROCEDURE:  Consent was obtained following explanation of risks and benefits of the procedure. The right groin was prepped and draped in the usual sterile fashion. The right common femoral vein was noted to be compressible and patent on gray scale ultrasound. Local anesthesia was provided. The vein was then accessed with a  21-gauge needle under ultrasound guidance, and an .018 wire was advanced centrally. An ultrasound-guided access image was saved to PACS. Exchange was made for a microsheath, .035 wire, and multi-sidehole sheath, which was positioned at the iliocaval junction. IVC cavagram was performed with CO2. Renal vein insertion sites were localized. An Argon Option Elite IVC Filter was deployed infrarenally without complication. Wires and catheters were removed and hemostasis was achieved. The patient remained stable throughout the procedure. The number of guidewires used, and their integrity, was confirmed at the end of the procedure.     CT ANGIOGRAPHY CHEST PULMONARY EMBOLISM WITH IV CONTRAST    Result Date: 10/24/2022  IMPRESSION: 1.  Extensive acute pulmonary arterial emboli involving the bilateral main and multiple bilateral lobar, segmental and subsegmental branches, as detailed above. Flattening of the interventricular septum with dilatation of the right ventricle suggestive of right-sided heart strain. No evidence of acute pulmonary infarct. 2.  Large, indeterminate infiltrative hypodense lesion within the right hepatic lobe measuring up to 7 cm warranting further assessment with contrast-enhanced MRI.  Differential diagnosis includes metastatic disease, a primary malignancy as well as abscess. Small adjacent similar appearing indeterminate right hepatic lobe lesion. 3.  Indeterminate right adrenal mass with findings consistent with right adrenal hemorrhage. 4.  A 10 mm groundglass nodule within the right middle lobe. A follow-up CT scan of the chest in 6-12 months is recommended as per Fleischner guidelines. 5.  Additional incidental findings including ectasia of ascending thoracic aorta, enlarged prostate gland, coronary arterial atherosclerotic vascular disease and colonic diverticulosis without evidence of diverticulitis. Findings and recommendations discussed with SUZANNA Muñoz by Dr. Thompson by telephone at 9:54 AM and 10:21 AM on 10/24/2022. Fleischner Society 2017 Guidelines for Management of Incidentally Detected Pulmonary Nodules in Adults. (Subsolid Nodules) Single ground glass: <6 mm - No routine follow-up >/= 6 mm - CT at 6-12 months to confirm persistence, then CT every 2 years until 5 years (In certain suspicious nodules < 6 mm, consider follow-up at 2 and 4 years.  If solid component(s) or growth develops, consider resection (Recommendations 3A and 4A)     IR THROMBOLYSIS    Result Date: 10/25/2022  IMPRESSION:   Successful bilateral pulmonary arteriography and catheter-directed thrombolysis. COMMENT: PROCEDURE:  1. Ultrasound guided access of the central right common femoral vein. 2. Subselective right lower lobe pulmonary arteriography. 3. Subselective catheter directed thrombolysis of the right pulmonary arterial thrombus extending into the right lower lobe branch. 4. Subselective left lower lobe pulmonary arteriography. 5. Subselective catheter directed thrombolysis of the left pulmonary arterial thrombus extending into the left lower lobe branch. RADIOLOGIST: Sarkis Gerardo MD ANESTHESIA:  Local 1% lidocaine. FLUORO TIME:  7.7 minutes REFERENCE AIR KERMA: 44 mGy CONTRAST:  20 cc of Omnipaque 300  DESCRIPTION OF PROCEDURE:    Written informed consent was obtained following explanation of risks and benefits of the procedure. Specifically, risks of cardiac arrhythmia, pulmonary arterial injury, and intracranial hemorrhage were discussed with the patient. Additionally, significantly elevated risk of abdominal hemorrhage related to known adrenal hemorrhage was clearly discussed. The patient was placed supine on the IR procedure table. Maximal sterile barrier precautions were utilized during catheter insertion including aseptic technique, the use of cap, mask, sterile gown, sterile gloves, sterile drapes, hand hygiene, and patient cutaneous antisepsis with chlorhexidene 2%. Grayscale and color Doppler ultrasound evaluation of the right common femoral vein was performed. The right common femoral vein was patent and compressible. The right groin was prepped and draped in usual sterile fashion. Local anesthesia was administered with 1% lidocaine. Under ultrasound guidance, the right common femoral vein was accessed with a 21-gauge needle. An 018 wire was advanced centrally. Sequential exchange was made for a 5 Serbian a microcatheter sheath, 035 Ness wire, and 5 Serbian vascular sheath. The sheath was secured with 0-0 silk. Utilizing an H1 catheter with Glidewire, the right main pulmonary artery was selected with successful subselection of a right lower lobe pulmonary arterial branch. Subselective right lower lobe pulmonary arteriography was performed, confirming satisfactory position within the right lower lobe branch distal to the main thrombus. Over a J Phelan wire, exchange was made for the Darrion-Macnimesh flush catheter. 10 mg of TPA was then successfully infused into the right main pulmonary artery, extending into the right lower lobe pulmonary arterial branch. Utilizing an H1 catheter with Glidewire, the left main pulmonary artery was selected with successful subselection of a left lower lobe pulmonary arterial  branch. Subselective left lower lobe pulmonary arteriography was performed, confirming satisfactory position within the left lower lobe branch distal to the main thrombus. Over a J Phelan wire, exchange was made for the Darrion-Svetlana flush catheter. 10 mg of TPA was then successfully infused into the left main pulmonary artery, extending into the left lower lobe pulmonary arterial branch. Catheter and sheath were removed. Hemostasis of the right common femoral vein was achieved with manual compression.     X-RAY CHEST 1 VIEW    Result Date: 10/26/2022  IMPRESSION: No acute cardiopulmonary abnormality seen.. COMMENT:    A single AP view of the chest was performed. Comparison: AP chest x-ray from 10/24/2022. The heart size and pulmonary vasculature remain within normal limits. The lung fields appear clear, and no pleural effusions are seen. No hilar abnormality is identified. There is mild tortuosity of the descending thoracic aorta. An electronic device projects over the left lung base projecting over the left anterior left fourth rib, likely a loop recorder. When compared to the prior study, there has been no significant interval change.     X-RAY CHEST 1 VIEW    Result Date: 10/24/2022  IMPRESSION: No evidence of acute pulmonary disease.     Ultrasound venous leg bilateral    Result Date: 10/25/2022  IMPRESSION: Examination positive for deep vein thrombosis bilaterally as described, with greater than right.     CT CHEST ABDOMEN PELVIS WITHOUT IV CONTRAST    Result Date: 10/25/2022  IMPRESSION: 1.  Findings concerning for a large right-sided retroperitoneal hemorrhage including the right kidney and right adrenal gland. A small to moderate amount of pelvic ascites is also noted which appears mildly hyperdense and may reflect a component of hemoperitoneum. 2.  Additional chronic and incidental findings as described above. Finding:    Unexpected significant hemorrhage (chest, abdomen, pelvis)   Acuity: Critical   Status:  CLOSED Critical read back was performed and results were read back by Dr. Simms, on 10/25/2022 8:10 PM     ECG/Telemetry  ASSESSMENT AND PLAN        LYNNE (acute kidney injury) (CMS/formerly [facility])  Assessment & Plan  -Prerenal, improving  -Cr peaked at 2.3, now resolved    Acute blood loss anemia  Assessment & Plan  -From retroperitoneal hematoma, needing PRBC transfusion  -Thrombocytopenia as well, improving.  DIC labs noted  -Downtrending, continue to monitor    Facial fracture (CMS/formerly [facility])  Assessment & Plan  -CT facial bones:Multiple right-sided facial bone fractures as described under the comment.  -Seen by Trauma and plastics  -Seen by plastics who discussed potential facial deformity.  Not a candidate for reconstructive surgery, given his need for AC  -I d/w trauma, no need for abx  -Holding cpap for now    Hyperlipidemia  Assessment & Plan  -Lipitor held with abn LFTs    LFT normal resume statin    Hypertension  Assessment & Plan  -BP better since resuming Norvasc and Metoprolol  -Holding HCTZ    Blood pressure elevated increase Norvasc from 5 mg to 10 mg    * Pulmonary embolism with acute cor pulmonale (CMS/HCC)  Assessment & Plan  CT chest: Extensive b/l PE with R heart strain. 7cm hypodense lesion R hepatic lobe, small R hepatic lobe lesion. Indeterminate R adrenal mass and hemorrhage.  10 mm RML nodule  -Provoked by underlying hepatic malignancy?   -With acute hypoxic resp failure  -Associated R heart strain  -Started on Heparin drip and underwent TPA to each pulm artery  -RRT 10/26 with hypotension and retroperitoneal hemorrhage  -DVT,  US: B/ L>R.  R nearly occlusive thrombus post tibial vein. Left: Mid femoral vein partially occlusive thrombus. Distal popliteal vein occlusive thrombus.  Peroneal and posterior tibial vein partially/nearly occlusive thrombus.  -Echo 10/24:  TDS.  EF 75%   Grade I LV diastolic dysfunction. Severely dilated RV. Severely reduced RV systolic function c/w RV strain.  Mod  dilated RA. Trace TR.   -Echo 10/26:C/w 10/24, RV size dec and fcn improved   -S/p IVC filter 10/25  -Heparin resumed without a bolus, hgb stable  -Hb stable, change Hep gtt to Eliquis today 11/3  - MRI 11/3: No intracranial mass or acute abnormality.          Disposition Planning: Pending progression     VTE Assessment: Padua    VTE Prophylaxis Plan: Continue current DVT Prophylaxis   Code Status: Full Code  Estimated Discharge Date: 11/14/2022    This patient note has been dictated using speech recognition software. Inadvertent speech recognition errors should be disregarded. Please do not hesitate to call [facility] [place] office for clarifications.     Tish Frazier MD  11/3/2022

## 2022-11-03 NOTE — PROGRESS NOTES
OCCUPATIONAL THERAPY ACUTE CARE - TREATMENT NOTE     Patient:  Tim Humphries  Location:  [facility] 3 Main 0305  MRN:  [id number]  Today's date:  11/3/2022    Tim is a 77 y.o. male admitted on 10/24/2022 with Fall, initial encounter [W19.XXXA]  Chin laceration, initial encounter [S01.81XA]  Acute saddle pulmonary embolism with acute cor pulmonale (CMS/HCC) [I26.02]  Syncope, unspecified syncope type [R55]. Principal problem is Pulmonary embolism with acute cor pulmonale (CMS/HCC).    Past Medical History  Tim has a past medical history of Dyslipidemia, GERD (gastroesophageal reflux disease), Glaucoma, Heart murmur, HTN (hypertension), Implantable loop recorder present, Pericarditis, and Sleep apnea.    History of Present Illness   Tim Humphries is a 77 y.o. male with a past medical history of hypertension, GERD, FLAVIA, hyperlipidemia who presents with syncope, fall.  Wife is at bedside who assisted with history.  For 1 is only patient had COVID about 2-3 months ago which he fully recovered without event.  After he recovered he went to get his COVID-vaccine about 2 weeks ago.  For the last 4 to 5 days patient has noted increased dyspnea with exertion and shortness of breath.  Today while coming down stair he passed out and fell down half a flight of stair.  Wife was close by it and found him down on the floor unresponsive.  Wife report patient was unconscious for about 10 minutes.  When EMS arrived patient was found to have grunting respiration with O2 sat at 86% on room air.  Was then brought to [place] emergency room.  Patient also report he has some left leg swelling approximately 3 to 4 weeks ago which resolved by itself. In ER he was found to have extensive PE with right heart strain.  He was found to be hypoxic and put on 4 L oxygen.  His blood pressure remained stable currently.  Mentation intact.  Patient CAT scan also revealed multiple right displaced facial fracture as well as adrenal  "hemorrhage.    10/26 IVC Filter placement      Session details: daily treatment/progress note   occupational therapy    Start time:   1446  End time:  1518  Time ca min    General Observations  Start: Pt received supine in bed; he was agreeable to therapy and no issues or concerns identified by nurse prior to session   End: Pt supine in bed at end of session; call bell in reach, bed alarm activated, all needs met, personal items in reach and nurse notified of patient performance, patient's position and patient's response to activity    Precautions:   fall and bleeding precaution      VITALS     OT Vitals    Date/Time Pulse HR Source SpO2 O2 Therapy BP BP Location BP Method Pt Position [facility]   22 1450 86 Monitor 90 % None (Room air) 135/73 Right upper arm Automatic Lying CLG   22 1500 -- -- 92 % None (Room air) -- -- -- -- CLG      OT Pain    Date/Time Pain Type Side/Orientation Rating: Rest Rating: Activity [facility]   22 1450 Pain Assessment generalized 3 3 CLG          PRIOR LEVEL OF FUNCTION AND LIVING ENVIRONMENT     Prior Level of Function    Flowsheet Row Most Recent Value   Ambulation independent   Transferring independent   Toileting independent   Bathing independent   Dressing independent   Prior Level of Function Comment indep with no devices for ADL, avid \"walker jogger\"   Assistive Device Currently Used at Home none          Prior Living Environment    Flowsheet Row Most Recent Value   Current Living Arrangements home   Living Environment Comment 2 story house with second bedroom/bathroom walk in shower - reports recently added first floor bed/bath but does not have furniture yet          Occupational Profile    Flowsheet Row Most Recent Value   Reason for Services/Referral d/c needs   Occupational History/Life Experiences 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 \"walker jogger\" ,  doctorate in engineering   Performance Patterns indep w/ ADL no devices   Patient Goals dc home          OBJECTIVE "     Cognition   Affect/MentalStatus: WFL  Orientation: oriented x 3  Follow Commands: WFL  Cognitive Function: executive function deficit: minimal deficit (insight/awareness of deficits and judgment) and safety deficit: minimal deficit (insight into deficits/self-awareness, judgment and problem solving)    Motor Skills: functional activity tolerance/endurance (WFL)    Balance   Static Dynamic   Sitting Fair - (maintains balance with UE support or CGA) Fair - (CGA; unable to weight shift without UE support)   Standing Fair (maintains balance unsupported without LOB and without UE support) Fair (CGA/SBA; unsupported and minimal difficulty crossing midline without LOB)   General Comments:      Functional Transfers   [place] Level DME / AD Cues / Comments   Supine to Sit supervision head of bed elevated Right    Sit to Supine supervision head of bed elevated right   Bed to/from Chair close supervision walker (front-wheeled) Right    Sit to Stand close supervision walker (front-wheeled) Patient education on proper hand placement when performing functional sit to stand transfers in preporation for toilet transfers. Patient demonstrated correctly.    From bed   Stand to Sit close supervision walker (front-wheeled) To bed   Other:           Activities of Daily Living   [place] Level Cues / DME / Comments   Bathing:   Upper Body                      Lower Body close supervision Seated, education on use of shower chair for energy conservation     minimum assist Required assist with buttock while standing    Dressing: Upper Body                      Lower Body close supervision [facility] gown, able to thread BUE     MIN  Seated, donning/ doffing bilateral socks, figure four positioning education.    Required assist with threading LLE in underpants, able to thread RLE and pull up with CS   Grooming/hygiene close supervision Wash hands/ face        AM-PAC  - ADL (Current Function)     Putting on/taking off  regular LB clothing 3 - A Little   Bathing 3 - A Little   Toileting 3 - A Little   Putting on/taking off regular UB clothing 3 - A Little   Help for taking care of personal grooming 3 - A Little   Eating meals 3 - A Little   AM-PAC  ADL Score 18       ASSESSMENT AND RECOMMENDATIONS     Progress Summary  OT treatment session complete. ADL [facility] 18 . Patient continues to present with functional limitations affecting areas of ADLs and functional transfers. Pt making good progress toward all goals. Currently, patient performs bed mobility CS, functional transfers CS using RW, CS for UB bathing, UB dressing, and grooming. Pt required MIN A For LB bathing/ dressing.  Pt would benefit from continued skilled OT services to maximize safety and independence with daily tasks. Recommend discharge to  Home with assist/ OT HH When medically stable.     Therapy Plan  Rehab potential:  fair, will monitor progress closely  Therapy frequency:  3-5 times/wk  Therapy interventions:  occupation/activity based interventions, ROM/therapeutic exercise, transfer/mobility retraining, adaptive equipment training, BADL retraining    Discharge Plan  Recommended discharge:  skilled nursing facility  Anticipated equipment needs:  none (TBD rehab)    Patient/Family Therapy Goal Statement: get better    OT Goals    Flowsheet Row Most Recent Value   Bed Mobility Goal 1    Activity/Assistive Device bed mobility activities, all at 10/28/2022 1127   [place] supervision required at 10/28/2022 1127   Time Frame by discharge at 10/28/2022 1127   Progress/Outcome goal ongoing at 10/28/2022 1127   Transfer Goal 1    Activity/Assistive Device sit-to-stand/stand-to-sit, bed-to-chair/chair-to-bed, toilet at 10/28/2022 1127   [place] supervision required at 10/28/2022 1127   Time Frame by discharge at 10/28/2022 1127   Progress/Outcome goal ongoing at 10/28/2022 1127   Dressing Goal 1    Activity/Adaptive Equipment dressing skills, all at 10/28/2022  1127   [place] supervision required at 10/28/2022 1127   Time Frame by discharge at 10/28/2022 1127   Progress/Outcome goal ongoing at 10/28/2022 1127   Toileting Goal 1    Activity/Assistive Device toileting skills, all at 10/28/2022 1127   [place] supervision required at 10/28/2022 1127   Time Frame by discharge at 10/28/2022 1127   Progress/Outcome goal ongoing at 10/28/2022 1127

## 2022-11-03 NOTE — PROGRESS NOTES
Patient: Tim Humphries  Location:  [facility] 3 Main 0305  MRN:  [id number]  Today's date:  11/3/2022 Pt. In the bed Notified RN    Tim is a 77 y.o. male admitted on 10/24/2022 with Fall, initial encounter [W19.XXXA]  Chin laceration, initial encounter [S01.81XA]  Acute saddle pulmonary embolism with acute cor pulmonale (CMS/HCC) [I26.02]  Syncope, unspecified syncope type [R55]. Principal problem is Pulmonary embolism with acute cor pulmonale (CMS/HCC).    Past Medical History  Tim has a past medical history of Dyslipidemia, GERD (gastroesophageal reflux disease), Glaucoma, Heart murmur, HTN (hypertension), Implantable loop recorder present, Pericarditis, and Sleep apnea.    History of Present Illness   Tim Humphries is a 77 y.o. male with a past medical history of hypertension, GERD, FLAVIA, hyperlipidemia who presents with syncope, fall.  Wife is at bedside who assisted with history.  For 1 is only patient had COVID about 2-3 months ago which he fully recovered without event.  After he recovered he went to get his COVID-vaccine about 2 weeks ago.  For the last 4 to 5 days patient has noted increased dyspnea with exertion and shortness of breath.  Today while coming down stair he passed out and fell down half a flight of stair.  Wife was close by it and found him down on the floor unresponsive.  Wife report patient was unconscious for about 10 minutes.  When EMS arrived patient was found to have grunting respiration with O2 sat at 86% on room air.  Was then brought to [place] emergency room.  Patient also report he has some left leg swelling approximately 3 to 4 weeks ago which resolved by itself. In ER he was found to have extensive PE with right heart strain.  He was found to be hypoxic and put on 4 L oxygen.  His blood pressure remained stable currently.  Mentation intact.  Patient CAT scan also revealed multiple right displaced facial fracture as well as adrenal hemorrhage.    10/26 IVC  "Filter placement      PT Vitals    Date/Time Pulse SpO2 O2 Flow Rate BP BP Method [facility]   11/03/22 1011 80 97 % 2 L/min 136/78 133/82 Automatic MARILYN      PT Pain    Date/Time Pain Type Rating: Rest Rating: Activity [facility]   11/03/22 1011 Pain Assessment 0 0 MARILYN          Prior Living Environment    Flowsheet Row Most Recent Value   Current Living Arrangements home   Living Environment Comment 2 story house with second bedroom/bathroom walk in shower - reports recently added first floor bed/bath but does not have furniture yet        Prior Level of Function    Flowsheet Row Most Recent Value   Ambulation independent   Transferring independent   Toileting independent   Bathing independent   Dressing independent   Prior Level of Function Comment indep with no devices for ADL, avid \"walker jogger\"   Assistive Device Currently Used at Home none           PT Evaluation and Treatment - 11/03/22 1011        PT Time Calculation    Start Time 1011     Stop Time 1035     Time Calculation (min) 24 min        Session Details    Document Type daily treatment/progress note     Mode of Treatment physical therapy        General Information    Patient Profile Reviewed yes     Onset of Illness/Injury or Date of Surgery 10/24/22     Patient/Family/Caregiver Comments/Observations discussed patientt. with RN before session cleared to session     General Observations of Patient Pt. in bed     Existing Precautions/Restrictions fall     Limitations/Impairments safety/cognitive        Bed Mobility    [place], Supine to Sit modified independence     [place], Sit to Supine supervision     Comment (Bed Mobility) sitting self to EOB on own. Slow to bring R LE into bed.        Sit to Stand Transfer    [place], Sit to Stand Transfer close supervision     Assistive Device mobility belt;walker, front-wheeled;none     Comment steady standing to walker then at end of session stands without walker use        Stand to Sit Transfer    " [place], Stand to Sit Transfer close supervision     Assistive Device mobility belt;walker, front-wheeled;none     Comment controlled to sitting        Gait Training    [place], Gait close supervision     Assistive Device mobility belt;walker, front-wheeled     Distance in Feet 80 feet   3' side step to HOB no LOB moves without hesitation.    Pattern (Gait) step-to;step-through     Comment (Gait/Stairs) increasing gait speed once in hallway cueing for turning with RW. education given turning into room outside walker limiting CL not losing balance reviewed staying behind walker.        Balance    Static Sitting Balance WFL     Dynamic Sitting Balance WFL     Sit to Stand Dynamic Balance mild impairment     Static Standing Balance mild impairment;supported     Dynamic Standing Balance mild impairment;supported        Lower Extremity (Therapeutic Exercise)    Exercise Position/Type AROM (active range of motion)     General Exercise ankle pumps     Range of Motion Exercises hip flexion/extension     Reps and Sets x8     Comment Pt slow to move LE's while in bed. Shoulder flexion without hesitation.        AM-PAC (TM) - Mobility (Current Function)    Turning from your back to your side while in a flat bed without using bedrails? 4 - None     Moving from lying on your back to sitting on the side of a flat bed without using bedrails? 4 - None     Moving to and from a bed to a chair? 3 - A Little     Standing up from a chair using your arms? 3 - A Little     To walk in a hospital room? 3 - A Little     Climbing 3-5 steps with a railing? 3 - A Little     AM-PAC (TM) Mobility Score 20        Assessment/Plan (PT)    Daily Outcome Statement [facility] 20 Pt sitting self to EOB close supervision to walker. close supervision with RW. Pt. encouraged to mobililize with RN staff to possibly progress off walker.     Rehab Potential good, to achieve stated therapy goals     Therapy Frequency 3-5 times/wk     Planned Therapy  Interventions balance training;gait training               PT Assessment/Plan    Flowsheet Row Most Recent Value   PT Recommended Discharge Disposition skilled nursing facility  [as per PT eval] at 11/03/2022 1011   Anticipated Equipment Needs at Discharge (PT) --  [TBD] at 11/03/2022 1011   Patient/Family Therapy Goals Statement no falls at 11/02/2022 1440                    Education Documentation  Joint Mobility/Strength, taught by Jamey Putnam PTA at 11/3/2022 10:55 AM.  Learner: Patient  Readiness: Acceptance  Method: Explanation  Response: Verbalizes Understanding  Comment: walker use turning to stay behind walker          PT Goals    Flowsheet Row Most Recent Value   Bed Mobility Goal 1    Activity/Assistive Device bed mobility activities, all at 10/28/2022 1128   [place] supervision required at 10/28/2022 1128   Time Frame by discharge at 10/28/2022 1128   Progress/Outcome goal met at 11/03/2022 1011   Transfer Goal 1    Activity/Assistive Device sit-to-stand/stand-to-sit, bed-to-chair/chair-to-bed  [w/ least restrictive device] at 10/28/2022 1128   [place] supervision required at 10/28/2022 1128   Time Frame by discharge at 10/28/2022 1128   Progress/Outcome goal met at 11/03/2022 1011   Gait Training Goal 1    Activity/Assistive Device gait (walking locomotion)  [w/ least restrictive device] at 10/28/2022 1128   [place] supervision required at 10/28/2022 1128   Distance 100 ft at 10/28/2022 1128   Time Frame by discharge at 10/28/2022 1128   Progress/Outcome goal not met at 11/03/2022 1011

## 2022-11-03 NOTE — PLAN OF CARE
Problem: Adult Inpatient Plan of Care  Goal: Plan of Care Review  Outcome: Progressing  Flowsheets (Taken 11/3/2022 8806)  Progress: no change  Plan of Care Reviewed With:   patient   other (see comments)   Spoke w [facility], pt not stable for dc. Pt on hep drip, to dc on Eliquis. SW to price medication once script called in to pharmacy. PT worked w pt this am, pt walking 80 feet close supervision, modified independence w bed mobility. Pts insurance requires auth for SNF, SNF will not be approved w how well pt is moving at this time. SW met w pt and pts spouse at bedside to discuss home health. Patient and/or patient guardian/advocate was made aware of their right to choose a provider. A list of eligible providers was presented and reviewed with the patient and/or patient guardian/advocate in written and/or verbal form. The list delineates providers in the patients desired geographic area who are participating in the Medicare program and/or providers contracted with the patients primary insurance. The Medicare list and quality ratings were obtained from the Medicare.gov [medicare.gov] website. Pt agreeable to [facility] w RN, PT/OT services. SW provided pts spouse w list of local private duty agencies for caregiver support if needed. Pts spouse reports that pt does not have a walker at home. SW to message PT about dispensing walker upon dc. SW discussed ordering transfer chair for pt and pts spouse. MATEUSZ sent HC referral via [facility], spoke w [facility] liaison, and included HC instructions in pts dc paperwork. SW updated RN. CC will continue to follow. Angela SALAZAR

## 2022-11-03 NOTE — DISCHARGE INSTRUCTIONS
[facility]- referral completed for skilled nursing, physical therapy, and occupational therapy. You should receive a phone call within 24 hours of discharge to set up your initial appointment. Phone: [phone]     Need a follow up Chest CT study(without contrast) in 3-6 months for the Lung Nodule and a Liver MRI study in 6 months per PCP's orders

## 2022-11-03 NOTE — PLAN OF CARE
Plan of Care Review  Plan of Care Reviewed With: patient  Progress: improving  Outcome Summary: Pt continues with the heparin gtt with a therapeutic ptt this am. Pt ax1 with walker. Pt with a slight right facial droop. Neuro status intact. Bed alarm on and call bell within reach.

## 2022-11-03 NOTE — PATIENT CARE CONFERENCE
Care Progression Rounds Note  Date: 11/3/2022  Time: 10:46 AM     Patient Name: Tim Humphries     Medical Record Number: [id number]   YOB: 1945  Sex: Male      Room/Bed: 0305    Admitting Diagnosis: Fall, initial encounter [W19.XXXA]  Chin laceration, initial encounter [S01.81XA]  Acute saddle pulmonary embolism with acute cor pulmonale (CMS/HCC) [I26.02]  Syncope, unspecified syncope type [R55]   Admit Date/Time: 10/24/2022  8:21 AM    Primary Diagnosis: Pulmonary embolism with acute cor pulmonale (CMS/HCC)  Principal Problem: Pulmonary embolism with acute cor pulmonale (CMS/HCC)    GMLOS: 4.1  Anticipated Discharge Date: 11/14/2022    AM-PAC:  Mobility Score: 16    Discharge Planning:  Current Living Arrangements: home  Concerns to be Addressed: care coordination/care conferences, discharge planning  Anticipated Discharge Disposition: skilled nursing facility    Barriers to Discharge:  Medical issues not resolved, Test pending, Consultant recommendations pending    Comments:       Participants:  social work/services, nursing, , physical therapy, pharmacy

## 2022-11-03 NOTE — PLAN OF CARE
Problem: Adult Inpatient Plan of Care  Goal: Plan of Care Review  Outcome: Progressing  Flowsheets (Taken 11/3/2022 8293)  Progress: improving  Plan of Care Reviewed With:   patient   spouse  Outcome Summary: DC Heparin gtt and transitioned to oral Eliquis.  Patient without c/o pain or discomfort.  OOB and ambulating to bathroom with walker. Weaned to RA.  Plan for DC home with HH when medically cleared.  Wife updated at bedside. Will continue to monitor.  Goal: Optimal Comfort and Wellbeing  Outcome: Progressing     Problem: Fall Injury Risk  Goal: Absence of Fall and Fall-Related Injury  Outcome: Progressing

## 2022-11-03 NOTE — PROGRESS NOTES
Patient: Tim Humphries  Location: [facility] 3 Main 0305  MRN:  [id number]  Today's date:  11/3/2022    Attempted to see patient for therapy. Unable due to patient at test or procedure (MRI. OT will continue to follow).

## 2022-11-03 NOTE — PROGRESS NOTES
Hematology/Oncology Progress Note       SUBJECTIVE  Patient reports feeling ok. Breathing is good.  Plan for rehab.     CURRENT MEDS  Current Facility-Administered Medications   Medication Dose Route Frequency    acetaminophen  650 mg oral q4h PRN    albuterol  5 mg nebulization q6h PRN    amLODIPine  10 mg oral q PM    atorvastatin  20 mg oral Daily (6p)    glucose  16-32 g of dextrose oral PRN    Or    dextrose  15-30 g of dextrose oral PRN    Or    glucagon  1 mg intramuscular PRN    Or    dextrose 50 % in water (D50)  25 mL intravenous PRN    heparin (porcine)  40-80 Units/kg intravenous q6h PRN    heparin infusion - MAR calculator by PTT  100-4,000 Units/hr intravenous Titrated    hydrALAZINE  10 mg intravenous q6h PRN    HYDROmorphone  0.25-0.5 mg intravenous q3h PRN    insulin aspart U-100  2-10 Units subcutaneous With meals & nightly    latanoprost  1 drop Both Eyes Nightly    melatonin  6 mg oral Nightly    metoprolol succinate XL  50 mg oral q PM    nitroglycerin  0.4 mg sublingual q5 min PRN    oxyCODONE  5 mg oral q6h PRN    pantoprazole  40 mg oral Daily    polyethylene glycol  17 g oral Daily    sennosides-docusate sodium  2 tablet oral Daily    timolol  1 drop Left Eye q AM       VITAL SIGNS  Temp:  [36.1 °C (97 °F)-37.3 °C (99.1 °F)] 36.7 °C (98 °F)  Heart Rate:  [] 80  Resp:  [18] 18  BP: (129-165)/(73-95) 136/78    Intake/Output Summary (Last 24 hours) at 11/3/2022 1104  Last data filed at 11/3/2022 0400  Gross per 24 hour   Intake --   Output 600 ml   Net -600 ml       PHYSICAL EXAM  Constitutional:  Comfortable appearing, in no acute distress.  Facial bruising. Appears to have bit of a right facial droop.       LAB RESULTS  CBC  Results from last 7 days   Lab Units 11/03/22  0411 11/02/22  0326 11/01/22  0435 10/31/22  0928 10/30/22  1753   WBC K/uL 12.67* 11.91* 12.42* 11.95* 12.49*   RBC M/uL 3.28* 3.44* 3.16* 3.31* 3.31*   HEMOGLOBIN g/dL 9.5* 10.0* 9.0* 9.5*  9.5*   HEMATOCRIT % 28.9* 30.7* 28.6* 29.8* 29.9*   MCV fL 88.1 89.2 90.5 90.0 90.3   PLATELETS K/uL 324 336 259 227 229     Diff  Results from last 7 days   Lab Units 11/03/22  0411 10/29/22  0436 10/27/22  1605   NRBC % 0.0 0.0 0.0   IMM GRANULOCYTES % 2.7 0.7 0.7   NEUTROPHILS % 79.0 82.9 84.8   LYMPHOCYTES % 8.3 5.8 5.3   MONOCYTES % 7.9 9.4 8.9   EOSINOPHILS % 1.5 1.0 0.1   BASOPHILS % 0.6 0.2 0.2   IMM GRANUCOCYTES ABS K/uL 0.34* 0.09* 0.10*   NEUTRO ABS K/uL 10.02* 10.56* 13.04*     Chemistry   Results from last 7 days   Lab Units 11/03/22 0411 11/02/22 0326 11/01/22  0435 10/30/22  0640 10/29/22  0436 10/28/22  0540   SODIUM mEQ/L 134* 134* 133*   < > 139 140   POTASSIUM mEQ/L 4.2 4.2 4.4   < > 4.2 4.0   CHLORIDE mEQ/L 104 102 105   < > 108 106   CO2 mEQ/L 25 25 21*   < > 24 25   BUN mg/dL 26* 27* 29*   < > 40* 41*   CREATININE mg/dL 0.8 0.9 0.8   < > 1.0 1.1   CALCIUM mg/dL 8.1* 8.2* 8.0*   < > 8.0* 8.3*   ALBUMIN g/dL  --  2.8*  --   --  3.0* 3.1*   BILIRUBIN TOTAL mg/dL  --  1.5*  --   --  1.5* 1.4*   ALK PHOS IU/L  --  69  --   --  58 57   ALT IU/L  --  56  --   --  92* 124*   AST IU/L  --  27  --   --  23 30   GLUCOSE mg/dL 153* 136* 134*   < > 135* 150*    < > = values in this interval not displayed.     Coag  Results from last 7 days   Lab Units 11/03/22 0411 11/02/22 0326 11/01/22  0449 10/29/22  0436 10/27/22  1605   PROTIME sec  --   --   --   --  14.7*   INR   --   --   --   --  1.2   PTT sec 68* 72* 71*   < > 32    < > = values in this interval not displayed.     Micro  Microbiology Results     Procedure Component Value Units Date/Time    Blood Culture Blood, Venous [[id number]]  (Normal) Collected: 10/25/22 2248    Specimen: Blood, Venous Updated: 10/28/22 0601     Culture No growth at 48 hours    Blood Culture Blood, Venous [[id number]]  (Normal) Collected: 10/25/22 2222    Specimen: Blood, Venous Updated: 10/28/22 0601     Culture No growth at 48 hours    MRSA Screen, Nares Only Nose  [[id number]]  (Normal) Collected: 10/25/22 2019    Specimen: Nasal Swab from Nose Updated: 10/26/22 0216     MRSA DNA, Nares Negative    SARS-CoV-2 (COVID-19), PCR Nasopharynx [[id number]]  (Normal) Collected: 10/24/22 0839    Specimen: Nasopharyngeal Swab from Nasopharynx Updated: 10/24/22 0925    Narrative:      The following orders were created for panel order SARS-CoV-2 (COVID-19), PCR Nasopharynx.  Procedure                               Abnormality         Status                     ---------                               -----------         ------                     SARS-COV-2 (COVID-19)/ F...[[id number]]  Normal              Final result                 Please view results for these tests on the individual orders.    SARS-COV-2 (COVID-19)/ FLU A/B, AND RSV, PCR Nasopharynx [[id number]]  (Normal) Collected: 10/24/22 0839    Specimen: Nasopharyngeal Swab from Nasopharynx Updated: 10/24/22 0925     SARS-CoV-2 (COVID-19) Negative     Influenza A Negative     Influenza B Negative     Respiratory Syncytial Virus Negative    Narrative:      Testing performed using real-time PCR for detection of COVID-19. EUA approved validation studies performed on site.         IMAGING  CT HEAD WITHOUT IV CONTRAST    Result Date: 11/2/2022  IMPRESSION: No acute intracranial abnormality.  Chronic ischemic white matter changes are noted.    CT HEAD WITHOUT IV CONTRAST    Result Date: 10/25/2022  IMPRESSION: 1.  No acute intracranial hemorrhage, large territorial infarct, mass effect or midline shift is identified. 2.  Multiple right maxillary sinus and zygomatic arch fractures with an air-fluid level within the right maxillary sinus.     CT HEAD WITHOUT IV CONTRAST    Result Date: 10/24/2022  IMPRESSION: 1.  No posttraumatic intracranial abnormality. 2. Chronic changes noted under the comment.    CT FACIAL BONES WITHOUT IV CONTRAST    Result Date: 10/24/2022  IMPRESSION: Multiple right-sided facial bone fractures as described under the  comment.    CT CERVICAL SPINE WITHOUT IV CONTRAST    Result Date: 10/24/2022  IMPRESSION: 1.  No acute fractures. As clinically indicated, MRI of the cervical spine is recommended for further evaluation. 2.  Other incidental findings noted under the comment.     MRI BRAIN WITHOUT CONTRAST    Result Date: 11/3/2022  IMPRESSION: No intracranial mass or acute abnormality.    MRI ABDOMEN WITH AND WITHOUT CONTRAST    Result Date: 10/29/2022  IMPRESSION: 1. Right hepatic lobe mass on recent CT is most compatible with evolving hemorrhage/infarct centered in segment 7. Additional hemorrhagic/infarcted focus in central segment 5/8, stable in retrospect. Additional benign liver cysts, largest in the left lobe measuring up to 3.9 cm and containing blood products. 2. Similar appearance of right perinephric and right retroperitoneal hematomas with hemorrhagic right renal cyst. 3. Similar right adrenal hemorrhage with developing hemorrhage into the left adrenal gland. 4. Small right pleural effusion with findings suspicious for a component of hemothorax. This could be confirmed with thoracentesis, if necessary. 5. No findings on subtraction imaging to suggest papo active bleeding. Overall, there are no findings suspicious for malignancy; specifically, no suspicious hepatic observations. Given motion degradation on extensive findings, follow-up contrast-enhanced MRI is recommended to assess for resolution of findings and exclude underlying hemorrhagic neoplasm.    ULTRASOUND GUIDED VASCULAR ACCESS    Result Date: 10/26/2022  IMPRESSION:   Successful placement of a retrievable infrarenal IVC Filter. Device: Argon Option Elite IVC Filter COMMENT: PROCEDURE: 1. Ultrasound-guided access of the right common femoral vein. 2. IVC cavagram 3. IVC filter placement RADIOLOGIST:  Sarkis Gerardo MD ANESTHESIA:  Lidocaine 1% CONTRAST:  CO2 FLUORO TIME:  0.8 min REFERENCE AIR KERMA: 40 mGy MEDICATIONS:  none DESCRIPTION OF PROCEDURE:  Consent  was obtained following explanation of risks and benefits of the procedure. The right groin was prepped and draped in the usual sterile fashion. The right common femoral vein was noted to be compressible and patent on gray scale ultrasound. Local anesthesia was provided. The vein was then accessed with a  21-gauge needle under ultrasound guidance, and an .018 wire was advanced centrally. An ultrasound-guided access image was saved to PACS. Exchange was made for a microsheath, .035 wire, and multi-sidehole sheath, which was positioned at the iliocaval junction. IVC cavagram was performed with CO2. Renal vein insertion sites were localized. An Argon Option Elite IVC Filter was deployed infrarenally without complication. Wires and catheters were removed and hemostasis was achieved. The patient remained stable throughout the procedure. The number of guidewires used, and their integrity, was confirmed at the end of the procedure.     ULTRASOUND GUIDED VASCULAR ACCESS    Result Date: 10/25/2022  IMPRESSION:   Successful bilateral pulmonary arteriography and catheter-directed thrombolysis. COMMENT: PROCEDURE:  1. Ultrasound guided access of the central right common femoral vein. 2. Subselective right lower lobe pulmonary arteriography. 3. Subselective catheter directed thrombolysis of the right pulmonary arterial thrombus extending into the right lower lobe branch. 4. Subselective left lower lobe pulmonary arteriography. 5. Subselective catheter directed thrombolysis of the left pulmonary arterial thrombus extending into the left lower lobe branch. RADIOLOGIST: Sarkis Gerardo MD ANESTHESIA:  Local 1% lidocaine. FLUORO TIME:  7.7 minutes REFERENCE AIR KERMA: 44 mGy CONTRAST:  20 cc of Omnipaque 300 DESCRIPTION OF PROCEDURE:    Written informed consent was obtained following explanation of risks and benefits of the procedure. Specifically, risks of cardiac arrhythmia, pulmonary arterial injury, and intracranial hemorrhage  were discussed with the patient. Additionally, significantly elevated risk of abdominal hemorrhage related to known adrenal hemorrhage was clearly discussed. The patient was placed supine on the IR procedure table. Maximal sterile barrier precautions were utilized during catheter insertion including aseptic technique, the use of cap, mask, sterile gown, sterile gloves, sterile drapes, hand hygiene, and patient cutaneous antisepsis with chlorhexidene 2%. Grayscale and color Doppler ultrasound evaluation of the right common femoral vein was performed. The right common femoral vein was patent and compressible. The right groin was prepped and draped in usual sterile fashion. Local anesthesia was administered with 1% lidocaine. Under ultrasound guidance, the right common femoral vein was accessed with a 21-gauge needle. An 018 wire was advanced centrally. Sequential exchange was made for a 5 [place] a microcatheter sheath, 035 Ness wire, and 5 [place] vascular sheath. The sheath was secured with 0-0 silk. Utilizing an H1 catheter with Glidewire, the right main pulmonary artery was selected with successful subselection of a right lower lobe pulmonary arterial branch. Subselective right lower lobe pulmonary arteriography was performed, confirming satisfactory position within the right lower lobe branch distal to the main thrombus. Over a J Phelan wire, exchange was made for the Darrion-Macnamara flush catheter. 10 mg of TPA was then successfully infused into the right main pulmonary artery, extending into the right lower lobe pulmonary arterial branch. Utilizing an H1 catheter with Glidewire, the left main pulmonary artery was selected with successful subselection of a left lower lobe pulmonary arterial branch. Subselective left lower lobe pulmonary arteriography was performed, confirming satisfactory position within the left lower lobe branch distal to the main thrombus. Over a J Phelan wire, exchange was made for the  Darrion-Macnamara flush catheter. 10 mg of TPA was then successfully infused into the left main pulmonary artery, extending into the left lower lobe pulmonary arterial branch. Catheter and sheath were removed. Hemostasis of the right common femoral vein was achieved with manual compression.     CT ABDOMEN PELVIS WITH IV CONTRAST    Result Date: 10/24/2022  IMPRESSION: 1.  Extensive acute pulmonary arterial emboli involving the bilateral main and multiple bilateral lobar, segmental and subsegmental branches, as detailed above. Flattening of the interventricular septum with dilatation of the right ventricle suggestive of right-sided heart strain. No evidence of acute pulmonary infarct. 2.  Large, indeterminate infiltrative hypodense lesion within the right hepatic lobe measuring up to 7 cm warranting further assessment with contrast-enhanced MRI. Differential diagnosis includes metastatic disease, a primary malignancy as well as abscess. Small adjacent similar appearing indeterminate right hepatic lobe lesion. 3.  Indeterminate right adrenal mass with findings consistent with right adrenal hemorrhage. 4.  A 10 mm groundglass nodule within the right middle lobe. A follow-up CT scan of the chest in 6-12 months is recommended as per Fleischner guidelines. 5.  Additional incidental findings including ectasia of ascending thoracic aorta, enlarged prostate gland, coronary arterial atherosclerotic vascular disease and colonic diverticulosis without evidence of diverticulitis. Findings and recommendations discussed with SUZANNA Muñoz by Dr. Thompson by telephone at 9:54 AM and 10:21 AM on 10/24/2022. Fleischner Society 2017 Guidelines for Management of Incidentally Detected Pulmonary Nodules in Adults. (Subsolid Nodules) Single ground glass: <6 mm - No routine follow-up >/= 6 mm - CT at 6-12 months to confirm persistence, then CT every 2 years until 5 years (In certain suspicious nodules < 6 mm, consider follow-up at 2 and 4 years.   If solid component(s) or growth develops, consider resection (Recommendations 3A and 4A)     IR FILTER PLACEMENT    Result Date: 10/26/2022  IMPRESSION:   Successful placement of a retrievable infrarenal IVC Filter. Device: Argon Option Elite IVC Filter COMMENT: PROCEDURE: 1. Ultrasound-guided access of the right common femoral vein. 2. IVC cavagram 3. IVC filter placement RADIOLOGIST:  Sarkis Gerardo MD ANESTHESIA:  Lidocaine 1% CONTRAST:  CO2 FLUORO TIME:  0.8 min REFERENCE AIR KERMA: 40 mGy MEDICATIONS:  none DESCRIPTION OF PROCEDURE:  Consent was obtained following explanation of risks and benefits of the procedure. The right groin was prepped and draped in the usual sterile fashion. The right common femoral vein was noted to be compressible and patent on gray scale ultrasound. Local anesthesia was provided. The vein was then accessed with a  21-gauge needle under ultrasound guidance, and an .018 wire was advanced centrally. An ultrasound-guided access image was saved to PACS. Exchange was made for a microsheath, .035 wire, and multi-sidehole sheath, which was positioned at the iliocaval junction. IVC cavagram was performed with CO2. Renal vein insertion sites were localized. An Argon Option Elite IVC Filter was deployed infrarenally without complication. Wires and catheters were removed and hemostasis was achieved. The patient remained stable throughout the procedure. The number of guidewires used, and their integrity, was confirmed at the end of the procedure.     CT ANGIOGRAPHY CHEST PULMONARY EMBOLISM WITH IV CONTRAST    Result Date: 10/24/2022  IMPRESSION: 1.  Extensive acute pulmonary arterial emboli involving the bilateral main and multiple bilateral lobar, segmental and subsegmental branches, as detailed above. Flattening of the interventricular septum with dilatation of the right ventricle suggestive of right-sided heart strain. No evidence of acute pulmonary infarct. 2.  Large, indeterminate  infiltrative hypodense lesion within the right hepatic lobe measuring up to 7 cm warranting further assessment with contrast-enhanced MRI. Differential diagnosis includes metastatic disease, a primary malignancy as well as abscess. Small adjacent similar appearing indeterminate right hepatic lobe lesion. 3.  Indeterminate right adrenal mass with findings consistent with right adrenal hemorrhage. 4.  A 10 mm groundglass nodule within the right middle lobe. A follow-up CT scan of the chest in 6-12 months is recommended as per Fleischner guidelines. 5.  Additional incidental findings including ectasia of ascending thoracic aorta, enlarged prostate gland, coronary arterial atherosclerotic vascular disease and colonic diverticulosis without evidence of diverticulitis. Findings and recommendations discussed with SUZANNA Muñoz by Dr. Thompson by telephone at 9:54 AM and 10:21 AM on 10/24/2022. Fleischner Society 2017 Guidelines for Management of Incidentally Detected Pulmonary Nodules in Adults. (Subsolid Nodules) Single ground glass: <6 mm - No routine follow-up >/= 6 mm - CT at 6-12 months to confirm persistence, then CT every 2 years until 5 years (In certain suspicious nodules < 6 mm, consider follow-up at 2 and 4 years.  If solid component(s) or growth develops, consider resection (Recommendations 3A and 4A)     IR THROMBOLYSIS    Result Date: 10/25/2022  IMPRESSION:   Successful bilateral pulmonary arteriography and catheter-directed thrombolysis. COMMENT: PROCEDURE:  1. Ultrasound guided access of the central right common femoral vein. 2. Subselective right lower lobe pulmonary arteriography. 3. Subselective catheter directed thrombolysis of the right pulmonary arterial thrombus extending into the right lower lobe branch. 4. Subselective left lower lobe pulmonary arteriography. 5. Subselective catheter directed thrombolysis of the left pulmonary arterial thrombus extending into the left lower lobe branch. RADIOLOGIST:  Sarkis Gerardo MD ANESTHESIA:  Local 1% lidocaine. FLUORO TIME:  7.7 minutes REFERENCE AIR KERMA: 44 mGy CONTRAST:  20 cc of Omnipaque 300 DESCRIPTION OF PROCEDURE:    Written informed consent was obtained following explanation of risks and benefits of the procedure. Specifically, risks of cardiac arrhythmia, pulmonary arterial injury, and intracranial hemorrhage were discussed with the patient. Additionally, significantly elevated risk of abdominal hemorrhage related to known adrenal hemorrhage was clearly discussed. The patient was placed supine on the IR procedure table. Maximal sterile barrier precautions were utilized during catheter insertion including aseptic technique, the use of cap, mask, sterile gown, sterile gloves, sterile drapes, hand hygiene, and patient cutaneous antisepsis with chlorhexidene 2%. Grayscale and color Doppler ultrasound evaluation of the right common femoral vein was performed. The right common femoral vein was patent and compressible. The right groin was prepped and draped in usual sterile fashion. Local anesthesia was administered with 1% lidocaine. Under ultrasound guidance, the right common femoral vein was accessed with a 21-gauge needle. An 018 wire was advanced centrally. Sequential exchange was made for a 5 [place] a microcatheter sheath, 035 Ness wire, and 5 [place] vascular sheath. The sheath was secured with 0-0 silk. Utilizing an H1 catheter with Glidewire, the right main pulmonary artery was selected with successful subselection of a right lower lobe pulmonary arterial branch. Subselective right lower lobe pulmonary arteriography was performed, confirming satisfactory position within the right lower lobe branch distal to the main thrombus. Over a J Phelan wire, exchange was made for the Darrion-Macnamara flush catheter. 10 mg of TPA was then successfully infused into the right main pulmonary artery, extending into the right lower lobe pulmonary arterial branch. Utilizing an  H1 catheter with Glidewire, the left main pulmonary artery was selected with successful subselection of a left lower lobe pulmonary arterial branch. Subselective left lower lobe pulmonary arteriography was performed, confirming satisfactory position within the left lower lobe branch distal to the main thrombus. Over a J Phelan wire, exchange was made for the Darrion-Macnamara flush catheter. 10 mg of TPA was then successfully infused into the left main pulmonary artery, extending into the left lower lobe pulmonary arterial branch. Catheter and sheath were removed. Hemostasis of the right common femoral vein was achieved with manual compression.     X-RAY CHEST 1 VIEW    Result Date: 10/26/2022  IMPRESSION: No acute cardiopulmonary abnormality seen.. COMMENT:    A single AP view of the chest was performed. Comparison: AP chest x-ray from 10/24/2022. The heart size and pulmonary vasculature remain within normal limits. The lung fields appear clear, and no pleural effusions are seen. No hilar abnormality is identified. There is mild tortuosity of the descending thoracic aorta. An electronic device projects over the left lung base projecting over the left anterior left fourth rib, likely a loop recorder. When compared to the prior study, there has been no significant interval change.     X-RAY CHEST 1 VIEW    Result Date: 10/24/2022  IMPRESSION: No evidence of acute pulmonary disease.     Ultrasound venous leg bilateral    Result Date: 10/25/2022  IMPRESSION: Examination positive for deep vein thrombosis bilaterally as described, with greater than right.     CT CHEST ABDOMEN PELVIS WITHOUT IV CONTRAST    Result Date: 10/25/2022  IMPRESSION: 1.  Findings concerning for a large right-sided retroperitoneal hemorrhage including the right kidney and right adrenal gland. A small to moderate amount of pelvic ascites is also noted which appears mildly hyperdense and may reflect a component of hemoperitoneum. 2.  Additional chronic  and incidental findings as described above. Finding:    Unexpected significant hemorrhage (chest, abdomen, pelvis)   Acuity: Critical  Status:  CLOSED Critical read back was performed and results were read back by Dr. Simms, on 10/25/2022 8:10 PM       ASSESSMENT & PLAN    76 yo male presenting with syncope found to have submassive pulmonary embolism and bilateral DVT, liver mass, adrenal hemorrhage developing retroperitoneal hemorrhage following catheter directed thrombolysis post IVC filter placement 10/25.  10mm ground glass lung nodule on imaging as well.      Pulmonary embolism/DVT: In setting of retroperitoneal bleed, post IVC filter placement 10/26.  Concern for liver mass on CT but on MRI abdomen done 10/28 appears more c/w hemorrhage or infarct.   Restarted on heparin 10/29, hemoglobin has been quite stable, 9.5 today, no evidence of bleeding since.  Continues on heparin, plan appears to be for Eliquis today.  Patient does note did have left sided leg swelling for couple of weeks before event, no clear provocation though does have chronic issues kenn leg due to injury.  Likely should consider indefinite anticoagulation.  Ideally if tolerates anticoag should get IVC catheter removed at some point.       Liver mass: Concern for this on CT,  tumor markers with normal AFP, , CEA.  Hepatitis panels negative.  MRI looks more c/w hemorrhage or infarct, no concerns for metastatic disease.  Would repeat imaging in future.      Adrenal hemorrhage: noted on right on admission, developing adrenal hemorrhage noted on left on MRI done 10/28, follow for adrenal insufficiency, etiology of adrenal hemorrhages not clear.         Anemia: hgb 15.3 on presentation but down to 10.6 10/25 in setting of bleed, normocytic, normal RDW.  Did get 2 units PRBC 10/25.  Requested iron studies/LDH.  Ferritin high at 612, iron sat low at 10% c/w inflammation.  LDH high at 263, likely also non specific marker of inflammation but  given bili also up a bit at 1.4, could be hemolysis, ordered retic/haptoglobin.  Hgb 9.5 today     Thrombocytopenia: resolved.      Pulm nodule: ground glass RML hilar pulm nodule noted on initial CT, I personally reviewed images, does not look overly concerning for aggressive lung cancer that could lead to hypercoag state.  Could be adenocarcinoma spectrum lesion, per pulm, follow up imaging 3-6 months.     Need for CPAP: concern for patient, not able to use now due to facial fractures, resume use as per surgery.      ? Right facial droop: MRI done AM 11/3, resulted after I saw patient, CRAIG.      All of the above has been reviewed with the patient, who is in agreement with the plan of action.  I appreciate the opportunity to participate in the care of this patient, who I will follow with you.      Anticipate discharge likely soon, asked my office to set him up for follow up with me about 2 weeks post discharge.        Octavia Soto MD

## 2022-11-04 LAB
ANION GAP SERPL CALC-SCNC: 6 MEQ/L (ref 3–15)
APTT PPP: 37 SEC (ref 23–35)
BASOPHILS # BLD: 0.07 K/UL (ref 0.01–0.1)
BASOPHILS NFR BLD: 0.5 %
BUN SERPL-MCNC: 27 MG/DL (ref 8–20)
CALCIUM SERPL-MCNC: 8.4 MG/DL (ref 8.9–10.3)
CHLORIDE SERPL-SCNC: 105 MEQ/L (ref 98–109)
CO2 SERPL-SCNC: 25 MEQ/L (ref 22–32)
CREAT SERPL-MCNC: 0.9 MG/DL (ref 0.8–1.3)
DIFFERENTIAL METHOD BLD: ABNORMAL
EOSINOPHIL # BLD: 0.2 K/UL (ref 0.04–0.54)
EOSINOPHIL NFR BLD: 1.5 %
ERYTHROCYTE [DISTWIDTH] IN BLOOD BY AUTOMATED COUNT: 13.7 % (ref 11.6–14.4)
GFR SERPL CREATININE-BSD FRML MDRD: >60 ML/MIN/1.73M*2
GLUCOSE BLD-MCNC: 113 MG/DL (ref 70–99)
GLUCOSE BLD-MCNC: 122 MG/DL (ref 70–99)
GLUCOSE BLD-MCNC: 124 MG/DL (ref 70–99)
GLUCOSE BLD-MCNC: 185 MG/DL (ref 70–99)
GLUCOSE SERPL-MCNC: 137 MG/DL (ref 70–99)
HCT VFR BLDCO AUTO: 31.3 % (ref 40.1–51)
HGB BLD-MCNC: 10.1 G/DL (ref 13.7–17.5)
IMM GRANULOCYTES # BLD AUTO: 0.28 K/UL (ref 0–0.08)
IMM GRANULOCYTES NFR BLD AUTO: 2.1 %
LYMPHOCYTES # BLD: 1.18 K/UL (ref 1.2–3.5)
LYMPHOCYTES NFR BLD: 8.8 %
MAGNESIUM SERPL-MCNC: 2.2 MG/DL (ref 1.8–2.5)
MCH RBC QN AUTO: 28.8 PG (ref 28–33.2)
MCHC RBC AUTO-ENTMCNC: 32.3 G/DL (ref 32.2–36.5)
MCV RBC AUTO: 89.2 FL (ref 83–98)
MONOCYTES # BLD: 1.03 K/UL (ref 0.3–1)
MONOCYTES NFR BLD: 7.7 %
NEUTROPHILS # BLD: 10.62 K/UL (ref 1.7–7)
NEUTS SEG NFR BLD: 79.4 %
NRBC BLD-RTO: 0 %
PDW BLD AUTO: 8.6 FL (ref 9.4–12.4)
PHOSPHATE SERPL-MCNC: 3.8 MG/DL (ref 2.4–4.7)
PLATELET # BLD AUTO: 342 K/UL (ref 150–350)
POCT TEST: ABNORMAL
POTASSIUM SERPL-SCNC: 4.3 MEQ/L (ref 3.6–5.1)
RBC # BLD AUTO: 3.51 M/UL (ref 4.5–5.8)
SODIUM SERPL-SCNC: 136 MEQ/L (ref 136–144)
WBC # BLD AUTO: 13.38 K/UL (ref 3.8–10.5)

## 2022-11-04 PROCEDURE — 97535 SELF CARE MNGMENT TRAINING: CPT | Mod: GO

## 2022-11-04 PROCEDURE — 63700000 HC SELF-ADMINISTRABLE DRUG

## 2022-11-04 PROCEDURE — 99232 SBSQ HOSP IP/OBS MODERATE 35: CPT | Performed by: HOSPITALIST

## 2022-11-04 PROCEDURE — 80048 BASIC METABOLIC PNL TOTAL CA: CPT | Performed by: HOSPITALIST

## 2022-11-04 PROCEDURE — 63700000 HC SELF-ADMINISTRABLE DRUG: Performed by: HOSPITALIST

## 2022-11-04 PROCEDURE — 85025 COMPLETE CBC W/AUTO DIFF WBC: CPT | Performed by: HOSPITALIST

## 2022-11-04 PROCEDURE — 83735 ASSAY OF MAGNESIUM: CPT | Performed by: HOSPITALIST

## 2022-11-04 PROCEDURE — 97530 THERAPEUTIC ACTIVITIES: CPT | Mod: GP

## 2022-11-04 PROCEDURE — 84100 ASSAY OF PHOSPHORUS: CPT | Performed by: HOSPITALIST

## 2022-11-04 PROCEDURE — 97116 GAIT TRAINING THERAPY: CPT | Mod: GP

## 2022-11-04 PROCEDURE — 36415 COLL VENOUS BLD VENIPUNCTURE: CPT | Performed by: HOSPITALIST

## 2022-11-04 PROCEDURE — 85730 THROMBOPLASTIN TIME PARTIAL: CPT | Performed by: INTERNAL MEDICINE

## 2022-11-04 PROCEDURE — 21400000 HC ROOM AND CARE CCU/INTERMEDIATE

## 2022-11-04 RX ADMIN — PANTOPRAZOLE SODIUM 40 MG: 40 TABLET, DELAYED RELEASE ORAL at 08:49

## 2022-11-04 RX ADMIN — LATANOPROST 1 DROP: 50 SOLUTION OPHTHALMIC at 21:10

## 2022-11-04 RX ADMIN — ATORVASTATIN CALCIUM 20 MG: 20 TABLET, FILM COATED ORAL at 18:21

## 2022-11-04 RX ADMIN — APIXABAN 5 MG: 5 TABLET, FILM COATED ORAL at 21:10

## 2022-11-04 RX ADMIN — METOPROLOL SUCCINATE 50 MG: 50 TABLET, FILM COATED, EXTENDED RELEASE ORAL at 18:21

## 2022-11-04 RX ADMIN — POLYETHYLENE GLYCOL 3350 17 G: 17 POWDER, FOR SOLUTION ORAL at 08:49

## 2022-11-04 RX ADMIN — TIMOLOL MALEATE 1 DROP: 5 SOLUTION/ DROPS OPHTHALMIC at 08:49

## 2022-11-04 RX ADMIN — INSULIN ASPART 2 UNITS: 100 INJECTION, SOLUTION INTRAVENOUS; SUBCUTANEOUS at 08:49

## 2022-11-04 RX ADMIN — APIXABAN 5 MG: 5 TABLET, FILM COATED ORAL at 08:49

## 2022-11-04 RX ADMIN — SENNOSIDES AND DOCUSATE SODIUM 2 TABLET: 50; 8.6 TABLET ORAL at 08:49

## 2022-11-04 ASSESSMENT — COGNITIVE AND FUNCTIONAL STATUS - GENERAL
STANDING UP FROM CHAIR USING ARMS: 3 - A LITTLE
HELP NEEDED FOR PERSONAL GROOMING: 3 - A LITTLE
DRESSING REGULAR UPPER BODY CLOTHING: 4 - NONE
EATING MEALS: 4 - NONE
MOVING TO AND FROM BED TO CHAIR: 3 - A LITTLE
CLIMB 3 TO 5 STEPS WITH RAILING: 3 - A LITTLE
AFFECT: FLAT/BLUNTED AFFECT
HELP NEEDED FOR BATHING: 3 - A LITTLE
TOILETING: 3 - A LITTLE
AFFECT: FLAT/BLUNTED AFFECT
WALKING IN HOSPITAL ROOM: 3 - A LITTLE
DRESSING REGULAR LOWER BODY CLOTHING: 3 - A LITTLE

## 2022-11-04 NOTE — PROGRESS NOTES
Hospital Medicine Service -  Daily Progress Note       SUBJECTIVE   Interval History:   Tim Humphries was seen and examined  no acute overnight event reported  patient reported no chest pain, sob, N/V/D, overt bleeding.    Patient reported feel good, no new complaints  Patient work-up negative CT scan and MRI without any evidence of CVA  Patient hemoglobin stable without evidence of acute bleeding  Anticoagulation switched from heparin to Eliquis  check price  Will check ambulation pulse ox to evaluate home oxygen therapy  Patient hemodynamically stable for discharge  Case discussed with , wife started appeal for discharge      OBJECTIVE      Vital signs in last 24 hours:  Vitals:    11/04/22 1114   BP:    Pulse: 84   Resp:    Temp:    SpO2:        Intake/Output Summary (Last 24 hours) at 11/4/2022 1355  Last data filed at 11/4/2022 0724  Gross per 24 hour   Intake --   Output 1100 ml   Net -1100 ml     PHYSICAL EXAMINATION      General Appearance:  Awake, Alert, no distress     Head:    Normocephalic, without obvious abnormality, atraumatic   Neck: Supple      Lungs:   Bilateral equal expansion  No labored breathing     Heart:  S1 and S2 normal  no rub or gallop     Abdomen:   Soft  no masses  no organomegaly     Vascular: Bilateral radial pulse equally palpated      Extremities: no calf tenderness      Behavior/Emotional: Mood stable      Skin:                                     no rash     Neuro:                                 Spontaneous move bilateral upper and lower extremity                                                No focal deficits   LINES, CATHETERS, DRAINS, AIRWAYS, AND WOUNDS   Lines, Drains, Airways, Wounds:  Peripheral IV (Adult) 10/30/22 Anterior;Right Forearm (Active)   Number of days: 5       Wound Puncture Anterior;Proximal;Right Thigh (Active)   Number of days: 9       Wound Right Chin (Active)   Number of days: 10       Catheterization Site - Venous Right  Femoral 5 Fr.;Removed in lab (Active)   Number of days: 10       Comments:      LABS / IMAGING / TELE      Labs  CMP Results       11/04/22 11/03/22 11/02/22     0251 0411 0326     134 134    K 4.3 4.2 4.2    Cl 105 104 102    CO2 25 25 25    Glucose 137 153 136    BUN 27 26 27    Creatinine 0.9 0.8 0.9    Calcium 8.4 8.1 8.2    Anion Gap 6 5 7    AST -- -- 27    ALT -- -- 56    Albumin -- -- 2.8    EGFR >60.0 >60.0 >60.0        CBC Results       11/04/22 11/03/22 11/02/22     0251 0411 0326    WBC 13.38 12.67 11.91    RBC 3.51 3.28 3.44    HGB 10.1 9.5 10.0    HCT 31.3 28.9 30.7    MCV 89.2 88.1 89.2    MCH 28.8 29.0 29.1    MCHC 32.3 32.9 32.6     324 336        Troponin I Results       10/25/22 10/25/22 10/24/22     2139 1855 1047    HS Troponin I 1,349.2 1,349.8 1,150.4        PT/PTT Results       11/04/22 11/03/22 11/02/22     0251 0411 0326    PTT 37 68 72         Comment for PTT at 0251 on 11/04/22: The Standard Therapeutic Range for Heparin is 68 to 101 seconds.    Comment for PTT at 0411 on 11/03/22: The Standard Therapeutic Range for Heparin is 68 to 101 seconds.    Comment for PTT at 0326 on 11/02/22: The Standard Therapeutic Range for Heparin is 68 to 101 seconds.        Lab Results   Component Value Date    HGBA1C 5.9 (H) 10/25/2022     Imaging  CT HEAD WITHOUT IV CONTRAST    Result Date: 11/2/2022  IMPRESSION: No acute intracranial abnormality.  Chronic ischemic white matter changes are noted.    CT HEAD WITHOUT IV CONTRAST    Result Date: 10/25/2022  IMPRESSION: 1.  No acute intracranial hemorrhage, large territorial infarct, mass effect or midline shift is identified. 2.  Multiple right maxillary sinus and zygomatic arch fractures with an air-fluid level within the right maxillary sinus.     CT HEAD WITHOUT IV CONTRAST    Result Date: 10/24/2022  IMPRESSION: 1.  No posttraumatic intracranial abnormality. 2. Chronic changes noted under the comment.    CT FACIAL BONES WITHOUT IV  CONTRAST    Result Date: 10/24/2022  IMPRESSION: Multiple right-sided facial bone fractures as described under the comment.    CT CERVICAL SPINE WITHOUT IV CONTRAST    Result Date: 10/24/2022  IMPRESSION: 1.  No acute fractures. As clinically indicated, MRI of the cervical spine is recommended for further evaluation. 2.  Other incidental findings noted under the comment.     MRI BRAIN WITHOUT CONTRAST    Result Date: 11/3/2022  IMPRESSION: No intracranial mass or acute abnormality.    MRI ABDOMEN WITH AND WITHOUT CONTRAST    Result Date: 10/29/2022  IMPRESSION: 1. Right hepatic lobe mass on recent CT is most compatible with evolving hemorrhage/infarct centered in segment 7. Additional hemorrhagic/infarcted focus in central segment 5/8, stable in retrospect. Additional benign liver cysts, largest in the left lobe measuring up to 3.9 cm and containing blood products. 2. Similar appearance of right perinephric and right retroperitoneal hematomas with hemorrhagic right renal cyst. 3. Similar right adrenal hemorrhage with developing hemorrhage into the left adrenal gland. 4. Small right pleural effusion with findings suspicious for a component of hemothorax. This could be confirmed with thoracentesis, if necessary. 5. No findings on subtraction imaging to suggest papo active bleeding. Overall, there are no findings suspicious for malignancy; specifically, no suspicious hepatic observations. Given motion degradation on extensive findings, follow-up contrast-enhanced MRI is recommended to assess for resolution of findings and exclude underlying hemorrhagic neoplasm.    ULTRASOUND GUIDED VASCULAR ACCESS    Result Date: 10/26/2022  IMPRESSION:   Successful placement of a retrievable infrarenal IVC Filter. Device: Argon Option Elite IVC Filter COMMENT: PROCEDURE: 1. Ultrasound-guided access of the right common femoral vein. 2. IVC cavagram 3. IVC filter placement RADIOLOGIST:  Sarkis Gerardo MD ANESTHESIA:  Lidocaine 1%  CONTRAST:  CO2 FLUORO TIME:  0.8 min REFERENCE AIR KERMA: 40 mGy MEDICATIONS:  none DESCRIPTION OF PROCEDURE:  Consent was obtained following explanation of risks and benefits of the procedure. The right groin was prepped and draped in the usual sterile fashion. The right common femoral vein was noted to be compressible and patent on gray scale ultrasound. Local anesthesia was provided. The vein was then accessed with a  21-gauge needle under ultrasound guidance, and an .018 wire was advanced centrally. An ultrasound-guided access image was saved to PACS. Exchange was made for a microsheath, .035 wire, and multi-sidehole sheath, which was positioned at the iliocaval junction. IVC cavagram was performed with CO2. Renal vein insertion sites were localized. An Argon Option Elite IVC Filter was deployed infrarenally without complication. Wires and catheters were removed and hemostasis was achieved. The patient remained stable throughout the procedure. The number of guidewires used, and their integrity, was confirmed at the end of the procedure.     ULTRASOUND GUIDED VASCULAR ACCESS    Result Date: 10/25/2022  IMPRESSION:   Successful bilateral pulmonary arteriography and catheter-directed thrombolysis. COMMENT: PROCEDURE:  1. Ultrasound guided access of the central right common femoral vein. 2. Subselective right lower lobe pulmonary arteriography. 3. Subselective catheter directed thrombolysis of the right pulmonary arterial thrombus extending into the right lower lobe branch. 4. Subselective left lower lobe pulmonary arteriography. 5. Subselective catheter directed thrombolysis of the left pulmonary arterial thrombus extending into the left lower lobe branch. RADIOLOGIST: Sarkis Gerardo MD ANESTHESIA:  Local 1% lidocaine. FLUORO TIME:  7.7 minutes REFERENCE AIR KERMA: 44 mGy CONTRAST:  20 cc of Omnipaque 300 DESCRIPTION OF PROCEDURE:    Written informed consent was obtained following explanation of risks and benefits  of the procedure. Specifically, risks of cardiac arrhythmia, pulmonary arterial injury, and intracranial hemorrhage were discussed with the patient. Additionally, significantly elevated risk of abdominal hemorrhage related to known adrenal hemorrhage was clearly discussed. The patient was placed supine on the IR procedure table. Maximal sterile barrier precautions were utilized during catheter insertion including aseptic technique, the use of cap, mask, sterile gown, sterile gloves, sterile drapes, hand hygiene, and patient cutaneous antisepsis with chlorhexidene 2%. Grayscale and color Doppler ultrasound evaluation of the right common femoral vein was performed. The right common femoral vein was patent and compressible. The right groin was prepped and draped in usual sterile fashion. Local anesthesia was administered with 1% lidocaine. Under ultrasound guidance, the right common femoral vein was accessed with a 21-gauge needle. An 018 wire was advanced centrally. Sequential exchange was made for a 5 [place] a microcatheter sheath, 035 Ness wire, and 5 [place] vascular sheath. The sheath was secured with 0-0 silk. Utilizing an H1 catheter with Glidewire, the right main pulmonary artery was selected with successful subselection of a right lower lobe pulmonary arterial branch. Subselective right lower lobe pulmonary arteriography was performed, confirming satisfactory position within the right lower lobe branch distal to the main thrombus. Over a J Phelan wire, exchange was made for the Darrion-Macnamara flush catheter. 10 mg of TPA was then successfully infused into the right main pulmonary artery, extending into the right lower lobe pulmonary arterial branch. Utilizing an H1 catheter with Glidewire, the left main pulmonary artery was selected with successful subselection of a left lower lobe pulmonary arterial branch. Subselective left lower lobe pulmonary arteriography was performed, confirming satisfactory position  within the left lower lobe branch distal to the main thrombus. Over a J Phelan wire, exchange was made for the Darrion-Macnamara flush catheter. 10 mg of TPA was then successfully infused into the left main pulmonary artery, extending into the left lower lobe pulmonary arterial branch. Catheter and sheath were removed. Hemostasis of the right common femoral vein was achieved with manual compression.     CT ABDOMEN PELVIS WITH IV CONTRAST    Result Date: 10/24/2022  IMPRESSION: 1.  Extensive acute pulmonary arterial emboli involving the bilateral main and multiple bilateral lobar, segmental and subsegmental branches, as detailed above. Flattening of the interventricular septum with dilatation of the right ventricle suggestive of right-sided heart strain. No evidence of acute pulmonary infarct. 2.  Large, indeterminate infiltrative hypodense lesion within the right hepatic lobe measuring up to 7 cm warranting further assessment with contrast-enhanced MRI. Differential diagnosis includes metastatic disease, a primary malignancy as well as abscess. Small adjacent similar appearing indeterminate right hepatic lobe lesion. 3.  Indeterminate right adrenal mass with findings consistent with right adrenal hemorrhage. 4.  A 10 mm groundglass nodule within the right middle lobe. A follow-up CT scan of the chest in 6-12 months is recommended as per Fleischner guidelines. 5.  Additional incidental findings including ectasia of ascending thoracic aorta, enlarged prostate gland, coronary arterial atherosclerotic vascular disease and colonic diverticulosis without evidence of diverticulitis. Findings and recommendations discussed with SUZANNA Muñoz by Dr. Thompson by telephone at 9:54 AM and 10:21 AM on 10/24/2022. Fleischner Society 2017 Guidelines for Management of Incidentally Detected Pulmonary Nodules in Adults. (Subsolid Nodules) Single ground glass: <6 mm - No routine follow-up >/= 6 mm - CT at 6-12 months to confirm persistence,  then CT every 2 years until 5 years (In certain suspicious nodules < 6 mm, consider follow-up at 2 and 4 years.  If solid component(s) or growth develops, consider resection (Recommendations 3A and 4A)     IR FILTER PLACEMENT    Result Date: 10/26/2022  IMPRESSION:   Successful placement of a retrievable infrarenal IVC Filter. Device: Argon Option Elite IVC Filter COMMENT: PROCEDURE: 1. Ultrasound-guided access of the right common femoral vein. 2. IVC cavagram 3. IVC filter placement RADIOLOGIST:  Sarkis Gerardo MD ANESTHESIA:  Lidocaine 1% CONTRAST:  CO2 FLUORO TIME:  0.8 min REFERENCE AIR KERMA: 40 mGy MEDICATIONS:  none DESCRIPTION OF PROCEDURE:  Consent was obtained following explanation of risks and benefits of the procedure. The right groin was prepped and draped in the usual sterile fashion. The right common femoral vein was noted to be compressible and patent on gray scale ultrasound. Local anesthesia was provided. The vein was then accessed with a  21-gauge needle under ultrasound guidance, and an .018 wire was advanced centrally. An ultrasound-guided access image was saved to PACS. Exchange was made for a microsheath, .035 wire, and multi-sidehole sheath, which was positioned at the iliocaval junction. IVC cavagram was performed with CO2. Renal vein insertion sites were localized. An Argon Option Elite IVC Filter was deployed infrarenally without complication. Wires and catheters were removed and hemostasis was achieved. The patient remained stable throughout the procedure. The number of guidewires used, and their integrity, was confirmed at the end of the procedure.     CT ANGIOGRAPHY CHEST PULMONARY EMBOLISM WITH IV CONTRAST    Result Date: 10/24/2022  IMPRESSION: 1.  Extensive acute pulmonary arterial emboli involving the bilateral main and multiple bilateral lobar, segmental and subsegmental branches, as detailed above. Flattening of the interventricular septum with dilatation of the right ventricle  suggestive of right-sided heart strain. No evidence of acute pulmonary infarct. 2.  Large, indeterminate infiltrative hypodense lesion within the right hepatic lobe measuring up to 7 cm warranting further assessment with contrast-enhanced MRI. Differential diagnosis includes metastatic disease, a primary malignancy as well as abscess. Small adjacent similar appearing indeterminate right hepatic lobe lesion. 3.  Indeterminate right adrenal mass with findings consistent with right adrenal hemorrhage. 4.  A 10 mm groundglass nodule within the right middle lobe. A follow-up CT scan of the chest in 6-12 months is recommended as per Fleischner guidelines. 5.  Additional incidental findings including ectasia of ascending thoracic aorta, enlarged prostate gland, coronary arterial atherosclerotic vascular disease and colonic diverticulosis without evidence of diverticulitis. Findings and recommendations discussed with SUZANNA Muñoz by Dr. Thompson by telephone at 9:54 AM and 10:21 AM on 10/24/2022. Fleischner Society 2017 Guidelines for Management of Incidentally Detected Pulmonary Nodules in Adults. (Subsolid Nodules) Single ground glass: <6 mm - No routine follow-up >/= 6 mm - CT at 6-12 months to confirm persistence, then CT every 2 years until 5 years (In certain suspicious nodules < 6 mm, consider follow-up at 2 and 4 years.  If solid component(s) or growth develops, consider resection (Recommendations 3A and 4A)     IR THROMBOLYSIS    Result Date: 10/25/2022  IMPRESSION:   Successful bilateral pulmonary arteriography and catheter-directed thrombolysis. COMMENT: PROCEDURE:  1. Ultrasound guided access of the central right common femoral vein. 2. Subselective right lower lobe pulmonary arteriography. 3. Subselective catheter directed thrombolysis of the right pulmonary arterial thrombus extending into the right lower lobe branch. 4. Subselective left lower lobe pulmonary arteriography. 5. Subselective catheter directed  thrombolysis of the left pulmonary arterial thrombus extending into the left lower lobe branch. RADIOLOGIST: Sarkis Gerardo MD ANESTHESIA:  Local 1% lidocaine. FLUORO TIME:  7.7 minutes REFERENCE AIR KERMA: 44 mGy CONTRAST:  20 cc of Omnipaque 300 DESCRIPTION OF PROCEDURE:    Written informed consent was obtained following explanation of risks and benefits of the procedure. Specifically, risks of cardiac arrhythmia, pulmonary arterial injury, and intracranial hemorrhage were discussed with the patient. Additionally, significantly elevated risk of abdominal hemorrhage related to known adrenal hemorrhage was clearly discussed. The patient was placed supine on the IR procedure table. Maximal sterile barrier precautions were utilized during catheter insertion including aseptic technique, the use of cap, mask, sterile gown, sterile gloves, sterile drapes, hand hygiene, and patient cutaneous antisepsis with chlorhexidene 2%. Grayscale and color Doppler ultrasound evaluation of the right common femoral vein was performed. The right common femoral vein was patent and compressible. The right groin was prepped and draped in usual sterile fashion. Local anesthesia was administered with 1% lidocaine. Under ultrasound guidance, the right common femoral vein was accessed with a 21-gauge needle. An 018 wire was advanced centrally. Sequential exchange was made for a 5 [place] a microcatheter sheath, 035 Ness wire, and 5 [place] vascular sheath. The sheath was secured with 0-0 silk. Utilizing an H1 catheter with Glidewire, the right main pulmonary artery was selected with successful subselection of a right lower lobe pulmonary arterial branch. Subselective right lower lobe pulmonary arteriography was performed, confirming satisfactory position within the right lower lobe branch distal to the main thrombus. Over a J Phelan wire, exchange was made for the Darrion-Svetlana flush catheter. 10 mg of TPA was then successfully infused into  the right main pulmonary artery, extending into the right lower lobe pulmonary arterial branch. Utilizing an H1 catheter with Glidewire, the left main pulmonary artery was selected with successful subselection of a left lower lobe pulmonary arterial branch. Subselective left lower lobe pulmonary arteriography was performed, confirming satisfactory position within the left lower lobe branch distal to the main thrombus. Over a J Phelan wire, exchange was made for the Darrion-Macnamara flush catheter. 10 mg of TPA was then successfully infused into the left main pulmonary artery, extending into the left lower lobe pulmonary arterial branch. Catheter and sheath were removed. Hemostasis of the right common femoral vein was achieved with manual compression.     X-RAY CHEST 1 VIEW    Result Date: 10/26/2022  IMPRESSION: No acute cardiopulmonary abnormality seen.. COMMENT:    A single AP view of the chest was performed. Comparison: AP chest x-ray from 10/24/2022. The heart size and pulmonary vasculature remain within normal limits. The lung fields appear clear, and no pleural effusions are seen. No hilar abnormality is identified. There is mild tortuosity of the descending thoracic aorta. An electronic device projects over the left lung base projecting over the left anterior left fourth rib, likely a loop recorder. When compared to the prior study, there has been no significant interval change.     X-RAY CHEST 1 VIEW    Result Date: 10/24/2022  IMPRESSION: No evidence of acute pulmonary disease.     Ultrasound venous leg bilateral    Result Date: 10/25/2022  IMPRESSION: Examination positive for deep vein thrombosis bilaterally as described, with greater than right.     CT CHEST ABDOMEN PELVIS WITHOUT IV CONTRAST    Result Date: 10/25/2022  IMPRESSION: 1.  Findings concerning for a large right-sided retroperitoneal hemorrhage including the right kidney and right adrenal gland. A small to moderate amount of pelvic ascites is  also noted which appears mildly hyperdense and may reflect a component of hemoperitoneum. 2.  Additional chronic and incidental findings as described above. Finding:    Unexpected significant hemorrhage (chest, abdomen, pelvis)   Acuity: Critical  Status:  CLOSED Critical read back was performed and results were read back by Dr. Simms, on 10/25/2022 8:10 PM     ECG/Telemetry  ASSESSMENT AND PLAN        LYNNE (acute kidney injury) (CMS/HCC)  Assessment & Plan  -Prerenal, improving  -Cr peaked at 2.3, now resolved    Acute blood loss anemia  Assessment & Plan  -From retroperitoneal hematoma, needing PRBC transfusion  -Thrombocytopenia as well, improving.  DIC labs noted  -Downtrending, continue to monitor    Facial fracture (CMS/[facility])  Assessment & Plan  -CT facial bones:Multiple right-sided facial bone fractures as described under the comment.  -Seen by Trauma and plastics  -Seen by plastics who discussed potential facial deformity.  Not a candidate for reconstructive surgery, given his need for AC  -I d/w trauma, no need for abx  -Holding cpap for now    Hyperlipidemia  Assessment & Plan  -Lipitor held with abn LFTs    LFT normal resume statin    Hypertension  Assessment & Plan  -BP better since resuming Norvasc and Metoprolol  -Holding HCTZ    Blood pressure elevated increase Norvasc from 5 mg to 10 mg    * Pulmonary embolism with acute cor pulmonale (CMS/HCC)  Assessment & Plan  CT chest: Extensive b/l PE with R heart strain. 7cm hypodense lesion R hepatic lobe, small R hepatic lobe lesion. Indeterminate R adrenal mass and hemorrhage.  10 mm RML nodule  -Provoked by underlying hepatic malignancy?   -With acute hypoxic resp failure  -Associated R heart strain  -Started on Heparin drip and underwent TPA to each pulm artery  -RRT 10/26 with hypotension and retroperitoneal hemorrhage  -DVT,  US: B/ L>R.  R nearly occlusive thrombus post tibial vein. Left: Mid femoral vein partially occlusive thrombus. Distal  popliteal vein occlusive thrombus.  Peroneal and posterior tibial vein partially/nearly occlusive thrombus.  -Echo 10/24:  TDS.  EF 75%   Grade I LV diastolic dysfunction. Severely dilated RV. Severely reduced RV systolic function c/w RV strain.  Mod dilated RA. Trace TR.   -Echo 10/26:C/w 10/24, RV size dec and fcn improved   -S/p IVC filter 10/25  -Heparin resumed without a bolus, hgb stable  -Hb stable, change Hep gtt to Eliquis today 11/3  - MRI 11/3: No intracranial mass or acute abnormality.          Disposition Planning: Pending progression     VTE Assessment: Padua    VTE Prophylaxis Plan: Continue current DVT Prophylaxis   Code Status: Full Code  Estimated Discharge Date: 11/4/2022    This patient note has been dictated using speech recognition software. Inadvertent speech recognition errors should be disregarded. Please do not hesitate to call [facility] office for clarifications.     Tish Frazier MD  11/4/2022

## 2022-11-04 NOTE — PATIENT CARE CONFERENCE
Care Progression Rounds Note  Date: 11/4/2022  Time: 11:02 AM     Patient Name: Tim Humphries     Medical Record Number: [id number]   YOB: 1945  Sex: Male      Room/Bed: 0305    Admitting Diagnosis: Fall, initial encounter [W19.XXXA]  Chin laceration, initial encounter [S01.81XA]  Acute saddle pulmonary embolism with acute cor pulmonale (CMS/HCC) [I26.02]  Syncope, unspecified syncope type [R55]   Admit Date/Time: 10/24/2022  8:21 AM    Primary Diagnosis: Pulmonary embolism with acute cor pulmonale (CMS/HCC)  Principal Problem: Pulmonary embolism with acute cor pulmonale (CMS/HCC)    GMLOS: 4.1  Anticipated Discharge Date: 11/4/2022    AM-PAC:  Mobility Score: 20    Discharge Planning:  Current Living Arrangements: home  Concerns to be Addressed: care coordination/care conferences, discharge planning  Anticipated Discharge Disposition: home with home health, home with assistance, home with outpatient services    Barriers to Discharge:  Other (see comments)    Comments:       Participants:  social work/services, nursing, , physical therapy

## 2022-11-04 NOTE — PROGRESS NOTES
Patient: Tim Humphries  Location:  [facility] 3 Main 0305  MRN:  [id number]  Today's date:  11/4/2022  General Observations  Start: Pt received supine in bed; he was agreeable to therapy and no issues or concerns identified by nurse prior to session   End: Pt supine in bed at end of session; call bell in reach, bed alarm activated, all needs met, personal items in reach and nurse notified of patient performance, patient's position and patient's response to activity. Spouse prsent t/o session    Tim is a 77 y.o. male admitted on 10/24/2022 with Fall, initial encounter [W19.XXXA]  Chin laceration, initial encounter [S01.81XA]  Acute saddle pulmonary embolism with acute cor pulmonale (CMS/HCC) [I26.02]  Syncope, unspecified syncope type [R55]. Principal problem is Pulmonary embolism with acute cor pulmonale (CMS/HCC).    Past Medical History  Tim has a past medical history of Dyslipidemia, GERD (gastroesophageal reflux disease), Glaucoma, Heart murmur, HTN (hypertension), Implantable loop recorder present, Pericarditis, and Sleep apnea.    History of Present Illness   Tim Humphries is a 77 y.o. male with a past medical history of hypertension, GERD, FLAVIA, hyperlipidemia who presents with syncope, fall.  Wife is at bedside who assisted with history.  For 1 is only patient had COVID about 2-3 months ago which he fully recovered without event.  After he recovered he went to get his COVID-vaccine about 2 weeks ago.  For the last 4 to 5 days patient has noted increased dyspnea with exertion and shortness of breath.  Today while coming down stair he passed out and fell down half a flight of stair.  Wife was close by it and found him down on the floor unresponsive.  Wife report patient was unconscious for about 10 minutes.  When EMS arrived patient was found to have grunting respiration with O2 sat at 86% on room air.  Was then brought to [place] emergency room.  Patient also report he has some left leg  "swelling approximately 3 to 4 weeks ago which resolved by itself. In ER he was found to have extensive PE with right heart strain.  He was found to be hypoxic and put on 4 L oxygen.  His blood pressure remained stable currently.  Mentation intact.  Patient CAT scan also revealed multiple right displaced facial fracture as well as adrenal hemorrhage.    10/26 IVC Filter placement      PT Vitals    Date/Time Pulse SpO2 Pt Activity O2 Therapy BP BP Location BP Method Pt Position [facility]   11/04/22 1410 90 92 % At rest None (Room air) 141/76 Left upper arm Automatic Lying KK   11/04/22 1435 94 95 % At rest None (Room air) 156/77 Left upper arm Automatic Lying KK      PT Pain    Date/Time Pain Type Side/Orientation Location Rating: Rest Rating: Activity Interventions [facility]   11/04/22 1410 Pain Assessment right neck 4 4 care clustered KK          Prior Living Environment    Flowsheet Row Most Recent Value   Current Living Arrangements home   Living Environment Comment 2 story house with second bedroom/bathroom walk in shower - reports recently added first floor bed/bath but does not have furniture yet        Prior Level of Function    Flowsheet Row Most Recent Value   Ambulation independent   Transferring independent   Toileting independent   Bathing independent   Dressing independent   Prior Level of Function Comment indep with no devices for ADL, avid \"walker jogger\"   Assistive Device Currently Used at Home none           PT Evaluation and Treatment - 11/04/22 1406        PT Time Calculation    Start Time 1406     Stop Time 1438     Time Calculation (min) 32 min        Session Details    Document Type daily treatment/progress note     Mode of Treatment physical therapy        General Information    Patient Profile Reviewed yes     Onset of Illness/Injury or Date of Surgery 10/24/22     Referring Physician [facility]     Patient/Family/Caregiver Comments/Observations cleared by RN for session     General Observations of Patient " resting in bed, spouse present t/o session     Existing Precautions/Restrictions fall        Cognition/Psychosocial    Affect/Mental Status (Cognition) flat/blunted affect     Orientation Status (Cognition) oriented x 3     Follows Commands (Cognition) follows one-step commands;over 90% accuracy;delayed response/completion;increased processing time needed;verbal cues/prompting required     Cognitive Function safety deficit     Safety Deficit (Cognition) minimal deficit;awareness of need for assistance;insight into deficits/self-awareness        Range of Motion (ROM)    Range of Motion ROM is WFL;bilateral lower extremities        Strength (Manual Muscle Testing)    Strength (Manual Muscle Testing) strength is WFL;bilateral lower extremities        Bed Mobility    [place], Supine to Sit modified independence     [place], Sit to Supine modified independence     Assistive Device head of bed elevated     Comment (Bed Mobility) exit to R, inc time.        Transfers    Comment mobility belt applied while seated EOB,used t/o session, removed upon return to sitting. pt/spouse instructed in use of belt at home        Sit to Stand Transfer    [place], Sit to Stand Transfer supervision     Verbal Cues hand placement     Assistive Device mobility belt;walker, front-wheeled     Comment from bed x 3 trials, chair x  2 trials.        Stand to Sit Transfer    [place], Stand to Sit Transfer supervision     Verbal Cues hand placement     Assistive Device mobility belt;walker, front-wheeled     Comment to bed x 3 trials, chair x 2 trials        Gait Training    [place], Gait supervision     Assistive Device mobility belt;walker, front-wheeled     Distance in Feet 120 feet     Pattern (Gait) step-through     Deviations/Abnormal Patterns (Gait) gait speed decreased;step length decreased     Advanced Gait Activity 10 Meter Walk Test (Fast Velocity)     Comment (Gait/Stairs) able to effectively navigate around  obstacles in room, abrams. able to return to correct location. fatigue post amb        10 Meter Walk Test (Fast Velocity)    Trial One: Ten Meter Walk Test (sec) 16 seconds     Trial Two: Ten Meter Walk Test (sec) 12 seconds     Trial One: Gait Speed (m/s) 0.38 m/s     Trial Two: Gait Speed (m/s) 0.5 m/s     Average Gait Speed (m/s): Two Trials 0.44 m/s        Stairs Training    [place], Stairs supervision     Assistive Device --   RW    Number of Stairs 1     Comment step up/down curb step w/BUE support on RW x 4 trials        Safety Issues, Functional Mobility    Safety Issues Affecting Function (Mobility) insight into deficits/self-awareness     Impairments Affecting Function (Mobility) endurance/activity tolerance     Cognitive Impairments, Mobility Safety/Performance awareness, need for assistance;insight into deficits/self-awareness        Balance    Balance Assessment sitting static balance;sitting dynamic balance;sit to stand dynamic balance;standing static balance;standing dynamic balance     Static Sitting Balance WFL;unsupported;sitting, edge of bed;sitting in chair     Dynamic Sitting Balance WFL;unsupported;sitting in chair;sitting, edge of bed     Sit to Stand Dynamic Balance WFL;supported     Static Standing Balance mild impairment;supported     Dynamic Standing Balance mild impairment;supported     Balance Test Results Five Times Sit to Stand (FTSTS)     Five Times to Sit to Stand (FTSTS) unable to complete 1 rep w/u UE assist     Comment, Balance + use of RW standing. pt able to reach within and outside CL w/Unilat UE support to relocate call bell/tv control and smooth out incontinence pads prior to getting back into bed        AM-PAC (TM) - Mobility (Current Function)    Turning from your back to your side while in a flat bed without using bedrails? 4 - None     Moving from lying on your back to sitting on the side of a flat bed without using bedrails? 4 - None     Moving to and from a bed to a  chair? 3 - A Little     Standing up from a chair using your arms? 3 - A Little     To walk in a hospital room? 3 - A Little     Climbing 3-5 steps with a railing? 3 - A Little     AM-PAC (TM) Mobility Score 20        Assessment/Plan (PT)    Daily Outcome Statement pt seen in PT f/u. Sup for mobility w/RW. Spouse w/mult questions which were answered during session:what to do if pt were to fall at home (call 911), how to get out of car (verballly discussed strategies/technique during session), is there an alarm that will go home with him to alert me when he gets up (not recommended by PT, but spouse able to investigate commercially available options if desired).  Anticipate return home w/assist/homecare at discharge. [facility] 20     Rehab Potential good, to achieve stated therapy goals     Therapy Frequency 3-5 times/wk     Planned Therapy Interventions balance training;bed mobility training;gait training;transfer training;patient/family education               PT Assessment/Plan    Flowsheet Row Most Recent Value   PT Recommended Discharge Disposition home with home health, home with assistance at 11/04/2022 1406   Anticipated Equipment Needs at Discharge (PT) walker, front-wheeled at 11/04/2022 1406   Patient/Family Therapy Goals Statement no falls at 11/04/2022 1406               Equipment Provided: Walker, with Wheels, Adult   Vendor: [employer]    Education Documentation  Signs/Symptoms, taught by vEa Govea, PT at 11/4/2022  2:59 PM.  Learner: Patient  Readiness: Acceptance  Method: Explanation  Response: Verbalizes Understanding  Comment: role of PT, hand placement wt/ransfers, use of mobility belt in hosptial and at home, use of RW,technqiue/strategies for car transfers, fall prevention strategies for home    Risk Factors, taught by Eva Govea PT at 11/4/2022  2:59 PM.  Learner: Patient  Readiness: Acceptance  Method: Explanation  Response: Verbalizes  Understanding  Comment: role of PT, hand placement wt/ransfers, use of mobility belt in hosptial and at home, use of RW,technqiue/strategies for car transfers, fall prevention strategies for home          PT Goals    Flowsheet Row Most Recent Value   Bed Mobility Goal 1    Activity/Assistive Device bed mobility activities, all at 10/28/2022 1128   [place] supervision required at 10/28/2022 1128   Time Frame by discharge at 10/28/2022 1128   Progress/Outcome goal met at 11/04/2022 1406   Transfer Goal 1    Activity/Assistive Device sit-to-stand/stand-to-sit, bed-to-chair/chair-to-bed, stand pivot, walker, front-wheeled at 11/04/2022 1406   [place] modified independence at 11/04/2022 1406   Time Frame by discharge at 11/04/2022 1406   Progress/Outcome good progress toward goal, goal revised this date at 11/04/2022 1406   Gait Training Goal 1    Activity/Assistive Device gait (walking locomotion), walker, front-wheeled at 11/04/2022 1406   [place] modified independence at 11/04/2022 1406   Distance 150 at 11/04/2022 1406   Time Frame by discharge at 11/04/2022 1406   Progress/Outcome goal revised this date at 11/04/2022 1406

## 2022-11-04 NOTE — PLAN OF CARE
Plan of Care Review  Plan of Care Reviewed With: patient  Progress: improving  Outcome Summary: Pt put on 2l oxygen via NC for saturation 88% while sleeping. Eliquis administered as ordered. Knee pain managed with tylenol. Q4 neuro checks performed, unchanged and appropriate. Pt up to commode. Fall precautions in place, call bell in reach. No further complaints at this time.

## 2022-11-04 NOTE — PLAN OF CARE
Problem: Adult Inpatient Plan of Care  Goal: Plan of Care Review  Outcome: Progressing  Flowsheets (Taken 11/4/2022 1233)  Progress: no change  Plan of Care Reviewed With:   patient   other (see comments)   spouse   Spoke w HMS, pt stable for dc. MATEUSZ called pts pharmacy to price Eliquis, pharmacist reports co-pay is $19. MATEUSZ called pts spouse Olena to provide update, pts spouse reports she would like to appeal pts dc. MATEUSZ met w pt at bedside and obtain IMM signature. Pts spouse called [facility] harlan to initiate appeal [id number]. MATEUSZ provided IMM copy and notice of dc to medical records to be scanned in. MATEUSZ updated RN and HMS. CC will continue to follow and facilitate appeal. Angela SALAZAR [phone]    1:06 PM MATEUSZ received notification from [facility] that they have received beneficiary appeal request, and request clinicals be faxed to [phone]. MATEUSZ faxed requested documents. Determination should be known within the next 24 hours. CC will continue to follow. Angela SALAZAR

## 2022-11-04 NOTE — PROGRESS NOTES
Patient:  Tim Humphries  Location:  [facility] 3 Main 0305  MRN:  [id number]  Today's date:  11/4/2022     General Observations- spouse present  Start: Pt received supine in bed; he was agreeable to therapy and no issues or concerns identified by nurse prior to session   End: Pt supine in bed at end of session; call bell in reach, bed alarm activated, all needs met, personal items in reach and nurse notified of patient performance, patient's position and patient's response to activity      Tim is a 77 y.o. male admitted on 10/24/2022 with Fall, initial encounter [W19.XXXA]  Chin laceration, initial encounter [S01.81XA]  Acute saddle pulmonary embolism with acute cor pulmonale (CMS/HCC) [I26.02]  Syncope, unspecified syncope type [R55]. Principal problem is Pulmonary embolism with acute cor pulmonale (CMS/HCC).    Past Medical History  Tim has a past medical history of Dyslipidemia, GERD (gastroesophageal reflux disease), Glaucoma, Heart murmur, HTN (hypertension), Implantable loop recorder present, Pericarditis, and Sleep apnea.    History of Present Illness   Tim Humphries is a 77 y.o. male with a past medical history of hypertension, GERD, FLAVIA, hyperlipidemia who presents with syncope, fall.  Wife is at bedside who assisted with history.  For 1 is only patient had COVID about 2-3 months ago which he fully recovered without event.  After he recovered he went to get his COVID-vaccine about 2 weeks ago.  For the last 4 to 5 days patient has noted increased dyspnea with exertion and shortness of breath.  Today while coming down stair he passed out and fell down half a flight of stair.  Wife was close by it and found him down on the floor unresponsive.  Wife report patient was unconscious for about 10 minutes.  When EMS arrived patient was found to have grunting respiration with O2 sat at 86% on room air.  Was then brought to [place] emergency room.  Patient also report he has some left leg  "swelling approximately 3 to 4 weeks ago which resolved by itself. In ER he was found to have extensive PE with right heart strain.  He was found to be hypoxic and put on 4 L oxygen.  His blood pressure remained stable currently.  Mentation intact.  Patient CAT scan also revealed multiple right displaced facial fracture as well as adrenal hemorrhage.    10/26 IVC Filter placement      OT Vitals    Date/Time Pulse SpO2 Pt Activity O2 Therapy BP BP Location BP Method Pt Position [facility]   11/04/22 1410 90 92 % At rest None (Room air) 141/76 Left upper arm Automatic Lying KK   11/04/22 1435 94 95 % At rest None (Room air) 156/77 Left upper arm Automatic Lying KK      OT Pain    Date/Time Pain Type Side/Orientation Location Rating: Rest Rating: Activity Interventions [facility]   11/04/22 1410 Pain Assessment right neck 4 4 care clustered KK          Prior Living Environment    Flowsheet Row Most Recent Value   Current Living Arrangements home   Living Environment Comment 2 story house with second bedroom/bathroom walk in shower - reports recently added first floor bed/bath but does not have furniture yet        Prior Level of Function    Flowsheet Row Most Recent Value   Ambulation independent   Transferring independent   Toileting independent   Bathing independent   Dressing independent   Prior Level of Function Comment indep with no devices for ADL, avid \"walker jogger\"   Assistive Device Currently Used at Home none        Occupational Profile    Flowsheet Row Most Recent Value   Reason for Services/Referral d/c needs   Occupational History/Life Experiences 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 \"walker jogger\" ,  doctorate in engineering   Performance Patterns indep w/ ADL no devices   Patient Goals dc home           OT Evaluation and Treatment - 11/04/22 1405        OT Time Calculation    Start Time 1405     Stop Time 1439     Time Calculation (min) 34 min        Session Details    Document Type daily treatment/progress note     Mode of Treatment " occupational therapy        General Information    Existing Precautions/Restrictions fall        Cognition/Psychosocial    Affect/Mental Status (Cognition) flat/blunted affect     Orientation Status (Cognition) oriented x 4     Follows Commands (Cognition) follows one-step commands;over 90% accuracy     Executive Function Deficit (Cognition) minimal deficit;information processing;insight/awareness of deficits     Safety Deficit (Cognition) minimal deficit;insight into deficits/self-awareness     Comment, Cognition cooperative, receptive        Bed Mobility    [place], Supine to Sit modified independence     [place], Sit to Supine modified independence     Assistive Device head of bed elevated     Comment (Bed Mobility) R        Sit to Stand Transfer    [place], Sit to Stand Transfer supervision     Verbal Cues hand placement;safety     Assistive Device walker, front-wheeled;mobility belt     Comment bed        Stand to Sit Transfer    [place], Stand to Sit Transfer supervision     Verbal Cues hand placement;safety     Assistive Device walker, front-wheeled;mobility belt     Comment bed        Toilet Transfer    Transfer Technique stand-sit;sit-stand     [place], Toilet Transfer supervision;verbal cues     Verbal Cues hand placement;safety;technique     Comment toilet in bathroom and commode frame, easier time from commode with arm rests and increased seat height   functional mobility into and out of bathroom with RW and SUP- Pt very cautious about stepping over threshhold       Balance    Static Sitting Balance WFL;unsupported;sitting, edge of bed     Dynamic Sitting Balance mild impairment;unsupported;sitting, edge of bed     Sit to Stand Dynamic Balance mild impairment     Static Standing Balance mild impairment;supported     Dynamic Standing Balance mild impairment;supported     Comment, Balance RW use, no overt LOB, in stance able to reach out and organize bed prior to sitting         Lower Body Dressing    Tasks don;doff;socks     [place] supervision     Position edge of bed sitting        Grooming    Self-Performance washes, rinses and dries hands     [place] supervision     Position unsupported standing     Comment remained with RW        Toileting    [place] adjust/manage clothing;supervision     Comment managed gown prior to sitting, did not need to void        Safety and Emergency Maintenance (IADLs)    Safety and Emergency Maintenance safety, all activities;recognizing hazards;initiating emergency actions;maintaining safe environment   spouse very concerned about Pt falling at home, education provided to Pt and spouse on falls pervention/home safety       AM-PAC (TM) - ADL (Current Function)    Putting on and taking off regular lower body clothing? 3 - A Little     Bathing? 3 - A Little     Toileting? 3 - A Little     Putting on/taking off regular upper body clothing? 4 - None     How much help for taking care of personal grooming? 3 - A Little     Eating meals? 4 - None     AM-PAC (TM) ADL Score 20        Assessment/Plan (OT)    Daily Outcome Statement AMAPC 20, MOD IND bed mobility, SUP functional transfers with RW, SUP grooming/LB dressing/toileting task, education provided on home safey/falls pervention, ADLs impacted by impairments related to endurance/cognition/balance but progressing well towards OT goals which required upgrading, Rec continued OT and home with home health and assist     Rehab Potential fair, will monitor progress closely     Therapy Frequency 3-5 times/wk     Planned Therapy Interventions activity tolerance training;cognitive/visual perception retraining;occupation/activity based interventions;patient/caregiver education/training;transfer/mobility retraining               OT Assessment/Plan    Flowsheet Row Most Recent Value   OT Recommended Discharge Disposition home with assistance, home with home health at 11/04/2022 1405   Anticipated Equipment  Needs At Discharge (OT) commode, 3-in-1, walker, front-wheeled, dressing equipment, shower chair at 11/04/2022 1405   Patient/Family Therapy Goal Statement no falls, safety at 11/04/2022 1405                    Education Documentation  Self-Care, taught by Nikki Contreras, OT at 11/4/2022  2:58 PM.  Learner: Patient, Family  Readiness: Acceptance  Method: Explanation  Response: Verbalizes Understanding  Comment: safe LB dressing, LH AE, home safety, falls pervention, safe toileting, commode use, safe bathing, role of OT, seeking medical assistance for falls/injuries          OT Goals    Flowsheet Row Most Recent Value   Bed Mobility Goal 1    Activity/Assistive Device bed mobility activities, all at 10/28/2022 1127   [place] supervision required at 10/28/2022 1127   Time Frame by discharge at 10/28/2022 1127   Progress/Outcome goal met at 11/04/2022 1405   Transfer Goal 1    Activity/Assistive Device sit-to-stand/stand-to-sit, bed-to-chair/chair-to-bed, toilet at 10/28/2022 1127   [place] modified independence at 11/04/2022 1405   Time Frame by discharge at 10/28/2022 1127   Progress/Outcome goal ongoing at 10/28/2022 1127   Dressing Goal 1    Activity/Adaptive Equipment dressing skills, all at 10/28/2022 1127   [place] modified independence at 11/04/2022 1405   Time Frame by discharge at 10/28/2022 1127   Progress/Outcome goal ongoing at 10/28/2022 1127   Toileting Goal 1    Activity/Assistive Device toileting skills, all at 10/28/2022 1127   [place] modified independence at 11/04/2022 1405   Time Frame by discharge at 10/28/2022 1127   Progress/Outcome goal ongoing at 10/28/2022 1127

## 2022-11-05 LAB
ANION GAP SERPL CALC-SCNC: 7 MEQ/L (ref 3–15)
APTT PPP: 34 SEC (ref 23–35)
BASOPHILS # BLD: 0.06 K/UL (ref 0.01–0.1)
BASOPHILS NFR BLD: 0.4 %
BUN SERPL-MCNC: 26 MG/DL (ref 8–20)
CALCIUM SERPL-MCNC: 8.5 MG/DL (ref 8.9–10.3)
CHLORIDE SERPL-SCNC: 103 MEQ/L (ref 98–109)
CO2 SERPL-SCNC: 24 MEQ/L (ref 22–32)
CREAT SERPL-MCNC: 0.7 MG/DL (ref 0.8–1.3)
DIFFERENTIAL METHOD BLD: ABNORMAL
EOSINOPHIL # BLD: 0.14 K/UL (ref 0.04–0.54)
EOSINOPHIL NFR BLD: 1 %
ERYTHROCYTE [DISTWIDTH] IN BLOOD BY AUTOMATED COUNT: 13.9 % (ref 11.6–14.4)
GFR SERPL CREATININE-BSD FRML MDRD: >60 ML/MIN/1.73M*2
GLUCOSE BLD-MCNC: 126 MG/DL (ref 70–99)
GLUCOSE BLD-MCNC: 144 MG/DL (ref 70–99)
GLUCOSE BLD-MCNC: 145 MG/DL (ref 70–99)
GLUCOSE BLD-MCNC: 151 MG/DL (ref 70–99)
GLUCOSE SERPL-MCNC: 129 MG/DL (ref 70–99)
HCT VFR BLDCO AUTO: 29.9 % (ref 40.1–51)
HGB BLD-MCNC: 9.9 G/DL (ref 13.7–17.5)
IMM GRANULOCYTES # BLD AUTO: 0.23 K/UL (ref 0–0.08)
IMM GRANULOCYTES NFR BLD AUTO: 1.7 %
LYMPHOCYTES # BLD: 0.96 K/UL (ref 1.2–3.5)
LYMPHOCYTES NFR BLD: 6.9 %
MAGNESIUM SERPL-MCNC: 2.2 MG/DL (ref 1.8–2.5)
MCH RBC QN AUTO: 29.6 PG (ref 28–33.2)
MCHC RBC AUTO-ENTMCNC: 33.1 G/DL (ref 32.2–36.5)
MCV RBC AUTO: 89.3 FL (ref 83–98)
MONOCYTES # BLD: 0.88 K/UL (ref 0.3–1)
MONOCYTES NFR BLD: 6.4 %
NEUTROPHILS # BLD: 11.55 K/UL (ref 1.7–7)
NEUTS SEG NFR BLD: 83.6 %
NRBC BLD-RTO: 0 %
PDW BLD AUTO: 8.7 FL (ref 9.4–12.4)
PHOSPHATE SERPL-MCNC: 3.8 MG/DL (ref 2.4–4.7)
PLATELET # BLD AUTO: 404 K/UL (ref 150–350)
POCT TEST: ABNORMAL
POTASSIUM SERPL-SCNC: 4.5 MEQ/L (ref 3.6–5.1)
RBC # BLD AUTO: 3.35 M/UL (ref 4.5–5.8)
SODIUM SERPL-SCNC: 134 MEQ/L (ref 136–144)
WBC # BLD AUTO: 13.82 K/UL (ref 3.8–10.5)

## 2022-11-05 PROCEDURE — 63700000 HC SELF-ADMINISTRABLE DRUG

## 2022-11-05 PROCEDURE — 63700000 HC SELF-ADMINISTRABLE DRUG: Performed by: HOSPITALIST

## 2022-11-05 PROCEDURE — 99233 SBSQ HOSP IP/OBS HIGH 50: CPT | Performed by: HOSPITALIST

## 2022-11-05 PROCEDURE — 80048 BASIC METABOLIC PNL TOTAL CA: CPT | Performed by: HOSPITALIST

## 2022-11-05 PROCEDURE — 36415 COLL VENOUS BLD VENIPUNCTURE: CPT | Performed by: HOSPITALIST

## 2022-11-05 PROCEDURE — 83735 ASSAY OF MAGNESIUM: CPT | Performed by: HOSPITALIST

## 2022-11-05 PROCEDURE — 21400000 HC ROOM AND CARE CCU/INTERMEDIATE

## 2022-11-05 PROCEDURE — 85025 COMPLETE CBC W/AUTO DIFF WBC: CPT | Performed by: HOSPITALIST

## 2022-11-05 PROCEDURE — 84100 ASSAY OF PHOSPHORUS: CPT | Performed by: HOSPITALIST

## 2022-11-05 PROCEDURE — 85730 THROMBOPLASTIN TIME PARTIAL: CPT | Performed by: INTERNAL MEDICINE

## 2022-11-05 RX ADMIN — METOPROLOL SUCCINATE 50 MG: 50 TABLET, FILM COATED, EXTENDED RELEASE ORAL at 17:51

## 2022-11-05 RX ADMIN — SENNOSIDES AND DOCUSATE SODIUM 2 TABLET: 50; 8.6 TABLET ORAL at 09:53

## 2022-11-05 RX ADMIN — PANTOPRAZOLE SODIUM 40 MG: 40 TABLET, DELAYED RELEASE ORAL at 09:53

## 2022-11-05 RX ADMIN — POLYETHYLENE GLYCOL 3350 17 G: 17 POWDER, FOR SOLUTION ORAL at 09:53

## 2022-11-05 RX ADMIN — APIXABAN 5 MG: 5 TABLET, FILM COATED ORAL at 21:25

## 2022-11-05 RX ADMIN — TIMOLOL MALEATE 1 DROP: 5 SOLUTION/ DROPS OPHTHALMIC at 09:59

## 2022-11-05 RX ADMIN — ATORVASTATIN CALCIUM 20 MG: 20 TABLET, FILM COATED ORAL at 17:51

## 2022-11-05 RX ADMIN — APIXABAN 5 MG: 5 TABLET, FILM COATED ORAL at 09:53

## 2022-11-05 RX ADMIN — Medication 6 MG: at 21:25

## 2022-11-05 RX ADMIN — LATANOPROST 1 DROP: 50 SOLUTION OPHTHALMIC at 21:25

## 2022-11-05 NOTE — PLAN OF CARE
Problem: Adult Inpatient Plan of Care  Goal: Plan of Care Review  Outcome: Progressing  Flowsheets (Taken 11/5/2022 1620)  Progress: improving  Plan of Care Reviewed With: patient  Outcome Summary: Pt AOx4, BELLO, ambulatory w/ supervision and RW (tolerated 200+ ft).  Pt and family member spoke w/ CC at bedside r/t discharge plan, updated on plan of care, verbalized understanding.  Fall/safety precautions maintained, will continue to assess and intervene.  Goal: Patient-Specific Goal (Individualized)  Outcome: Progressing  Goal: Absence of Hospital-Acquired Illness or Injury  Outcome: Progressing  Goal: Optimal Comfort and Wellbeing  Outcome: Progressing  Goal: Readiness for Transition of Care  Outcome: Progressing     Problem: Fall Injury Risk  Goal: Absence of Fall and Fall-Related Injury  Outcome: Progressing     Problem: Bleeding  Goal: Absence of Bleeding  Outcome: Progressing     Problem: Skin Injury Risk Increased  Goal: Skin Health and Integrity  Outcome: Progressing     Problem: Violence Risk or Actual  Goal: Anger and Impulse Control  Outcome: Progressing     Problem: Gas Exchange Impaired  Goal: Optimal Gas Exchange  Outcome: Progressing     Problem: Mobility Impairment  Goal: Optimal Mobility  Outcome: Progressing     Problem: Self-Care Deficit  Goal: Improved Ability to Complete Activities of Daily Living  Outcome: Progressing   Plan of Care Review  Plan of Care Reviewed With: patient  Progress: improving  Outcome Summary: Pt AOx4, BELLO, ambulatory w/ supervision and RW (tolerated 200+ ft).  Pt and family member spoke w/ CC at bedside r/t discharge plan, updated on plan of care, verbalized understanding.  Fall/safety precautions maintained, will continue to assess and intervene.

## 2022-11-05 NOTE — PROGRESS NOTES
Hospital Medicine Service -  Daily Progress Note       SUBJECTIVE     Interval History: No acute events overnight. Slept well last night. Denied pain. Breathing normal.     Patient is stable for discharge when arrangement done by SW.     OBJECTIVE        Vital signs in last 24 hours:  Temp:  [36.2 °C (97.2 °F)-37.2 °C (99 °F)] 36.2 °C (97.2 °F)  Heart Rate:  [79-94] 79  Resp:  [18] 18  BP: (133-156)/(68-90) 145/84  I/O last 3 completed shifts:  In: 460 [P.O.:460]  Out: 1600 [Urine:1600]    PHYSICAL EXAMINATION        GEN: well-developed and well-nourished; not in acute distress  HEENT: normocephalic; atraumatic  NECK: no JVD; no bruits  CARDIO: regular rate and rhythm; no murmurs or rubs  RESP: clear to auscultation bilaterally; no rales, rhonchi, or wheezes  ABD: soft, non-distended, non-tender, normal bowel sounds  EXT: no cyanosis, clubbing, or edema  SKIN: clean, dry, warm, and intact  MUSCULOSKELETAL: no injury or deformity  NEURO: alert and oriented x 3; nonfocal  BEHAVIOR/EMOTIONAL: appropriate; cooperative     LABS / IMAGING / TELE        Labs  Lab Results   Component Value Date    WBC 13.82 (H) 11/05/2022    HGB 9.9 (L) 11/05/2022    HCT 29.9 (L) 11/05/2022    MCV 89.3 11/05/2022     (H) 11/05/2022     Lab Results   Component Value Date    GLUCOSE 129 (H) 11/05/2022    CALCIUM 8.5 (L) 11/05/2022     (L) 11/05/2022    K 4.5 11/05/2022    CO2 24 11/05/2022     11/05/2022    BUN 26 (H) 11/05/2022    CREATININE 0.7 (L) 11/05/2022     Lab Results   Component Value Date    INR 1.2 10/27/2022    INR 1.4 10/26/2022    INR 1.4 10/25/2022       Imaging  CT HEAD WITHOUT IV CONTRAST    Result Date: 11/2/2022  IMPRESSION: No acute intracranial abnormality.  Chronic ischemic white matter changes are noted.    CT HEAD WITHOUT IV CONTRAST    Result Date: 10/25/2022  IMPRESSION: 1.  No acute intracranial hemorrhage, large territorial infarct, mass effect or midline shift is identified. 2.  Multiple  right maxillary sinus and zygomatic arch fractures with an air-fluid level within the right maxillary sinus.     CT HEAD WITHOUT IV CONTRAST    Result Date: 10/24/2022  IMPRESSION: 1.  No posttraumatic intracranial abnormality. 2. Chronic changes noted under the comment.    CT FACIAL BONES WITHOUT IV CONTRAST    Result Date: 10/24/2022  IMPRESSION: Multiple right-sided facial bone fractures as described under the comment.    CT CERVICAL SPINE WITHOUT IV CONTRAST    Result Date: 10/24/2022  IMPRESSION: 1.  No acute fractures. As clinically indicated, MRI of the cervical spine is recommended for further evaluation. 2.  Other incidental findings noted under the comment.     MRI BRAIN WITHOUT CONTRAST    Result Date: 11/3/2022  IMPRESSION: No intracranial mass or acute abnormality.    MRI ABDOMEN WITH AND WITHOUT CONTRAST    Result Date: 10/29/2022  IMPRESSION: 1. Right hepatic lobe mass on recent CT is most compatible with evolving hemorrhage/infarct centered in segment 7. Additional hemorrhagic/infarcted focus in central segment 5/8, stable in retrospect. Additional benign liver cysts, largest in the left lobe measuring up to 3.9 cm and containing blood products. 2. Similar appearance of right perinephric and right retroperitoneal hematomas with hemorrhagic right renal cyst. 3. Similar right adrenal hemorrhage with developing hemorrhage into the left adrenal gland. 4. Small right pleural effusion with findings suspicious for a component of hemothorax. This could be confirmed with thoracentesis, if necessary. 5. No findings on subtraction imaging to suggest papo active bleeding. Overall, there are no findings suspicious for malignancy; specifically, no suspicious hepatic observations. Given motion degradation on extensive findings, follow-up contrast-enhanced MRI is recommended to assess for resolution of findings and exclude underlying hemorrhagic neoplasm.    ULTRASOUND GUIDED VASCULAR ACCESS    Result Date:  10/26/2022  IMPRESSION:   Successful placement of a retrievable infrarenal IVC Filter. Device: Argon Option Elite IVC Filter COMMENT: PROCEDURE: 1. Ultrasound-guided access of the right common femoral vein. 2. IVC cavagram 3. IVC filter placement RADIOLOGIST:  Sarkis Gerardo MD ANESTHESIA:  Lidocaine 1% CONTRAST:  CO2 FLUORO TIME:  0.8 min REFERENCE AIR KERMA: 40 mGy MEDICATIONS:  none DESCRIPTION OF PROCEDURE:  Consent was obtained following explanation of risks and benefits of the procedure. The right groin was prepped and draped in the usual sterile fashion. The right common femoral vein was noted to be compressible and patent on gray scale ultrasound. Local anesthesia was provided. The vein was then accessed with a  21-gauge needle under ultrasound guidance, and an .018 wire was advanced centrally. An ultrasound-guided access image was saved to PACS. Exchange was made for a microsheath, .035 wire, and multi-sidehole sheath, which was positioned at the iliocaval junction. IVC cavagram was performed with CO2. Renal vein insertion sites were localized. An Argon Option Elite IVC Filter was deployed infrarenally without complication. Wires and catheters were removed and hemostasis was achieved. The patient remained stable throughout the procedure. The number of guidewires used, and their integrity, was confirmed at the end of the procedure.     ULTRASOUND GUIDED VASCULAR ACCESS    Result Date: 10/25/2022  IMPRESSION:   Successful bilateral pulmonary arteriography and catheter-directed thrombolysis. COMMENT: PROCEDURE:  1. Ultrasound guided access of the central right common femoral vein. 2. Subselective right lower lobe pulmonary arteriography. 3. Subselective catheter directed thrombolysis of the right pulmonary arterial thrombus extending into the right lower lobe branch. 4. Subselective left lower lobe pulmonary arteriography. 5. Subselective catheter directed thrombolysis of the left pulmonary arterial thrombus  extending into the left lower lobe branch. RADIOLOGIST: Sarkis Gerardo MD ANESTHESIA:  Local 1% lidocaine. FLUORO TIME:  7.7 minutes REFERENCE AIR KERMA: 44 mGy CONTRAST:  20 cc of Omnipaque 300 DESCRIPTION OF PROCEDURE:    Written informed consent was obtained following explanation of risks and benefits of the procedure. Specifically, risks of cardiac arrhythmia, pulmonary arterial injury, and intracranial hemorrhage were discussed with the patient. Additionally, significantly elevated risk of abdominal hemorrhage related to known adrenal hemorrhage was clearly discussed. The patient was placed supine on the IR procedure table. Maximal sterile barrier precautions were utilized during catheter insertion including aseptic technique, the use of cap, mask, sterile gown, sterile gloves, sterile drapes, hand hygiene, and patient cutaneous antisepsis with chlorhexidene 2%. Grayscale and color Doppler ultrasound evaluation of the right common femoral vein was performed. The right common femoral vein was patent and compressible. The right groin was prepped and draped in usual sterile fashion. Local anesthesia was administered with 1% lidocaine. Under ultrasound guidance, the right common femoral vein was accessed with a 21-gauge needle. An 018 wire was advanced centrally. Sequential exchange was made for a 5 Portuguese a microcatheter sheath, 035 Ness wire, and 5 Portuguese vascular sheath. The sheath was secured with 0-0 silk. Utilizing an H1 catheter with Glidewire, the right main pulmonary artery was selected with successful subselection of a right lower lobe pulmonary arterial branch. Subselective right lower lobe pulmonary arteriography was performed, confirming satisfactory position within the right lower lobe branch distal to the main thrombus. Over a J Phelan wire, exchange was made for the Darrion-Svetlana flush catheter. 10 mg of TPA was then successfully infused into the right main pulmonary artery, extending into the  right lower lobe pulmonary arterial branch. Utilizing an H1 catheter with Glidewire, the left main pulmonary artery was selected with successful subselection of a left lower lobe pulmonary arterial branch. Subselective left lower lobe pulmonary arteriography was performed, confirming satisfactory position within the left lower lobe branch distal to the main thrombus. Over a J Phelan wire, exchange was made for the Darrion-Macnamara flush catheter. 10 mg of TPA was then successfully infused into the left main pulmonary artery, extending into the left lower lobe pulmonary arterial branch. Catheter and sheath were removed. Hemostasis of the right common femoral vein was achieved with manual compression.     CT ABDOMEN PELVIS WITH IV CONTRAST    Result Date: 10/24/2022  IMPRESSION: 1.  Extensive acute pulmonary arterial emboli involving the bilateral main and multiple bilateral lobar, segmental and subsegmental branches, as detailed above. Flattening of the interventricular septum with dilatation of the right ventricle suggestive of right-sided heart strain. No evidence of acute pulmonary infarct. 2.  Large, indeterminate infiltrative hypodense lesion within the right hepatic lobe measuring up to 7 cm warranting further assessment with contrast-enhanced MRI. Differential diagnosis includes metastatic disease, a primary malignancy as well as abscess. Small adjacent similar appearing indeterminate right hepatic lobe lesion. 3.  Indeterminate right adrenal mass with findings consistent with right adrenal hemorrhage. 4.  A 10 mm groundglass nodule within the right middle lobe. A follow-up CT scan of the chest in 6-12 months is recommended as per Fleischner guidelines. 5.  Additional incidental findings including ectasia of ascending thoracic aorta, enlarged prostate gland, coronary arterial atherosclerotic vascular disease and colonic diverticulosis without evidence of diverticulitis. Findings and recommendations discussed  with SUZANNA Muñoz by Dr. Thompson by telephone at 9:54 AM and 10:21 AM on 10/24/2022. Fleischner Society 2017 Guidelines for Management of Incidentally Detected Pulmonary Nodules in Adults. (Subsolid Nodules) Single ground glass: <6 mm - No routine follow-up >/= 6 mm - CT at 6-12 months to confirm persistence, then CT every 2 years until 5 years (In certain suspicious nodules < 6 mm, consider follow-up at 2 and 4 years.  If solid component(s) or growth develops, consider resection (Recommendations 3A and 4A)     IR FILTER PLACEMENT    Result Date: 10/26/2022  IMPRESSION:   Successful placement of a retrievable infrarenal IVC Filter. Device: Argon Option Elite IVC Filter COMMENT: PROCEDURE: 1. Ultrasound-guided access of the right common femoral vein. 2. IVC cavagram 3. IVC filter placement RADIOLOGIST:  Sarkis Gerardo MD ANESTHESIA:  Lidocaine 1% CONTRAST:  CO2 FLUORO TIME:  0.8 min REFERENCE AIR KERMA: 40 mGy MEDICATIONS:  none DESCRIPTION OF PROCEDURE:  Consent was obtained following explanation of risks and benefits of the procedure. The right groin was prepped and draped in the usual sterile fashion. The right common femoral vein was noted to be compressible and patent on gray scale ultrasound. Local anesthesia was provided. The vein was then accessed with a  21-gauge needle under ultrasound guidance, and an .018 wire was advanced centrally. An ultrasound-guided access image was saved to PACS. Exchange was made for a microsheath, .035 wire, and multi-sidehole sheath, which was positioned at the iliocaval junction. IVC cavagram was performed with CO2. Renal vein insertion sites were localized. An Argon Option Elite IVC Filter was deployed infrarenally without complication. Wires and catheters were removed and hemostasis was achieved. The patient remained stable throughout the procedure. The number of guidewires used, and their integrity, was confirmed at the end of the procedure.     CT ANGIOGRAPHY CHEST PULMONARY  EMBOLISM WITH IV CONTRAST    Result Date: 10/24/2022  IMPRESSION: 1.  Extensive acute pulmonary arterial emboli involving the bilateral main and multiple bilateral lobar, segmental and subsegmental branches, as detailed above. Flattening of the interventricular septum with dilatation of the right ventricle suggestive of right-sided heart strain. No evidence of acute pulmonary infarct. 2.  Large, indeterminate infiltrative hypodense lesion within the right hepatic lobe measuring up to 7 cm warranting further assessment with contrast-enhanced MRI. Differential diagnosis includes metastatic disease, a primary malignancy as well as abscess. Small adjacent similar appearing indeterminate right hepatic lobe lesion. 3.  Indeterminate right adrenal mass with findings consistent with right adrenal hemorrhage. 4.  A 10 mm groundglass nodule within the right middle lobe. A follow-up CT scan of the chest in 6-12 months is recommended as per Fleischner guidelines. 5.  Additional incidental findings including ectasia of ascending thoracic aorta, enlarged prostate gland, coronary arterial atherosclerotic vascular disease and colonic diverticulosis without evidence of diverticulitis. Findings and recommendations discussed with SUZANNA Muñoz by Dr. Thompson by telephone at 9:54 AM and 10:21 AM on 10/24/2022. Fleischner Society 2017 Guidelines for Management of Incidentally Detected Pulmonary Nodules in Adults. (Subsolid Nodules) Single ground glass: <6 mm - No routine follow-up >/= 6 mm - CT at 6-12 months to confirm persistence, then CT every 2 years until 5 years (In certain suspicious nodules < 6 mm, consider follow-up at 2 and 4 years.  If solid component(s) or growth develops, consider resection (Recommendations 3A and 4A)     IR THROMBOLYSIS    Result Date: 10/25/2022  IMPRESSION:   Successful bilateral pulmonary arteriography and catheter-directed thrombolysis. COMMENT: PROCEDURE:  1. Ultrasound guided access of the central right  common femoral vein. 2. Subselective right lower lobe pulmonary arteriography. 3. Subselective catheter directed thrombolysis of the right pulmonary arterial thrombus extending into the right lower lobe branch. 4. Subselective left lower lobe pulmonary arteriography. 5. Subselective catheter directed thrombolysis of the left pulmonary arterial thrombus extending into the left lower lobe branch. RADIOLOGIST: Sarkis Gerardo MD ANESTHESIA:  Local 1% lidocaine. FLUORO TIME:  7.7 minutes REFERENCE AIR KERMA: 44 mGy CONTRAST:  20 cc of Omnipaque 300 DESCRIPTION OF PROCEDURE:    Written informed consent was obtained following explanation of risks and benefits of the procedure. Specifically, risks of cardiac arrhythmia, pulmonary arterial injury, and intracranial hemorrhage were discussed with the patient. Additionally, significantly elevated risk of abdominal hemorrhage related to known adrenal hemorrhage was clearly discussed. The patient was placed supine on the IR procedure table. Maximal sterile barrier precautions were utilized during catheter insertion including aseptic technique, the use of cap, mask, sterile gown, sterile gloves, sterile drapes, hand hygiene, and patient cutaneous antisepsis with chlorhexidene 2%. Grayscale and color Doppler ultrasound evaluation of the right common femoral vein was performed. The right common femoral vein was patent and compressible. The right groin was prepped and draped in usual sterile fashion. Local anesthesia was administered with 1% lidocaine. Under ultrasound guidance, the right common femoral vein was accessed with a 21-gauge needle. An 018 wire was advanced centrally. Sequential exchange was made for a 5 Lao a microcatheter sheath, 035 Ness wire, and 5 Lao vascular sheath. The sheath was secured with 0-0 silk. Utilizing an H1 catheter with Glidewire, the right main pulmonary artery was selected with successful subselection of a right lower lobe pulmonary arterial  branch. Subselective right lower lobe pulmonary arteriography was performed, confirming satisfactory position within the right lower lobe branch distal to the main thrombus. Over a J Phelan wire, exchange was made for the Darrion-Macnamara flush catheter. 10 mg of TPA was then successfully infused into the right main pulmonary artery, extending into the right lower lobe pulmonary arterial branch. Utilizing an H1 catheter with Glidewire, the left main pulmonary artery was selected with successful subselection of a left lower lobe pulmonary arterial branch. Subselective left lower lobe pulmonary arteriography was performed, confirming satisfactory position within the left lower lobe branch distal to the main thrombus. Over a J Phelan wire, exchange was made for the Darrion-Macnamara flush catheter. 10 mg of TPA was then successfully infused into the left main pulmonary artery, extending into the left lower lobe pulmonary arterial branch. Catheter and sheath were removed. Hemostasis of the right common femoral vein was achieved with manual compression.     X-RAY CHEST 1 VIEW    Result Date: 10/26/2022  IMPRESSION: No acute cardiopulmonary abnormality seen.. COMMENT:    A single AP view of the chest was performed. Comparison: AP chest x-ray from 10/24/2022. The heart size and pulmonary vasculature remain within normal limits. The lung fields appear clear, and no pleural effusions are seen. No hilar abnormality is identified. There is mild tortuosity of the descending thoracic aorta. An electronic device projects over the left lung base projecting over the left anterior left fourth rib, likely a loop recorder. When compared to the prior study, there has been no significant interval change.     X-RAY CHEST 1 VIEW    Result Date: 10/24/2022  IMPRESSION: No evidence of acute pulmonary disease.     Ultrasound venous leg bilateral    Result Date: 10/25/2022  IMPRESSION: Examination positive for deep vein thrombosis bilaterally as  described, with greater than right.     CT CHEST ABDOMEN PELVIS WITHOUT IV CONTRAST    Result Date: 10/25/2022  IMPRESSION: 1.  Findings concerning for a large right-sided retroperitoneal hemorrhage including the right kidney and right adrenal gland. A small to moderate amount of pelvic ascites is also noted which appears mildly hyperdense and may reflect a component of hemoperitoneum. 2.  Additional chronic and incidental findings as described above. Finding:    Unexpected significant hemorrhage (chest, abdomen, pelvis)   Acuity: Critical  Status:  CLOSED Critical read back was performed and results were read back by Dr. Simms, on 10/25/2022 8:10 PM       ECG/Telemetry  I have independently reviewed the telemetry. No events for the last 24 hours.    ASSESSMENT AND PLAN      LYNNE (acute kidney injury) (CMS/HCC)  Assessment & Plan  -Prerenal, improving  -Cr peaked at 2.3, now resolved    Acute blood loss anemia  Assessment & Plan  -From retroperitoneal hematoma, needing PRBC transfusion  -Thrombocytopenia as well, improving.  DIC labs noted  -Downtrending, continue to monitor    Retroperitoneal hematoma  Assessment & Plan  -CT a/p 10/25: Lg R sided retroperitoneal hemorrhage including R kidney and R adrenal. Component of hemoperitoneum  -With associated acute blood loss anemia requiring prbcs  -Underlying adrenal hemorrhage noted on initial CT, but risk of not anticoagulating outweighed risk of bleed  -Will need to follow to assure no adrenal insufficiency    Pulmonary nodule  Assessment & Plan  -Noted on CT chest 1 cm R perihilar  -No present plan for bx.  Will need repeat imaging 3-6 months, and may need navigational bronch for bx     Liver mass  Assessment & Plan  -Abnormal transaminases  -CT:  7cm hypodense lesion R hepatic lobe, small R hepatic lobe lesion.   -CA 19-9, AFP, hepatitis panel neg.   CEA 3.9 which is theoretically elevated in a non smoker  -MRI: R hepatic lobe looks more like evolving  hemorrhage/infarct.  Benign liver cysts. R perinephric and R retroperitoneal hematomas, hemorrhagic R renal cyst. R adrenal hemorrhage with developing hemorrhage L adrenal.  Small R pleural effusion, possible hemothorax  -I d/w Dr Soto, no plan for bx    Facial fracture (CMS/HCC)  Assessment & Plan  -CT facial bones:Multiple right-sided facial bone fractures as described under the comment.  -Seen by Trauma and plastics  -Seen by plastics who discussed potential facial deformity.  Not a candidate for reconstructive surgery, given his need for AC  -I d/w trauma, no need for abx  -Holding cpap for now    Syncope  Assessment & Plan  -From submassive PE/R heart strain  -PT, OT    Hyperlipidemia  Assessment & Plan  -Lipitor held with abn LFTs    LFT normal resume statin    Myocardial injury  Assessment & Plan  -Trop peaked at 1349, R heart strain from PE    Hypertension  Assessment & Plan  -BP better since resuming Norvasc and Metoprolol  -Holding HCTZ    Blood pressure elevated increase Norvasc from 5 mg to 10 mg    * Pulmonary embolism with acute cor pulmonale (CMS/HCC)  Assessment & Plan  CT chest: Extensive b/l PE with R heart strain. 7cm hypodense lesion R hepatic lobe, small R hepatic lobe lesion. Indeterminate R adrenal mass and hemorrhage.  10 mm RML nodule  -Provoked by underlying hepatic malignancy?   -With acute hypoxic resp failure  -Associated R heart strain  -Started on Heparin drip and underwent TPA to each pulm artery  -RRT 10/26 with hypotension and retroperitoneal hemorrhage  -DVT,  US: B/ L>R.  R nearly occlusive thrombus post tibial vein. Left: Mid femoral vein partially occlusive thrombus. Distal popliteal vein occlusive thrombus.  Peroneal and posterior tibial vein partially/nearly occlusive thrombus.  -Echo 10/24:  TDS.  EF 75%   Grade I LV diastolic dysfunction. Severely dilated RV. Severely reduced RV systolic function c/w RV strain.  Mod dilated RA. Trace TR.   -Echo 10/26:C/w 10/24, RV size dec  and fcn improved   -S/p IVC filter 10/25  -Heparin resumed without a bolus, hgb stable  -Hb stable, change Hep gtt to Eliquis today 11/3  - MRI 11/3: No intracranial mass or acute abnormality.           VTE Assessment: Padua    Code Status: Full Code  Estimated discharge date: 11/4/2022     Toni Stiles MD  11/5/2022  8:06 AM

## 2022-11-05 NOTE — PLAN OF CARE
Plan of Care Review  Plan of Care Reviewed With: patient  Progress: improving  Outcome Summary: VSS on room air. Eliquis administered as ordered. Pt denies pain. Pt up to bathroom 1a with walker. Fall precautions in place, call bell in reach. No further complaints at this time.

## 2022-11-06 VITALS
DIASTOLIC BLOOD PRESSURE: 80 MMHG | HEIGHT: 73 IN | RESPIRATION RATE: 18 BRPM | OXYGEN SATURATION: 96 % | BODY MASS INDEX: 24.65 KG/M2 | SYSTOLIC BLOOD PRESSURE: 136 MMHG | HEART RATE: 85 BPM | WEIGHT: 186 LBS | TEMPERATURE: 98 F

## 2022-11-06 PROBLEM — O22.30 DVT (DEEP VEIN THROMBOSIS) IN PREGNANCY: Status: ACTIVE | Noted: 2022-11-06

## 2022-11-06 LAB
GLUCOSE BLD-MCNC: 133 MG/DL (ref 70–99)
POCT TEST: ABNORMAL

## 2022-11-06 PROCEDURE — 63700000 HC SELF-ADMINISTRABLE DRUG

## 2022-11-06 PROCEDURE — 63700000 HC SELF-ADMINISTRABLE DRUG: Performed by: HOSPITALIST

## 2022-11-06 PROCEDURE — 99239 HOSP IP/OBS DSCHRG MGMT >30: CPT | Performed by: HOSPITALIST

## 2022-11-06 RX ADMIN — POLYETHYLENE GLYCOL 3350 17 G: 17 POWDER, FOR SOLUTION ORAL at 09:10

## 2022-11-06 RX ADMIN — TIMOLOL MALEATE 1 DROP: 5 SOLUTION/ DROPS OPHTHALMIC at 09:12

## 2022-11-06 RX ADMIN — SENNOSIDES AND DOCUSATE SODIUM 2 TABLET: 50; 8.6 TABLET ORAL at 09:10

## 2022-11-06 RX ADMIN — APIXABAN 5 MG: 5 TABLET, FILM COATED ORAL at 09:10

## 2022-11-06 RX ADMIN — PANTOPRAZOLE SODIUM 40 MG: 40 TABLET, DELAYED RELEASE ORAL at 09:10

## 2022-11-06 NOTE — NURSING NOTE
Pt left unit via WC w/ all belongings.  Pt AOx4, in stable condition for discharge.  Able to ambulate w/ RW and supervision x300+ ft, w/o SOB, spO2 > 96%.  Reviewed AVS w/ pt and family @ bedside, all questions answered, verbalized understanding.  IV access removed and tele box returned.  Care plan and education complete.

## 2022-11-06 NOTE — DISCHARGE SUMMARY
Hospital Medicine Service -  Inpatient Discharge Summary        BRIEF OVERVIEW   Admitting Provider: Cheryl Bradford DO  Attending Provider: No att. providers found Attending phys phone: N/A    PCP: Zachary Gutierrez [phone]    Admission Date: 10/24/2022  Discharge Date: 11/6/2022     DISCHARGE DIAGNOSES      Primary Discharge Diagnosis  Pulmonary embolism with acute cor pulmonale (CMS/HCC)    Secondary Discharge Diagnoses  Active Hospital Problems    Diagnosis Date Noted    DVT (deep vein thrombosis) in pregnancy 11/06/2022    Pulmonary nodule 10/27/2022    Retroperitoneal hematoma 10/27/2022    Acute blood loss anemia 10/27/2022    LYNNE (acute kidney injury) (CMS/[facility]) 10/27/2022    Hypertension 10/24/2022    Pulmonary embolism with acute cor pulmonale (CMS/HCC) 10/24/2022    Myocardial injury 10/24/2022    Hyperlipidemia 10/24/2022    Syncope 10/24/2022    Facial fracture (CMS/HCC) 10/24/2022    Fall, initial encounter 10/24/2022    Liver mass 10/24/2022      Resolved Hospital Problems    Diagnosis Date Noted Date Resolved    Adrenal hemorrhage (CMS/HCC) 10/24/2022 10/27/2022     SUMMARY OF HOSPITALIZATION      Presenting Problem/History of Present Illness  Fall, initial encounter [W19.XXXA]  Chin laceration, initial encounter [S01.81XA]  Acute saddle pulmonary embolism with acute cor pulmonale (CMS/HCC) [I26.02]  Syncope, unspecified syncope type [R55]    This is a 77 y.o. year-old male admitted on 10/24/2022 with Fall, initial encounter [W19.XXXA]  Chin laceration, initial encounter [S01.81XA]  Acute saddle pulmonary embolism with acute cor pulmonale (CMS/HCC) [I26.02]  Syncope, unspecified syncope type [R55].    Hospital Course  Patient was evaluated by pulmonary and hematology service after admit to floor.  Because of the extensive clot burden, Dr. Davie Orr commended interventional radiology consultation for catheter directed thrombolysis to pulmonary arteries.  The procedure was done by  interventional radiologist on October 25 with success..  Because hemoglobin dropped, imaging showing large right-sided retroperitoneal hemorrhage.  IVC was placed since anticoagulation treatment was stopped.  The follow-up echocardiogram showed improved RV dysfunction. fortunately patient hemoglobin stabilized since then and no further bleeding signs.  Since the risk of not 10 calculating although with risk of bleeding, IV heparin drip is initiated with closely monitoring for any bleeding signs.  Fortunately patient showed good tolerance and no further signs of bleeding.  We changed to p.o. Eliquis for long-term anticoagulation treatment.     Regarding the finding of liver mass which was further evaluated by MRI and reported more likely involving hemorrhage or infarct.  Also benign liver cysts.  Will recommended the follow-up MRI study after discharge.    Patient also needed a follow-up chest CT without contrast for the incidental finding of the right perihilar nodular 1 cm in 3-6.    The facial fracture appears stable.  Patient may resume CPAP at night for sleep apnea after discharge as tolerated.    Patient will be discharged home with home care follow-up arrangement.     Dc time: 40 mins    Exam on Day of Discharge  Physical Exam    Consults During Admission  IP CONSULT TO PULMONOLOGY/SLEEP MEDICINE  IP CONSULT TO CARDIOLOGY  IP CONSULT TO GASTROENTEROLOGY  IP CONSULT TO PLASTIC SURGERY  IP CONSULT TO HEMATOLOGY/ONCOLOGY  IP CONSULT TO GASTROENTEROLOGY    PROCEDURES / LABS / IMAGING      Operative Procedures      Pertinent Labs  Lab Results   Component Value Date    GLUCOSE 129 (H) 11/05/2022    CALCIUM 8.5 (L) 11/05/2022     (L) 11/05/2022    K 4.5 11/05/2022    CO2 24 11/05/2022     11/05/2022    BUN 26 (H) 11/05/2022    CREATININE 0.7 (L) 11/05/2022     Lab Results   Component Value Date    WBC 13.82 (H) 11/05/2022    HGB 9.9 (L) 11/05/2022    HCT 29.9 (L) 11/05/2022    MCV 89.3 11/05/2022    PLT  404 (H) 11/05/2022       Pertinent Imaging  CT HEAD WITHOUT IV CONTRAST    Result Date: 11/2/2022  IMPRESSION: No acute intracranial abnormality.  Chronic ischemic white matter changes are noted.    CT HEAD WITHOUT IV CONTRAST    Result Date: 10/25/2022  IMPRESSION: 1.  No acute intracranial hemorrhage, large territorial infarct, mass effect or midline shift is identified. 2.  Multiple right maxillary sinus and zygomatic arch fractures with an air-fluid level within the right maxillary sinus.     CT HEAD WITHOUT IV CONTRAST    Result Date: 10/24/2022  IMPRESSION: 1.  No posttraumatic intracranial abnormality. 2. Chronic changes noted under the comment.    CT FACIAL BONES WITHOUT IV CONTRAST    Result Date: 10/24/2022  IMPRESSION: Multiple right-sided facial bone fractures as described under the comment.    CT CERVICAL SPINE WITHOUT IV CONTRAST    Result Date: 10/24/2022  IMPRESSION: 1.  No acute fractures. As clinically indicated, MRI of the cervical spine is recommended for further evaluation. 2.  Other incidental findings noted under the comment.     MRI BRAIN WITHOUT CONTRAST    Result Date: 11/3/2022  IMPRESSION: No intracranial mass or acute abnormality.    MRI ABDOMEN WITH AND WITHOUT CONTRAST    Result Date: 10/29/2022  IMPRESSION: 1. Right hepatic lobe mass on recent CT is most compatible with evolving hemorrhage/infarct centered in segment 7. Additional hemorrhagic/infarcted focus in central segment 5/8, stable in retrospect. Additional benign liver cysts, largest in the left lobe measuring up to 3.9 cm and containing blood products. 2. Similar appearance of right perinephric and right retroperitoneal hematomas with hemorrhagic right renal cyst. 3. Similar right adrenal hemorrhage with developing hemorrhage into the left adrenal gland. 4. Small right pleural effusion with findings suspicious for a component of hemothorax. This could be confirmed with thoracentesis, if necessary. 5. No findings on  subtraction imaging to suggest papo active bleeding. Overall, there are no findings suspicious for malignancy; specifically, no suspicious hepatic observations. Given motion degradation on extensive findings, follow-up contrast-enhanced MRI is recommended to assess for resolution of findings and exclude underlying hemorrhagic neoplasm.    ULTRASOUND GUIDED VASCULAR ACCESS    Result Date: 10/26/2022  IMPRESSION:   Successful placement of a retrievable infrarenal IVC Filter. Device: Argon Option Elite IVC Filter COMMENT: PROCEDURE: 1. Ultrasound-guided access of the right common femoral vein. 2. IVC cavagram 3. IVC filter placement RADIOLOGIST:  Sarkis Gerardo MD ANESTHESIA:  Lidocaine 1% CONTRAST:  CO2 FLUORO TIME:  0.8 min REFERENCE AIR KERMA: 40 mGy MEDICATIONS:  none DESCRIPTION OF PROCEDURE:  Consent was obtained following explanation of risks and benefits of the procedure. The right groin was prepped and draped in the usual sterile fashion. The right common femoral vein was noted to be compressible and patent on gray scale ultrasound. Local anesthesia was provided. The vein was then accessed with a  21-gauge needle under ultrasound guidance, and an .018 wire was advanced centrally. An ultrasound-guided access image was saved to PACS. Exchange was made for a microsheath, .035 wire, and multi-sidehole sheath, which was positioned at the iliocaval junction. IVC cavagram was performed with CO2. Renal vein insertion sites were localized. An Argon Option Elite IVC Filter was deployed infrarenally without complication. Wires and catheters were removed and hemostasis was achieved. The patient remained stable throughout the procedure. The number of guidewires used, and their integrity, was confirmed at the end of the procedure.     ULTRASOUND GUIDED VASCULAR ACCESS    Result Date: 10/25/2022  IMPRESSION:   Successful bilateral pulmonary arteriography and catheter-directed thrombolysis. COMMENT: PROCEDURE:  1.  Ultrasound guided access of the central right common femoral vein. 2. Subselective right lower lobe pulmonary arteriography. 3. Subselective catheter directed thrombolysis of the right pulmonary arterial thrombus extending into the right lower lobe branch. 4. Subselective left lower lobe pulmonary arteriography. 5. Subselective catheter directed thrombolysis of the left pulmonary arterial thrombus extending into the left lower lobe branch. RADIOLOGIST: Sarkis Gerardo MD ANESTHESIA:  Local 1% lidocaine. FLUORO TIME:  7.7 minutes REFERENCE AIR KERMA: 44 mGy CONTRAST:  20 cc of Omnipaque 300 DESCRIPTION OF PROCEDURE:    Written informed consent was obtained following explanation of risks and benefits of the procedure. Specifically, risks of cardiac arrhythmia, pulmonary arterial injury, and intracranial hemorrhage were discussed with the patient. Additionally, significantly elevated risk of abdominal hemorrhage related to known adrenal hemorrhage was clearly discussed. The patient was placed supine on the IR procedure table. Maximal sterile barrier precautions were utilized during catheter insertion including aseptic technique, the use of cap, mask, sterile gown, sterile gloves, sterile drapes, hand hygiene, and patient cutaneous antisepsis with chlorhexidene 2%. Grayscale and color Doppler ultrasound evaluation of the right common femoral vein was performed. The right common femoral vein was patent and compressible. The right groin was prepped and draped in usual sterile fashion. Local anesthesia was administered with 1% lidocaine. Under ultrasound guidance, the right common femoral vein was accessed with a 21-gauge needle. An 018 wire was advanced centrally. Sequential exchange was made for a 5 Slovak a microcatheter sheath, 035 Ness wire, and 5 Slovak vascular sheath. The sheath was secured with 0-0 silk. Utilizing an H1 catheter with Glidewire, the right main pulmonary artery was selected with successful  subselection of a right lower lobe pulmonary arterial branch. Subselective right lower lobe pulmonary arteriography was performed, confirming satisfactory position within the right lower lobe branch distal to the main thrombus. Over a J Phelan wire, exchange was made for the Darrion-Macnamara flush catheter. 10 mg of TPA was then successfully infused into the right main pulmonary artery, extending into the right lower lobe pulmonary arterial branch. Utilizing an H1 catheter with Glidewire, the left main pulmonary artery was selected with successful subselection of a left lower lobe pulmonary arterial branch. Subselective left lower lobe pulmonary arteriography was performed, confirming satisfactory position within the left lower lobe branch distal to the main thrombus. Over a J Phelan wire, exchange was made for the Darrion-Macnamara flush catheter. 10 mg of TPA was then successfully infused into the left main pulmonary artery, extending into the left lower lobe pulmonary arterial branch. Catheter and sheath were removed. Hemostasis of the right common femoral vein was achieved with manual compression.     CT ABDOMEN PELVIS WITH IV CONTRAST    Result Date: 10/24/2022  IMPRESSION: 1.  Extensive acute pulmonary arterial emboli involving the bilateral main and multiple bilateral lobar, segmental and subsegmental branches, as detailed above. Flattening of the interventricular septum with dilatation of the right ventricle suggestive of right-sided heart strain. No evidence of acute pulmonary infarct. 2.  Large, indeterminate infiltrative hypodense lesion within the right hepatic lobe measuring up to 7 cm warranting further assessment with contrast-enhanced MRI. Differential diagnosis includes metastatic disease, a primary malignancy as well as abscess. Small adjacent similar appearing indeterminate right hepatic lobe lesion. 3.  Indeterminate right adrenal mass with findings consistent with right adrenal hemorrhage. 4.  A 10  mm groundglass nodule within the right middle lobe. A follow-up CT scan of the chest in 6-12 months is recommended as per Fleischner guidelines. 5.  Additional incidental findings including ectasia of ascending thoracic aorta, enlarged prostate gland, coronary arterial atherosclerotic vascular disease and colonic diverticulosis without evidence of diverticulitis. Findings and recommendations discussed with SUZANNA Muñoz by Dr. Thompson by telephone at 9:54 AM and 10:21 AM on 10/24/2022. Fleischner Society 2017 Guidelines for Management of Incidentally Detected Pulmonary Nodules in Adults. (Subsolid Nodules) Single ground glass: <6 mm - No routine follow-up >/= 6 mm - CT at 6-12 months to confirm persistence, then CT every 2 years until 5 years (In certain suspicious nodules < 6 mm, consider follow-up at 2 and 4 years.  If solid component(s) or growth develops, consider resection (Recommendations 3A and 4A)     IR FILTER PLACEMENT    Result Date: 10/26/2022  IMPRESSION:   Successful placement of a retrievable infrarenal IVC Filter. Device: Argon Option Elite IVC Filter COMMENT: PROCEDURE: 1. Ultrasound-guided access of the right common femoral vein. 2. IVC cavagram 3. IVC filter placement RADIOLOGIST:  Sakris Gerardo MD ANESTHESIA:  Lidocaine 1% CONTRAST:  CO2 FLUORO TIME:  0.8 min REFERENCE AIR KERMA: 40 mGy MEDICATIONS:  none DESCRIPTION OF PROCEDURE:  Consent was obtained following explanation of risks and benefits of the procedure. The right groin was prepped and draped in the usual sterile fashion. The right common femoral vein was noted to be compressible and patent on gray scale ultrasound. Local anesthesia was provided. The vein was then accessed with a  21-gauge needle under ultrasound guidance, and an .018 wire was advanced centrally. An ultrasound-guided access image was saved to PACS. Exchange was made for a microsheath, .035 wire, and multi-sidehole sheath, which was positioned at the iliocaval junction. IVC  cavagram was performed with CO2. Renal vein insertion sites were localized. An Argon Option Elite IVC Filter was deployed infrarenally without complication. Wires and catheters were removed and hemostasis was achieved. The patient remained stable throughout the procedure. The number of guidewires used, and their integrity, was confirmed at the end of the procedure.     CT ANGIOGRAPHY CHEST PULMONARY EMBOLISM WITH IV CONTRAST    Result Date: 10/24/2022  IMPRESSION: 1.  Extensive acute pulmonary arterial emboli involving the bilateral main and multiple bilateral lobar, segmental and subsegmental branches, as detailed above. Flattening of the interventricular septum with dilatation of the right ventricle suggestive of right-sided heart strain. No evidence of acute pulmonary infarct. 2.  Large, indeterminate infiltrative hypodense lesion within the right hepatic lobe measuring up to 7 cm warranting further assessment with contrast-enhanced MRI. Differential diagnosis includes metastatic disease, a primary malignancy as well as abscess. Small adjacent similar appearing indeterminate right hepatic lobe lesion. 3.  Indeterminate right adrenal mass with findings consistent with right adrenal hemorrhage. 4.  A 10 mm groundglass nodule within the right middle lobe. A follow-up CT scan of the chest in 6-12 months is recommended as per Fleischner guidelines. 5.  Additional incidental findings including ectasia of ascending thoracic aorta, enlarged prostate gland, coronary arterial atherosclerotic vascular disease and colonic diverticulosis without evidence of diverticulitis. Findings and recommendations discussed with SUZANNA Muñoz by Dr. Thompson by telephone at 9:54 AM and 10:21 AM on 10/24/2022. Fleischner Society 2017 Guidelines for Management of Incidentally Detected Pulmonary Nodules in Adults. (Subsolid Nodules) Single ground glass: <6 mm - No routine follow-up >/= 6 mm - CT at 6-12 months to confirm persistence, then CT  every 2 years until 5 years (In certain suspicious nodules < 6 mm, consider follow-up at 2 and 4 years.  If solid component(s) or growth develops, consider resection (Recommendations 3A and 4A)     IR THROMBOLYSIS    Result Date: 10/25/2022  IMPRESSION:   Successful bilateral pulmonary arteriography and catheter-directed thrombolysis. COMMENT: PROCEDURE:  1. Ultrasound guided access of the central right common femoral vein. 2. Subselective right lower lobe pulmonary arteriography. 3. Subselective catheter directed thrombolysis of the right pulmonary arterial thrombus extending into the right lower lobe branch. 4. Subselective left lower lobe pulmonary arteriography. 5. Subselective catheter directed thrombolysis of the left pulmonary arterial thrombus extending into the left lower lobe branch. RADIOLOGIST: Sarkis Gerardo MD ANESTHESIA:  Local 1% lidocaine. FLUORO TIME:  7.7 minutes REFERENCE AIR KERMA: 44 mGy CONTRAST:  20 cc of Omnipaque 300 DESCRIPTION OF PROCEDURE:    Written informed consent was obtained following explanation of risks and benefits of the procedure. Specifically, risks of cardiac arrhythmia, pulmonary arterial injury, and intracranial hemorrhage were discussed with the patient. Additionally, significantly elevated risk of abdominal hemorrhage related to known adrenal hemorrhage was clearly discussed. The patient was placed supine on the IR procedure table. Maximal sterile barrier precautions were utilized during catheter insertion including aseptic technique, the use of cap, mask, sterile gown, sterile gloves, sterile drapes, hand hygiene, and patient cutaneous antisepsis with chlorhexidene 2%. Grayscale and color Doppler ultrasound evaluation of the right common femoral vein was performed. The right common femoral vein was patent and compressible. The right groin was prepped and draped in usual sterile fashion. Local anesthesia was administered with 1% lidocaine. Under ultrasound guidance, the  right common femoral vein was accessed with a 21-gauge needle. An 018 wire was advanced centrally. Sequential exchange was made for a 5 [place] a microcatheter sheath, 035 Ness wire, and 5 [place] vascular sheath. The sheath was secured with 0-0 silk. Utilizing an H1 catheter with Glidewire, the right main pulmonary artery was selected with successful subselection of a right lower lobe pulmonary arterial branch. Subselective right lower lobe pulmonary arteriography was performed, confirming satisfactory position within the right lower lobe branch distal to the main thrombus. Over a J Phelan wire, exchange was made for the Darrion-Macnamara flush catheter. 10 mg of TPA was then successfully infused into the right main pulmonary artery, extending into the right lower lobe pulmonary arterial branch. Utilizing an H1 catheter with Glidewire, the left main pulmonary artery was selected with successful subselection of a left lower lobe pulmonary arterial branch. Subselective left lower lobe pulmonary arteriography was performed, confirming satisfactory position within the left lower lobe branch distal to the main thrombus. Over a J Phelan wire, exchange was made for the Darrion-Macnamara flush catheter. 10 mg of TPA was then successfully infused into the left main pulmonary artery, extending into the left lower lobe pulmonary arterial branch. Catheter and sheath were removed. Hemostasis of the right common femoral vein was achieved with manual compression.     X-RAY CHEST 1 VIEW    Result Date: 10/26/2022  IMPRESSION: No acute cardiopulmonary abnormality seen.. COMMENT:    A single AP view of the chest was performed. Comparison: AP chest x-ray from 10/24/2022. The heart size and pulmonary vasculature remain within normal limits. The lung fields appear clear, and no pleural effusions are seen. No hilar abnormality is identified. There is mild tortuosity of the descending thoracic aorta. An electronic device projects over the left  lung base projecting over the left anterior left fourth rib, likely a loop recorder. When compared to the prior study, there has been no significant interval change.     X-RAY CHEST 1 VIEW    Result Date: 10/24/2022  IMPRESSION: No evidence of acute pulmonary disease.     Ultrasound venous leg bilateral    Result Date: 10/25/2022  IMPRESSION: Examination positive for deep vein thrombosis bilaterally as described, with greater than right.     CT CHEST ABDOMEN PELVIS WITHOUT IV CONTRAST    Result Date: 10/25/2022  IMPRESSION: 1.  Findings concerning for a large right-sided retroperitoneal hemorrhage including the right kidney and right adrenal gland. A small to moderate amount of pelvic ascites is also noted which appears mildly hyperdense and may reflect a component of hemoperitoneum. 2.  Additional chronic and incidental findings as described above. Finding:    Unexpected significant hemorrhage (chest, abdomen, pelvis)   Acuity: Critical  Status:  CLOSED Critical read back was performed and results were read back by Dr. Simms, on 10/25/2022 8:10 PM       OUTPATIENT  FOLLOW-UP / REFERRALS / PENDING TESTS        Outpatient Follow-Up Appointments  Encounter Information    This patient does not currently have any appointments scheduled.         Referrals  No orders of the defined types were placed in this encounter.      Test Results Pending at Discharge  Unresulted Labs (From admission, onward)    None          Important Issues to Address in Follow-Up      DISCHARGE DISPOSITION      Disposition: Home     Code Status At Discharge: Full Code    Physician Order for Life-Sustaining Treatment Document Status      No documents found

## 2022-11-06 NOTE — ASSESSMENT & PLAN NOTE
CT chest: Extensive b/l PE with R heart strain. 7cm hypodense lesion R hepatic lobe, small R hepatic lobe lesion. Indeterminate R adrenal mass and hemorrhage.  10 mm RML nodule  -Provoked by underlying hepatic malignancy?   -With acute hypoxic resp failure  -Associated R heart strain  -Started on Heparin drip and underwent TPA to each pulm artery  -RRT 10/26 with hypotension and retroperitoneal hemorrhage  -DVT,  US: B/ L>R.  R nearly occlusive thrombus post tibial vein. Left: Mid femoral vein partially occlusive thrombus. Distal popliteal vein occlusive thrombus.  Peroneal and posterior tibial vein partially/nearly occlusive thrombus.  -Echo 10/24:  TDS.  EF 75%   Grade I LV diastolic dysfunction. Severely dilated RV. Severely reduced RV systolic function c/w RV strain.  Mod dilated RA. Trace TR.   -Echo 10/26:C/w 10/24, RV size dec and fcn improved   -S/p IVC filter 10/25  -Heparin resumed without a bolus, hgb stable  -Hb stable, change Hep gtt to Eliquis today 11/3  - MRI 11/3: No intracranial mass or acute abnormality.     [FreeTextEntry1] : Ulcers healed   both legs  He  wants  to stick with  Unna Boots  rather than wears tockings  and  Pumps   Unna boots reapplied  [Arterial/Venous Disease] : arterial/venous disease

## 2022-11-06 NOTE — PLAN OF CARE
No events overnight.  Pt resting in his bed.  No c/o pain. VSS. SR on monitor.  Fall precautions maintained.  Call bell and personal effects in reach.

## 2022-11-06 NOTE — PLAN OF CARE
Problem: Adult Inpatient Plan of Care  Goal: Plan of Care Review  Outcome: Met  Goal: Patient-Specific Goal (Individualized)  Outcome: Met  Goal: Absence of Hospital-Acquired Illness or Injury  Outcome: Met  Goal: Optimal Comfort and Wellbeing  Outcome: Met  Goal: Readiness for Transition of Care  Outcome: Met     Problem: Fall Injury Risk  Goal: Absence of Fall and Fall-Related Injury  Outcome: Met     Problem: Bleeding  Goal: Absence of Bleeding  Outcome: Met     Problem: Skin Injury Risk Increased  Goal: Skin Health and Integrity  Outcome: Met     Problem: Violence Risk or Actual  Goal: Anger and Impulse Control  Outcome: Met     Problem: Gas Exchange Impaired  Goal: Optimal Gas Exchange  Outcome: Met     Problem: Mobility Impairment  Goal: Optimal Mobility  Outcome: Met     Problem: Self-Care Deficit  Goal: Improved Ability to Complete Activities of Daily Living  Outcome: Met   Plan of Care Review  Plan of Care Reviewed With: patient  Progress: improving  Outcome Summary: Pt AOx4, BELLO, ambulatory w/ supervision and RW (tolerated 200+ ft).  Pt and family member spoke w/ CC at bedside r/t discharge plan, updated on plan of care, verbalized understanding.  Fall/safety precautions maintained, will continue to assess and intervene.

## 2022-11-06 NOTE — PROGRESS NOTES
I looked up [facility] info and found out pt's appeal to medicre as been rejected and pt will be financially responsible for hospital costs after 12 noon today.i soke with wife harpreet and she has received notice from [facility] as well,she will come to hospital at 11;00 and transport pt home.i called [facility] to let them know pt being dc home today.wife is asking for nursing,pt.ot and a hh aide.nursing and attending notified of decision from medicare.

## 2022-11-22 ENCOUNTER — PATIENT OUTREACH (OUTPATIENT)
Dept: HEMATOLOGY/ONCOLOGY | Facility: HOSPITAL | Age: 77
End: 2022-11-22
Payer: COMMERCIAL

## 2022-11-22 NOTE — LETTER
November 22, 2022    Tim GUTIERRES Yandel  [address] [facility] [address]    Dear Mr. Humphries:    You were referred to [facility] Pulmonary Nodule Program when CTA Chest done on 10/24/2022 while you were hospitalized at [facility] showed a lung nodule.  At least 50% of people have lung nodules by age 50; 95% of lung nodules are NOT cancer.  It is recommended that you have a follow up CT chest in 3-6 months to make sure the nodule has not grown or changed.  Follow up is very important to identify possible problems at an early stage.    I notified your primary care provider, Zahcary Gutierrez MD, of this result and recommendation.  You should discuss this matter with Dr. Gutierrez at your earliest opportunity.     Please feel free to call me at [phone] if you have any questions or concerns.     Sincerely,         Dyan Black, JASSONN, RN, OCN  Program Navigator

## 2022-11-22 NOTE — LETTER
2022    Zachary Gutierrez MD  [address] [place] [address] [address]  [facility] [address]     Re: Tim Humphries    : 1945    Dear Dr. Gutierrez:    Your patient Tim Humphries was referred to the [facility] Pulmonary Nodule Program after CTA Chest done on 10/24/2022 while hospitalized at [facility] had incidental finding of 1 cm right middle lobe groundglass opacity.  Fleischner Society Guidelines recommend follow up CT Chest at 3-6 months.  Patient was followed by pulmonology during his hospitalization, and they concur with this recommendation.    Follow up by the program will not take the place of appropriate medical care by the patient's primary care provider or pulmonologist. The team will keep your patient in our database which will prompt us to send notification before any scan is due to remind your office to contact the patient and schedule follow up testing.      Please feel free to contact the nurse navigator at [phone] with any questions.    Sincerely,    Amalia Black, JASSONN, RN, OCN  Program Navigator    CC: Tim Humphries

## 2022-11-22 NOTE — PATIENT INSTRUCTIONS
"THIS DOCUMENT APPEARS AS AN \"AFTER VISIT SUMMARY\" IN [facility].  THIS IS A NON-BILLABLE ENCOUNTER AND THIS NOTE IS FOR RECORD REVIEW OR PATIENT COMMUNICATION PURPOSES.         [address] [facility]  [place]  [address]  Phone  [phone]  Fax  [phone]      November 22, 2022    Tim GUTIERRES Yandel  [address] [facility] [address]    Dear Mr. Humphries:    You were referred to [facility] Pulmonary Nodule Program when CTA Chest done on 10/24/2022 while you were hospitalized at [facility] showed a lung nodule.  At least 50% of people have lung nodules by age 50; 95% of lung nodules are NOT cancer.  It is recommended that you have a follow up CT chest in 3-6 months to make sure the nodule has not grown or changed.  Follow up is very important to identify possible problems at an early stage.    I notified your primary care provider, Zachary Gutierrez MD, of this result and recommendation.  You should discuss this matter with Dr. Gutierrez at your earliest opportunity.     Please feel free to call me at [phone] if you have any questions or concerns.     Sincerely,        Dyan Black, JASSONN, RN, OCN  Program Navigator  [phone]         "

## 2022-11-22 NOTE — PROGRESS NOTES
Patient referred to Pulmonary Nodule Program when CTA Chest done on 10/24/2022 had incidental finding of 1 cm right middle lobe groundglass opacity.  Follow up CT Chest at 3-6 months recommended per Fleischner Society guidelines and pulmonary input.  Contacted PCP and patient to determine follow up needs.

## 2023-11-16 NOTE — PROGRESS NOTES
Hematology/Oncology Progress Note       SUBJECTIVE  Patient reports feeling comfortable.  Denies any chest pain, progr dyspnea, abdominal pain, bleeding, headache, rash, edema, or pain otherwise.  Wife at bedside c/f poor sleep in absence of CPAP, but notes pulm yest told her no CPAP x 3 wks given facial frx.  Disc'd further at her request and she seemed to process/understand the delicate balance of all of his current issues.    CURRENT MEDS  Current Facility-Administered Medications   Medication Dose Route Frequency    acetaminophen  650 mg oral q4h PRN    albuterol  5 mg nebulization q6h PRN    amLODIPine  5 mg oral q PM    glucose  16-32 g of dextrose oral PRN    Or    dextrose  15-30 g of dextrose oral PRN    Or    glucagon  1 mg intramuscular PRN    Or    dextrose 50 % in water (D50)  25 mL intravenous PRN    heparin (porcine)  40-80 Units/kg intravenous q6h PRN    heparin infusion - MAR calculator by PTT  100-4,000 Units/hr intravenous Titrated    hydrALAZINE  10 mg intravenous q6h PRN    HYDROmorphone  0.25-0.5 mg intravenous q3h PRN    insulin aspart U-100  2-10 Units subcutaneous With meals & nightly    latanoprost  1 drop Both Eyes Nightly    metoprolol succinate XL  50 mg oral q PM    nitroglycerin  0.4 mg sublingual q5 min PRN    oxyCODONE  5 mg oral q6h PRN    pantoprazole  40 mg oral Daily    polyethylene glycol  17 g oral Daily    sennosides-docusate sodium  2 tablet oral Daily    timolol  1 drop Left Eye q AM       VITAL SIGNS  Temp:  [36.6 °C (97.9 °F)-36.9 °C (98.4 °F)] 36.8 °C (98.3 °F)  Heart Rate:  [] 97  Resp:  [18-20] 18  BP: (126-156)/(62-89) 133/62    Intake/Output Summary (Last 24 hours) at 10/29/2022 1129  Last data filed at 10/29/2022 0400  Gross per 24 hour   Intake 240 ml   Output 750 ml   Net -510 ml       PHYSICAL EXAM  Constitutional:  Comfortable appearing, sitting up in bed, in no acute distress.   HEENT: Oropharynx clear.  Moist mucous membranes.  Scl  Labs obtained via port as ordered without difficulty. Port flushed per protocol. Discharged in stable condition. anict.  Mult fac'l contusions.  Neck: Supple.   Cardiovascular: Normal and regular S1 and S2, no murmurs.  Chest: Sl coarse BS.  Abdomen: Soft, nontender, nondistended, bowel sounds are present.  No hepatosplenomegaly.  Musculoskeletal: No spinal or CVAT.  Extremities: BLE 1+ edema.  No palp cords. Flora's sign absent.  Neurological: Tired, blunted affect, deferring to wife for most details, but awake, not agitated, and able to answer q's.  Skin: Skin is warm and dry.  No rash.  Hematologic: No petechiae or purpura.  Difuse echymoses.  Lymph Nodes: No cervical lymphadenopathy.    LAB RESULTS  CBC  Results from last 7 days   Lab Units 10/29/22  0436 10/28/22  0540 10/27/22  1605 10/27/22  0633 10/26/22  2026   WBC K/uL 12.75* 14.55* 15.37* 15.25* 15.62*   RBC M/uL 3.29* 3.38* 3.54* 3.53* 3.50*   HEMOGLOBIN g/dL 9.7* 10.0* 10.6* 10.3* 10.2*  10.2*   HEMATOCRIT % 29.3* 29.7* 30.8* 31.0* 30.5*  30.5*   MCV fL 89.1 87.9 87.0 87.8 87.1   PLATELETS K/uL 136* 116* 105* 95* 122*     Diff  Results from last 7 days   Lab Units 10/29/22  0436 10/27/22  1605 10/27/22  0633 10/26/22  2026 10/26/22  0444   NRBC % 0.0 0.0 0.0 0.0 0.0   IMM GRANULOCYTES % 0.7 0.7 1.3 0.9 0.7   NEUTROPHILS % 82.9 84.8 84.5 83.3 82.4   LYMPHOCYTES % 5.8 5.3 4.6 6.0 5.5   MONOCYTES % 9.4 8.9 9.4 9.7 11.2   EOSINOPHILS % 1.0 0.1 0.0 0.0 0.0   BASOPHILS % 0.2 0.2 0.2 0.1 0.2   IMM GRANUCOCYTES ABS K/uL 0.09* 0.10* 0.20* 0.14* 0.12*   NEUTRO ABS K/uL 10.56* 13.04* 12.89* 13.01* 13.19*   PLT MORPHOLOGY   --   --  Normal  --   --      Chemistry   Results from last 7 days   Lab Units 10/29/22  0436 10/28/22  0540 10/27/22  1605   SODIUM mEQ/L 139 140 138   POTASSIUM mEQ/L 4.2 4.0 4.1   CHLORIDE mEQ/L 108 106 108   CO2 mEQ/L 24 25 22   BUN mg/dL 40* 41* 44*   CREATININE mg/dL 1.0 1.1 1.1   CALCIUM mg/dL 8.0* 8.3* 8.4*   ALBUMIN g/dL 3.0* 3.1* 3.3*   BILIRUBIN TOTAL mg/dL 1.5* 1.4* 1.3*   ALK PHOS IU/L 58 57 48   ALT IU/L 92* 124* 159*   AST IU/L 23 30 38    GLUCOSE mg/dL 135* 150* 134*     Coag  Results from last 7 days   Lab Units 10/29/22  0436 10/27/22  1605 10/26/22  0444 10/25/22  2139 10/25/22  1855   PROTIME sec  --  14.7* 16.5*  --  16.7*   INR   --  1.2 1.4  --  1.4   PTT sec 31 32 26   < > 52*    < > = values in this interval not displayed.     Micro  Microbiology Results     Procedure Component Value Units Date/Time    Blood Culture Blood, Venous [[id number]]  (Normal) Collected: 10/25/22 2248    Specimen: Blood, Venous Updated: 10/29/22 0600     Culture No growth at 72 hours    Blood Culture Blood, Venous [[id number]]  (Normal) Collected: 10/25/22 2222    Specimen: Blood, Venous Updated: 10/29/22 0600     Culture No growth at 72 hours    MRSA Screen, Nares Only Nose [[id number]]  (Normal) Collected: 10/25/22 2019    Specimen: Nasal Swab from Nose Updated: 10/26/22 0216     MRSA DNA, Nares Negative    SARS-CoV-2 (COVID-19), PCR Nasopharynx [[id number]]  (Normal) Collected: 10/24/22 0839    Specimen: Nasopharyngeal Swab from Nasopharynx Updated: 10/24/22 0925    Narrative:      The following orders were created for panel order SARS-CoV-2 (COVID-19), PCR Nasopharynx.  Procedure                               Abnormality         Status                     ---------                               -----------         ------                     SARS-COV-2 (COVID-19)/ F...[[id number]]  Normal              Final result                 Please view results for these tests on the individual orders.    SARS-COV-2 (COVID-19)/ FLU A/B, AND RSV, PCR Nasopharynx [[id number]]  (Normal) Collected: 10/24/22 0839    Specimen: Nasopharyngeal Swab from Nasopharynx Updated: 10/24/22 0925     SARS-CoV-2 (COVID-19) Negative     Influenza A Negative     Influenza B Negative     Respiratory Syncytial Virus Negative    Narrative:      Testing performed using real-time PCR for detection of COVID-19. EUA approved validation studies performed on site.         IMAGING  CT HEAD WITHOUT IV  CONTRAST    Result Date: 10/25/2022  IMPRESSION: 1.  No acute intracranial hemorrhage, large territorial infarct, mass effect or midline shift is identified. 2.  Multiple right maxillary sinus and zygomatic arch fractures with an air-fluid level within the right maxillary sinus.     CT HEAD WITHOUT IV CONTRAST    Result Date: 10/24/2022  IMPRESSION: 1.  No posttraumatic intracranial abnormality. 2. Chronic changes noted under the comment.    CT FACIAL BONES WITHOUT IV CONTRAST    Result Date: 10/24/2022  IMPRESSION: Multiple right-sided facial bone fractures as described under the comment.    CT CERVICAL SPINE WITHOUT IV CONTRAST    Result Date: 10/24/2022  IMPRESSION: 1.  No acute fractures. As clinically indicated, MRI of the cervical spine is recommended for further evaluation. 2.  Other incidental findings noted under the comment.     MRI ABDOMEN WITH AND WITHOUT CONTRAST    Result Date: 10/29/2022  IMPRESSION: 1. Right hepatic lobe mass on recent CT is most compatible with evolving hemorrhage/infarct centered in segment 7. Additional hemorrhagic/infarcted focus in central segment 5/8, stable in retrospect. Additional benign liver cysts, largest in the left lobe measuring up to 3.9 cm and containing blood products. 2. Similar appearance of right perinephric and right retroperitoneal hematomas with hemorrhagic right renal cyst. 3. Similar right adrenal hemorrhage with developing hemorrhage into the left adrenal gland. 4. Small right pleural effusion with findings suspicious for a component of hemothorax. This could be confirmed with thoracentesis, if necessary. 5. No findings on subtraction imaging to suggest papo active bleeding. Overall, there are no findings suspicious for malignancy; specifically, no suspicious hepatic observations. Given motion degradation on extensive findings, follow-up contrast-enhanced MRI is recommended to assess for resolution of findings and exclude underlying hemorrhagic  neoplasm.    ULTRASOUND GUIDED VASCULAR ACCESS    Result Date: 10/26/2022  IMPRESSION:   Successful placement of a retrievable infrarenal IVC Filter. Device: Argon Option Elite IVC Filter COMMENT: PROCEDURE: 1. Ultrasound-guided access of the right common femoral vein. 2. IVC cavagram 3. IVC filter placement RADIOLOGIST:  Sarkis Gerardo MD ANESTHESIA:  Lidocaine 1% CONTRAST:  CO2 FLUORO TIME:  0.8 min REFERENCE AIR KERMA: 40 mGy MEDICATIONS:  none DESCRIPTION OF PROCEDURE:  Consent was obtained following explanation of risks and benefits of the procedure. The right groin was prepped and draped in the usual sterile fashion. The right common femoral vein was noted to be compressible and patent on gray scale ultrasound. Local anesthesia was provided. The vein was then accessed with a  21-gauge needle under ultrasound guidance, and an .018 wire was advanced centrally. An ultrasound-guided access image was saved to PACS. Exchange was made for a microsheath, .035 wire, and multi-sidehole sheath, which was positioned at the iliocaval junction. IVC cavagram was performed with CO2. Renal vein insertion sites were localized. An Argon Option Elite IVC Filter was deployed infrarenally without complication. Wires and catheters were removed and hemostasis was achieved. The patient remained stable throughout the procedure. The number of guidewires used, and their integrity, was confirmed at the end of the procedure.     ULTRASOUND GUIDED VASCULAR ACCESS    Result Date: 10/25/2022  IMPRESSION:   Successful bilateral pulmonary arteriography and catheter-directed thrombolysis. COMMENT: PROCEDURE:  1. Ultrasound guided access of the central right common femoral vein. 2. Subselective right lower lobe pulmonary arteriography. 3. Subselective catheter directed thrombolysis of the right pulmonary arterial thrombus extending into the right lower lobe branch. 4. Subselective left lower lobe pulmonary arteriography. 5. Subselective catheter  directed thrombolysis of the left pulmonary arterial thrombus extending into the left lower lobe branch. RADIOLOGIST: Sarkis Gerardo MD ANESTHESIA:  Local 1% lidocaine. FLUORO TIME:  7.7 minutes REFERENCE AIR KERMA: 44 mGy CONTRAST:  20 cc of Omnipaque 300 DESCRIPTION OF PROCEDURE:    Written informed consent was obtained following explanation of risks and benefits of the procedure. Specifically, risks of cardiac arrhythmia, pulmonary arterial injury, and intracranial hemorrhage were discussed with the patient. Additionally, significantly elevated risk of abdominal hemorrhage related to known adrenal hemorrhage was clearly discussed. The patient was placed supine on the IR procedure table. Maximal sterile barrier precautions were utilized during catheter insertion including aseptic technique, the use of cap, mask, sterile gown, sterile gloves, sterile drapes, hand hygiene, and patient cutaneous antisepsis with chlorhexidene 2%. Grayscale and color Doppler ultrasound evaluation of the right common femoral vein was performed. The right common femoral vein was patent and compressible. The right groin was prepped and draped in usual sterile fashion. Local anesthesia was administered with 1% lidocaine. Under ultrasound guidance, the right common femoral vein was accessed with a 21-gauge needle. An 018 wire was advanced centrally. Sequential exchange was made for a 5 Welsh a microcatheter sheath, 035 Ness wire, and 5 Welsh vascular sheath. The sheath was secured with 0-0 silk. Utilizing an H1 catheter with Glidewire, the right main pulmonary artery was selected with successful subselection of a right lower lobe pulmonary arterial branch. Subselective right lower lobe pulmonary arteriography was performed, confirming satisfactory position within the right lower lobe branch distal to the main thrombus. Over a J Phelan wire, exchange was made for the Darrion-Svetlana flush catheter. 10 mg of TPA was then successfully  infused into the right main pulmonary artery, extending into the right lower lobe pulmonary arterial branch. Utilizing an H1 catheter with Glidewire, the left main pulmonary artery was selected with successful subselection of a left lower lobe pulmonary arterial branch. Subselective left lower lobe pulmonary arteriography was performed, confirming satisfactory position within the left lower lobe branch distal to the main thrombus. Over a J Phelan wire, exchange was made for the Darrion-Macnamara flush catheter. 10 mg of TPA was then successfully infused into the left main pulmonary artery, extending into the left lower lobe pulmonary arterial branch. Catheter and sheath were removed. Hemostasis of the right common femoral vein was achieved with manual compression.     CT ABDOMEN PELVIS WITH IV CONTRAST    Result Date: 10/24/2022  IMPRESSION: 1.  Extensive acute pulmonary arterial emboli involving the bilateral main and multiple bilateral lobar, segmental and subsegmental branches, as detailed above. Flattening of the interventricular septum with dilatation of the right ventricle suggestive of right-sided heart strain. No evidence of acute pulmonary infarct. 2.  Large, indeterminate infiltrative hypodense lesion within the right hepatic lobe measuring up to 7 cm warranting further assessment with contrast-enhanced MRI. Differential diagnosis includes metastatic disease, a primary malignancy as well as abscess. Small adjacent similar appearing indeterminate right hepatic lobe lesion. 3.  Indeterminate right adrenal mass with findings consistent with right adrenal hemorrhage. 4.  A 10 mm groundglass nodule within the right middle lobe. A follow-up CT scan of the chest in 6-12 months is recommended as per Fleischner guidelines. 5.  Additional incidental findings including ectasia of ascending thoracic aorta, enlarged prostate gland, coronary arterial atherosclerotic vascular disease and colonic diverticulosis without  evidence of diverticulitis. Findings and recommendations discussed with SUZANNA Muñoz by Dr. Thompson by telephone at 9:54 AM and 10:21 AM on 10/24/2022. Fleischner Society 2017 Guidelines for Management of Incidentally Detected Pulmonary Nodules in Adults. (Subsolid Nodules) Single ground glass: <6 mm - No routine follow-up >/= 6 mm - CT at 6-12 months to confirm persistence, then CT every 2 years until 5 years (In certain suspicious nodules < 6 mm, consider follow-up at 2 and 4 years.  If solid component(s) or growth develops, consider resection (Recommendations 3A and 4A)     IR FILTER PLACEMENT    Result Date: 10/26/2022  IMPRESSION:   Successful placement of a retrievable infrarenal IVC Filter. Device: Argon Option Elite IVC Filter COMMENT: PROCEDURE: 1. Ultrasound-guided access of the right common femoral vein. 2. IVC cavagram 3. IVC filter placement RADIOLOGIST:  Sarkis Gerardo MD ANESTHESIA:  Lidocaine 1% CONTRAST:  CO2 FLUORO TIME:  0.8 min REFERENCE AIR KERMA: 40 mGy MEDICATIONS:  none DESCRIPTION OF PROCEDURE:  Consent was obtained following explanation of risks and benefits of the procedure. The right groin was prepped and draped in the usual sterile fashion. The right common femoral vein was noted to be compressible and patent on gray scale ultrasound. Local anesthesia was provided. The vein was then accessed with a  21-gauge needle under ultrasound guidance, and an .018 wire was advanced centrally. An ultrasound-guided access image was saved to PACS. Exchange was made for a microsheath, .035 wire, and multi-sidehole sheath, which was positioned at the iliocaval junction. IVC cavagram was performed with CO2. Renal vein insertion sites were localized. An Argon Option Elite IVC Filter was deployed infrarenally without complication. Wires and catheters were removed and hemostasis was achieved. The patient remained stable throughout the procedure. The number of guidewires used, and their integrity, was  confirmed at the end of the procedure.     CT ANGIOGRAPHY CHEST PULMONARY EMBOLISM WITH IV CONTRAST    Result Date: 10/24/2022  IMPRESSION: 1.  Extensive acute pulmonary arterial emboli involving the bilateral main and multiple bilateral lobar, segmental and subsegmental branches, as detailed above. Flattening of the interventricular septum with dilatation of the right ventricle suggestive of right-sided heart strain. No evidence of acute pulmonary infarct. 2.  Large, indeterminate infiltrative hypodense lesion within the right hepatic lobe measuring up to 7 cm warranting further assessment with contrast-enhanced MRI. Differential diagnosis includes metastatic disease, a primary malignancy as well as abscess. Small adjacent similar appearing indeterminate right hepatic lobe lesion. 3.  Indeterminate right adrenal mass with findings consistent with right adrenal hemorrhage. 4.  A 10 mm groundglass nodule within the right middle lobe. A follow-up CT scan of the chest in 6-12 months is recommended as per Fleischner guidelines. 5.  Additional incidental findings including ectasia of ascending thoracic aorta, enlarged prostate gland, coronary arterial atherosclerotic vascular disease and colonic diverticulosis without evidence of diverticulitis. Findings and recommendations discussed with SUZANNA Muñoz by Dr. Thompson by telephone at 9:54 AM and 10:21 AM on 10/24/2022. Fleischner Society 2017 Guidelines for Management of Incidentally Detected Pulmonary Nodules in Adults. (Subsolid Nodules) Single ground glass: <6 mm - No routine follow-up >/= 6 mm - CT at 6-12 months to confirm persistence, then CT every 2 years until 5 years (In certain suspicious nodules < 6 mm, consider follow-up at 2 and 4 years.  If solid component(s) or growth develops, consider resection (Recommendations 3A and 4A)     IR THROMBOLYSIS    Result Date: 10/25/2022  IMPRESSION:   Successful bilateral pulmonary arteriography and catheter-directed  thrombolysis. COMMENT: PROCEDURE:  1. Ultrasound guided access of the central right common femoral vein. 2. Subselective right lower lobe pulmonary arteriography. 3. Subselective catheter directed thrombolysis of the right pulmonary arterial thrombus extending into the right lower lobe branch. 4. Subselective left lower lobe pulmonary arteriography. 5. Subselective catheter directed thrombolysis of the left pulmonary arterial thrombus extending into the left lower lobe branch. RADIOLOGIST: Sarkis Gerardo MD ANESTHESIA:  Local 1% lidocaine. FLUORO TIME:  7.7 minutes REFERENCE AIR KERMA: 44 mGy CONTRAST:  20 cc of Omnipaque 300 DESCRIPTION OF PROCEDURE:    Written informed consent was obtained following explanation of risks and benefits of the procedure. Specifically, risks of cardiac arrhythmia, pulmonary arterial injury, and intracranial hemorrhage were discussed with the patient. Additionally, significantly elevated risk of abdominal hemorrhage related to known adrenal hemorrhage was clearly discussed. The patient was placed supine on the IR procedure table. Maximal sterile barrier precautions were utilized during catheter insertion including aseptic technique, the use of cap, mask, sterile gown, sterile gloves, sterile drapes, hand hygiene, and patient cutaneous antisepsis with chlorhexidene 2%. Grayscale and color Doppler ultrasound evaluation of the right common femoral vein was performed. The right common femoral vein was patent and compressible. The right groin was prepped and draped in usual sterile fashion. Local anesthesia was administered with 1% lidocaine. Under ultrasound guidance, the right common femoral vein was accessed with a 21-gauge needle. An 018 wire was advanced centrally. Sequential exchange was made for a 5 Lithuanian a microcatheter sheath, 035 Ness wire, and 5 Lithuanian vascular sheath. The sheath was secured with 0-0 silk. Utilizing an H1 catheter with Glidewire, the right main pulmonary artery  was selected with successful subselection of a right lower lobe pulmonary arterial branch. Subselective right lower lobe pulmonary arteriography was performed, confirming satisfactory position within the right lower lobe branch distal to the main thrombus. Over a J Phelan wire, exchange was made for the Darrion-Macnamara flush catheter. 10 mg of TPA was then successfully infused into the right main pulmonary artery, extending into the right lower lobe pulmonary arterial branch. Utilizing an H1 catheter with Glidewire, the left main pulmonary artery was selected with successful subselection of a left lower lobe pulmonary arterial branch. Subselective left lower lobe pulmonary arteriography was performed, confirming satisfactory position within the left lower lobe branch distal to the main thrombus. Over a J Phelan wire, exchange was made for the Darrion-Macnamara flush catheter. 10 mg of TPA was then successfully infused into the left main pulmonary artery, extending into the left lower lobe pulmonary arterial branch. Catheter and sheath were removed. Hemostasis of the right common femoral vein was achieved with manual compression.     X-RAY CHEST 1 VIEW    Result Date: 10/26/2022  IMPRESSION: No acute cardiopulmonary abnormality seen.. COMMENT:    A single AP view of the chest was performed. Comparison: AP chest x-ray from 10/24/2022. The heart size and pulmonary vasculature remain within normal limits. The lung fields appear clear, and no pleural effusions are seen. No hilar abnormality is identified. There is mild tortuosity of the descending thoracic aorta. An electronic device projects over the left lung base projecting over the left anterior left fourth rib, likely a loop recorder. When compared to the prior study, there has been no significant interval change.     X-RAY CHEST 1 VIEW    Result Date: 10/24/2022  IMPRESSION: No evidence of acute pulmonary disease.     Ultrasound venous leg bilateral    Result Date:  10/25/2022  IMPRESSION: Examination positive for deep vein thrombosis bilaterally as described, with greater than right.     CT CHEST ABDOMEN PELVIS WITHOUT IV CONTRAST    Result Date: 10/25/2022  IMPRESSION: 1.  Findings concerning for a large right-sided retroperitoneal hemorrhage including the right kidney and right adrenal gland. A small to moderate amount of pelvic ascites is also noted which appears mildly hyperdense and may reflect a component of hemoperitoneum. 2.  Additional chronic and incidental findings as described above. Finding:    Unexpected significant hemorrhage (chest, abdomen, pelvis)   Acuity: Critical  Status:  CLOSED Critical read back was performed and results were read back by Dr. Simms, on 10/25/2022 8:10 PM       ASSESSMENT & PLAN  76 yo male presenting with submassive pulmonary embolism and bilateral DVT, w/ c/f liver, adrenal, and retroperitoneal hemorrhage following catheter directed thrombolysis and IVC filter placement 10/25.  Also w/ 10mm ground glass lung nodule on imaging as well.     DVT/PE, s/p lytic tx to DVT, s/p IVC filter  Course c/b mult hemorrhages (liver, adrenal, RP)  anticoag'n held and Hb stable  Still requiring 4 L supplemental oxygen due to submassive PE  Plan now to try unfractionated heparin drip without bolus, with CBCs every 12 hours, with low threshold to hold should hemoglobin fall once again  No mass in liver on MRI  Lung mass, t/c bx once stabilizes (high c/f Trousseau syndrome with malignancy driving virulent clot burden.)  Cont to titrate pain and bowel regimens prn  Will cont to follow; d/w'd pt and HMS Dr. Rodriguez today  Dr. Soto returns 10/31/22    Becki oHlt MD